# Patient Record
Sex: FEMALE | Race: WHITE
[De-identification: names, ages, dates, MRNs, and addresses within clinical notes are randomized per-mention and may not be internally consistent; named-entity substitution may affect disease eponyms.]

---

## 2017-01-19 ENCOUNTER — RX RENEWAL (OUTPATIENT)
Age: 82
End: 2017-01-19

## 2017-03-16 ENCOUNTER — APPOINTMENT (OUTPATIENT)
Dept: FAMILY MEDICINE | Facility: CLINIC | Age: 82
End: 2017-03-16

## 2017-03-16 VITALS
HEIGHT: 60 IN | SYSTOLIC BLOOD PRESSURE: 140 MMHG | WEIGHT: 129.38 LBS | DIASTOLIC BLOOD PRESSURE: 78 MMHG | BODY MASS INDEX: 25.4 KG/M2

## 2017-03-27 ENCOUNTER — RX RENEWAL (OUTPATIENT)
Age: 82
End: 2017-03-27

## 2017-06-14 ENCOUNTER — FORM ENCOUNTER (OUTPATIENT)
Age: 82
End: 2017-06-14

## 2017-06-14 ENCOUNTER — LABORATORY RESULT (OUTPATIENT)
Age: 82
End: 2017-06-14

## 2017-06-14 ENCOUNTER — APPOINTMENT (OUTPATIENT)
Dept: FAMILY MEDICINE | Facility: CLINIC | Age: 82
End: 2017-06-14

## 2017-06-14 VITALS
OXYGEN SATURATION: 95 % | DIASTOLIC BLOOD PRESSURE: 80 MMHG | SYSTOLIC BLOOD PRESSURE: 148 MMHG | RESPIRATION RATE: 16 BRPM | HEIGHT: 60 IN | WEIGHT: 129.13 LBS | BODY MASS INDEX: 25.35 KG/M2 | HEART RATE: 87 BPM

## 2017-06-15 ENCOUNTER — APPOINTMENT (OUTPATIENT)
Dept: RADIOLOGY | Facility: CLINIC | Age: 82
End: 2017-06-15

## 2017-06-15 ENCOUNTER — OUTPATIENT (OUTPATIENT)
Dept: OUTPATIENT SERVICES | Facility: HOSPITAL | Age: 82
LOS: 1 days | End: 2017-06-15
Payer: MEDICARE

## 2017-06-15 DIAGNOSIS — Z00.8 ENCOUNTER FOR OTHER GENERAL EXAMINATION: ICD-10-CM

## 2017-06-15 LAB
25(OH)D3 SERPL-MCNC: 60.4 NG/ML
ALBUMIN SERPL ELPH-MCNC: 4.2 G/DL
ALP BLD-CCNC: 106 U/L
ALT SERPL-CCNC: 18 U/L
ANION GAP SERPL CALC-SCNC: 14 MMOL/L
AST SERPL-CCNC: 35 U/L
BASOPHILS # BLD AUTO: 0.1 K/UL
BASOPHILS NFR BLD AUTO: 0.6 %
BILIRUB SERPL-MCNC: 0.8 MG/DL
BUN SERPL-MCNC: 23 MG/DL
CALCIUM SERPL-MCNC: 10.7 MG/DL
CHLORIDE SERPL-SCNC: 96 MMOL/L
CHOLEST SERPL-MCNC: 187 MG/DL
CHOLEST/HDLC SERPL: 3.4 RATIO
CO2 SERPL-SCNC: 27 MMOL/L
CREAT SERPL-MCNC: 0.84 MG/DL
EOSINOPHIL # BLD AUTO: 0.63 K/UL
EOSINOPHIL NFR BLD AUTO: 3.9 %
FOLATE SERPL-MCNC: >20 NG/ML
GLUCOSE SERPL-MCNC: 90 MG/DL
HCT VFR BLD CALC: 46.4 %
HDLC SERPL-MCNC: 55 MG/DL
HGB BLD-MCNC: 14.9 G/DL
IMM GRANULOCYTES NFR BLD AUTO: 0.3 %
LDLC SERPL CALC-MCNC: 107 MG/DL
LYMPHOCYTES # BLD AUTO: 1.39 K/UL
LYMPHOCYTES NFR BLD AUTO: 8.6 %
MAN DIFF?: NORMAL
MCHC RBC-ENTMCNC: 22.9 PG
MCHC RBC-ENTMCNC: 32.1 GM/DL
MCV RBC AUTO: 71.2 FL
MONOCYTES # BLD AUTO: 0.73 K/UL
MONOCYTES NFR BLD AUTO: 4.5 %
NEUTROPHILS # BLD AUTO: 13.2 K/UL
NEUTROPHILS NFR BLD AUTO: 82.1 %
PLATELET # BLD AUTO: 641 K/UL
POTASSIUM SERPL-SCNC: 4.5 MMOL/L
PROT SERPL-MCNC: 8.6 G/DL
RBC # BLD: 6.52 M/UL
RBC # FLD: 19.3 %
SODIUM SERPL-SCNC: 137 MMOL/L
TRIGL SERPL-MCNC: 125 MG/DL
TSH SERPL-ACNC: 2.93 UIU/ML
VIT B12 SERPL-MCNC: 1322 PG/ML
WBC # FLD AUTO: 16.1 K/UL

## 2017-06-15 PROCEDURE — 77080 DXA BONE DENSITY AXIAL: CPT

## 2017-06-28 ENCOUNTER — APPOINTMENT (OUTPATIENT)
Dept: FAMILY MEDICINE | Facility: CLINIC | Age: 82
End: 2017-06-28

## 2017-06-28 DIAGNOSIS — Z87.39 PERSONAL HISTORY OF OTHER DISEASES OF THE MUSCULOSKELETAL SYSTEM AND CONNECTIVE TISSUE: ICD-10-CM

## 2017-07-17 ENCOUNTER — APPOINTMENT (OUTPATIENT)
Dept: FAMILY MEDICINE | Facility: CLINIC | Age: 82
End: 2017-07-17

## 2017-07-17 VITALS
SYSTOLIC BLOOD PRESSURE: 144 MMHG | HEIGHT: 60 IN | BODY MASS INDEX: 25.52 KG/M2 | WEIGHT: 130 LBS | HEART RATE: 91 BPM | DIASTOLIC BLOOD PRESSURE: 70 MMHG

## 2017-07-19 ENCOUNTER — LABORATORY RESULT (OUTPATIENT)
Age: 82
End: 2017-07-19

## 2017-07-19 ENCOUNTER — APPOINTMENT (OUTPATIENT)
Dept: FAMILY MEDICINE | Facility: CLINIC | Age: 82
End: 2017-07-19

## 2017-07-19 VITALS
TEMPERATURE: 98.4 F | HEIGHT: 60 IN | DIASTOLIC BLOOD PRESSURE: 62 MMHG | BODY MASS INDEX: 25.52 KG/M2 | WEIGHT: 130 LBS | SYSTOLIC BLOOD PRESSURE: 136 MMHG

## 2017-07-20 LAB
BASOPHILS # BLD AUTO: 0.13 K/UL
BASOPHILS NFR BLD AUTO: 0.8 %
EOSINOPHIL # BLD AUTO: 0.79 K/UL
EOSINOPHIL NFR BLD AUTO: 5 %
HCT VFR BLD CALC: 41.6 %
HGB BLD-MCNC: 13.4 G/DL
IMM GRANULOCYTES NFR BLD AUTO: 0.4 %
LYMPHOCYTES # BLD AUTO: 1.26 K/UL
LYMPHOCYTES NFR BLD AUTO: 8 %
MAN DIFF?: NORMAL
MCHC RBC-ENTMCNC: 22.7 PG
MCHC RBC-ENTMCNC: 32.2 GM/DL
MCV RBC AUTO: 70.4 FL
MONOCYTES # BLD AUTO: 0.75 K/UL
MONOCYTES NFR BLD AUTO: 4.7 %
NEUTROPHILS # BLD AUTO: 12.82 K/UL
NEUTROPHILS NFR BLD AUTO: 81.1 %
PLATELET # BLD AUTO: 688 K/UL
RBC # BLD: 5.91 M/UL
RBC # FLD: 18 %
WBC # FLD AUTO: 15.81 K/UL

## 2017-08-04 ENCOUNTER — APPOINTMENT (OUTPATIENT)
Dept: FAMILY MEDICINE | Facility: CLINIC | Age: 82
End: 2017-08-04
Payer: MEDICARE

## 2017-08-04 ENCOUNTER — RX RENEWAL (OUTPATIENT)
Age: 82
End: 2017-08-04

## 2017-08-04 VITALS
DIASTOLIC BLOOD PRESSURE: 80 MMHG | SYSTOLIC BLOOD PRESSURE: 130 MMHG | BODY MASS INDEX: 26.21 KG/M2 | TEMPERATURE: 98.4 F | WEIGHT: 133.5 LBS | HEIGHT: 60 IN

## 2017-08-04 DIAGNOSIS — N95.2 POSTMENOPAUSAL ATROPHIC VAGINITIS: ICD-10-CM

## 2017-08-04 LAB
BILIRUB UR QL STRIP: NEGATIVE
CLARITY UR: CLEAR
COLLECTION METHOD: NORMAL
GLUCOSE UR-MCNC: NEGATIVE
HCG UR QL: 0.2 EU/DL
HGB UR QL STRIP.AUTO: ABNORMAL
KETONES UR-MCNC: NEGATIVE
LEUKOCYTE ESTERASE UR QL STRIP: ABNORMAL
NITRITE UR QL STRIP: NEGATIVE
PH UR STRIP: 6.5
PROT UR STRIP-MCNC: ABNORMAL
SP GR UR STRIP: 1.02

## 2017-08-04 PROCEDURE — 81003 URINALYSIS AUTO W/O SCOPE: CPT | Mod: QW

## 2017-08-04 PROCEDURE — 99214 OFFICE O/P EST MOD 30 MIN: CPT

## 2017-08-04 RX ORDER — CEPHALEXIN 500 MG/1
500 CAPSULE ORAL 4 TIMES DAILY
Qty: 40 | Refills: 1 | Status: DISCONTINUED | COMMUNITY
Start: 2017-07-17 | End: 2017-08-04

## 2017-08-07 LAB — BACTERIA UR CULT: ABNORMAL

## 2017-08-23 ENCOUNTER — RX RENEWAL (OUTPATIENT)
Age: 82
End: 2017-08-23

## 2017-09-11 ENCOUNTER — MED ADMIN CHARGE (OUTPATIENT)
Age: 82
End: 2017-09-11

## 2017-09-11 ENCOUNTER — APPOINTMENT (OUTPATIENT)
Dept: FAMILY MEDICINE | Facility: CLINIC | Age: 82
End: 2017-09-11
Payer: MEDICARE

## 2017-09-11 VITALS — TEMPERATURE: 98.1 F

## 2017-09-11 PROCEDURE — 90662 IIV NO PRSV INCREASED AG IM: CPT

## 2017-09-11 PROCEDURE — G0008: CPT

## 2017-10-05 ENCOUNTER — LABORATORY RESULT (OUTPATIENT)
Age: 82
End: 2017-10-05

## 2017-10-05 ENCOUNTER — FORM ENCOUNTER (OUTPATIENT)
Age: 82
End: 2017-10-05

## 2017-10-06 ENCOUNTER — APPOINTMENT (OUTPATIENT)
Dept: RADIOLOGY | Facility: CLINIC | Age: 82
End: 2017-10-06

## 2017-10-06 ENCOUNTER — APPOINTMENT (OUTPATIENT)
Dept: RADIOLOGY | Facility: CLINIC | Age: 82
End: 2017-10-06
Payer: MEDICARE

## 2017-10-06 ENCOUNTER — OUTPATIENT (OUTPATIENT)
Dept: OUTPATIENT SERVICES | Facility: HOSPITAL | Age: 82
LOS: 1 days | End: 2017-10-06
Payer: MEDICARE

## 2017-10-06 ENCOUNTER — APPOINTMENT (OUTPATIENT)
Dept: FAMILY MEDICINE | Facility: CLINIC | Age: 82
End: 2017-10-06
Payer: MEDICARE

## 2017-10-06 VITALS
BODY MASS INDEX: 24.76 KG/M2 | HEIGHT: 60 IN | HEART RATE: 91 BPM | OXYGEN SATURATION: 95 % | DIASTOLIC BLOOD PRESSURE: 78 MMHG | SYSTOLIC BLOOD PRESSURE: 140 MMHG | WEIGHT: 126.13 LBS

## 2017-10-06 DIAGNOSIS — Z00.8 ENCOUNTER FOR OTHER GENERAL EXAMINATION: ICD-10-CM

## 2017-10-06 PROCEDURE — 99214 OFFICE O/P EST MOD 30 MIN: CPT | Mod: 25

## 2017-10-06 PROCEDURE — 73060 X-RAY EXAM OF HUMERUS: CPT | Mod: 26,RT

## 2017-10-06 PROCEDURE — 36415 COLL VENOUS BLD VENIPUNCTURE: CPT

## 2017-10-06 PROCEDURE — 73030 X-RAY EXAM OF SHOULDER: CPT

## 2017-10-06 PROCEDURE — 73060 X-RAY EXAM OF HUMERUS: CPT

## 2017-10-06 PROCEDURE — 73030 X-RAY EXAM OF SHOULDER: CPT | Mod: 26,RT

## 2017-10-06 RX ORDER — GLYCOPYRROLATE AND FORMOTEROL FUMARATE 9; 4.8 UG/1; UG/1
9-4.8 AEROSOL, METERED RESPIRATORY (INHALATION)
Refills: 0 | Status: DISCONTINUED | COMMUNITY
End: 2017-10-06

## 2017-10-06 RX ORDER — DENOSUMAB 60 MG/ML
60 INJECTION SUBCUTANEOUS
Qty: 1 | Refills: 0 | Status: DISCONTINUED | COMMUNITY
Start: 2017-03-16 | End: 2017-10-06

## 2017-10-06 RX ORDER — AMOXICILLIN 875 MG/1
875 TABLET, FILM COATED ORAL
Qty: 10 | Refills: 1 | Status: DISCONTINUED | COMMUNITY
Start: 2017-08-07 | End: 2017-10-06

## 2017-10-06 RX ORDER — SULFAMETHOXAZOLE AND TRIMETHOPRIM 800; 160 MG/1; MG/1
800-160 TABLET ORAL TWICE DAILY
Qty: 10 | Refills: 3 | Status: DISCONTINUED | COMMUNITY
Start: 2017-08-04 | End: 2017-10-06

## 2017-10-08 LAB
ALBUMIN SERPL ELPH-MCNC: 3.7 G/DL
ALP BLD-CCNC: 127 U/L
ALT SERPL-CCNC: 8 U/L
ANION GAP SERPL CALC-SCNC: 17 MMOL/L
AST SERPL-CCNC: 30 U/L
BASOPHILS # BLD AUTO: 0.16 K/UL
BASOPHILS NFR BLD AUTO: 0.8 %
BILIRUB SERPL-MCNC: 0.6 MG/DL
BUN SERPL-MCNC: 31 MG/DL
CALCIUM SERPL-MCNC: 10.8 MG/DL
CHLORIDE SERPL-SCNC: 97 MMOL/L
CO2 SERPL-SCNC: 23 MMOL/L
CREAT SERPL-MCNC: 0.93 MG/DL
EOSINOPHIL # BLD AUTO: 2.25 K/UL
EOSINOPHIL NFR BLD AUTO: 10.9 %
GLUCOSE SERPL-MCNC: 82 MG/DL
HBA1C MFR BLD HPLC: 5.6 %
HCT VFR BLD CALC: 43.9 %
HGB BLD-MCNC: 13.5 G/DL
IMM GRANULOCYTES NFR BLD AUTO: 0.2 %
LYMPHOCYTES # BLD AUTO: 1.14 K/UL
LYMPHOCYTES NFR BLD AUTO: 5.5 %
MAN DIFF?: NORMAL
MCHC RBC-ENTMCNC: 21.6 PG
MCHC RBC-ENTMCNC: 30.8 GM/DL
MCV RBC AUTO: 70.2 FL
MONOCYTES # BLD AUTO: 0.96 K/UL
MONOCYTES NFR BLD AUTO: 4.7 %
NEUTROPHILS # BLD AUTO: 16.06 K/UL
NEUTROPHILS NFR BLD AUTO: 77.9 %
PLATELET # BLD AUTO: 802 K/UL
POTASSIUM SERPL-SCNC: 5.7 MMOL/L
PROT SERPL-MCNC: 8.4 G/DL
RBC # BLD: 6.25 M/UL
RBC # FLD: 17.8 %
SODIUM SERPL-SCNC: 137 MMOL/L
WBC # FLD AUTO: 20.61 K/UL

## 2017-10-16 ENCOUNTER — RX RENEWAL (OUTPATIENT)
Age: 82
End: 2017-10-16

## 2017-12-21 ENCOUNTER — APPOINTMENT (OUTPATIENT)
Dept: FAMILY MEDICINE | Facility: CLINIC | Age: 82
End: 2017-12-21
Payer: MEDICARE

## 2017-12-21 VITALS
HEIGHT: 60 IN | DIASTOLIC BLOOD PRESSURE: 62 MMHG | SYSTOLIC BLOOD PRESSURE: 142 MMHG | BODY MASS INDEX: 26.5 KG/M2 | WEIGHT: 135 LBS

## 2017-12-21 DIAGNOSIS — I34.0 NONRHEUMATIC MITRAL (VALVE) INSUFFICIENCY: ICD-10-CM

## 2017-12-21 PROCEDURE — 99214 OFFICE O/P EST MOD 30 MIN: CPT

## 2017-12-22 ENCOUNTER — RX RENEWAL (OUTPATIENT)
Age: 82
End: 2017-12-22

## 2017-12-24 ENCOUNTER — INPATIENT (INPATIENT)
Facility: HOSPITAL | Age: 82
LOS: 9 days | Discharge: HOME HEALTH | End: 2018-01-03
Attending: FAMILY MEDICINE | Admitting: FAMILY MEDICINE
Payer: MEDICARE

## 2017-12-24 VITALS
DIASTOLIC BLOOD PRESSURE: 66 MMHG | TEMPERATURE: 99 F | HEART RATE: 101 BPM | RESPIRATION RATE: 17 BRPM | SYSTOLIC BLOOD PRESSURE: 136 MMHG | OXYGEN SATURATION: 79 %

## 2017-12-24 LAB
ADD ON TEST-SPECIMEN IN LAB: SIGNIFICANT CHANGE UP
ALBUMIN SERPL ELPH-MCNC: 2 G/DL — LOW (ref 3.3–5)
ALP SERPL-CCNC: 75 U/L — SIGNIFICANT CHANGE UP (ref 40–120)
ALT FLD-CCNC: 15 U/L — SIGNIFICANT CHANGE UP (ref 12–78)
ANION GAP SERPL CALC-SCNC: 6 MMOL/L — SIGNIFICANT CHANGE UP (ref 5–17)
AST SERPL-CCNC: 28 U/L — SIGNIFICANT CHANGE UP (ref 15–37)
BASE EXCESS BLDA CALC-SCNC: 4.8 MMOL/L — HIGH (ref -2–2)
BILIRUB SERPL-MCNC: 0.3 MG/DL — SIGNIFICANT CHANGE UP (ref 0.2–1.2)
BLOOD GAS COMMENTS ARTERIAL: SIGNIFICANT CHANGE UP
BUN SERPL-MCNC: 29 MG/DL — HIGH (ref 7–23)
CALCIUM SERPL-MCNC: 9 MG/DL — SIGNIFICANT CHANGE UP (ref 8.5–10.1)
CHLORIDE SERPL-SCNC: 101 MMOL/L — SIGNIFICANT CHANGE UP (ref 96–108)
CK SERPL-CCNC: 35 U/L — SIGNIFICANT CHANGE UP (ref 26–192)
CO2 SERPL-SCNC: 30 MMOL/L — SIGNIFICANT CHANGE UP (ref 22–31)
CREAT SERPL-MCNC: 0.7 MG/DL — SIGNIFICANT CHANGE UP (ref 0.5–1.3)
FLUAV H1 2009 PAND RNA SPEC QL NAA+PROBE: DETECTED
GAS PNL BLDA: SIGNIFICANT CHANGE UP
GLUCOSE SERPL-MCNC: 99 MG/DL — SIGNIFICANT CHANGE UP (ref 70–99)
HCO3 BLDA-SCNC: 31 MMOL/L — HIGH (ref 21–29)
HCT VFR BLD CALC: 34.4 % — LOW (ref 34.5–45)
HGB BLD-MCNC: 10.5 G/DL — LOW (ref 11.5–15.5)
LACTATE SERPL-SCNC: 1.9 MMOL/L — SIGNIFICANT CHANGE UP (ref 0.7–2)
MCHC RBC-ENTMCNC: 20 PG — LOW (ref 27–34)
MCHC RBC-ENTMCNC: 30.5 GM/DL — LOW (ref 32–36)
MCV RBC AUTO: 65.6 FL — LOW (ref 80–100)
NT-PROBNP SERPL-SCNC: 679 PG/ML — HIGH (ref 0–450)
NT-PROBNP SERPL-SCNC: 831 PG/ML — HIGH (ref 0–450)
PCO2 BLDA: 57 MMHG — HIGH (ref 32–46)
PH BLDA: 7.35 — SIGNIFICANT CHANGE UP (ref 7.35–7.45)
PLATELET # BLD AUTO: 516 K/UL — HIGH (ref 150–400)
PO2 BLDA: 115 MMHG — HIGH (ref 74–108)
POTASSIUM SERPL-MCNC: 3.8 MMOL/L — SIGNIFICANT CHANGE UP (ref 3.5–5.3)
POTASSIUM SERPL-SCNC: 3.8 MMOL/L — SIGNIFICANT CHANGE UP (ref 3.5–5.3)
PROT SERPL-MCNC: 6.8 GM/DL — SIGNIFICANT CHANGE UP (ref 6–8.3)
RAPID RVP RESULT: DETECTED
RBC # BLD: 5.25 M/UL — HIGH (ref 3.8–5.2)
RBC # FLD: 18.7 % — HIGH (ref 10.3–14.5)
SAO2 % BLDA: 98 % — HIGH (ref 92–96)
SODIUM SERPL-SCNC: 137 MMOL/L — SIGNIFICANT CHANGE UP (ref 135–145)
TROPONIN I SERPL-MCNC: 0.02 NG/ML — SIGNIFICANT CHANGE UP (ref 0.01–0.04)
WBC # BLD: 15.1 K/UL — HIGH (ref 3.8–10.5)
WBC # FLD AUTO: 15.1 K/UL — HIGH (ref 3.8–10.5)

## 2017-12-24 PROCEDURE — 93010 ELECTROCARDIOGRAM REPORT: CPT

## 2017-12-24 PROCEDURE — 71250 CT THORAX DX C-: CPT | Mod: 26

## 2017-12-24 PROCEDURE — 71010: CPT | Mod: 26

## 2017-12-24 PROCEDURE — 99285 EMERGENCY DEPT VISIT HI MDM: CPT

## 2017-12-24 RX ORDER — CEFEPIME 1 G/1
1000 INJECTION, POWDER, FOR SOLUTION INTRAMUSCULAR; INTRAVENOUS EVERY 8 HOURS
Qty: 0 | Refills: 0 | Status: DISCONTINUED | OUTPATIENT
Start: 2017-12-24 | End: 2017-12-24

## 2017-12-24 RX ORDER — CEFEPIME 1 G/1
2000 INJECTION, POWDER, FOR SOLUTION INTRAMUSCULAR; INTRAVENOUS EVERY 12 HOURS
Qty: 0 | Refills: 0 | Status: COMPLETED | OUTPATIENT
Start: 2017-12-24 | End: 2017-12-29

## 2017-12-24 RX ORDER — ACETAMINOPHEN 500 MG
650 TABLET ORAL EVERY 6 HOURS
Qty: 0 | Refills: 0 | Status: DISCONTINUED | OUTPATIENT
Start: 2017-12-24 | End: 2018-01-03

## 2017-12-24 RX ORDER — VANCOMYCIN HCL 1 G
VIAL (EA) INTRAVENOUS
Qty: 0 | Refills: 0 | Status: DISCONTINUED | OUTPATIENT
Start: 2017-12-24 | End: 2017-12-24

## 2017-12-24 RX ORDER — AMLODIPINE BESYLATE 2.5 MG/1
10 TABLET ORAL DAILY
Qty: 0 | Refills: 0 | Status: DISCONTINUED | OUTPATIENT
Start: 2017-12-24 | End: 2018-01-03

## 2017-12-24 RX ORDER — VANCOMYCIN HCL 1 G
1000 VIAL (EA) INTRAVENOUS ONCE
Qty: 0 | Refills: 0 | Status: COMPLETED | OUTPATIENT
Start: 2017-12-24 | End: 2017-12-24

## 2017-12-24 RX ORDER — IPRATROPIUM/ALBUTEROL SULFATE 18-103MCG
3 AEROSOL WITH ADAPTER (GRAM) INHALATION EVERY 6 HOURS
Qty: 0 | Refills: 0 | Status: DISCONTINUED | OUTPATIENT
Start: 2017-12-24 | End: 2018-01-03

## 2017-12-24 RX ORDER — ASPIRIN/CALCIUM CARB/MAGNESIUM 324 MG
81 TABLET ORAL DAILY
Qty: 0 | Refills: 0 | Status: DISCONTINUED | OUTPATIENT
Start: 2017-12-24 | End: 2018-01-03

## 2017-12-24 RX ORDER — CEFEPIME 1 G/1
1000 INJECTION, POWDER, FOR SOLUTION INTRAMUSCULAR; INTRAVENOUS ONCE
Qty: 0 | Refills: 0 | Status: COMPLETED | OUTPATIENT
Start: 2017-12-24 | End: 2017-12-24

## 2017-12-24 RX ORDER — BUDESONIDE, MICRONIZED 100 %
1 POWDER (GRAM) MISCELLANEOUS
Qty: 0 | Refills: 0 | Status: DISCONTINUED | OUTPATIENT
Start: 2017-12-24 | End: 2017-12-25

## 2017-12-24 RX ORDER — ALBUTEROL 90 UG/1
2.5 AEROSOL, METERED ORAL ONCE
Qty: 0 | Refills: 0 | Status: COMPLETED | OUTPATIENT
Start: 2017-12-24 | End: 2017-12-24

## 2017-12-24 RX ORDER — ENOXAPARIN SODIUM 100 MG/ML
40 INJECTION SUBCUTANEOUS EVERY 24 HOURS
Qty: 0 | Refills: 0 | Status: DISCONTINUED | OUTPATIENT
Start: 2017-12-24 | End: 2018-01-03

## 2017-12-24 RX ORDER — ONDANSETRON 8 MG/1
4 TABLET, FILM COATED ORAL EVERY 6 HOURS
Qty: 0 | Refills: 0 | Status: DISCONTINUED | OUTPATIENT
Start: 2017-12-24 | End: 2018-01-03

## 2017-12-24 RX ORDER — LEVOTHYROXINE SODIUM 125 MCG
100 TABLET ORAL DAILY
Qty: 0 | Refills: 0 | Status: DISCONTINUED | OUTPATIENT
Start: 2017-12-25 | End: 2018-01-03

## 2017-12-24 RX ORDER — VANCOMYCIN HCL 1 G
750 VIAL (EA) INTRAVENOUS EVERY 12 HOURS
Qty: 0 | Refills: 0 | Status: DISCONTINUED | OUTPATIENT
Start: 2017-12-24 | End: 2017-12-29

## 2017-12-24 RX ORDER — MONTELUKAST 4 MG/1
10 TABLET, CHEWABLE ORAL DAILY
Qty: 0 | Refills: 0 | Status: DISCONTINUED | OUTPATIENT
Start: 2017-12-24 | End: 2018-01-03

## 2017-12-24 RX ADMIN — ENOXAPARIN SODIUM 40 MILLIGRAM(S): 100 INJECTION SUBCUTANEOUS at 17:57

## 2017-12-24 RX ADMIN — Medication 150 MILLIGRAM(S): at 18:39

## 2017-12-24 RX ADMIN — Medication 1 PUFF(S): at 20:37

## 2017-12-24 RX ADMIN — ALBUTEROL 2.5 MILLIGRAM(S): 90 AEROSOL, METERED ORAL at 01:25

## 2017-12-24 RX ADMIN — CEFEPIME 100 MILLIGRAM(S): 1 INJECTION, POWDER, FOR SOLUTION INTRAMUSCULAR; INTRAVENOUS at 03:16

## 2017-12-24 RX ADMIN — Medication 75 MILLIGRAM(S): at 20:19

## 2017-12-24 RX ADMIN — Medication 250 MILLIGRAM(S): at 04:15

## 2017-12-24 RX ADMIN — AMLODIPINE BESYLATE 10 MILLIGRAM(S): 2.5 TABLET ORAL at 12:54

## 2017-12-24 RX ADMIN — Medication 81 MILLIGRAM(S): at 12:56

## 2017-12-24 RX ADMIN — Medication 650 MILLIGRAM(S): at 23:30

## 2017-12-24 RX ADMIN — Medication 3 MILLILITER(S): at 20:37

## 2017-12-24 RX ADMIN — MONTELUKAST 10 MILLIGRAM(S): 4 TABLET, CHEWABLE ORAL at 12:55

## 2017-12-24 RX ADMIN — Medication 3 MILLILITER(S): at 14:14

## 2017-12-24 RX ADMIN — CEFEPIME 100 MILLIGRAM(S): 1 INJECTION, POWDER, FOR SOLUTION INTRAMUSCULAR; INTRAVENOUS at 18:00

## 2017-12-24 NOTE — ED PROVIDER NOTE - CONSTITUTIONAL, MLM
normal... well nourished, awake, alert, oriented to person, place, time/situation able to talk in full sentences.

## 2017-12-24 NOTE — H&P ADULT - HISTORY OF PRESENT ILLNESS
84 year old woman with PMHx of asthma, hypothyroidism, HTN who presents with worsening SOB.  Pt was recently discharged from [facility] for multiple rib fractures and PNA s/p 10 day course of levaquin, however still requiring oxygen.  Daughter noted pt to be hypoxic at home with sat  82%.    In ER: Pt Hypoxic, and placed on venti-mask. She was given Abx, and nebulizer therapy for presumed HCAP.      At bedside pt sleeping but arousable, and answering questions appropriately. Daughter at bedside. She is not confused but is comfortable yet weak. 84 year old woman with PMHx of MDS, asthma, hypothyroidism, HTN who presents with worsening SOB.  Pt was recently discharged from [facility] for multiple rib fractures and PNA s/p 10 day course of levaquin, however still requiring oxygen.  Daughter noted pt to be hypoxic at home with sat  82%.    In ER: Pt Hypoxic, and placed on venti-mask. She was given Abx, and nebulizer therapy for presumed HCAP.      At bedside pt sleeping but arousable, and answering questions appropriately. Daughter at bedside. She is not confused but is comfortable yet weak.

## 2017-12-24 NOTE — ED ADULT NURSE NOTE - OBJECTIVE STATEMENT
84 year old female presenting to the ED brought in by daughter with hypoxia. Patient's daughter acted as informant, reports that the patient was recently discharged from [facility] with multiple rib fractures to her left side, a left sided pleural effusion, and a left lobar pneumonia. Patient just recently completed a course of Levaquin. Post discharge, patient has been on 2 lpm NC home O2, but despite the O2, was hypoxic in the home with a SPO2 of 79%. Daughter also notes increased lethargy, weakness, and productive coughing fits. Patient states that she feels more tired, but otherwise denies any complaints. Patient afebrile. Lung sounds: wheeze bilaterally with rhonchi. Chest wall retractions noted. SPO2 81% on room air, up to 100% on 15 lpm NRB. 84 year old female presenting to the ED brought in by daughter with hypoxia. Patient's daughter acted as informant, reports that the patient was recently discharged from [facility] with multiple rib fractures to her left side, a left sided pleural effusion, and a left lobar pneumonia. Patient just recently completed a course of Levaquin. Post discharge, patient has been on 2 lpm NC home O2, but despite the O2, was hypoxic in the home with a SPO2 of 79%. Daughter also notes increased lethargy, weakness, and productive coughing fits. Patient states that she feels more tired, but otherwise denies any complaints. Patient afebrile. Lung sounds: wheeze bilaterally with rhonchi. Chest wall retractions noted. SPO2 81% on room air, up to 100% on 15 lpm NRB. MD Gustafson at bedside during initial assessment.

## 2017-12-24 NOTE — ED ADULT NURSE REASSESSMENT NOTE - NS ED NURSE REASSESS COMMENT FT1
Pt provided with hot breakfast tray. Pt placed on 4L NC while she eats. Pt O2 sat 92% on 4L. Pt is admitted with orders awaiting a bed on SD unit. Pt updated on plan of care.

## 2017-12-24 NOTE — H&P ADULT - PMH
Asthma, unspecified asthma severity, unspecified whether complicated, unspecified whether persistent    Essential hypertension    Hypothyroidism, unspecified type    Rib fractures Asthma, unspecified asthma severity, unspecified whether complicated, unspecified whether persistent    Essential hypertension    Hypothyroidism, unspecified type    MDS (myelodysplastic syndrome)    Rib fractures

## 2017-12-24 NOTE — ED PROVIDER NOTE - RESPIRATORY, MLM
Decreased BS Lt side.  (+) diffuse wheezing throughout all lung fields.  (+) min accessory muscle use.

## 2017-12-24 NOTE — H&P ADULT - NSHPLABSRESULTS_GEN_ALL_CORE
CARDIAC MARKERS ( 24 Dec 2017 07:38 )  0.022 ng/mL / x     / x     / x     / x      CARDIAC MARKERS ( 24 Dec 2017 04:45 )  0.018 ng/mL / x     / x     / x     / x      CARDIAC MARKERS ( 24 Dec 2017 01:30 )  0.018 ng/mL / x     / 35 U/L / x     / x                                10.5   15.1  )-----------( 516      ( 24 Dec 2017 01:30 )             34.4     12-24    137  |  101  |  29<H>  ----------------------------<  99  3.8   |  30  |  0.70    Ca    9.0      24 Dec 2017 01:30    TPro  6.8  /  Alb  2.0<L>  /  TBili  0.3  /  DBili  x   /  AST  28  /  ALT  15  /  AlkPhos  75  12-24    CAPILLARY BLOOD GLUCOSE        LIVER FUNCTIONS - ( 24 Dec 2017 01:30 )  Alb: 2.0 g/dL / Pro: 6.8 gm/dL / ALK PHOS: 75 U/L / ALT: 15 U/L / AST: 28 U/L / GGT: x               ABG - ( 24 Dec 2017 03:11 )  pH: 7.35  /  pCO2: 57    /  pO2: 115   / HCO3: 31    / Base Excess: 4.8   /  SaO2: 98

## 2017-12-24 NOTE — CONSULT NOTE ADULT - ASSESSMENT
84 year old woman with PMHx of MDS, asthma, hypothyroidism, HTN who presents with worsening SOB.  Pt was recently discharged from [facility] for multiple rib fractures and PNA s/p 10 day course of levaquin, however still requiring oxygen.  Daughter noted pt to be hypoxic at home with sat  82%. In ER: Pt Hypoxic, and placed on venti-mask, wbc elevated to 15.1, RVP positive for influenza, CT chest w/o contrast showed bronchiectasis/multifocal pna/b/l small pleural effusions, was given IV vancomycin/cefepime d/t concern for infection and started on tamiflu/droplet precautions. Her sister is currently sick at home who she has been residing with    1. sepsis/hypoxic resp failure/influenza/multifocal pna w/ bronchiectasis/MDS/asthma  - agree with IV vancomcyin [id number], check trough prior to 4th dose #2  - continue with IV cefepime [id number] for gnr resistant coverage #2  - f/u cultures  - monitor temps  - check legionella ag/mycoplasma serology  - completed 10 day course of levaquin for atypical coverage already  - on tamiflu 75 mg BID for influenza - for 5 day course  - on droplet precautions  -tolerating abx well so far; no side effects noted  -reason for abx use and side effects reviewed with patient  - supportive care  - f/u cbc    2. other issues -care per medicine

## 2017-12-24 NOTE — ED ADULT NURSE REASSESSMENT NOTE - NS ED NURSE REASSESS COMMENT FT1
Received care of pt A&Ox3 from Castro Tapia RN with daughter at the bedside. Pt is admitted awaiting orders from hospitalist. Pt and daughter updated on plan of care.

## 2017-12-24 NOTE — H&P ADULT - NSHPPHYSICALEXAM_GEN_ALL_CORE
Vital Signs Last 24 Hrs  T(C): 37.1 (24 Dec 2017 00:30), Max: 37.1 (24 Dec 2017 00:30)  T(F): 98.7 (24 Dec 2017 00:30), Max: 98.7 (24 Dec 2017 00:30)  HR: 86 (24 Dec 2017 07:00) (86 - 110)  BP: 121/54 (24 Dec 2017 07:00) (110/55 - 136/66)  BP(mean): --  RR: 19 (24 Dec 2017 07:00) (17 - 21)  SpO2: 98% (24 Dec 2017 07:00) (79% - 100%)    PHYSICAL EXAM:    Constitutional: NAD, afrail, asleep  HEENT: PERR, EOMI, Normal Hearing, MMM  Neck: Soft and supple  Respiratory: right sided rhonchi with faint exp wheezing.  Cardiovascular: S1 and S2, regular rate and rhythm, no Murmurs, gallops or rubs  Gastrointestinal: Bowel Sounds present, soft, nontender, nondistended, no guarding, no rebound  Extremities: No peripheral edema  Neurological: A/O x 3, no focal deficits  Skin: No rashes

## 2017-12-24 NOTE — ED PROVIDER NOTE - MEDICAL DECISION MAKING DETAILS
Pt with hypoxia, pn after recent admission.  Case DW Dr. Lakhani and pt will be admitted to the hospitalist service.

## 2017-12-24 NOTE — ED PROVIDER NOTE - OBJECTIVE STATEMENT
84 F with co SOb that has gotten worse throughout the day.  Pt was recently seen at [facility] for multiple rib fractures and PN.  She completed 10 days of Levaquin yesterday.  As per daughter, Pt started to get more and more SOB.  NO co HA , dizziness, cp , abd pain, fever, nvd.   Daughter noted pt to be hypoxic at home with sat  82%.

## 2017-12-25 DIAGNOSIS — J11.1 INFLUENZA DUE TO UNIDENTIFIED INFLUENZA VIRUS WITH OTHER RESPIRATORY MANIFESTATIONS: ICD-10-CM

## 2017-12-25 DIAGNOSIS — S22.39XA FRACTURE OF ONE RIB, UNSPECIFIED SIDE, INITIAL ENCOUNTER FOR CLOSED FRACTURE: ICD-10-CM

## 2017-12-25 DIAGNOSIS — J45.909 UNSPECIFIED ASTHMA, UNCOMPLICATED: ICD-10-CM

## 2017-12-25 DIAGNOSIS — I10 ESSENTIAL (PRIMARY) HYPERTENSION: ICD-10-CM

## 2017-12-25 DIAGNOSIS — E03.9 HYPOTHYROIDISM, UNSPECIFIED: ICD-10-CM

## 2017-12-25 DIAGNOSIS — J18.9 PNEUMONIA, UNSPECIFIED ORGANISM: ICD-10-CM

## 2017-12-25 DIAGNOSIS — J96.91 RESPIRATORY FAILURE, UNSPECIFIED WITH HYPOXIA: ICD-10-CM

## 2017-12-25 DIAGNOSIS — D56.3 THALASSEMIA MINOR: ICD-10-CM

## 2017-12-25 DIAGNOSIS — D46.9 MYELODYSPLASTIC SYNDROME, UNSPECIFIED: ICD-10-CM

## 2017-12-25 LAB
ANION GAP SERPL CALC-SCNC: 4 MMOL/L — LOW (ref 5–17)
BASE EXCESS BLDA CALC-SCNC: 5.6 MMOL/L — HIGH (ref -2–2)
BLOOD GAS COMMENTS ARTERIAL: SIGNIFICANT CHANGE UP
BUN SERPL-MCNC: 24 MG/DL — HIGH (ref 7–23)
CALCIUM SERPL-MCNC: 8.4 MG/DL — LOW (ref 8.5–10.1)
CHLORIDE SERPL-SCNC: 102 MMOL/L — SIGNIFICANT CHANGE UP (ref 96–108)
CO2 SERPL-SCNC: 33 MMOL/L — HIGH (ref 22–31)
CREAT SERPL-MCNC: 0.63 MG/DL — SIGNIFICANT CHANGE UP (ref 0.5–1.3)
FERRITIN SERPL-MCNC: 178 NG/ML — HIGH (ref 15–150)
FOLATE SERPL-MCNC: >20 NG/ML — SIGNIFICANT CHANGE UP (ref 4.8–24.2)
GAS PNL BLDA: SIGNIFICANT CHANGE UP
GLUCOSE SERPL-MCNC: 80 MG/DL — SIGNIFICANT CHANGE UP (ref 70–99)
HCO3 BLDA-SCNC: 31 MMOL/L — HIGH (ref 21–29)
HCT VFR BLD CALC: 29.2 % — LOW (ref 34.5–45)
HGB BLD-MCNC: 8.6 G/DL — LOW (ref 11.5–15.5)
IRON SATN MFR SERPL: 13 UG/DL — LOW (ref 30–160)
IRON SATN MFR SERPL: 8 % — LOW (ref 14–50)
LEGIONELLA AG UR QL: NEGATIVE — SIGNIFICANT CHANGE UP
MAGNESIUM SERPL-MCNC: 1.9 MG/DL — SIGNIFICANT CHANGE UP (ref 1.6–2.6)
MCHC RBC-ENTMCNC: 19.7 PG — LOW (ref 27–34)
MCHC RBC-ENTMCNC: 29.5 GM/DL — LOW (ref 32–36)
MCV RBC AUTO: 66.8 FL — LOW (ref 80–100)
PCO2 BLDA: 52 MMHG — HIGH (ref 32–46)
PH BLDA: 7.39 — SIGNIFICANT CHANGE UP (ref 7.35–7.45)
PHOSPHATE SERPL-MCNC: 2.9 MG/DL — SIGNIFICANT CHANGE UP (ref 2.5–4.5)
PLATELET # BLD AUTO: 403 K/UL — HIGH (ref 150–400)
PO2 BLDA: 58 MMHG — LOW (ref 74–108)
POTASSIUM SERPL-MCNC: 4.1 MMOL/L — SIGNIFICANT CHANGE UP (ref 3.5–5.3)
POTASSIUM SERPL-SCNC: 4.1 MMOL/L — SIGNIFICANT CHANGE UP (ref 3.5–5.3)
RBC # BLD: 4.38 M/UL — SIGNIFICANT CHANGE UP (ref 3.8–5.2)
RBC # FLD: 18.1 % — HIGH (ref 10.3–14.5)
SAO2 % BLDA: 90 % — LOW (ref 92–96)
SODIUM SERPL-SCNC: 139 MMOL/L — SIGNIFICANT CHANGE UP (ref 135–145)
TIBC SERPL-MCNC: 168 UG/DL — LOW (ref 220–430)
UIBC SERPL-MCNC: 155 UG/DL — SIGNIFICANT CHANGE UP (ref 110–370)
VANCOMYCIN TROUGH SERPL-MCNC: 12.2 UG/ML — SIGNIFICANT CHANGE UP (ref 10–20)
VIT B12 SERPL-MCNC: 1393 PG/ML — HIGH (ref 232–1245)
WBC # BLD: 11.3 K/UL — HIGH (ref 3.8–10.5)
WBC # FLD AUTO: 11.3 K/UL — HIGH (ref 3.8–10.5)

## 2017-12-25 PROCEDURE — 99223 1ST HOSP IP/OBS HIGH 75: CPT

## 2017-12-25 RX ORDER — BUDESONIDE AND FORMOTEROL FUMARATE DIHYDRATE 160; 4.5 UG/1; UG/1
2 AEROSOL RESPIRATORY (INHALATION)
Qty: 0 | Refills: 0 | Status: DISCONTINUED | OUTPATIENT
Start: 2017-12-25 | End: 2018-01-03

## 2017-12-25 RX ORDER — FERROUS SULFATE 325(65) MG
325 TABLET ORAL DAILY
Qty: 0 | Refills: 0 | Status: DISCONTINUED | OUTPATIENT
Start: 2017-12-25 | End: 2018-01-03

## 2017-12-25 RX ADMIN — Medication 75 MILLIGRAM(S): at 05:04

## 2017-12-25 RX ADMIN — Medication 75 MILLIGRAM(S): at 18:05

## 2017-12-25 RX ADMIN — Medication 150 MILLIGRAM(S): at 05:04

## 2017-12-25 RX ADMIN — Medication 81 MILLIGRAM(S): at 13:41

## 2017-12-25 RX ADMIN — AMLODIPINE BESYLATE 10 MILLIGRAM(S): 2.5 TABLET ORAL at 05:04

## 2017-12-25 RX ADMIN — CEFEPIME 100 MILLIGRAM(S): 1 INJECTION, POWDER, FOR SOLUTION INTRAMUSCULAR; INTRAVENOUS at 05:03

## 2017-12-25 RX ADMIN — Medication 3 MILLILITER(S): at 08:15

## 2017-12-25 RX ADMIN — Medication 3 MILLILITER(S): at 14:09

## 2017-12-25 RX ADMIN — Medication 100 MICROGRAM(S): at 05:04

## 2017-12-25 RX ADMIN — Medication 60 MILLIGRAM(S): at 17:52

## 2017-12-25 RX ADMIN — BUDESONIDE AND FORMOTEROL FUMARATE DIHYDRATE 2 PUFF(S): 160; 4.5 AEROSOL RESPIRATORY (INHALATION) at 21:33

## 2017-12-25 RX ADMIN — CEFEPIME 100 MILLIGRAM(S): 1 INJECTION, POWDER, FOR SOLUTION INTRAMUSCULAR; INTRAVENOUS at 18:04

## 2017-12-25 RX ADMIN — MONTELUKAST 10 MILLIGRAM(S): 4 TABLET, CHEWABLE ORAL at 13:42

## 2017-12-25 RX ADMIN — Medication 325 MILLIGRAM(S): at 18:05

## 2017-12-25 RX ADMIN — Medication 150 MILLIGRAM(S): at 19:18

## 2017-12-25 RX ADMIN — Medication 3 MILLILITER(S): at 02:48

## 2017-12-25 RX ADMIN — ENOXAPARIN SODIUM 40 MILLIGRAM(S): 100 INJECTION SUBCUTANEOUS at 18:04

## 2017-12-25 RX ADMIN — Medication 3 MILLILITER(S): at 20:20

## 2017-12-25 NOTE — CONSULT NOTE ADULT - ASSESSMENT
Multifocal HCAP. Positive Influenza A.    Antibiotics per ID, tamiflu.  Asthma with bronchospasm.    O2, nebs, symbicort, singulair.  IV solumedrol 60 mg now then 40 mg IV q AM.   Acute Hypoxic respiratory failure, CO2 retention with metabolic compensation.    recheck ABG's.  O2, BIPAP prn.   h.o. recent rib fractures L, denies pain.   incentive spirometry. Multifocal HCAP. Positive Influenza A.    Antibiotics per ID, tamiflu.    Asthma with bronchospasm.    O2, nebs, symbicort, singulair.  IV solumedrol 60 mg now then 40 mg IV q AM.     Acute Hypoxic respiratory failure, CO2 retention with metabolic compensation.    recheck ABG's.  O2, BIPAP prn.     h.o. recent rib fractures L, denies pain.   incentive spirometry.

## 2017-12-25 NOTE — PROGRESS NOTE ADULT - SUBJECTIVE AND OBJECTIVE BOX
Patient is a 84y old  Female who presents with a chief complaint of SOB (24 Dec 2017 08:49)      HPI:  84 year old woman with PMHx of MDS, asthma, hypothyroidism, HTN who presents with worsening SOB.  Pt was recently discharged from [facility] for multiple rib fractures and PNA s/p 10 day course of levaquin, however still requiring oxygen.  Daughter noted pt to be hypoxic at home with sat  82%.    In ER: Pt Hypoxic, and placed on venti-mask. She was given Abx, and nebulizer therapy for presumed HCAP.      At bedside pt sleeping but arousable, and answering questions appropriately. Daughter at bedside. She is not confused but is comfortable yet weak. (24 Dec 2017 08:49)    12/25: Pt still requiring a lot of oxygen, on non-rebreather.  She appears comfortable, and is more alert than yesterday.  She is answering all questions appropriately.    Review of system- Rest of the review of system are normal except mentioned in HPI    Vital Signs Last 24 Hrs  T(C): 38.7 (25 Dec 2017 00:00), Max: 38.7 (25 Dec 2017 00:00)  T(F): 101.7 (25 Dec 2017 00:00), Max: 101.7 (25 Dec 2017 00:00)  HR: 91 (25 Dec 2017 14:10) (86 - 111)  BP: 122/46 (25 Dec 2017 06:00) (116/59 - 149/50)  BP(mean): 65 (25 Dec 2017 06:00) (59 - 113)  RR: 26 (25 Dec 2017 06:00) (19 - 30)  SpO2: 99% (25 Dec 2017 06:00) (84% - 100%)    PHYSICAL EXAM:    Constitutional: NAD, afrail, asleep  HEENT: PERR, EOMI, Normal Hearing, MMM  Neck: Soft and supple  Respiratory: rhonchi with faint exp wheezing again noted.  Cardiovascular: S1 and S2, regular rate and rhythm, no Murmurs, gallops or rubs  Gastrointestinal: Bowel Sounds present, soft, nontender, nondistended, no guarding, no rebound  Extremities: No peripheral edema  Neurological: A/O x 3, no focal deficits  Skin: No rashes    MEDICATIONS  (STANDING):  ALBUTerol/ipratropium for Nebulization 3 milliLiter(s) Nebulizer every 6 hours  amLODIPine   Tablet 10 milliGRAM(s) Oral daily  aspirin  chewable 81 milliGRAM(s) Oral daily  buDESOnide 160 MICROgram(s)/formoterol 4.5 MICROgram(s) Inhaler 2 Puff(s) Inhalation two times a day  cefepime  IVPB 2000 milliGRAM(s) IV Intermittent every 12 hours  enoxaparin Injectable 40 milliGRAM(s) SubCutaneous every 24 hours  levothyroxine 100 MICROGram(s) Oral daily  methylPREDNISolone sodium succinate Injectable 60 milliGRAM(s) IV Push once  montelukast 10 milliGRAM(s) Oral daily  oseltamivir 75 milliGRAM(s) Oral every 12 hours  vancomycin  IVPB 750 milliGRAM(s) IV Intermittent every 12 hours    MEDICATIONS  (PRN):  acetaminophen   Tablet 650 milliGRAM(s) Oral every 6 hours PRN For Temp greater than 38.5 C (101.3 F)  acetaminophen   Tablet. 650 milliGRAM(s) Oral every 6 hours PRN Moderate Pain (4 - 6)  guaiFENesin    Syrup 200 milliGRAM(s) Oral every 6 hours PRN Cough  ondansetron Injectable 4 milliGRAM(s) IV Push every 6 hours PRN Nausea and/or Vomiting    CARDIAC MARKERS ( 24 Dec 2017 07:38 )  0.022 ng/mL / x     / x     / x     / x      CARDIAC MARKERS ( 24 Dec 2017 04:45 )  0.018 ng/mL / x     / x     / x     / x      CARDIAC MARKERS ( 24 Dec 2017 01:30 )  0.018 ng/mL / x     / 35 U/L / x     / x                                8.6    11.3  )-----------( 403      ( 25 Dec 2017 04:36 )             29.2     12-25    139  |  102  |  24<H>  ----------------------------<  80  4.1   |  33<H>  |  0.63    Ca    8.4<L>      25 Dec 2017 04:36  Phos  2.9     12-25  Mg     1.9     12-25    TPro  6.8  /  Alb  2.0<L>  /  TBili  0.3  /  DBili  x   /  AST  28  /  ALT  15  /  AlkPhos  75  12-24    CAPILLARY BLOOD GLUCOSE        LIVER FUNCTIONS - ( 24 Dec 2017 01:30 )  Alb: 2.0 g/dL / Pro: 6.8 gm/dL / ALK PHOS: 75 U/L / ALT: 15 U/L / AST: 28 U/L / GGT: x               ABG - ( 25 Dec 2017 14:41 )  pH: 7.39  /  pCO2: 52    /  pO2: 58    / HCO3: 31    / Base Excess: 5.6   /  SaO2: 90          Assessment and Plan:  84 year old woman with asthma, HTN, hypothyroidism who presents with SOB.      SOB with sepsis (fever, leukocytosis) and acute respiratory failure secondary to acute viral influenza A, hmpv, and probable superimposed bacterial pneumonia.  -Monitor in step down  -broad spectrum abx for probable HCAP/multifocal pnuemonia/gram negative bacteria.  -tamiflu for acute influenza A  -supportive care for RSV infection.  -ID consult appreciated.  -legionella negative  -f/u cultures     Asthma: with exacerbation  -duonebs OTC  -inhaled corticosteroids  -O2 supplementation  -Pulm consult appreciated - added systemic steroids and BIPAP PRN    Rib fractures:  -incentive spirometry  -pain management  -PT consult    Anemia: Probable mixed picture, chronic disease, iron def  -will start supplementation and monitor  -stool occult if possible    HTN:  -c/w amlodipine    Hypothyroidism:  -c/w synthroid    DVT ppx:  -lovenox    Attending Statement:  40 minutes spent on total encounter; more than 50% of the visit was spent counseling and/or coordinating care by the attending physician. Patient is a 84y old  Female who presents with a chief complaint of SOB (24 Dec 2017 08:49)      HPI:  84 year old woman with PMHx of MDS, asthma, hypothyroidism, HTN who presents with worsening SOB.  Pt was recently discharged from [facility] for multiple rib fractures and PNA s/p 10 day course of levaquin, however still requiring oxygen.  Daughter noted pt to be hypoxic at home with sat  82%.    In ER: Pt Hypoxic, and placed on venti-mask. She was given Abx, and nebulizer therapy for presumed HCAP.      At bedside pt sleeping but arousable, and answering questions appropriately. Daughter at bedside. She is not confused but is comfortable yet weak. (24 Dec 2017 08:49)    12/25: Pt still requiring a lot of oxygen, on non-rebreather.  She appears comfortable, and is more alert than yesterday.  She is answering all questions appropriately.    Review of system- Rest of the review of system are normal except mentioned in HPI    Vital Signs Last 24 Hrs  T(C): 38.7 (25 Dec 2017 00:00), Max: 38.7 (25 Dec 2017 00:00)  T(F): 101.7 (25 Dec 2017 00:00), Max: 101.7 (25 Dec 2017 00:00)  HR: 91 (25 Dec 2017 14:10) (86 - 111)  BP: 122/46 (25 Dec 2017 06:00) (116/59 - 149/50)  BP(mean): 65 (25 Dec 2017 06:00) (59 - 113)  RR: 26 (25 Dec 2017 06:00) (19 - 30)  SpO2: 99% (25 Dec 2017 06:00) (84% - 100%)    PHYSICAL EXAM:    Constitutional: NAD, afrail, asleep  HEENT: PERR, EOMI, Normal Hearing, MMM  Neck: Soft and supple  Respiratory: rhonchi with faint exp wheezing again noted.  Cardiovascular: S1 and S2, regular rate and rhythm, no Murmurs, gallops or rubs  Gastrointestinal: Bowel Sounds present, soft, nontender, nondistended, no guarding, no rebound  Extremities: No peripheral edema  Neurological: A/O x 3, no focal deficits  Skin: No rashes    MEDICATIONS  (STANDING):  ALBUTerol/ipratropium for Nebulization 3 milliLiter(s) Nebulizer every 6 hours  amLODIPine   Tablet 10 milliGRAM(s) Oral daily  aspirin  chewable 81 milliGRAM(s) Oral daily  buDESOnide 160 MICROgram(s)/formoterol 4.5 MICROgram(s) Inhaler 2 Puff(s) Inhalation two times a day  cefepime  IVPB 2000 milliGRAM(s) IV Intermittent every 12 hours  enoxaparin Injectable 40 milliGRAM(s) SubCutaneous every 24 hours  levothyroxine 100 MICROGram(s) Oral daily  methylPREDNISolone sodium succinate Injectable 60 milliGRAM(s) IV Push once  montelukast 10 milliGRAM(s) Oral daily  oseltamivir 75 milliGRAM(s) Oral every 12 hours  vancomycin  IVPB 750 milliGRAM(s) IV Intermittent every 12 hours    MEDICATIONS  (PRN):  acetaminophen   Tablet 650 milliGRAM(s) Oral every 6 hours PRN For Temp greater than 38.5 C (101.3 F)  acetaminophen   Tablet. 650 milliGRAM(s) Oral every 6 hours PRN Moderate Pain (4 - 6)  guaiFENesin    Syrup 200 milliGRAM(s) Oral every 6 hours PRN Cough  ondansetron Injectable 4 milliGRAM(s) IV Push every 6 hours PRN Nausea and/or Vomiting    CARDIAC MARKERS ( 24 Dec 2017 07:38 )  0.022 ng/mL / x     / x     / x     / x      CARDIAC MARKERS ( 24 Dec 2017 04:45 )  0.018 ng/mL / x     / x     / x     / x      CARDIAC MARKERS ( 24 Dec 2017 01:30 )  0.018 ng/mL / x     / 35 U/L / x     / x                                8.6    11.3  )-----------( 403      ( 25 Dec 2017 04:36 )             29.2     12-25    139  |  102  |  24<H>  ----------------------------<  80  4.1   |  33<H>  |  0.63    Ca    8.4<L>      25 Dec 2017 04:36  Phos  2.9     12-25  Mg     1.9     12-25    TPro  6.8  /  Alb  2.0<L>  /  TBili  0.3  /  DBili  x   /  AST  28  /  ALT  15  /  AlkPhos  75  12-24    CAPILLARY BLOOD GLUCOSE        LIVER FUNCTIONS - ( 24 Dec 2017 01:30 )  Alb: 2.0 g/dL / Pro: 6.8 gm/dL / ALK PHOS: 75 U/L / ALT: 15 U/L / AST: 28 U/L / GGT: x               ABG - ( 25 Dec 2017 14:41 )  pH: 7.39  /  pCO2: 52    /  pO2: 58    / HCO3: 31    / Base Excess: 5.6   /  SaO2: 90          Assessment and Plan:  84 year old woman with asthma, HTN, hypothyroidism who presents with SOB.      SOB with sepsis (fever, leukocytosis) and acute hypoxemic respiratory failure secondary to acute viral influenza A, hmpv, and probable superimposed bacterial pneumonia.  -Monitor in step down  -broad spectrum abx for probable HCAP/multifocal pnuemonia/gram negative bacteria.  -tamiflu for acute influenza A  -supportive care for RSV infection.  -ID consult appreciated.  -legionella negative  -f/u cultures     Asthma: with exacerbation  -duonebs OTC  -inhaled corticosteroids  -O2 supplementation  -Pulm consult appreciated - added systemic steroids and BIPAP PRN    Rib fractures:  -incentive spirometry  -pain management  -PT consult    Anemia: Probable mixed picture, chronic disease, iron def  -will start supplementation and monitor  -stool occult if possible    HTN:  -c/w amlodipine    Hypothyroidism:  -c/w synthroid    DVT ppx:  -lovenox    Attending Statement:  40 minutes spent on total encounter; more than 50% of the visit was spent counseling and/or coordinating care by the attending physician.

## 2017-12-26 LAB
ANION GAP SERPL CALC-SCNC: 4 MMOL/L — LOW (ref 5–17)
BUN SERPL-MCNC: 23 MG/DL — SIGNIFICANT CHANGE UP (ref 7–23)
CALCIUM SERPL-MCNC: 8.1 MG/DL — LOW (ref 8.5–10.1)
CHLORIDE SERPL-SCNC: 97 MMOL/L — SIGNIFICANT CHANGE UP (ref 96–108)
CO2 SERPL-SCNC: 35 MMOL/L — HIGH (ref 22–31)
CREAT SERPL-MCNC: 0.44 MG/DL — LOW (ref 0.5–1.3)
GLUCOSE SERPL-MCNC: 125 MG/DL — HIGH (ref 70–99)
HCT VFR BLD CALC: 33.4 % — LOW (ref 34.5–45)
HGB BLD-MCNC: 9.8 G/DL — LOW (ref 11.5–15.5)
MCHC RBC-ENTMCNC: 19.6 PG — LOW (ref 27–34)
MCHC RBC-ENTMCNC: 29.4 GM/DL — LOW (ref 32–36)
MCV RBC AUTO: 66.7 FL — LOW (ref 80–100)
NRBC # BLD: SIGNIFICANT CHANGE UP /100 WBCS (ref 0–0)
OB PNL STL: NEGATIVE — SIGNIFICANT CHANGE UP
PLATELET # BLD AUTO: 408 K/UL — HIGH (ref 150–400)
POTASSIUM SERPL-MCNC: 4.1 MMOL/L — SIGNIFICANT CHANGE UP (ref 3.5–5.3)
POTASSIUM SERPL-SCNC: 4.1 MMOL/L — SIGNIFICANT CHANGE UP (ref 3.5–5.3)
RBC # BLD: 5.02 M/UL — SIGNIFICANT CHANGE UP (ref 3.8–5.2)
RBC # FLD: 18.4 % — HIGH (ref 10.3–14.5)
SODIUM SERPL-SCNC: 136 MMOL/L — SIGNIFICANT CHANGE UP (ref 135–145)
WBC # BLD: 8.4 K/UL — SIGNIFICANT CHANGE UP (ref 3.8–10.5)
WBC # FLD AUTO: 8.4 K/UL — SIGNIFICANT CHANGE UP (ref 3.8–10.5)

## 2017-12-26 PROCEDURE — 99233 SBSQ HOSP IP/OBS HIGH 50: CPT

## 2017-12-26 RX ADMIN — CEFEPIME 100 MILLIGRAM(S): 1 INJECTION, POWDER, FOR SOLUTION INTRAMUSCULAR; INTRAVENOUS at 17:21

## 2017-12-26 RX ADMIN — BUDESONIDE AND FORMOTEROL FUMARATE DIHYDRATE 2 PUFF(S): 160; 4.5 AEROSOL RESPIRATORY (INHALATION) at 10:04

## 2017-12-26 RX ADMIN — Medication 100 MICROGRAM(S): at 06:02

## 2017-12-26 RX ADMIN — ENOXAPARIN SODIUM 40 MILLIGRAM(S): 100 INJECTION SUBCUTANEOUS at 17:21

## 2017-12-26 RX ADMIN — Medication 3 MILLILITER(S): at 10:08

## 2017-12-26 RX ADMIN — Medication 3 MILLILITER(S): at 21:03

## 2017-12-26 RX ADMIN — BUDESONIDE AND FORMOTEROL FUMARATE DIHYDRATE 2 PUFF(S): 160; 4.5 AEROSOL RESPIRATORY (INHALATION) at 21:05

## 2017-12-26 RX ADMIN — CEFEPIME 100 MILLIGRAM(S): 1 INJECTION, POWDER, FOR SOLUTION INTRAMUSCULAR; INTRAVENOUS at 06:01

## 2017-12-26 RX ADMIN — Medication 3 MILLILITER(S): at 14:02

## 2017-12-26 RX ADMIN — Medication 40 MILLIGRAM(S): at 06:02

## 2017-12-26 RX ADMIN — AMLODIPINE BESYLATE 10 MILLIGRAM(S): 2.5 TABLET ORAL at 06:02

## 2017-12-26 RX ADMIN — Medication 81 MILLIGRAM(S): at 11:52

## 2017-12-26 RX ADMIN — Medication 150 MILLIGRAM(S): at 18:08

## 2017-12-26 RX ADMIN — Medication 75 MILLIGRAM(S): at 06:01

## 2017-12-26 RX ADMIN — Medication 3 MILLILITER(S): at 02:01

## 2017-12-26 RX ADMIN — Medication 150 MILLIGRAM(S): at 06:01

## 2017-12-26 RX ADMIN — Medication 75 MILLIGRAM(S): at 18:08

## 2017-12-26 RX ADMIN — Medication 325 MILLIGRAM(S): at 11:52

## 2017-12-26 RX ADMIN — MONTELUKAST 10 MILLIGRAM(S): 4 TABLET, CHEWABLE ORAL at 11:52

## 2017-12-26 NOTE — PROGRESS NOTE ADULT - SUBJECTIVE AND OBJECTIVE BOX
Subjective:  awake and alert  no distress    MEDICATIONS  (STANDING):  ALBUTerol/ipratropium for Nebulization 3 milliLiter(s) Nebulizer every 6 hours  amLODIPine   Tablet 10 milliGRAM(s) Oral daily  aspirin  chewable 81 milliGRAM(s) Oral daily  buDESOnide 160 MICROgram(s)/formoterol 4.5 MICROgram(s) Inhaler 2 Puff(s) Inhalation two times a day  cefepime  IVPB 2000 milliGRAM(s) IV Intermittent every 12 hours  enoxaparin Injectable 40 milliGRAM(s) SubCutaneous every 24 hours  ferrous    sulfate 325 milliGRAM(s) Oral daily  levothyroxine 100 MICROGram(s) Oral daily  methylPREDNISolone sodium succinate Injectable 40 milliGRAM(s) IV Push daily  montelukast 10 milliGRAM(s) Oral daily  oseltamivir 75 milliGRAM(s) Oral every 12 hours  vancomycin  IVPB 750 milliGRAM(s) IV Intermittent every 12 hours    MEDICATIONS  (PRN):  acetaminophen   Tablet 650 milliGRAM(s) Oral every 6 hours PRN For Temp greater than 38.5 C (101.3 F)  acetaminophen   Tablet. 650 milliGRAM(s) Oral every 6 hours PRN Moderate Pain (4 - 6)  guaiFENesin    Syrup 200 milliGRAM(s) Oral every 6 hours PRN Cough  ondansetron Injectable 4 milliGRAM(s) IV Push every 6 hours PRN Nausea and/or Vomiting      Allergies    No Known Allergies    Intolerances        REVIEW OF SYSTEMS:    CONSTITUTIONAL:  As per HPI.  HEENT:  Eyes:  No diplopia or blurred vision. ENT:  No earache, sore throat or runny nose.  CARDIOVASCULAR:  No pressure, squeezing, tightness, heaviness or aching about the chest, neck, axilla or epigastrium.  RESPIRATORY:  No cough, shortness of breath, PND or orthopnea.  GASTROINTESTINAL:  No nausea, vomiting or diarrhea.  GENITOURINARY:  No dysuria, frequency or urgency.  MUSCULOSKELETAL:  no joint pain, deformity, tenderness  EXTREMITIES: no clubbing cyanosis,edema  SKIN:  No change in skin, hair or nails.  NEUROLOGIC:  No paresthesias, fasciculations, seizures or weakness.  PSYCHIATRIC:  No disorder of thought or mood.  ENDOCRINE:  No heat or cold intolerance, polyuria or polydipsia.  HEMATOLOGICAL:  No easy bruising or bleedings:    Vital Signs Last 24 Hrs  T(C): 37 (26 Dec 2017 00:00), Max: 37 (26 Dec 2017 00:00)  T(F): 98.6 (26 Dec 2017 00:00), Max: 98.6 (26 Dec 2017 00:00)  HR: 94 (26 Dec 2017 07:00) (78 - 111)  BP: 126/82 (26 Dec 2017 07:00) (116/48 - 165/82)  BP(mean): 92 (26 Dec 2017 07:00) (62 - 127)  RR: 21 (26 Dec 2017 07:00) (18 - 36)  SpO2: 95% (26 Dec 2017 07:00) (82% - 100%)    PHYSICAL EXAMINATION:  SKIN: no rashes  HEAD: NC/AT  EYES: PERRLA, EOMI  EARS: TM's intact  NOSE: no abnormalities  NECK:  Supple. No lymphadenopathy. Jugular venous pressure not elevated. Carotids equal.   HEART:   The cardiac impulse has a normal quality. Reg., Nl S1 and S2.  There are no murmurs, rubs or gallops noted  CHEST: coarse ronchi R with crackles  ABDOMEN:  Soft and nontender.   EXTREMITIES:  no C/C/E  NEURO: AAO x 3, no focal deficts       LABS:                        9.8    8.4   )-----------( 408      ( 26 Dec 2017 04:51 )             33.4     12-26    136  |  97  |  23  ----------------------------<  125<H>  4.1   |  35<H>  |  0.44<L>    Ca    8.1<L>      26 Dec 2017 04:51  Phos  2.9     12-25  Mg     1.9     12-25            RADIOLOGY & ADDITIONAL TESTS:

## 2017-12-26 NOTE — PROGRESS NOTE ADULT - SUBJECTIVE AND OBJECTIVE BOX
Patient is a 84y old  Female who presents with a chief complaint of SOB (24 Dec 2017 08:49)      HPI:  84 year old woman with PMHx of MDS, asthma, hypothyroidism, HTN who presents with worsening SOB.  Pt was recently discharged from [facility] for multiple rib fractures and PNA s/p 10 day course of levaquin, however still requiring oxygen.  Daughter noted pt to be hypoxic at home with sat  82%.    In ER: Pt Hypoxic, and placed on venti-mask. She was given Abx, and nebulizer therapy for presumed HCAP.      At bedside pt sleeping but arousable, and answering questions appropriately. Daughter at bedside. She is not confused but is comfortable yet weak. (24 Dec 2017 08:49)    12/25: Pt still requiring a lot of oxygen, on non-rebreather.  She appears comfortable, and is more alert than yesterday.  She is answering all questions appropriately.  12/26: Pt downgraded to NC and satting well.  She ambulated with PT as well.  She is alert and comfortable. She tolerated BIPAP last night.    Review of system- Rest of the review of system are normal except mentioned in HPI    Vital Signs Last 24 Hrs  T(C): 37 (26 Dec 2017 00:00), Max: 37 (26 Dec 2017 00:00)  T(F): 98.6 (26 Dec 2017 00:00), Max: 98.6 (26 Dec 2017 00:00)  HR: 93 (26 Dec 2017 15:00) (75 - 107)  BP: 138/66 (26 Dec 2017 15:00) (122/47 - 165/82)  BP(mean): 83 (26 Dec 2017 15:00) (65 - 127)  RR: 23 (26 Dec 2017 15:00) (16 - 36)  SpO2: 93% (26 Dec 2017 15:00) (82% - 100%)    PHYSICAL EXAM:    Constitutional: NAD, on NC, awake and alert.  HEENT: PERR, EOMI, Normal Hearing, MMM  Neck: Soft and supple  Respiratory: rhonchi with faint exp wheezing again noted.  Cardiovascular: S1 and S2, regular rate and rhythm, no Murmurs, gallops or rubs  Gastrointestinal: Bowel Sounds present, soft, nontender, nondistended, no guarding, no rebound  Extremities: No peripheral edema  Neurological: A/O x 3, no focal deficits  Skin: No rashes    MEDICATIONS  (STANDING):  ALBUTerol/ipratropium for Nebulization 3 milliLiter(s) Nebulizer every 6 hours  amLODIPine   Tablet 10 milliGRAM(s) Oral daily  aspirin  chewable 81 milliGRAM(s) Oral daily  buDESOnide 160 MICROgram(s)/formoterol 4.5 MICROgram(s) Inhaler 2 Puff(s) Inhalation two times a day  cefepime  IVPB 2000 milliGRAM(s) IV Intermittent every 12 hours  enoxaparin Injectable 40 milliGRAM(s) SubCutaneous every 24 hours  ferrous    sulfate 325 milliGRAM(s) Oral daily  levothyroxine 100 MICROGram(s) Oral daily  methylPREDNISolone sodium succinate Injectable 40 milliGRAM(s) IV Push daily  montelukast 10 milliGRAM(s) Oral daily  oseltamivir 75 milliGRAM(s) Oral every 12 hours  vancomycin  IVPB 750 milliGRAM(s) IV Intermittent every 12 hours    MEDICATIONS  (PRN):  acetaminophen   Tablet 650 milliGRAM(s) Oral every 6 hours PRN For Temp greater than 38.5 C (101.3 F)  acetaminophen   Tablet. 650 milliGRAM(s) Oral every 6 hours PRN Moderate Pain (4 - 6)  guaiFENesin    Syrup 200 milliGRAM(s) Oral every 6 hours PRN Cough  ondansetron Injectable 4 milliGRAM(s) IV Push every 6 hours PRN Nausea and/or Vomiting    	                          9.8    8.4   )-----------( 408      ( 26 Dec 2017 04:51 )             33.4     12-26    136  |  97  |  23  ----------------------------<  125<H>  4.1   |  35<H>  |  0.44<L>    Ca    8.1<L>      26 Dec 2017 04:51  Phos  2.9     12-25  Mg     1.9     12-25      CAPILLARY BLOOD GLUCOSE          ABG - ( 25 Dec 2017 14:41 )  pH: 7.39  /  pCO2: 52    /  pO2: 58    / HCO3: 31    / Base Excess: 5.6   /  SaO2: 90          Assessment and Plan:  84 year old woman with asthma, HTN, hypothyroidism who presents with SOB.    SOB with sepsis (fever, leukocytosis) and acute hypoxemic respiratory failure secondary to acute viral influenza A, hmpv, and probable superimposed bacterial pneumonia.  -Monitor in step down/CCU  -broad spectrum abx for probable HCAP/multifocal pnuemonia/gram negative bacteria.  -tamiflu for acute influenza A  -supportive care for RSV infection.  -ID consult appreciated.  -legionella negative  -f/u cultures negative.  -leukocytosis resolved.  -nocturnal bipap PRN    Asthma: with exacerbation  -duonebs OTC  -inhaled corticosteroids  -O2 supplementation  -Pulm consult appreciated - added systemic steroids and BIPAP PRN    Rib fractures:  -incentive spirometry  -pain management  -PT consult    Anemia and thrombocytosis: Presumed secondary to MDS  -Stable  -monitor  -stool occult negative    HTN:  -c/w amlodipine    Hypothyroidism:  -c/w synthroid    DVT ppx:  -lovenox    Code status: DNR/DNI    Attending Statement:  40 minutes spent on total encounter; more than 50% of the visit was spent counseling and/or coordinating care by the attending physician.

## 2017-12-26 NOTE — PROGRESS NOTE ADULT - SUBJECTIVE AND OBJECTIVE BOX
84 year old woman with PMHx of MDS, asthma, hypothyroidism, HTN who presents with worsening SOB.  Pt was recently discharged from [facility] for multiple rib fractures and PNA s/p 10 day course of levaquin, however still requiring oxygen.  Daughter noted pt to be hypoxic at home with sat  82%. In ER: Pt Hypoxic, and placed on venti-mask, wbc elevated to 15.1, RVP positive for influenza, CT chest w/o contrast showed bronchiectasis/multifocal pna/b/l small pleural effusions, was given IV vancomycin/cefepime d/t concern for infection and started on tamiflu/droplet precautions. Her sister is currently sick at home who she has been residing with.     dyspnea with exertion  still w/ cough  feels slightly better  no fevers    MEDICATIONS  (STANDING):  ALBUTerol/ipratropium for Nebulization 3 milliLiter(s) Nebulizer every 6 hours  amLODIPine   Tablet 10 milliGRAM(s) Oral daily  aspirin  chewable 81 milliGRAM(s) Oral daily  buDESOnide 160 MICROgram(s)/formoterol 4.5 MICROgram(s) Inhaler 2 Puff(s) Inhalation two times a day  cefepime  IVPB 2000 milliGRAM(s) IV Intermittent every 12 hours  enoxaparin Injectable 40 milliGRAM(s) SubCutaneous every 24 hours  ferrous    sulfate 325 milliGRAM(s) Oral daily  levothyroxine 100 MICROGram(s) Oral daily  methylPREDNISolone sodium succinate Injectable 40 milliGRAM(s) IV Push daily  montelukast 10 milliGRAM(s) Oral daily  oseltamivir 75 milliGRAM(s) Oral every 12 hours  vancomycin  IVPB 750 milliGRAM(s) IV Intermittent every 12 hours      Vital Signs Last 24 Hrs  T(C): 37 (26 Dec 2017 00:00), Max: 37 (26 Dec 2017 00:00)  T(F): 98.6 (26 Dec 2017 00:00), Max: 98.6 (26 Dec 2017 00:00)  HR: 91 (26 Dec 2017 11:30) (75 - 106)  BP: 145/80 (26 Dec 2017 11:00) (122/47 - 165/82)  BP(mean): 92 (26 Dec 2017 11:00) (65 - 127)  RR: 16 (26 Dec 2017 11:30) (16 - 36)  SpO2: 92% (26 Dec 2017 11:30) (82% - 99%)          PE:  Constitutional: frail looking, on NC  HEENT: NC/AT, EOMI, PERRLA  Neck: supple  Back: no tenderness  Respiratory: decreased breath sounds, scattered rhonchi  Cardiovascular: S1S2 regular, no murmurs  Abdomen: soft, not tender, not distended, positive BS  Genitourinary: deferred  Rectal: deferred  Musculoskeletal: no muscle tenderness, no joint swelling or tenderness  Extremities: no pedal edema  Neurological: no focal deficits  Skin: no rashes    Labs:                                   9.8    8.4   )-----------( 408      ( 26 Dec 2017 04:51 )             33.4     12-26    136  |  97  |  23  ----------------------------<  125<H>  4.1   |  35<H>  |  0.44<L>    Ca    8.1<L>      26 Dec 2017 04:51  Phos  2.9     12-25  Mg     1.9     12-25         Vancomycin Level, Trough: 12.2 ug/mL (12-25 @ 17:54)          Culture - Blood (12.24.17 @ 03:11)    Specimen Source: .Blood None    Culture Results:   No growth to date.    Culture - Blood (12.24.17 @ 03:11)    Specimen Source: .Blood None    Culture Results:   No growth to date.            Radiology:  < from: CT Chest No Cont (12.24.17 @ 10:21) >    EXAM:  CT CHEST                            PROCEDURE DATE:  12/24/2017          INTERPRETATION:  Exam Date: 12/24/2017 10:21 AM  Clinical Information: Shortness of breath, hypoxia  Technique:       CT of the chest was performed with axial images obtained   from the thoracic to the inlet to the bilateral adrenal glands       without IV contrast. Maximum intensity projection images were obtained.  Comparison:  None    FINDINGS:    Lungs, Airways and Pleura: Central tracheobronchial tree demonstrates   trace mucosal debris. Mild bronchiectasis in the upper and lower lobes.   Mild left pleural effusion. Trace to small right pleural effusion.   Multifocal segmental consolidations in the right middle lobe and   bilateral lower lobes and subsegmental consolidation in the lingula and   medial left upper lobe with air bronchograms and associated   bronchiectasis may represent multifocal pneumonia versus atelectasis with   or without chronic scarring. Additional scattered groundglass opacities   and patchy nodularity may represent a multifocal infectious or   inflammatory etiology. Biapical pleural parenchymal scarring. No   pneumothorax.    Heart and Great Vessels: Atherosclerotic changes of the aorta and   coronary vasculature. Heart is normal in size. Trace pericardial effusion.    Mediastinum and Jodi: Hiatal hernia. Mildly prominent mediastinal hilar   lymph nodes.    Neck and Chest Wall: within normal limits    Bones: Degenerative changes and osteopenia. Thoracicolumbar scoliosis   convex to the right. Mild kyphosis. Mild superior endplate deformities of   T8-T10.    Upper Abdomen:  Exophytic left upper pole renal cyst. Splenomegaly   measuring up to 14 cm.    IMPRESSION:          Mild left pleural effusion. Trace to small rightpleural effusion.   Multifocal segmental consolidations in the right middle lobe and   bilateral lower lobes and subsegmental consolidation in the lingula and   medial left upper lobe with air bronchograms and associated   bronchiectasis may represent multifocal pneumonia versus atelectasis with   or without chronic scarring. Additional scattered groundglass opacities   and patchy nodularity may represent a multifocal infectious or   inflammatory etiology      < end of copied text >    Advanced directives addressed: full resuscitation

## 2017-12-26 NOTE — PROGRESS NOTE ADULT - ASSESSMENT
84 year old woman with PMHx of MDS, asthma, hypothyroidism, HTN who presents with worsening SOB.  Pt was recently discharged from [facility] for multiple rib fractures and PNA s/p 10 day course of levaquin, however still requiring oxygen.  Daughter noted pt to be hypoxic at home with sat  82%. In ER: Pt Hypoxic, and placed on venti-mask, wbc elevated to 15.1, RVP positive for influenza, CT chest w/o contrast showed bronchiectasis/multifocal pna/b/l small pleural effusions, was given IV vancomycin/cefepime d/t concern for infection and started on tamiflu/droplet precautions. Her sister is currently sick at home who she has been residing with    1. sepsis/hypoxic resp failure/influenza/multifocal pna w/ bronchiectasis/MDS/asthma  - slowly improving  - on IV vancomcyin [id number], trough therapeutic #4  - on IV cefepime [id number] for gnr resistant coverage #4  - blood cx no growth  - monitor temps  - completed 10 day course of levaquin for atypical coverage already  - on tamiflu 75 mg BID for influenza - for 5 day course  - on droplet precautions  -tolerating abx well so far; no side effects noted  -reason for abx use and side effects reviewed with patient  - supportive care  - f/u cbc    2. other issues -care per medicine

## 2017-12-26 NOTE — PHYSICAL THERAPY INITIAL EVALUATION ADULT - ADDITIONAL COMMENTS
Upon discharge, pt. plans to go to pt.'s daughter's house who will be with pt. 24/7. House is level w/ 2 REBECCA w/ b/l HRs. At baseline, pt. ambulates w/ a RW and does not wear O2.

## 2017-12-27 ENCOUNTER — APPOINTMENT (OUTPATIENT)
Dept: FAMILY MEDICINE | Facility: CLINIC | Age: 82
End: 2017-12-27

## 2017-12-27 LAB
ANION GAP SERPL CALC-SCNC: 5 MMOL/L — SIGNIFICANT CHANGE UP (ref 5–17)
BUN SERPL-MCNC: 26 MG/DL — HIGH (ref 7–23)
CALCIUM SERPL-MCNC: 8.2 MG/DL — LOW (ref 8.5–10.1)
CHLORIDE SERPL-SCNC: 100 MMOL/L — SIGNIFICANT CHANGE UP (ref 96–108)
CO2 SERPL-SCNC: 33 MMOL/L — HIGH (ref 22–31)
CREAT SERPL-MCNC: 0.51 MG/DL — SIGNIFICANT CHANGE UP (ref 0.5–1.3)
GLUCOSE SERPL-MCNC: 71 MG/DL — SIGNIFICANT CHANGE UP (ref 70–99)
HCT VFR BLD CALC: 33.1 % — LOW (ref 34.5–45)
HGB BLD-MCNC: 9.9 G/DL — LOW (ref 11.5–15.5)
MCHC RBC-ENTMCNC: 19.7 PG — LOW (ref 27–34)
MCHC RBC-ENTMCNC: 29.7 GM/DL — LOW (ref 32–36)
MCV RBC AUTO: 66.4 FL — LOW (ref 80–100)
NRBC # BLD: SIGNIFICANT CHANGE UP /100 WBCS (ref 0–0)
PLATELET # BLD AUTO: 419 K/UL — HIGH (ref 150–400)
POTASSIUM SERPL-MCNC: 3.8 MMOL/L — SIGNIFICANT CHANGE UP (ref 3.5–5.3)
POTASSIUM SERPL-SCNC: 3.8 MMOL/L — SIGNIFICANT CHANGE UP (ref 3.5–5.3)
RBC # BLD: 4.99 M/UL — SIGNIFICANT CHANGE UP (ref 3.8–5.2)
RBC # FLD: 18.4 % — HIGH (ref 10.3–14.5)
SODIUM SERPL-SCNC: 138 MMOL/L — SIGNIFICANT CHANGE UP (ref 135–145)
WBC # BLD: 11.7 K/UL — HIGH (ref 3.8–10.5)
WBC # FLD AUTO: 11.7 K/UL — HIGH (ref 3.8–10.5)

## 2017-12-27 PROCEDURE — 99233 SBSQ HOSP IP/OBS HIGH 50: CPT

## 2017-12-27 RX ADMIN — Medication 150 MILLIGRAM(S): at 19:28

## 2017-12-27 RX ADMIN — CEFEPIME 100 MILLIGRAM(S): 1 INJECTION, POWDER, FOR SOLUTION INTRAMUSCULAR; INTRAVENOUS at 05:55

## 2017-12-27 RX ADMIN — Medication 100 MICROGRAM(S): at 05:55

## 2017-12-27 RX ADMIN — Medication 150 MILLIGRAM(S): at 05:55

## 2017-12-27 RX ADMIN — Medication 40 MILLIGRAM(S): at 05:55

## 2017-12-27 RX ADMIN — Medication 81 MILLIGRAM(S): at 12:09

## 2017-12-27 RX ADMIN — BUDESONIDE AND FORMOTEROL FUMARATE DIHYDRATE 2 PUFF(S): 160; 4.5 AEROSOL RESPIRATORY (INHALATION) at 08:47

## 2017-12-27 RX ADMIN — Medication 325 MILLIGRAM(S): at 12:09

## 2017-12-27 RX ADMIN — Medication 3 MILLILITER(S): at 20:30

## 2017-12-27 RX ADMIN — Medication 75 MILLIGRAM(S): at 07:39

## 2017-12-27 RX ADMIN — MONTELUKAST 10 MILLIGRAM(S): 4 TABLET, CHEWABLE ORAL at 22:01

## 2017-12-27 RX ADMIN — CEFEPIME 100 MILLIGRAM(S): 1 INJECTION, POWDER, FOR SOLUTION INTRAMUSCULAR; INTRAVENOUS at 19:27

## 2017-12-27 RX ADMIN — Medication 3 MILLILITER(S): at 02:01

## 2017-12-27 RX ADMIN — ENOXAPARIN SODIUM 40 MILLIGRAM(S): 100 INJECTION SUBCUTANEOUS at 19:27

## 2017-12-27 RX ADMIN — Medication 3 MILLILITER(S): at 13:52

## 2017-12-27 RX ADMIN — Medication 3 MILLILITER(S): at 08:45

## 2017-12-27 RX ADMIN — AMLODIPINE BESYLATE 10 MILLIGRAM(S): 2.5 TABLET ORAL at 05:55

## 2017-12-27 RX ADMIN — BUDESONIDE AND FORMOTEROL FUMARATE DIHYDRATE 2 PUFF(S): 160; 4.5 AEROSOL RESPIRATORY (INHALATION) at 20:30

## 2017-12-27 RX ADMIN — Medication 75 MILLIGRAM(S): at 19:28

## 2017-12-27 NOTE — PROGRESS NOTE ADULT - SUBJECTIVE AND OBJECTIVE BOX
Subjective:  awake and alert  no distress  12/27: O2 decreased to NC, 4 L/min.  has cough, clear/white sputum now.     MEDICATIONS  (STANDING):  ALBUTerol/ipratropium for Nebulization 3 milliLiter(s) Nebulizer every 6 hours  amLODIPine   Tablet 10 milliGRAM(s) Oral daily  aspirin  chewable 81 milliGRAM(s) Oral daily  buDESOnide 160 MICROgram(s)/formoterol 4.5 MICROgram(s) Inhaler 2 Puff(s) Inhalation two times a day  cefepime  IVPB 2000 milliGRAM(s) IV Intermittent every 12 hours  enoxaparin Injectable 40 milliGRAM(s) SubCutaneous every 24 hours  ferrous    sulfate 325 milliGRAM(s) Oral daily  levothyroxine 100 MICROGram(s) Oral daily  methylPREDNISolone sodium succinate Injectable 40 milliGRAM(s) IV Push daily  montelukast 10 milliGRAM(s) Oral daily  oseltamivir 75 milliGRAM(s) Oral every 12 hours  vancomycin  IVPB 750 milliGRAM(s) IV Intermittent every 12 hours    MEDICATIONS  (PRN):  acetaminophen   Tablet 650 milliGRAM(s) Oral every 6 hours PRN For Temp greater than 38.5 C (101.3 F)  acetaminophen   Tablet. 650 milliGRAM(s) Oral every 6 hours PRN Moderate Pain (4 - 6)  guaiFENesin    Syrup 200 milliGRAM(s) Oral every 6 hours PRN Cough  ondansetron Injectable 4 milliGRAM(s) IV Push every 6 hours PRN Nausea and/or Vomiting      Allergies    No Known Allergies    Intolerances        REVIEW OF SYSTEMS:    CONSTITUTIONAL:  As per HPI.  HEENT:  Eyes:  No diplopia or blurred vision. ENT:  No earache, sore throat or runny nose.  CARDIOVASCULAR:  No pressure, squeezing, tightness, heaviness or aching about the chest, neck, axilla or epigastrium.  RESPIRATORY:  see above.  GASTROINTESTINAL:  No nausea, vomiting or diarrhea.  GENITOURINARY:  No dysuria, frequency or urgency.  MUSCULOSKELETAL:  no joint pain, deformity, tenderness  EXTREMITIES: no clubbing cyanosis,edema  SKIN:  No change in skin, hair or nails.  NEUROLOGIC:  No paresthesias, fasciculations, seizures or weakness.  PSYCHIATRIC:  No disorder of thought or mood.  ENDOCRINE:  No heat or cold intolerance, polyuria or polydipsia.  HEMATOLOGICAL:  No easy bruising or bleedings:    Vital Signs Last 24 Hrs  T(C): 37 (26 Dec 2017 00:00), Max: 37 (26 Dec 2017 00:00)  T(F): 98.6 (26 Dec 2017 00:00), Max: 98.6 (26 Dec 2017 00:00)  HR: 94 (26 Dec 2017 07:00) (78 - 111)  BP: 126/82 (26 Dec 2017 07:00) (116/48 - 165/82)  BP(mean): 92 (26 Dec 2017 07:00) (62 - 127)  RR: 21 (26 Dec 2017 07:00) (18 - 36)  SpO2: 95% (26 Dec 2017 07:00) (82% - 100%)    PHYSICAL EXAMINATION:  SKIN: no rashes  HEAD: NC/AT  EYES: PERRLA, EOMI  EARS: TM's intact  NOSE: no abnormalities  NECK:  Supple. No lymphadenopathy. Jugular venous pressure not elevated. Carotids equal.   HEART:   The cardiac impulse has a normal quality. Reg., Nl S1 and S2.  There are no murmurs, rubs or gallops noted  CHEST: rhonchi and few crackles bibasilar.  increased FET.  ABDOMEN:  Soft and nontender.   EXTREMITIES:  no C/C/E  NEURO: AAO x 3, no focal deficts       LABS:                        9.8    8.4   )-----------( 408      ( 26 Dec 2017 04:51 )             33.4     12-26    136  |  97  |  23  ----------------------------<  125<H>  4.1   |  35<H>  |  0.44<L>    Ca    8.1<L>      26 Dec 2017 04:51  Phos  2.9     12-25  Mg     1.9     12-25            RADIOLOGY & ADDITIONAL TESTS:

## 2017-12-27 NOTE — PROGRESS NOTE ADULT - ASSESSMENT
Clinically improving.   cont O2/bronchodilators, IV solumedrol 40 mg/d, singulair.  on cefepime/vanco/tamiflu    DVT proph

## 2017-12-27 NOTE — PROGRESS NOTE ADULT - SUBJECTIVE AND OBJECTIVE BOX
Patient is a 84y old  Female who presents with a chief complaint of SOB (24 Dec 2017 08:49)      HPI:  84 year old woman with PMHx of MDS, asthma, hypothyroidism, HTN who presents with worsening SOB.  Pt was recently discharged from [facility] for multiple rib fractures and PNA s/p 10 day course of levaquin, however still requiring oxygen.  Daughter noted pt to be hypoxic at home with sat  82%.    In ER: Pt Hypoxic, and placed on venti-mask. She was given Abx, and nebulizer therapy for presumed HCAP.      At bedside pt sleeping but arousable, and answering questions appropriately. Daughter at bedside. She is not confused but is comfortable yet weak. (24 Dec 2017 08:49)    12/25: Pt still requiring a lot of oxygen, on non-rebreather.  She appears comfortable, and is more alert than yesterday.  She is answering all questions appropriately.  12/26: Pt downgraded to NC and satting well.  She ambulated with PT as well.  She is alert and comfortable. She tolerated BIPAP last night.  12/27: Eager to go home, pt is improving but still requiring supplemental O2.  Still with productive cough. No fever, chills.    Review of system- Rest of the review of system are normal except mentioned in HPI    Vital Signs Last 24 Hrs  T(C): 36.4 (27 Dec 2017 08:13), Max: 36.6 (27 Dec 2017 00:00)  T(F): 97.6 (27 Dec 2017 08:13), Max: 97.9 (27 Dec 2017 00:00)  HR: 91 (27 Dec 2017 09:00) (78 - 115)  BP: 139/83 (27 Dec 2017 04:00) (128/52 - 179/72)  BP(mean): 90 (27 Dec 2017 04:00) (71 - 98)  RR: 21 (27 Dec 2017 09:00) (17 - 35)  SpO2: 84% (27 Dec 2017 08:00) (84% - 100%)    PHYSICAL EXAM:    Constitutional: NAD, on NC, awake and alert.  HEENT: PERR, EOMI, Normal Hearing, MMM  Neck: Soft and supple  Respiratory: rhonchi with faint exp wheezing again noted.  Cardiovascular: S1 and S2, regular rate and rhythm, no Murmurs, gallops or rubs  Gastrointestinal: Bowel Sounds present, soft, nontender, nondistended, no guarding, no rebound  Extremities: No peripheral edema  Neurological: A/O x 3, no focal deficits  Skin: No rashes    MEDICATIONS  (STANDING):  ALBUTerol/ipratropium for Nebulization 3 milliLiter(s) Nebulizer every 6 hours  amLODIPine   Tablet 10 milliGRAM(s) Oral daily  aspirin  chewable 81 milliGRAM(s) Oral daily  buDESOnide 160 MICROgram(s)/formoterol 4.5 MICROgram(s) Inhaler 2 Puff(s) Inhalation two times a day  cefepime  IVPB 2000 milliGRAM(s) IV Intermittent every 12 hours  enoxaparin Injectable 40 milliGRAM(s) SubCutaneous every 24 hours  ferrous    sulfate 325 milliGRAM(s) Oral daily  levothyroxine 100 MICROGram(s) Oral daily  methylPREDNISolone sodium succinate Injectable 40 milliGRAM(s) IV Push daily  montelukast 10 milliGRAM(s) Oral daily  oseltamivir 75 milliGRAM(s) Oral every 12 hours  vancomycin  IVPB 750 milliGRAM(s) IV Intermittent every 12 hours    MEDICATIONS  (PRN):  acetaminophen   Tablet 650 milliGRAM(s) Oral every 6 hours PRN For Temp greater than 38.5 C (101.3 F)  acetaminophen   Tablet. 650 milliGRAM(s) Oral every 6 hours PRN Moderate Pain (4 - 6)  guaiFENesin    Syrup 200 milliGRAM(s) Oral every 6 hours PRN Cough  ondansetron Injectable 4 milliGRAM(s) IV Push every 6 hours PRN Nausea and/or Vomiting                              9.9    11.7  )-----------( 419      ( 27 Dec 2017 04:55 )             33.1     12-27    138  |  100  |  26<H>  ----------------------------<  71  3.8   |  33<H>  |  0.51    Ca    8.2<L>      27 Dec 2017 04:55      CAPILLARY BLOOD GLUCOSE      ABG - ( 25 Dec 2017 14:41 )  pH: 7.39  /  pCO2: 52    /  pO2: 58    / HCO3: 31    / Base Excess: 5.6   /  SaO2: 90              Assessment and Plan:  84 year old woman with asthma, HTN, hypothyroidism who presents with SOB.    SOB with sepsis (fever, leukocytosis) and acute hypoxemic respiratory failure secondary to acute viral influenza A, hmpv, and probable superimposed bacterial pneumonia.  -Sepsis resolved  -Monitor in step down/CCU  -broad spectrum abx for probable HCAP/multifocal pnuemonia/gram negative bacteria.  -tamiflu for acute influenza A  -supportive care for RSV infection.  -ID consult appreciated.  -legionella negative  -f/u cultures negative.  -leukocytosis IMPROVED.  -nocturnal bipap PRN    Asthma: with exacerbation  -duonebs OTC  -inhaled corticosteroids  -O2 supplementation  -Pulm consult appreciated - added systemic steroids. BIPAP PRN.    Rib fractures:  -incentive spirometry  -pain management  -PT consult    Anemia and thrombocytosis: Presumed secondary to MDS  -Stable  -monitor  -stool occult negative  -heme consult for nucleated RBCs    HTN:  -c/w amlodipine    Hypothyroidism:  -c/w synthroid    DVT ppx:  -lovenox    Code status: DNR/DNI    Attending Statement:  40 minutes spent on total encounter; more than 50% of the visit was spent counseling and/or coordinating care by the attending physician.

## 2017-12-28 PROCEDURE — 99233 SBSQ HOSP IP/OBS HIGH 50: CPT

## 2017-12-28 PROCEDURE — 99223 1ST HOSP IP/OBS HIGH 75: CPT

## 2017-12-28 RX ORDER — SODIUM FERRIC GLUCONAT/SUCROSE 62.5MG/5ML
125 AMPUL (ML) INTRAVENOUS ONCE
Qty: 0 | Refills: 0 | Status: COMPLETED | OUTPATIENT
Start: 2017-12-28 | End: 2017-12-28

## 2017-12-28 RX ORDER — FERUMOXYTOL 510 MG/17ML
510 INJECTION INTRAVENOUS ONCE
Qty: 0 | Refills: 0 | Status: DISCONTINUED | OUTPATIENT
Start: 2017-12-28 | End: 2017-12-28

## 2017-12-28 RX ADMIN — Medication 3 MILLILITER(S): at 12:41

## 2017-12-28 RX ADMIN — CEFEPIME 100 MILLIGRAM(S): 1 INJECTION, POWDER, FOR SOLUTION INTRAMUSCULAR; INTRAVENOUS at 06:27

## 2017-12-28 RX ADMIN — Medication 3 MILLILITER(S): at 20:55

## 2017-12-28 RX ADMIN — Medication 75 MILLIGRAM(S): at 06:30

## 2017-12-28 RX ADMIN — MONTELUKAST 10 MILLIGRAM(S): 4 TABLET, CHEWABLE ORAL at 11:08

## 2017-12-28 RX ADMIN — Medication 325 MILLIGRAM(S): at 11:08

## 2017-12-28 RX ADMIN — Medication 40 MILLIGRAM(S): at 06:26

## 2017-12-28 RX ADMIN — ENOXAPARIN SODIUM 40 MILLIGRAM(S): 100 INJECTION SUBCUTANEOUS at 17:39

## 2017-12-28 RX ADMIN — AMLODIPINE BESYLATE 10 MILLIGRAM(S): 2.5 TABLET ORAL at 06:26

## 2017-12-28 RX ADMIN — Medication 110 MILLIGRAM(S): at 16:42

## 2017-12-28 RX ADMIN — Medication 150 MILLIGRAM(S): at 06:26

## 2017-12-28 RX ADMIN — Medication 150 MILLIGRAM(S): at 18:51

## 2017-12-28 RX ADMIN — Medication 81 MILLIGRAM(S): at 11:08

## 2017-12-28 RX ADMIN — CEFEPIME 100 MILLIGRAM(S): 1 INJECTION, POWDER, FOR SOLUTION INTRAMUSCULAR; INTRAVENOUS at 17:38

## 2017-12-28 RX ADMIN — BUDESONIDE AND FORMOTEROL FUMARATE DIHYDRATE 2 PUFF(S): 160; 4.5 AEROSOL RESPIRATORY (INHALATION) at 20:55

## 2017-12-28 RX ADMIN — Medication 100 MICROGRAM(S): at 06:26

## 2017-12-28 RX ADMIN — Medication 40 MILLIGRAM(S): at 17:39

## 2017-12-28 RX ADMIN — Medication 3 MILLILITER(S): at 01:18

## 2017-12-28 RX ADMIN — Medication 75 MILLIGRAM(S): at 17:39

## 2017-12-28 RX ADMIN — Medication 3 MILLILITER(S): at 14:34

## 2017-12-28 NOTE — PROGRESS NOTE ADULT - SUBJECTIVE AND OBJECTIVE BOX
Patient is a 84y old  Female who presents with a chief complaint of SOB (24 Dec 2017 08:49)      HPI:  84 year old woman with PMHx of MDS, asthma, hypothyroidism, HTN who presents with worsening SOB.  Pt was recently discharged from [facility] for multiple rib fractures and PNA s/p 10 day course of levaquin, however still requiring oxygen.  Daughter noted pt to be hypoxic at home with sat  82%.    In ER: Pt Hypoxic, and placed on venti-mask. She was given Abx, and nebulizer therapy for presumed HCAP.      At bedside pt sleeping but arousable, and answering questions appropriately. Daughter at bedside. She is not confused but is comfortable yet weak. (24 Dec 2017 08:49)    12/25: Pt still requiring a lot of oxygen, on non-rebreather.  She appears comfortable, and is more alert than yesterday.  She is answering all questions appropriately.  12/26: Pt downgraded to NC and satting well.  She ambulated with PT as well.  She is alert and comfortable. She tolerated BIPAP last night.  12/27: Eager to go home, pt is improving but still requiring supplemental O2.  Still with productive cough. No fever, chills.  12/28: Continues to do better daily. Working with PT, pt still with dyspnea but improving.    Review of system- Rest of the review of system are normal except mentioned in HPI    Vital Signs Last 24 Hrs  T(C): 36.7 (28 Dec 2017 11:59), Max: 37.1 (28 Dec 2017 05:00)  T(F): 98 (28 Dec 2017 11:59), Max: 98.7 (28 Dec 2017 05:00)  HR: 76 (28 Dec 2017 10:00) (69 - 102)  BP: 121/65 (28 Dec 2017 10:00) (109/95 - 153/68)  BP(mean): 74 (28 Dec 2017 10:00) (73 - 98)  RR: 26 (28 Dec 2017 10:00) (15 - 30)  SpO2: 97% (28 Dec 2017 10:00) (92% - 100%)    PHYSICAL EXAM:    Constitutional: NAD, on NC, awake and alert.  HEENT: PERR, EOMI, Normal Hearing, MMM  Neck: Soft and supple  Respiratory: rhonchi with faint exp wheezing again noted.  Cardiovascular: S1 and S2, regular rate and rhythm, no Murmurs, gallops or rubs  Gastrointestinal: Bowel Sounds present, soft, nontender, nondistended, no guarding, no rebound  Extremities: No peripheral edema  Neurological: A/O x 3, no focal deficits  Skin: No rashes    MEDICATIONS  (STANDING):  ALBUTerol/ipratropium for Nebulization 3 milliLiter(s) Nebulizer every 6 hours  amLODIPine   Tablet 10 milliGRAM(s) Oral daily  aspirin  chewable 81 milliGRAM(s) Oral daily  buDESOnide 160 MICROgram(s)/formoterol 4.5 MICROgram(s) Inhaler 2 Puff(s) Inhalation two times a day  cefepime  IVPB 2000 milliGRAM(s) IV Intermittent every 12 hours  enoxaparin Injectable 40 milliGRAM(s) SubCutaneous every 24 hours  ferrous    sulfate 325 milliGRAM(s) Oral daily  ferumoxytol IVPB 510 milliGRAM(s) IV Intermittent once  levothyroxine 100 MICROGram(s) Oral daily  methylPREDNISolone sodium succinate Injectable 40 milliGRAM(s) IV Push every 12 hours  montelukast 10 milliGRAM(s) Oral daily  oseltamivir 75 milliGRAM(s) Oral every 12 hours  vancomycin  IVPB 750 milliGRAM(s) IV Intermittent every 12 hours    MEDICATIONS  (PRN):  acetaminophen   Tablet 650 milliGRAM(s) Oral every 6 hours PRN For Temp greater than 38.5 C (101.3 F)  acetaminophen   Tablet. 650 milliGRAM(s) Oral every 6 hours PRN Moderate Pain (4 - 6)  guaiFENesin    Syrup 200 milliGRAM(s) Oral every 6 hours PRN Cough  ondansetron Injectable 4 milliGRAM(s) IV Push every 6 hours PRN Nausea and/or Vomiting                              9.9    11.7  )-----------( 419      ( 27 Dec 2017 04:55 )             33.1     12-27    138  |  100  |  26<H>  ----------------------------<  71  3.8   |  33<H>  |  0.51    Ca    8.2<L>      27 Dec 2017 04:55      CAPILLARY BLOOD GLUCOSE      Assessment and Plan:  84 year old woman with asthma, HTN, hypothyroidism who presents with SOB.    SOB with sepsis (fever, leukocytosis) and acute hypoxemic respiratory failure secondary to acute viral influenza A, hmpv, and probable superimposed bacterial pneumonia.  -Sepsis resolved  -Transfer to med-surg  -broad spectrum abx for probable HCAP/multifocal pnuemonia/gram negative bacteria.  -tamiflu for acute influenza A day # 5.  -supportive care for RSV infection.  -ID consult appreciated.  -legionella negative  -f/u cultures negative.  -leukocytosis IMPROVED.  -nocturnal bipap PRN    Asthma: with exacerbation  -duonebs OTC  -inhaled corticosteroids  -O2 supplementation  -Pulm consult appreciated - added systemic steroids. BIPAP PRN (has not used bipap for last 2 nights).    Rib fractures:  -incentive spirometry  -pain management  -PT consult    Anemia and thrombocytosis: Presumed secondary to MDS with a component of iron deficiency anemia  -iron studies noted  -stool occult neg  -H+H Stable  -monitor  -heme consult appreciated - start feraheme IV x 1.    HTN:  -c/w amlodipine    Hypothyroidism:  -c/w synthroid    DVT ppx:  -lovenox    Code status: DNR/DNI    Attending Statement:  40 minutes spent on total encounter; more than 50% of the visit was spent counseling and/or coordinating care by the attending physician.

## 2017-12-28 NOTE — CONSULT NOTE ADULT - ASSESSMENT
84 year old woman with PMHx of  asthma, hypothyroidism, HTN who presents with worsening SOB. Patient is being treated for HAP. Hematology consult requested for anemia.    Reviewed of laboratory studies is c/w iron deficiency anemia. Recommend Feraheme x 1 today and follow up with hematology as outpatient  for a second dose of Feraheme and further studies to r/o MDS.  Patients with history of chronic   iron deficiency anemia  with MCVs less than 70 can present similar to MDS but once iron is restore the counts normalized.     Follow up in the outpatient Dr Elkins [phone].

## 2017-12-28 NOTE — PROGRESS NOTE ADULT - SUBJECTIVE AND OBJECTIVE BOX
84 year old woman with PMHx of MDS, asthma, hypothyroidism, HTN who presents with worsening SOB.  Pt was recently discharged from [facility] for multiple rib fractures and PNA s/p 10 day course of levaquin, however still requiring oxygen.  Daughter noted pt to be hypoxic at home with sat  82%. In ER: Pt Hypoxic, and placed on venti-mask, wbc elevated to 15.1, RVP positive for influenza, CT chest w/o contrast showed bronchiectasis/multifocal pna/b/l small pleural effusions, was given IV vancomycin/cefepime d/t concern for infection and started on tamiflu/droplet precautions. Her sister is currently sick at home who she has been residing with.       feels better  still some yanez but improving  less cough    MEDICATIONS  (STANDING):  ALBUTerol/ipratropium for Nebulization 3 milliLiter(s) Nebulizer every 6 hours  amLODIPine   Tablet 10 milliGRAM(s) Oral daily  aspirin  chewable 81 milliGRAM(s) Oral daily  buDESOnide 160 MICROgram(s)/formoterol 4.5 MICROgram(s) Inhaler 2 Puff(s) Inhalation two times a day  cefepime  IVPB 2000 milliGRAM(s) IV Intermittent every 12 hours  enoxaparin Injectable 40 milliGRAM(s) SubCutaneous every 24 hours  ferrous    sulfate 325 milliGRAM(s) Oral daily  levothyroxine 100 MICROGram(s) Oral daily  methylPREDNISolone sodium succinate Injectable 40 milliGRAM(s) IV Push every 12 hours  montelukast 10 milliGRAM(s) Oral daily  oseltamivir 75 milliGRAM(s) Oral every 12 hours  vancomycin  IVPB 750 milliGRAM(s) IV Intermittent every 12 hours      Vital Signs Last 24 Hrs  T(C): 36.7 (28 Dec 2017 07:43), Max: 37.1 (28 Dec 2017 05:00)  T(F): 98 (28 Dec 2017 07:43), Max: 98.7 (28 Dec 2017 05:00)  HR: 100 (28 Dec 2017 08:00) (69 - 100)  BP: 109/95 (28 Dec 2017 08:00) (109/95 - 153/68)  BP(mean): 98 (28 Dec 2017 08:00) (66 - 106)  RR: 30 (28 Dec 2017 08:00) (15 - 30)  SpO2: 93% (28 Dec 2017 08:00) (92% - 100%)        PE:  Constitutional: frail looking, on NC  HEENT: NC/AT, EOMI, PERRLA  Neck: supple  Back: no tenderness  Respiratory: decreased breath sounds, scattered rhonchi  Cardiovascular: S1S2 regular, no murmurs  Abdomen: soft, not tender, not distended, positive BS  Genitourinary: deferred  Rectal: deferred  Musculoskeletal: no muscle tenderness, no joint swelling or tenderness  Extremities: no pedal edema  Neurological: no focal deficits  Skin: no rashes    Labs:                                              9.9    11.7  )-----------( 419      ( 27 Dec 2017 04:55 )             33.1     12-27    138  |  100  |  26<H>  ----------------------------<  71  3.8   |  33<H>  |  0.51    Ca    8.2<L>      27 Dec 2017 04:55               Vancomycin Level, Trough: 12.2 ug/mL (12-25 @ 17:54)          Culture - Blood (12.24.17 @ 03:11)    Specimen Source: .Blood None    Culture Results:   No growth to date.    Culture - Blood (12.24.17 @ 03:11)    Specimen Source: .Blood None    Culture Results:   No growth to date.            Radiology:  < from: CT Chest No Cont (12.24.17 @ 10:21) >    EXAM:  CT CHEST                            PROCEDURE DATE:  12/24/2017          INTERPRETATION:  Exam Date: 12/24/2017 10:21 AM  Clinical Information: Shortness of breath, hypoxia  Technique:       CT of the chest was performed with axial images obtained   from the thoracic to the inlet to the bilateral adrenal glands       without IV contrast. Maximum intensity projection images were obtained.  Comparison:  None    FINDINGS:    Lungs, Airways and Pleura: Central tracheobronchial tree demonstrates   trace mucosal debris. Mild bronchiectasis in the upper and lower lobes.   Mild left pleural effusion. Trace to small right pleural effusion.   Multifocal segmental consolidations in the right middle lobe and   bilateral lower lobes and subsegmental consolidation in the lingula and   medial left upper lobe with air bronchograms and associated   bronchiectasis may represent multifocal pneumonia versus atelectasis with   or without chronic scarring. Additional scattered groundglass opacities   and patchy nodularity may represent a multifocal infectious or   inflammatory etiology. Biapical pleural parenchymal scarring. No   pneumothorax.    Heart and Great Vessels: Atherosclerotic changes of the aorta and   coronary vasculature. Heart is normal in size. Trace pericardial effusion.    Mediastinum and Jodi: Hiatal hernia. Mildly prominent mediastinal hilar   lymph nodes.    Neck and Chest Wall: within normal limits    Bones: Degenerative changes and osteopenia. Thoracicolumbar scoliosis   convex to the right. Mild kyphosis. Mild superior endplate deformities of   T8-T10.    Upper Abdomen:  Exophytic left upper pole renal cyst. Splenomegaly   measuring up to 14 cm.    IMPRESSION:          Mild left pleural effusion. Trace to small rightpleural effusion.   Multifocal segmental consolidations in the right middle lobe and   bilateral lower lobes and subsegmental consolidation in the lingula and   medial left upper lobe with air bronchograms and associated   bronchiectasis may represent multifocal pneumonia versus atelectasis with   or without chronic scarring. Additional scattered groundglass opacities   and patchy nodularity may represent a multifocal infectious or   inflammatory etiology      < end of copied text >    Advanced directives addressed: full resuscitation

## 2017-12-28 NOTE — PROGRESS NOTE ADULT - ASSESSMENT
Clinically improving.   cont O2/bronchodilators, singulair. increase solumedrol to 40 mg IV q 12 hours today, reevaluate dose in AM and taper as tolerated.  on cefepime/vanco/tamiflu    DVT proph

## 2017-12-28 NOTE — CONSULT NOTE ADULT - SUBJECTIVE AND OBJECTIVE BOX
HPI:  84 year old woman with PMHx of MDS, asthma, hypothyroidism, HTN who presents with worsening SOB.  Pt was recently discharged from [facility] for multiple rib fractures and PNA s/p 10 day course of levaquin, however still requiring oxygen.  Daughter noted pt to be hypoxic at home with sat  82%.    In ER: Pt Hypoxic, and placed on venti-mask. She was given Abx, and nebulizer therapy for presumed HCAP.      At bedside pt sleeping but arousable, and answering questions appropriately. Daughter at bedside. She is not confused but is comfortable yet weak. (24 Dec 2017 08:49)    pt on 100% NRM, mild SOB, has cough, some sputum.  no hemoptysis.  influenza A positive, CT bilat dense consolidations lower lobes and RML w/ air bronchograms, mild bronchiectais, splenomegaly, small L greater than R effusions.      PAST MEDICAL & SURGICAL HISTORY:  MDS (myelodysplastic syndrome)  Asthma, unspecified asthma severity, unspecified whether complicated, unspecified whether persistent  Hypothyroidism, unspecified type  Essential hypertension  Rib fractures  No significant past surgical history      MEDICATIONS  (STANDING):  ALBUTerol/ipratropium for Nebulization 3 milliLiter(s) Nebulizer every 6 hours  amLODIPine   Tablet 10 milliGRAM(s) Oral daily  aspirin  chewable 81 milliGRAM(s) Oral daily  buDESOnide 160 MICROgram(s)/formoterol 4.5 MICROgram(s) Inhaler 2 Puff(s) Inhalation two times a day  cefepime  IVPB 2000 milliGRAM(s) IV Intermittent every 12 hours  enoxaparin Injectable 40 milliGRAM(s) SubCutaneous every 24 hours  levothyroxine 100 MICROGram(s) Oral daily  montelukast 10 milliGRAM(s) Oral daily  oseltamivir 75 milliGRAM(s) Oral every 12 hours  vancomycin  IVPB 750 milliGRAM(s) IV Intermittent every 12 hours    MEDICATIONS  (PRN):  acetaminophen   Tablet 650 milliGRAM(s) Oral every 6 hours PRN For Temp greater than 38.5 C (101.3 F)  acetaminophen   Tablet. 650 milliGRAM(s) Oral every 6 hours PRN Moderate Pain (4 - 6)  guaiFENesin    Syrup 200 milliGRAM(s) Oral every 6 hours PRN Cough  ondansetron Injectable 4 milliGRAM(s) IV Push every 6 hours PRN Nausea and/or Vomiting      Allergies    No Known Allergies    Intolerances        SOCIAL HISTORY: Denies tobacco, etoh abuse or illicit drug use    FAMILY HISTORY:  No pertinent family history in first degree relatives      Vital Signs Last 24 Hrs  T(C): 38.7 (25 Dec 2017 00:00), Max: 38.7 (25 Dec 2017 00:00)  T(F): 101.7 (25 Dec 2017 00:00), Max: 101.7 (25 Dec 2017 00:00)  HR: 90 (25 Dec 2017 08:20) (83 - 111)  BP: 122/46 (25 Dec 2017 06:00) (116/59 - 149/50)  BP(mean): 65 (25 Dec 2017 06:00) (59 - 113)  RR: 26 (25 Dec 2017 06:00) (19 - 30)  SpO2: 99% (25 Dec 2017 06:00) (84% - 100%)    REVIEW OF SYSTEMS:    CONSTITUTIONAL:  As per HPI.  HEENT:  Eyes:  No diplopia or blurred vision. ENT:  No earache, sore throat or runny nose.  CARDIOVASCULAR:  No pressure, squeezing, tightness, heaviness or aching about the chest, neck, axilla or epigastrium.  RESPIRATORY:  see above.  GASTROINTESTINAL:  No nausea, vomiting or diarrhea.  GENITOURINARY:  No dysuria, frequency or urgency.  MUSCULOSKELETAL:  As per HPI.  SKIN:  No change in skin, hair or nails.  NEUROLOGIC:  No paresthesias, fasciculations, seizures or weakness.  PSYCHIATRIC:  No disorder of thought or mood.  ENDOCRINE:  No heat or cold intolerance, polyuria or polydipsia.  HEMATOLOGICAL:  No easy bruising or bleedings:  .     PHYSICAL EXAMINATION:    GENERAL APPEARANCE:  Pt. mildly SOB.  HEENT:  Pupils are normal and react normally. No icterus. Mucous membranes well colored.  NECK:  Supple. No lymphadenopathy. Jugular venous pressure not elevated. Carotids equal.   HEART:   The cardiac impulse has a normal quality. Regular. Normal S1 and S2. There are no murmurs, rubs or gallops noted  CHEST:  bilat rhonchi, few low pitched expir wheezes.  ABDOMEN:  Soft and nontender.   EXTREMITIES:  There is no cyanosis, clubbing or edema.   SKIN:  No rash or significant lesions are noted.  Neuro: Alert, awake, and O x 3.      LABS:                        8.6    11.3  )-----------( 403      ( 25 Dec 2017 04:36 )             29.2     12-25    139  |  102  |  24<H>  ----------------------------<  80  4.1   |  33<H>  |  0.63    Ca    8.4<L>      25 Dec 2017 04:36  Phos  2.9     12-25  Mg     1.9     12-25    TPro  6.8  /  Alb  2.0<L>  /  TBili  0.3  /  DBili  x   /  AST  28  /  ALT  15  /  AlkPhos  75  12-24    LIVER FUNCTIONS - ( 24 Dec 2017 01:30 )  Alb: 2.0 g/dL / Pro: 6.8 gm/dL / ALK PHOS: 75 U/L / ALT: 15 U/L / AST: 28 U/L / GGT: x             CARDIAC MARKERS ( 24 Dec 2017 07:38 )  0.022 ng/mL / x     / x     / x     / x      CARDIAC MARKERS ( 24 Dec 2017 04:45 )  0.018 ng/mL / x     / x     / x     / x      CARDIAC MARKERS ( 24 Dec 2017 01:30 )  0.018 ng/mL / x     / 35 U/L / x     / x            ABG - ( 24 Dec 2017 03:11 )  pH: 7.35  /  pCO2: 57    /  pO2: 115   / HCO3: 31    / Base Excess: 4.8   /  SaO2: 98                  RADIOLOGY & ADDITIONAL STUDIES:       EXAM:  CT CHEST                            PROCEDURE DATE:  12/24/2017          INTERPRETATION:  Exam Date: 12/24/2017 10:21 AM  Clinical Information: Shortness of breath, hypoxia  Technique:       CT of the chest was performed with axial images obtained   from the thoracic to the inlet to the bilateral adrenal glands       without IV contrast. Maximum intensity projection images were obtained.  Comparison:  None    FINDINGS:    Lungs, Airways and Pleura: Central tracheobronchial tree demonstrates   trace mucosal debris. Mild bronchiectasis in the upper and lower lobes.   Mild left pleural effusion. Trace to small right pleural effusion.   Multifocal segmental consolidations in the right middle lobe and   bilateral lower lobes and subsegmental consolidation in the lingula and   medial left upper lobe with air bronchograms and associated   bronchiectasis may represent multifocal pneumonia versus atelectasis with   or without chronic scarring. Additional scattered groundglass opacities   and patchy nodularity may represent a multifocal infectious or   inflammatory etiology. Biapical pleural parenchymal scarring. No   pneumothorax.    Heart and Great Vessels: Atherosclerotic changes of the aorta and   coronary vasculature. Heart is normal in size. Trace pericardial effusion.    Mediastinum and Jodi: Hiatal hernia. Mildly prominent mediastinal hilar   lymph nodes.    Neck and Chest Wall: within normal limits    Bones: Degenerative changes and osteopenia. Thoracicolumbar scoliosis   convex to the right. Mild kyphosis. Mild superior endplate deformities of   T8-T10.    Upper Abdomen:  Exophytic left upper pole renal cyst. Splenomegaly   measuring up to 14 cm.    IMPRESSION:          Mild left pleural effusion. Trace to small rightpleural effusion.   Multifocal segmental consolidations in the right middle lobe and   bilateral lower lobes and subsegmental consolidation in the lingula and   medial left upper lobe with air bronchograms and associated   bronchiectasis may represent multifocal pneumonia versus atelectasis with   or without chronic scarring. Additional scattered groundglass opacities   and patchy nodularity may represent a multifocal infectious or   inflammatory etiology
84 year old woman with PMHx of  asthma, hypothyroidism, HTN who presents with worsening SOB.  Pt was recently discharged from [facility] for multiple rib fractures and PNA s/p 10 day course of levaquin, however still requiring oxygen.  Daughter noted pt to be hypoxic at home with sat  82%. Patient is being treated for HAP.     Patient has history of anemia Hb  8.6 g/dl 12-25-17 to 9.9 g/dl 12-27-17. MCV 66.4, RDW  19.9, , iron saturation is 8%.    Patient is alert and oriented x 3, unable to provide history about MDS.     ICU Vital Signs Last 24 Hrs  T(C): 36.7 (28 Dec 2017 07:43), Max: 37.1 (28 Dec 2017 05:00)  T(F): 98 (28 Dec 2017 07:43), Max: 98.7 (28 Dec 2017 05:00)  HR: 76 (28 Dec 2017 10:00) (69 - 102)  BP: 121/65 (28 Dec 2017 10:00) (109/95 - 153/68)  BP(mean): 74 (28 Dec 2017 10:00) (73 - 98)  ABP: --  ABP(mean): --  RR: 26 (28 Dec 2017 10:00) (15 - 30)  SpO2: 97% (28 Dec 2017 10:00) (92% - 100%)      General woman in NAD  HEENT,PERRLA  Lungs bilateral decrease breath sounds, no wheezing or rales.  CVS S1 s2 regular, no M/R/G  Abdomen soft Nt ND positive for BS, no organomegaly.  Extremities: No C/C/E        MEDICATIONS  (STANDING):  ALBUTerol/ipratropium for Nebulization 3 milliLiter(s) Nebulizer every 6 hours  amLODIPine   Tablet 10 milliGRAM(s) Oral daily  aspirin  chewable 81 milliGRAM(s) Oral daily  buDESOnide 160 MICROgram(s)/formoterol 4.5 MICROgram(s) Inhaler 2 Puff(s) Inhalation two times a day  cefepime  IVPB 2000 milliGRAM(s) IV Intermittent every 12 hours  enoxaparin Injectable 40 milliGRAM(s) SubCutaneous every 24 hours  ferrous    sulfate 325 milliGRAM(s) Oral daily  levothyroxine 100 MICROGram(s) Oral daily  methylPREDNISolone sodium succinate Injectable 40 milliGRAM(s) IV Push every 12 hours  montelukast 10 milliGRAM(s) Oral daily  oseltamivir 75 milliGRAM(s) Oral every 12 hours  vancomycin  IVPB 750 milliGRAM(s) IV Intermittent every 12 hours    Basic Metabolic Panel in AM (12.27.17 @ 04:55)    Sodium, Serum: 138 mmol/L    Potassium, Serum: 3.8 mmol/L    Chloride, Serum: 100 mmol/L    Carbon Dioxide, Serum: 33 mmol/L    Anion Gap, Serum: 5 mmol/L    Blood Urea Nitrogen, Serum: 26 mg/dL    Creatinine, Serum: 0.51 mg/dL    Glucose, Serum: 71 mg/dL    Calcium, Total Serum: 8.2 mg/dL    eGFR if Non : 88: Interpretative comment  The units for eGFR are ml/min/1.73m2 (normalized body surface area). The  eGFR is calculated from a serum creatinine using the CKD-EPI equation.  Other variables required for calculation are race, age and sex. Among  patients with chronic kidney disease (CKD), the eGFR is useful in  determining the stage of disease according to KDOQI CKD classification.  All eGFR results are reported numerically with the following  interpretation.          GFR                    With                 Without     (ml/min/1.73 m2)    Kidney Damage       Kidney Damage        >= 90                    Stage 1                     Normal        60-89                    Stage 2                     Decreased GFR        30-59     Stage 3                     Stage 3        15-29                    Stage 4                     Stage 4        < 15                      Stage 5                     Stage 5  Each stage of CKD assumes that the associated GFR level has been in  effect for at least 3 months. Determination of stages one and two (with  eGFR > 59 ml/min/m2) requires estimation of kidney damage for at least 3  months as defined by structural or functional abnormalities.  Limitations: All estimates of GFR will be less accurate for patients at  extremes of muscle mass (including but not limited to frail elderly,  critically ill, or cancer patients), those with unusual diets, and those  with conditions associated with reduced secretion or extrarenal  elimination of creatinine. The eGFR equation is not recommended for use  in patients with unstable creatinine levels. mL/min/1.73M2    eGFR if African American: 102 mL/min/1.73M2        Complete Blood Count in AM (12.27.17 @ 04:55)    Nucleated RBC: FLAG /100 WBCs    WBC Count: 11.7 K/uL    RBC Count: 4.99 M/uL    Hemoglobin: 9.9 g/dL    Hematocrit: 33.1 %    Mean Cell Volume: 66.4 fl    Mean Cell Hemoglobin: 19.7 pg    Mean Cell Hemoglobin Conc: 29.7 gm/dL    Red Cell Distrib Width: 18.4 %    Platelet Count - Automated: 419 K/uL    Iron with Total Binding Capacity in AM (12.25.17 @ 04:36)    Iron - Total Binding Capacity.: 168 ug/dL    % Saturation, Iron: 8 %    Iron Total, Serum: 13 ug/dL    Unsaturated Iron Binding Capacity: 155 ug/dL    Ferritin, Serum in AM (12.25.17 @ 04:36)    Ferritin, Serum: 178.0 ng/mL    Vitamin B12, Serum in AM (12.25.17 @ 04:36)    Vitamin B12, Serum: 1393: Note: Reference Range Change on 12/18/2017. pg/mL    Folate, Serum in AM (12.25.17 @ 04:36)    Folate, Serum: >20.0 ng/mL
Patient is a 84y old  Female who presents with a chief complaint of SOB (24 Dec 2017 08:49)      HPI:  84 year old woman with PMHx of MDS, asthma, hypothyroidism, HTN who presents with worsening SOB.  Pt was recently discharged from [facility] for multiple rib fractures and PNA s/p 10 day course of levaquin, however still requiring oxygen.  Daughter noted pt to be hypoxic at home with sat  82%. In ER: Pt Hypoxic, and placed on venti-mask, wbc elevated to 15.1, RVP positive for influenza, CT chest w/o contrast showed bronchiectasis/multifocal pna/b/l small pleural effusions, was given IV vancomycin/cefepime d/t concern for infection and started on tamiflu/droplet precautions. Her sister is currently sick at home who she has been residing with.         PMH: as above    PSH: as above    Meds: per reconciliation sheet, noted below    MEDICATIONS  (STANDING):  ALBUTerol/ipratropium for Nebulization 3 milliLiter(s) Nebulizer every 6 hours  amLODIPine   Tablet 10 milliGRAM(s) Oral daily  aspirin  chewable 81 milliGRAM(s) Oral daily  buDESOnide  180 MICROgram(s) Inhaler 1 Puff(s) Inhalation two times a day  cefepime  IVPB 2000 milliGRAM(s) IV Intermittent every 12 hours  enoxaparin Injectable 40 milliGRAM(s) SubCutaneous every 24 hours  montelukast 10 milliGRAM(s) Oral daily  oseltamivir 75 milliGRAM(s) Oral every 12 hours  vancomycin  IVPB          Allergies    No Known Allergies    Intolerances        Social: no smoking, no alcohol, no illegal drugs; no recent travel, no exposure to TB    Family history:  No pertinent family history in first degree relatives      ROS: no HA, no dizziness, no sore throat, no blurry vision, no CP, no palpitations,  no abdominal pain, no diarrhea, no N/V, no dysuria, no leg pain, no claudication, no rash, no joint aches, no rectal pain or bleeding, no night sweats    Vital Signs Last 24 Hrs  T(C): 37.1 (24 Dec 2017 00:30), Max: 37.1 (24 Dec 2017 00:30)  T(F): 98.7 (24 Dec 2017 00:30), Max: 98.7 (24 Dec 2017 00:30)  HR: 86 (24 Dec 2017 07:00) (86 - 110)  BP: 121/54 (24 Dec 2017 07:00) (110/55 - 136/66)  BP(mean): --  RR: 19 (24 Dec 2017 07:00) (17 - 21)  SpO2: 98% (24 Dec 2017 07:00) (79% - 100%)      PE:  Constitutional: frail looking, on NC  HEENT: NC/AT, EOMI, PERRLA  Neck: supple  Back: no tenderness  Respiratory: decreased breath sounds, scattered rhonchi  Cardiovascular: S1S2 regular, no murmurs  Abdomen: soft, not tender, not distended, positive BS  Genitourinary: deferred  Rectal: deferred  Musculoskeletal: no muscle tenderness, no joint swelling or tenderness  Extremities: no pedal edema  Neurological: no focal deficits  Skin: no rashes    Labs:                        10.5   15.1  )-----------( 516      ( 24 Dec 2017 01:30 )             34.4     12-24    137  |  101  |  29<H>  ----------------------------<  99  3.8   |  30  |  0.70    Ca    9.0      24 Dec 2017 01:30    TPro  6.8  /  Alb  2.0<L>  /  TBili  0.3  /  DBili  x   /  AST  28  /  ALT  15  /  AlkPhos  75  12-24     LIVER FUNCTIONS - ( 24 Dec 2017 01:30 )  Alb: 2.0 g/dL / Pro: 6.8 gm/dL / ALK PHOS: 75 U/L / ALT: 15 U/L / AST: 28 U/L / GGT: x                   Radiology:  < from: CT Chest No Cont (12.24.17 @ 10:21) >    EXAM:  CT CHEST                            PROCEDURE DATE:  12/24/2017          INTERPRETATION:  Exam Date: 12/24/2017 10:21 AM  Clinical Information: Shortness of breath, hypoxia  Technique:       CT of the chest was performed with axial images obtained   from the thoracic to the inlet to the bilateral adrenal glands       without IV contrast. Maximum intensity projection images were obtained.  Comparison:  None    FINDINGS:    Lungs, Airways and Pleura: Central tracheobronchial tree demonstrates   trace mucosal debris. Mild bronchiectasis in the upper and lower lobes.   Mild left pleural effusion. Trace to small right pleural effusion.   Multifocal segmental consolidations in the right middle lobe and   bilateral lower lobes and subsegmental consolidation in the lingula and   medial left upper lobe with air bronchograms and associated   bronchiectasis may represent multifocal pneumonia versus atelectasis with   or without chronic scarring. Additional scattered groundglass opacities   and patchy nodularity may represent a multifocal infectious or   inflammatory etiology. Biapical pleural parenchymal scarring. No   pneumothorax.    Heart and Great Vessels: Atherosclerotic changes of the aorta and   coronary vasculature. Heart is normal in size. Trace pericardial effusion.    Mediastinum and Jodi: Hiatal hernia. Mildly prominent mediastinal hilar   lymph nodes.    Neck and Chest Wall: within normal limits    Bones: Degenerative changes and osteopenia. Thoracicolumbar scoliosis   convex to the right. Mild kyphosis. Mild superior endplate deformities of   T8-T10.    Upper Abdomen:  Exophytic left upper pole renal cyst. Splenomegaly   measuring up to 14 cm.    IMPRESSION:          Mild left pleural effusion. Trace to small rightpleural effusion.   Multifocal segmental consolidations in the right middle lobe and   bilateral lower lobes and subsegmental consolidation in the lingula and   medial left upper lobe with air bronchograms and associated   bronchiectasis may represent multifocal pneumonia versus atelectasis with   or without chronic scarring. Additional scattered groundglass opacities   and patchy nodularity may represent a multifocal infectious or   inflammatory etiology      < end of copied text >    Advanced directives addressed: full resuscitation

## 2017-12-28 NOTE — PROGRESS NOTE ADULT - SUBJECTIVE AND OBJECTIVE BOX
Subjective:  awake and alert  no distress  12/27: O2 decreased to NC, 4 L/min.  has cough, clear/white sputum now.   12/27: no distress, on NC O2. sputum is clear.    MEDICATIONS  (STANDING):  ALBUTerol/ipratropium for Nebulization 3 milliLiter(s) Nebulizer every 6 hours  amLODIPine   Tablet 10 milliGRAM(s) Oral daily  aspirin  chewable 81 milliGRAM(s) Oral daily  buDESOnide 160 MICROgram(s)/formoterol 4.5 MICROgram(s) Inhaler 2 Puff(s) Inhalation two times a day  cefepime  IVPB 2000 milliGRAM(s) IV Intermittent every 12 hours  enoxaparin Injectable 40 milliGRAM(s) SubCutaneous every 24 hours  ferrous    sulfate 325 milliGRAM(s) Oral daily  levothyroxine 100 MICROGram(s) Oral daily  methylPREDNISolone sodium succinate Injectable 40 milliGRAM(s) IV Push daily  montelukast 10 milliGRAM(s) Oral daily  oseltamivir 75 milliGRAM(s) Oral every 12 hours  vancomycin  IVPB 750 milliGRAM(s) IV Intermittent every 12 hours    MEDICATIONS  (PRN):  acetaminophen   Tablet 650 milliGRAM(s) Oral every 6 hours PRN For Temp greater than 38.5 C (101.3 F)  acetaminophen   Tablet. 650 milliGRAM(s) Oral every 6 hours PRN Moderate Pain (4 - 6)  guaiFENesin    Syrup 200 milliGRAM(s) Oral every 6 hours PRN Cough  ondansetron Injectable 4 milliGRAM(s) IV Push every 6 hours PRN Nausea and/or Vomiting      Allergies    No Known Allergies    Intolerances        REVIEW OF SYSTEMS:    CONSTITUTIONAL:  As per HPI.  HEENT:  Eyes:  No diplopia or blurred vision. ENT:  No earache, sore throat or runny nose.  CARDIOVASCULAR:  No pressure, squeezing, tightness, heaviness or aching about the chest, neck, axilla or epigastrium.  RESPIRATORY:  see above.  GASTROINTESTINAL:  No nausea, vomiting or diarrhea.  GENITOURINARY:  No dysuria, frequency or urgency.  MUSCULOSKELETAL:  no joint pain, deformity, tenderness  EXTREMITIES: no clubbing cyanosis,edema  SKIN:  No change in skin, hair or nails.  NEUROLOGIC:  No paresthesias, fasciculations, seizures or weakness.  PSYCHIATRIC:  No disorder of thought or mood.  ENDOCRINE:  No heat or cold intolerance, polyuria or polydipsia.  HEMATOLOGICAL:  No easy bruising or bleedings:    Vital Signs Last 24 Hrs  T(C): 37 (26 Dec 2017 00:00), Max: 37 (26 Dec 2017 00:00)  T(F): 98.6 (26 Dec 2017 00:00), Max: 98.6 (26 Dec 2017 00:00)  HR: 94 (26 Dec 2017 07:00) (78 - 111)  BP: 126/82 (26 Dec 2017 07:00) (116/48 - 165/82)  BP(mean): 92 (26 Dec 2017 07:00) (62 - 127)  RR: 21 (26 Dec 2017 07:00) (18 - 36)  SpO2: 95% (26 Dec 2017 07:00) (82% - 100%)    PHYSICAL EXAMINATION:  SKIN: no rashes  HEAD: NC/AT  EYES: PERRLA, EOMI  EARS: TM's intact  NOSE: no abnormalities  NECK:  Supple. No lymphadenopathy. Jugular venous pressure not elevated. Carotids equal.   HEART:   The cardiac impulse has a normal quality. Reg., Nl S1 and S2.  There are no murmurs, rubs or gallops noted  CHEST: few rhonchi anterior exam. low pitched wheeze with FEM.  ABDOMEN:  Soft and nontender.   EXTREMITIES:  no C/C/E  NEURO: AAO x 3, no focal deficts       LABS:                        9.8    8.4   )-----------( 408      ( 26 Dec 2017 04:51 )             33.4     12-26    136  |  97  |  23  ----------------------------<  125<H>  4.1   |  35<H>  |  0.44<L>    Ca    8.1<L>      26 Dec 2017 04:51  Phos  2.9     12-25  Mg     1.9     12-25            RADIOLOGY & ADDITIONAL TESTS:

## 2017-12-28 NOTE — PROGRESS NOTE ADULT - ASSESSMENT
84 year old woman with PMHx of MDS, asthma, hypothyroidism, HTN who presents with worsening SOB.  Pt was recently discharged from [facility] for multiple rib fractures and PNA s/p 10 day course of levaquin, however still requiring oxygen.  Daughter noted pt to be hypoxic at home with sat  82%. In ER: Pt Hypoxic, and placed on venti-mask, wbc elevated to 15.1, RVP positive for influenza, CT chest w/o contrast showed bronchiectasis/multifocal pna/b/l small pleural effusions, was given IV vancomycin/cefepime d/t concern for infection and started on tamiflu/droplet precautions. Her sister is currently sick at home who she has been residing with    1. sepsis/hypoxic resp failure/influenza/multifocal pna w/ bronchiectasis/MDS/asthma  - slowly improving  - on IV vancomcyin [id number], trough therapeutic #6  - on IV cefepime [id number] for gnr resistant coverage #6  - blood cx no growth  - monitor temps  - completed 10 day course of levaquin for atypical coverage already  - on tamiflu 75 mg BID for influenza - day 5/5  - on droplet precautions  -tolerating abx well so far; no side effects noted  -reason for abx use and side effects reviewed with patient  - supportive care  - f/u cbc    2. other issues -care per medicine

## 2017-12-29 ENCOUNTER — TRANSCRIPTION ENCOUNTER (OUTPATIENT)
Age: 82
End: 2017-12-29

## 2017-12-29 LAB
CULTURE RESULTS: SIGNIFICANT CHANGE UP
CULTURE RESULTS: SIGNIFICANT CHANGE UP
SPECIMEN SOURCE: SIGNIFICANT CHANGE UP
SPECIMEN SOURCE: SIGNIFICANT CHANGE UP

## 2017-12-29 RX ORDER — CEFUROXIME AXETIL 250 MG
500 TABLET ORAL EVERY 12 HOURS
Qty: 0 | Refills: 0 | Status: COMPLETED | OUTPATIENT
Start: 2017-12-30 | End: 2018-01-01

## 2017-12-29 RX ORDER — SODIUM FERRIC GLUCONAT/SUCROSE 62.5MG/5ML
125 AMPUL (ML) INTRAVENOUS ONCE
Qty: 0 | Refills: 0 | Status: COMPLETED | OUTPATIENT
Start: 2017-12-29 | End: 2017-12-29

## 2017-12-29 RX ADMIN — BUDESONIDE AND FORMOTEROL FUMARATE DIHYDRATE 2 PUFF(S): 160; 4.5 AEROSOL RESPIRATORY (INHALATION) at 21:15

## 2017-12-29 RX ADMIN — ENOXAPARIN SODIUM 40 MILLIGRAM(S): 100 INJECTION SUBCUTANEOUS at 17:27

## 2017-12-29 RX ADMIN — Medication 81 MILLIGRAM(S): at 11:24

## 2017-12-29 RX ADMIN — Medication 100 MICROGRAM(S): at 05:18

## 2017-12-29 RX ADMIN — Medication 75 MILLIGRAM(S): at 05:20

## 2017-12-29 RX ADMIN — AMLODIPINE BESYLATE 10 MILLIGRAM(S): 2.5 TABLET ORAL at 05:18

## 2017-12-29 RX ADMIN — MONTELUKAST 10 MILLIGRAM(S): 4 TABLET, CHEWABLE ORAL at 11:24

## 2017-12-29 RX ADMIN — Medication 3 MILLILITER(S): at 21:00

## 2017-12-29 RX ADMIN — Medication 3 MILLILITER(S): at 14:23

## 2017-12-29 RX ADMIN — CEFEPIME 100 MILLIGRAM(S): 1 INJECTION, POWDER, FOR SOLUTION INTRAMUSCULAR; INTRAVENOUS at 17:28

## 2017-12-29 RX ADMIN — Medication 40 MILLIGRAM(S): at 05:18

## 2017-12-29 RX ADMIN — BUDESONIDE AND FORMOTEROL FUMARATE DIHYDRATE 2 PUFF(S): 160; 4.5 AEROSOL RESPIRATORY (INHALATION) at 08:29

## 2017-12-29 RX ADMIN — Medication 110 MILLIGRAM(S): at 17:27

## 2017-12-29 RX ADMIN — Medication 150 MILLIGRAM(S): at 05:18

## 2017-12-29 RX ADMIN — CEFEPIME 100 MILLIGRAM(S): 1 INJECTION, POWDER, FOR SOLUTION INTRAMUSCULAR; INTRAVENOUS at 05:18

## 2017-12-29 RX ADMIN — Medication 3 MILLILITER(S): at 08:29

## 2017-12-29 RX ADMIN — Medication 325 MILLIGRAM(S): at 11:24

## 2017-12-29 RX ADMIN — Medication 3 MILLILITER(S): at 01:53

## 2017-12-29 NOTE — DISCHARGE NOTE ADULT - CARE PROVIDERS DIRECT ADDRESSES
[email or web address]

## 2017-12-29 NOTE — DISCHARGE NOTE ADULT - HOSPITAL COURSE
HPI:  84 year old woman with PMHx of MDS, asthma, hypothyroidism, HTN who presents with worsening SOB.  Pt was recently discharged from [facility] for multiple rib fractures and PNA s/p 10 day course of levaquin, however still requiring oxygen.  Daughter noted pt to be hypoxic at home with sat  82%.    In ER: Pt Hypoxic, and placed on venti-mask. She was given Abx, and nebulizer therapy for presumed HCAP.      At bedside pt sleeping but arousable, and answering questions appropriately. Daughter at bedside. She is not confused but is comfortable yet weak. (24 Dec 2017 08:49)    12/25: Pt still requiring a lot of oxygen, on non-rebreather.  She appears comfortable, and is more alert than yesterday.  She is answering all questions appropriately.  12/26: Pt downgraded to NC and satting well.  She ambulated with PT as well.  She is alert and comfortable. She tolerated BIPAP last night.  12/27: Eager to go home, pt is improving but still requiring supplemental O2.  Still with productive cough. No fever, chills.  12/28: Continues to do better daily. Working with PT, pt still with dyspnea but improving.  12/29: Improving daily.  Still requiring supplemental O2.  Tolerating abx.  No fever, chills, N, V.    Assessment and Plan:  84 year old woman with asthma, HTN, hypothyroidism who presents with SOB.    SOB with sepsis (fever, leukocytosis) and acute hypoxemic respiratory failure secondary to acute viral influenza A, hmpv, and probable superimposed bacterial pneumonia.  -Sepsis resolved, Transfer to med-surg.  -broad spectrum abx for probable HCAP/multifocal pnuemonia/gram negative bacteria. Per ID, change to oral ceftin x 3 days starting tomorrow for 10 day course of antibiotics.  -tamiflu for acute influenza A day # 5 --> Completed course.  -supportive care for RSV infection.  -legionella negative  -f/u cultures negative.  -leukocytosis IMPROVED.  -nocturnal bipap PRN    Asthma: with exacerbation  -duonebs OTC  -inhaled corticosteroids, taper to IV daily and d/c on tapering course.  -O2 supplementation  -Pulm consult appreciated     Rib fractures:  -incentive spirometry  -pain management  -PT consult --> Home PT.     Anemia and thrombocytosis: Presumed secondary to MDS with a component of iron deficiency anemia  -iron studies noted  -stool occult neg  -H+H Stable  -monitor  -heme consult appreciated   -start feraheme IV yesterday and today. d/c on daily dosing of oral iron.    HTN:  -c/w amlodipine    Hypothyroidism:  -c/w synthroid    DVT ppx:  -lovenox    Code status: DNR/DNI HPI:  84 year old woman with PMHx of MDS, asthma, hypothyroidism, HTN who presents with worsening SOB.  Pt was recently discharged from [facility] for multiple rib fractures and PNA s/p 10 day course of levaquin, however still requiring oxygen.  Daughter noted pt to be hypoxic at home with sat  82%.    In ER: Pt Hypoxic, and placed on venti-mask. She was given Abx, and nebulizer therapy for presumed HCAP.    Patient has since completed IV antibiotic regimen and transtiioned to po ceftin. Patient also on Prednisone taper for 1 week. She will need to f/u with Hematologist as an outpatient. Leukocytosis has trended down. Patient is medically cleared for discharge home today.

## 2017-12-29 NOTE — DISCHARGE NOTE ADULT - SECONDARY DIAGNOSIS.
Influenza MDS (myelodysplastic syndrome) Hypothyroidism, unspecified type HCAP (healthcare-associated pneumonia) Asthma, unspecified asthma severity, unspecified whether complicated, unspecified whether persistent

## 2017-12-29 NOTE — DISCHARGE NOTE ADULT - CARE PLAN
Principal Discharge DX:	Acute respiratory failure with hypoxia and hypercapnia  Goal:	RESOLVED.  Instructions for follow-up, activity and diet:	Follow up with your pcp.  Secondary Diagnosis:	Influenza  Goal:	RESOLVED.  Secondary Diagnosis:	MDS (myelodysplastic syndrome)  Goal:	Follow up with your hematologist.  Instructions for follow-up, activity and diet:	You also have a component of iron deficiency anemia. I recommend daily iron tablet.  Secondary Diagnosis:	Hypothyroidism, unspecified type  Goal:	resume home meds.  Secondary Diagnosis:	HCAP (healthcare-associated pneumonia)  Goal:	Take antibiotics to complete 10 day course.  Instructions for follow-up, activity and diet:	follow up with your pcp.  Secondary Diagnosis:	Asthma, unspecified asthma severity, unspecified whether complicated, unspecified whether persistent  Goal:	take tapering prednisone.  Instructions for follow-up, activity and diet:	follow up with your pcp. Principal Discharge DX:	Acute respiratory failure with hypoxia and hypercapnia  Goal:	RESOLVED.  Instructions for follow-up, activity and diet:	Follow up with your pcp.  Secondary Diagnosis:	Influenza  Goal:	RESOLVED.  Secondary Diagnosis:	MDS (myelodysplastic syndrome)  Goal:	Follow up with your hematologist.  Instructions for follow-up, activity and diet:	You also have a component of iron deficiency anemia. I recommend daily iron tablet[facility]  Secondary Diagnosis:	Hypothyroidism, unspecified type  Goal:	resume home meds.  Secondary Diagnosis:	HCAP (healthcare-associated pneumonia)  Goal:	completed 10 day course of ABx no further ABX  Instructions for follow-up, activity and diet:	follow up with your pcp.  Secondary Diagnosis:	Asthma, unspecified asthma severity, unspecified whether complicated, unspecified whether persistent  Goal:	take tapering prednisone.  Instructions for follow-up, activity and diet:	follow up with your pcp.

## 2017-12-29 NOTE — DISCHARGE NOTE ADULT - MEDICATION SUMMARY - MEDICATIONS TO TAKE
I will START or STAY ON the medications listed below when I get home from the hospital:    predniSONE 10 mg oral tablet  -- 20 mg daily for 3 days  10mg for 4 days then DC  -- It is very important that you take or use this exactly as directed.  Do not skip doses or discontinue unless directed by your doctor.  Obtain medical advice before taking any non-prescription drugs as some may affect the action of this medication.  Take with food or milk.    -- Indication: For PNuemonia    aspirin 81 mg oral tablet, chewable  -- 1 tab(s) by mouth once a day  -- Indication: For CAD    gabapentin enacarbil 300 mg oral tablet, extended release  -- 1 tab(s) by mouth once a day  -- Indication: For PAIn meds    albuterol  -- Indication: For Bronchodiltor    budesonide-formoterol 160 mcg-4.5 mcg/inh inhalation aerosol  -- 2 puff(s) inhaled 2 times a day   -- Indication: For Bronchodilator    amLODIPine 10 mg oral tablet  -- 1 tab(s) by mouth once a day  -- Indication: For HTN    guaiFENesin 100 mg/5 mL oral liquid  -- 10 milliliter(s) by mouth every 6 hours, As needed, Cough  -- Indication: For Cough    FeroSul 325 mg (65 mg elemental iron) oral tablet  -- 1 tab(s) by mouth once a day   -- Check with your doctor before becoming pregnant.  Do not chew, break, or crush.  May discolor urine or feces.    -- Indication: For Supplement    montelukast 10 mg oral tablet  -- 1 tab(s) by mouth once a day  -- Indication: For Home meds    levothyroxine 100 mcg (0.1 mg) oral tablet  -- 1 tab(s) by mouth once a day  -- Indication: For Hypothyroidism, unspecified type    Calcium 600+D  -- Indication: For Supplement I will START or STAY ON the medications listed below when I get home from the hospital:    predniSONE 10 mg oral tablet  -- 20 mg daily for 3 days  10mg for 4 days then DC  -- It is very important that you take or use this exactly as directed.  Do not skip doses or discontinue unless directed by your doctor.  Obtain medical advice before taking any non-prescription drugs as some may affect the action of this medication.  Take with food or milk.    -- Indication: For PNuemonia    aspirin 81 mg oral tablet, chewable  -- 1 tab(s) by mouth once a day  -- Indication: For CAD    gabapentin enacarbil 300 mg oral tablet, extended release  -- 1 tab(s) by mouth once a day  -- Indication: For PAIn meds    albuterol 90 mcg/inh inhalation aerosol  -- 2 puff(s) inhaled 4 times a day   -- For inhalation only.  It is very important that you take or use this exactly as directed.  Do not skip doses or discontinue unless directed by your doctor.  Obtain medical advice before taking any non-prescription drugs as some may affect the action of this medication.  Shake well before use.    -- Indication: For PNEUMONIA    budesonide-formoterol 160 mcg-4.5 mcg/inh inhalation aerosol  -- 2 puff(s) inhaled 2 times a day   -- Indication: For Bronchodilator    amLODIPine 10 mg oral tablet  -- 1 tab(s) by mouth once a day  -- Indication: For HTN    guaiFENesin 100 mg/5 mL oral liquid  -- 10 milliliter(s) by mouth every 6 hours, As needed, Cough  -- Indication: For Cough    FeroSul 325 mg (65 mg elemental iron) oral tablet  -- 1 tab(s) by mouth once a day   -- Check with your doctor before becoming pregnant.  Do not chew, break, or crush.  May discolor urine or feces.    -- Indication: For Supplement    montelukast 10 mg oral tablet  -- 1 tab(s) by mouth once a day  -- Indication: For Home meds    levothyroxine 100 mcg (0.1 mg) oral tablet  -- 1 tab(s) by mouth once a day  -- Indication: For Hypothyroidism, unspecified type    Calcium 600+D  -- Indication: For Supplement

## 2017-12-29 NOTE — PROGRESS NOTE ADULT - SUBJECTIVE AND OBJECTIVE BOX
Patient is a 84y old  Female who presents with a chief complaint of SOB (24 Dec 2017 08:49)      HPI:  84 year old woman with PMHx of MDS, asthma, hypothyroidism, HTN who presents with worsening SOB.  Pt was recently discharged from [facility] for multiple rib fractures and PNA s/p 10 day course of levaquin, however still requiring oxygen.  Daughter noted pt to be hypoxic at home with sat  82%.    In ER: Pt Hypoxic, and placed on venti-mask. She was given Abx, and nebulizer therapy for presumed HCAP.      At bedside pt sleeping but arousable, and answering questions appropriately. Daughter at bedside. She is not confused but is comfortable yet weak. (24 Dec 2017 08:49)    12/25: Pt still requiring a lot of oxygen, on non-rebreather.  She appears comfortable, and is more alert than yesterday.  She is answering all questions appropriately.  12/26: Pt downgraded to NC and satting well.  She ambulated with PT as well.  She is alert and comfortable. She tolerated BIPAP last night.  12/27: Eager to go home, pt is improving but still requiring supplemental O2.  Still with productive cough. No fever, chills.  12/28: Continues to do better daily. Working with PT, pt still with dyspnea but improving.  12/29: Improving daily.  Still requiring supplemental O2.  Tolerating abx.  No fever, chills, N, V.    Review of system- Rest of the review of system are normal except mentioned in HPI    Vital Signs Last 24 Hrs  T(C): 36.5 (29 Dec 2017 07:40), Max: 36.9 (29 Dec 2017 00:00)  T(F): 97.7 (29 Dec 2017 07:40), Max: 98.5 (29 Dec 2017 00:00)  HR: 102 (29 Dec 2017 14:23) (69 - 115)  BP: 137/66 (29 Dec 2017 13:00) (110/92 - 164/82)  BP(mean): 85 (29 Dec 2017 13:00) (76 - 112)  RR: 19 (29 Dec 2017 09:00) (13 - 24)  SpO2: 95% (29 Dec 2017 14:23) (79% - 100%)    PHYSICAL EXAM:    Constitutional: NAD, on NC, awake and alert.  HEENT: PERR, EOMI, Normal Hearing, MMM  Neck: Soft and supple  Respiratory: rhonchi with faint exp wheezing again noted.  Cardiovascular: S1 and S2, regular rate and rhythm, no Murmurs, gallops or rubs  Gastrointestinal: Bowel Sounds present, soft, nontender, nondistended, no guarding, no rebound  Extremities: No peripheral edema  Neurological: A/O x 3, no focal deficits  Skin: No rashes    MEDICATIONS  (STANDING):  ALBUTerol/ipratropium for Nebulization 3 milliLiter(s) Nebulizer every 6 hours  amLODIPine   Tablet 10 milliGRAM(s) Oral daily  aspirin  chewable 81 milliGRAM(s) Oral daily  buDESOnide 160 MICROgram(s)/formoterol 4.5 MICROgram(s) Inhaler 2 Puff(s) Inhalation two times a day  cefepime  IVPB 2000 milliGRAM(s) IV Intermittent every 12 hours  enoxaparin Injectable 40 milliGRAM(s) SubCutaneous every 24 hours  ferrous    sulfate 325 milliGRAM(s) Oral daily  levothyroxine 100 MICROGram(s) Oral daily  montelukast 10 milliGRAM(s) Oral daily  sodium ferric gluconate complex IVPB 125 milliGRAM(s) IV Intermittent once    MEDICATIONS  (PRN):  acetaminophen   Tablet 650 milliGRAM(s) Oral every 6 hours PRN For Temp greater than 38.5 C (101.3 F)  acetaminophen   Tablet. 650 milliGRAM(s) Oral every 6 hours PRN Moderate Pain (4 - 6)  guaiFENesin    Syrup 200 milliGRAM(s) Oral every 6 hours PRN Cough  ondansetron Injectable 4 milliGRAM(s) IV Push every 6 hours PRN Nausea and/or Vomiting        CAPILLARY BLOOD GLUCOSE        Assessment and Plan:  84 year old woman with asthma, HTN, hypothyroidism who presents with SOB.    SOB with sepsis (fever, leukocytosis) and acute hypoxemic respiratory failure secondary to acute viral influenza A, hmpv, and probable superimposed bacterial pneumonia.  -Sepsis resolved, Transfer to med-surg.  -broad spectrum abx for probable HCAP/multifocal pnuemonia/gram negative bacteria. Per ID, change to oral ceftin x 3 days starting tomorrow for 10 day course of antibiotics.  -tamiflu for acute influenza A day # 5 --> Completed course.  -supportive care for RSV infection.  -legionella negative  -f/u cultures negative.  -leukocytosis IMPROVED.  -nocturnal bipap PRN    Asthma: with exacerbation  -duonebs OTC  -inhaled corticosteroids, taper to IV daily and d/c on tapering course.  -O2 supplementation  -Pulm consult appreciated     Rib fractures:  -incentive spirometry  -pain management  -PT consult --> Home PT.     Anemia and thrombocytosis: Presumed secondary to MDS with a component of iron deficiency anemia  -iron studies noted  -stool occult neg  -H+H Stable  -monitor  -heme consult appreciated   -start feraheme IV yesterday and today. d/c on daily dosing of oral iron.    HTN:  -c/w amlodipine    Hypothyroidism:  -c/w synthroid    DVT ppx:  -lovenox    Code status: DNR/DNI    Dispo:  -Pt to be switched to oral abx tomorrow.  If pt can ambulate without desaturating can discharge home on ceftin and tapering steroids.  Will need respiratory assessment for this.      Attending Statement:  40 minutes spent on total encounter; more than 50% of the visit was spent counseling and/or coordinating care by the attending physician.

## 2017-12-29 NOTE — PROGRESS NOTE ADULT - SUBJECTIVE AND OBJECTIVE BOX
Subjective:  less dyspneic  still has cough    MEDICATIONS  (STANDING):  ALBUTerol/ipratropium for Nebulization 3 milliLiter(s) Nebulizer every 6 hours  amLODIPine   Tablet 10 milliGRAM(s) Oral daily  aspirin  chewable 81 milliGRAM(s) Oral daily  buDESOnide 160 MICROgram(s)/formoterol 4.5 MICROgram(s) Inhaler 2 Puff(s) Inhalation two times a day  cefepime  IVPB 2000 milliGRAM(s) IV Intermittent every 12 hours  enoxaparin Injectable 40 milliGRAM(s) SubCutaneous every 24 hours  ferrous    sulfate 325 milliGRAM(s) Oral daily  levothyroxine 100 MICROGram(s) Oral daily  methylPREDNISolone sodium succinate Injectable 40 milliGRAM(s) IV Push every 12 hours  montelukast 10 milliGRAM(s) Oral daily  vancomycin  IVPB 750 milliGRAM(s) IV Intermittent every 12 hours    MEDICATIONS  (PRN):  acetaminophen   Tablet 650 milliGRAM(s) Oral every 6 hours PRN For Temp greater than 38.5 C (101.3 F)  acetaminophen   Tablet. 650 milliGRAM(s) Oral every 6 hours PRN Moderate Pain (4 - 6)  guaiFENesin    Syrup 200 milliGRAM(s) Oral every 6 hours PRN Cough  ondansetron Injectable 4 milliGRAM(s) IV Push every 6 hours PRN Nausea and/or Vomiting      Allergies    No Known Allergies    Intolerances        REVIEW OF SYSTEMS:    CONSTITUTIONAL:  As per HPI.  HEENT:  Eyes:  No diplopia or blurred vision. ENT:  No earache, sore throat or runny nose.  CARDIOVASCULAR:  No pressure, squeezing, tightness, heaviness or aching about the chest, neck, axilla or epigastrium.  RESPIRATORY:  No cough, shortness of breath, PND or orthopnea.  GASTROINTESTINAL:  No nausea, vomiting or diarrhea.  GENITOURINARY:  No dysuria, frequency or urgency.  MUSCULOSKELETAL:  no joint pain, deformity, tenderness  EXTREMITIES: no clubbing cyanosis,edema  SKIN:  No change in skin, hair or nails.  NEUROLOGIC:  No paresthesias, fasciculations, seizures or weakness.  PSYCHIATRIC:  No disorder of thought or mood.  ENDOCRINE:  No heat or cold intolerance, polyuria or polydipsia.  HEMATOLOGICAL:  No easy bruising or bleedings:    Vital Signs Last 24 Hrs  T(C): 36.5 (29 Dec 2017 07:40), Max: 36.9 (29 Dec 2017 00:00)  T(F): 97.7 (29 Dec 2017 07:40), Max: 98.5 (29 Dec 2017 00:00)  HR: 100 (29 Dec 2017 08:00) (69 - 109)  BP: 145/65 (29 Dec 2017 08:00) (110/92 - 164/82)  BP(mean): 85 (29 Dec 2017 08:00) (74 - 112)  RR: 23 (29 Dec 2017 08:00) (13 - 26)  SpO2: 95% (29 Dec 2017 08:00) (79% - 100%)    PHYSICAL EXAMINATION:  SKIN: no rashes  HEAD: NC/AT  EYES: PERRLA, EOMI  EARS: TM's intact  NOSE: no abnormalities  NECK:  Supple. No lymphadenopathy. Jugular venous pressure not elevated. Carotids equal.   HEART:   S1S2 irreg  CHEST: bibasilar crackles  ABDOMEN:  Soft and nontender.   EXTREMITIES:  no C/C/E  NEURO: AAO x 3, no focal deficts       LABS:                RADIOLOGY & ADDITIONAL TESTS:

## 2017-12-29 NOTE — PROGRESS NOTE ADULT - SUBJECTIVE AND OBJECTIVE BOX
84 year old woman with PMHx of MDS, asthma, hypothyroidism, HTN who presents with worsening SOB.  Pt was recently discharged from [facility] for multiple rib fractures and PNA s/p 10 day course of levaquin, however still requiring oxygen.  Daughter noted pt to be hypoxic at home with sat  82%. In ER: Pt Hypoxic, and placed on venti-mask, wbc elevated to 15.1, RVP positive for influenza, CT chest w/o contrast showed bronchiectasis/multifocal pna/b/l small pleural effusions, was given IV vancomycin/cefepime d/t concern for infection and started on tamiflu/droplet precautions. Her sister is currently sick at home who she has been residing with.       feels better  still some yanez but improving  less cough        MEDICATIONS  (STANDING):  ALBUTerol/ipratropium for Nebulization 3 milliLiter(s) Nebulizer every 6 hours  amLODIPine   Tablet 10 milliGRAM(s) Oral daily  aspirin  chewable 81 milliGRAM(s) Oral daily  buDESOnide 160 MICROgram(s)/formoterol 4.5 MICROgram(s) Inhaler 2 Puff(s) Inhalation two times a day  cefepime  IVPB 2000 milliGRAM(s) IV Intermittent every 12 hours  enoxaparin Injectable 40 milliGRAM(s) SubCutaneous every 24 hours  ferrous    sulfate 325 milliGRAM(s) Oral daily  levothyroxine 100 MICROGram(s) Oral daily  montelukast 10 milliGRAM(s) Oral daily      Vital Signs Last 24 Hrs  T(C): 36.5 (29 Dec 2017 07:40), Max: 36.9 (29 Dec 2017 00:00)  T(F): 97.7 (29 Dec 2017 07:40), Max: 98.5 (29 Dec 2017 00:00)  HR: 107 (29 Dec 2017 11:00) (69 - 115)  BP: 131/60 (29 Dec 2017 11:00) (110/92 - 164/82)  BP(mean): 77 (29 Dec 2017 11:00) (75 - 112)  RR: 19 (29 Dec 2017 09:00) (13 - 25)  SpO2: 94% (29 Dec 2017 11:00) (79% - 100%)            PE:  Constitutional: frail looking, on NC  HEENT: NC/AT, EOMI, PERRLA  Neck: supple  Back: no tenderness  Respiratory: decreased breath sounds  Cardiovascular: S1S2 regular, no murmurs  Abdomen: soft, not tender, not distended, positive BS  Genitourinary: deferred  Rectal: deferred  Musculoskeletal: no muscle tenderness, no joint swelling or tenderness  Extremities: no pedal edema  Neurological: no focal deficits  Skin: no rashes    Labs:                            reviewed      Vancomycin Level, Trough: 12.2 ug/mL (12-25 @ 17:54)        Culture - Blood (12.24.17 @ 03:11)    Specimen Source: .Blood None    Culture Results:   No growth to date.    Culture - Blood (12.24.17 @ 03:11)    Specimen Source: .Blood None    Culture Results:   No growth to date.        Radiology:  < from: CT Chest No Cont (12.24.17 @ 10:21) >    EXAM:  CT CHEST                            PROCEDURE DATE:  12/24/2017        INTERPRETATION:  Exam Date: 12/24/2017 10:21 AM  Clinical Information: Shortness of breath, hypoxia  Technique:  CT of the chest was performed with axial images obtained   from the thoracic to the inlet to the bilateral adrenal glands       without IV contrast. Maximum intensity projection images were obtained.  Comparison:  None    FINDINGS:    Lungs, Airways and Pleura: Central tracheobronchial tree demonstrates   trace mucosal debris. Mild bronchiectasis in the upper and lower lobes.   Mild left pleural effusion. Trace to small right pleural effusion.   Multifocal segmental consolidations in the right middle lobe and   bilateral lower lobes and subsegmental consolidation in the lingula and   medial left upper lobe with air bronchograms and associated   bronchiectasis may represent multifocal pneumonia versus atelectasis with   or without chronic scarring. Additional scattered groundglass opacities   and patchy nodularity may represent a multifocal infectious or   inflammatory etiology. Biapical pleural parenchymal scarring. No   pneumothorax.    Heart and Great Vessels: Atherosclerotic changes of the aorta and   coronary vasculature. Heart is normal in size. Trace pericardial effusion.    Mediastinum and Jodi: Hiatal hernia. Mildly prominent mediastinal hilar   lymph nodes.    Neck and Chest Wall: within normal limits    Bones: Degenerative changes and osteopenia. Thoracicolumbar scoliosis   convex to the right. Mild kyphosis. Mild superior endplate deformities of   T8-T10.    Upper Abdomen:  Exophytic left upper pole renal cyst. Splenomegaly   measuring up to 14 cm.    IMPRESSION:          Mild left pleural effusion. Trace to small rightpleural effusion.   Multifocal segmental consolidations in the right middle lobe and   bilateral lower lobes and subsegmental consolidation in the lingula and   medial left upper lobe with air bronchograms and associated   bronchiectasis may represent multifocal pneumonia versus atelectasis with   or without chronic scarring. Additional scattered groundglass opacities   and patchy nodularity may represent a multifocal infectious or   inflammatory etiology      < end of copied text >    Advanced directives addressed: full resuscitation

## 2017-12-29 NOTE — DISCHARGE NOTE ADULT - PLAN OF CARE
RESOLVED. Follow up with your pcp. Follow up with your hematologist. You also have a component of iron deficiency anemia. I recommend daily iron tablet. resume home meds. Take antibiotics to complete 10 day course. follow up with your pcp. take tapering prednisone. You also have a component of iron deficiency anemia. I recommend daily iron tablet. FU with [facility] Dr Rodas completed 10 day course of ABx no further ABX

## 2017-12-29 NOTE — DISCHARGE NOTE ADULT - PATIENT PORTAL LINK FT
“You can access the [facility] Patient Portal, offered by [facility], by registering with the following website: [email or web address]”

## 2017-12-29 NOTE — DISCHARGE NOTE ADULT - CARE PROVIDER_API CALL
Cordell Yousif), Family Medicine  [address] [place] [address]  Phone: [phone]  Fax: [phone] Cordell Yousif), Family Medicine  [address] [place] [address]  Phone: [phone]  Fax: [phone]    Isai Rodas (), Hematology; Medical Oncology  [address] [facility]  [address] [place] [address]  Phone: [phone]  Fax: [phone]

## 2017-12-30 DIAGNOSIS — E87.6 HYPOKALEMIA: ICD-10-CM

## 2017-12-30 LAB
ANION GAP SERPL CALC-SCNC: 9 MMOL/L — SIGNIFICANT CHANGE UP (ref 5–17)
BUN SERPL-MCNC: 36 MG/DL — HIGH (ref 7–23)
CALCIUM SERPL-MCNC: 8.4 MG/DL — LOW (ref 8.5–10.1)
CHLORIDE SERPL-SCNC: 103 MMOL/L — SIGNIFICANT CHANGE UP (ref 96–108)
CO2 SERPL-SCNC: 30 MMOL/L — SIGNIFICANT CHANGE UP (ref 22–31)
CREAT SERPL-MCNC: 0.69 MG/DL — SIGNIFICANT CHANGE UP (ref 0.5–1.3)
GLUCOSE SERPL-MCNC: 79 MG/DL — SIGNIFICANT CHANGE UP (ref 70–99)
HCT VFR BLD CALC: 35.7 % — SIGNIFICANT CHANGE UP (ref 34.5–45)
HGB BLD-MCNC: 10.6 G/DL — LOW (ref 11.5–15.5)
MCHC RBC-ENTMCNC: 19.7 PG — LOW (ref 27–34)
MCHC RBC-ENTMCNC: 29.6 GM/DL — LOW (ref 32–36)
MCV RBC AUTO: 66.6 FL — LOW (ref 80–100)
NRBC # BLD: SIGNIFICANT CHANGE UP /100 WBCS (ref 0–0)
PLATELET # BLD AUTO: 580 K/UL — HIGH (ref 150–400)
POTASSIUM SERPL-MCNC: 3.4 MMOL/L — LOW (ref 3.5–5.3)
POTASSIUM SERPL-SCNC: 3.4 MMOL/L — LOW (ref 3.5–5.3)
RBC # BLD: 5.36 M/UL — HIGH (ref 3.8–5.2)
RBC # FLD: 19.3 % — HIGH (ref 10.3–14.5)
SODIUM SERPL-SCNC: 142 MMOL/L — SIGNIFICANT CHANGE UP (ref 135–145)
WBC # BLD: 20.3 K/UL — HIGH (ref 3.8–10.5)
WBC # FLD AUTO: 20.3 K/UL — HIGH (ref 3.8–10.5)

## 2017-12-30 RX ORDER — POTASSIUM CHLORIDE 20 MEQ
40 PACKET (EA) ORAL ONCE
Qty: 0 | Refills: 0 | Status: COMPLETED | OUTPATIENT
Start: 2017-12-30 | End: 2017-12-30

## 2017-12-30 RX ADMIN — Medication 3 MILLILITER(S): at 08:30

## 2017-12-30 RX ADMIN — BUDESONIDE AND FORMOTEROL FUMARATE DIHYDRATE 2 PUFF(S): 160; 4.5 AEROSOL RESPIRATORY (INHALATION) at 20:16

## 2017-12-30 RX ADMIN — BUDESONIDE AND FORMOTEROL FUMARATE DIHYDRATE 2 PUFF(S): 160; 4.5 AEROSOL RESPIRATORY (INHALATION) at 08:30

## 2017-12-30 RX ADMIN — Medication 3 MILLILITER(S): at 20:12

## 2017-12-30 RX ADMIN — Medication 500 MILLIGRAM(S): at 05:33

## 2017-12-30 RX ADMIN — ENOXAPARIN SODIUM 40 MILLIGRAM(S): 100 INJECTION SUBCUTANEOUS at 17:04

## 2017-12-30 RX ADMIN — Medication 500 MILLIGRAM(S): at 18:47

## 2017-12-30 RX ADMIN — Medication 3 MILLILITER(S): at 14:31

## 2017-12-30 RX ADMIN — AMLODIPINE BESYLATE 10 MILLIGRAM(S): 2.5 TABLET ORAL at 05:34

## 2017-12-30 RX ADMIN — Medication 40 MILLIGRAM(S): at 05:33

## 2017-12-30 RX ADMIN — Medication 81 MILLIGRAM(S): at 12:26

## 2017-12-30 RX ADMIN — Medication 325 MILLIGRAM(S): at 12:26

## 2017-12-30 RX ADMIN — MONTELUKAST 10 MILLIGRAM(S): 4 TABLET, CHEWABLE ORAL at 12:26

## 2017-12-30 RX ADMIN — Medication 40 MILLIEQUIVALENT(S): at 12:26

## 2017-12-30 NOTE — PROGRESS NOTE ADULT - ASSESSMENT
cont O2/bronchodilators  now on ceftin 500 mg bid  wbc increased to 20k - no diarrhea or evidence active infection  will repeat labs in am  repeat CT scan chest  dvt proph

## 2017-12-30 NOTE — PROGRESS NOTE ADULT - SUBJECTIVE AND OBJECTIVE BOX
Subjective:  awake, sitting in chair  still some SAENZ  daughter (pediatrician) at bedside    MEDICATIONS  (STANDING):  ALBUTerol/ipratropium for Nebulization 3 milliLiter(s) Nebulizer every 6 hours  amLODIPine   Tablet 10 milliGRAM(s) Oral daily  aspirin  chewable 81 milliGRAM(s) Oral daily  buDESOnide 160 MICROgram(s)/formoterol 4.5 MICROgram(s) Inhaler 2 Puff(s) Inhalation two times a day  cefuroxime   Tablet 500 milliGRAM(s) Oral every 12 hours  enoxaparin Injectable 40 milliGRAM(s) SubCutaneous every 24 hours  ferrous    sulfate 325 milliGRAM(s) Oral daily  levothyroxine 100 MICROGram(s) Oral daily  methylPREDNISolone sodium succinate Injectable 40 milliGRAM(s) IV Push daily  montelukast 10 milliGRAM(s) Oral daily    MEDICATIONS  (PRN):  acetaminophen   Tablet 650 milliGRAM(s) Oral every 6 hours PRN For Temp greater than 38.5 C (101.3 F)  acetaminophen   Tablet. 650 milliGRAM(s) Oral every 6 hours PRN Moderate Pain (4 - 6)  guaiFENesin    Syrup 200 milliGRAM(s) Oral every 6 hours PRN Cough  ondansetron Injectable 4 milliGRAM(s) IV Push every 6 hours PRN Nausea and/or Vomiting      Allergies    No Known Allergies    Intolerances        REVIEW OF SYSTEMS:    CONSTITUTIONAL:  As per HPI.  HEENT:  Eyes:  No diplopia or blurred vision. ENT:  No earache, sore throat or runny nose.  CARDIOVASCULAR:  No pressure, squeezing, tightness, heaviness or aching about the chest, neck, axilla or epigastrium.  RESPIRATORY:  No cough, shortness of breath, PND or orthopnea.  GASTROINTESTINAL:  No nausea, vomiting or diarrhea.  GENITOURINARY:  No dysuria, frequency or urgency.  MUSCULOSKELETAL:  no joint pain, deformity, tenderness  EXTREMITIES: no clubbing cyanosis,edema  SKIN:  No change in skin, hair or nails.  NEUROLOGIC:  No paresthesias, fasciculations, seizures or weakness.  PSYCHIATRIC:  No disorder of thought or mood.  ENDOCRINE:  No heat or cold intolerance, polyuria or polydipsia.  HEMATOLOGICAL:  No easy bruising or bleedings:    Vital Signs Last 24 Hrs  T(C): 36.5 (30 Dec 2017 08:20), Max: 36.5 (30 Dec 2017 08:20)  T(F): 97.7 (30 Dec 2017 08:20), Max: 97.7 (30 Dec 2017 08:20)  HR: 97 (30 Dec 2017 11:56) (97 - 107)  BP: 142/55 (30 Dec 2017 11:56) (137/56 - 156/68)  BP(mean): 77 (30 Dec 2017 11:56) (74 - 91)  RR: 29 (30 Dec 2017 11:56) (19 - 29)  SpO2: 96% (30 Dec 2017 11:56) (93% - 96%)    PHYSICAL EXAMINATION:  SKIN: no rashes  HEAD: NC/AT  EYES: PERRLA, EOMI  EARS: TM's intact  NOSE: no abnormalities  NECK:  Supple. No lymphadenopathy. Jugular venous pressure not elevated. Carotids equal.   HEART:   S1S2 reg  CHEST:  scattered ronchi, decreased BS bases  ABDOMEN:  Soft and nontender.   EXTREMITIES:  no C/C/E  NEURO: AAO x 3, no focal deficts       LABS:                        10.6   20.3  )-----------( 580      ( 30 Dec 2017 05:55 )             35.7     12-30    142  |  103  |  36<H>  ----------------------------<  79  3.4<L>   |  30  |  0.69    Ca    8.4<L>      30 Dec 2017 05:55            RADIOLOGY & ADDITIONAL TESTS:

## 2017-12-31 DIAGNOSIS — J10.1 INFLUENZA DUE TO OTHER IDENTIFIED INFLUENZA VIRUS WITH OTHER RESPIRATORY MANIFESTATIONS: ICD-10-CM

## 2017-12-31 DIAGNOSIS — D72.829 ELEVATED WHITE BLOOD CELL COUNT, UNSPECIFIED: ICD-10-CM

## 2017-12-31 LAB
ANION GAP SERPL CALC-SCNC: 5 MMOL/L — SIGNIFICANT CHANGE UP (ref 5–17)
ANISOCYTOSIS BLD QL: SIGNIFICANT CHANGE UP
BASO STIPL BLD QL SMEAR: SLIGHT — SIGNIFICANT CHANGE UP
BASOPHILS NFR BLD AUTO: 0.2 % — SIGNIFICANT CHANGE UP (ref 0–2)
BUN SERPL-MCNC: 37 MG/DL — HIGH (ref 7–23)
CALCIUM SERPL-MCNC: 8.3 MG/DL — LOW (ref 8.5–10.1)
CHLORIDE SERPL-SCNC: 105 MMOL/L — SIGNIFICANT CHANGE UP (ref 96–108)
CO2 SERPL-SCNC: 31 MMOL/L — SIGNIFICANT CHANGE UP (ref 22–31)
CREAT SERPL-MCNC: 0.53 MG/DL — SIGNIFICANT CHANGE UP (ref 0.5–1.3)
DACRYOCYTES BLD QL SMEAR: SLIGHT — SIGNIFICANT CHANGE UP
ELLIPTOCYTES BLD QL SMEAR: SLIGHT — SIGNIFICANT CHANGE UP
EOSINOPHIL NFR BLD AUTO: 2.4 % — SIGNIFICANT CHANGE UP (ref 0–6)
GLUCOSE SERPL-MCNC: 82 MG/DL — SIGNIFICANT CHANGE UP (ref 70–99)
HCT VFR BLD CALC: 34.6 % — SIGNIFICANT CHANGE UP (ref 34.5–45)
HGB BLD-MCNC: 10 G/DL — LOW (ref 11.5–15.5)
HYPOCHROMIA BLD QL: SLIGHT — SIGNIFICANT CHANGE UP
LG PLATELETS BLD QL AUTO: SLIGHT — SIGNIFICANT CHANGE UP
LYMPHOCYTES # BLD AUTO: 4.1 % — LOW (ref 13–44)
MACROCYTES BLD QL: SLIGHT — SIGNIFICANT CHANGE UP
MANUAL DIF COMMENT BLD-IMP: SIGNIFICANT CHANGE UP
MCHC RBC-ENTMCNC: 19.1 PG — LOW (ref 27–34)
MCHC RBC-ENTMCNC: 28.9 GM/DL — LOW (ref 32–36)
MCV RBC AUTO: 66.2 FL — LOW (ref 80–100)
MICROCYTES BLD QL: SIGNIFICANT CHANGE UP
MONOCYTES NFR BLD AUTO: 4 % — SIGNIFICANT CHANGE UP (ref 2–14)
NEUTROPHILS NFR BLD AUTO: 89.3 % — HIGH (ref 43–77)
OVALOCYTES BLD QL SMEAR: SLIGHT — SIGNIFICANT CHANGE UP
PLAT MORPH BLD: NORMAL — SIGNIFICANT CHANGE UP
PLATELET # BLD AUTO: 575 K/UL — HIGH (ref 150–400)
POIKILOCYTOSIS BLD QL AUTO: SIGNIFICANT CHANGE UP
POLYCHROMASIA BLD QL SMEAR: SLIGHT — SIGNIFICANT CHANGE UP
POTASSIUM SERPL-MCNC: 3.9 MMOL/L — SIGNIFICANT CHANGE UP (ref 3.5–5.3)
POTASSIUM SERPL-SCNC: 3.9 MMOL/L — SIGNIFICANT CHANGE UP (ref 3.5–5.3)
RBC # BLD: 5.22 M/UL — HIGH (ref 3.8–5.2)
RBC # FLD: 19.4 % — HIGH (ref 10.3–14.5)
RBC BLD AUTO: (no result)
SCHISTOCYTES BLD QL AUTO: SLIGHT — SIGNIFICANT CHANGE UP
SODIUM SERPL-SCNC: 141 MMOL/L — SIGNIFICANT CHANGE UP (ref 135–145)
TARGETS BLD QL SMEAR: SLIGHT — SIGNIFICANT CHANGE UP
WBC # BLD: 22.1 K/UL — HIGH (ref 3.8–10.5)
WBC # FLD AUTO: 22.1 K/UL — HIGH (ref 3.8–10.5)

## 2017-12-31 PROCEDURE — 99233 SBSQ HOSP IP/OBS HIGH 50: CPT

## 2017-12-31 PROCEDURE — 71250 CT THORAX DX C-: CPT | Mod: 26

## 2017-12-31 RX ADMIN — ENOXAPARIN SODIUM 40 MILLIGRAM(S): 100 INJECTION SUBCUTANEOUS at 18:06

## 2017-12-31 RX ADMIN — MONTELUKAST 10 MILLIGRAM(S): 4 TABLET, CHEWABLE ORAL at 11:51

## 2017-12-31 RX ADMIN — Medication 3 MILLILITER(S): at 08:10

## 2017-12-31 RX ADMIN — Medication 500 MILLIGRAM(S): at 05:47

## 2017-12-31 RX ADMIN — Medication 40 MILLIGRAM(S): at 05:47

## 2017-12-31 RX ADMIN — Medication 3 MILLILITER(S): at 21:17

## 2017-12-31 RX ADMIN — Medication 500 MILLIGRAM(S): at 18:06

## 2017-12-31 RX ADMIN — BUDESONIDE AND FORMOTEROL FUMARATE DIHYDRATE 2 PUFF(S): 160; 4.5 AEROSOL RESPIRATORY (INHALATION) at 21:52

## 2017-12-31 RX ADMIN — Medication 325 MILLIGRAM(S): at 11:51

## 2017-12-31 RX ADMIN — Medication 81 MILLIGRAM(S): at 11:51

## 2017-12-31 RX ADMIN — AMLODIPINE BESYLATE 10 MILLIGRAM(S): 2.5 TABLET ORAL at 05:47

## 2017-12-31 RX ADMIN — Medication 3 MILLILITER(S): at 14:00

## 2017-12-31 RX ADMIN — BUDESONIDE AND FORMOTEROL FUMARATE DIHYDRATE 2 PUFF(S): 160; 4.5 AEROSOL RESPIRATORY (INHALATION) at 08:10

## 2017-12-31 RX ADMIN — Medication 3 MILLILITER(S): at 02:10

## 2017-12-31 NOTE — PROGRESS NOTE ADULT - SUBJECTIVE AND OBJECTIVE BOX
Subjective:  feeling much better ambulating    MEDICATIONS  (STANDING):  ALBUTerol/ipratropium for Nebulization 3 milliLiter(s) Nebulizer every 6 hours  amLODIPine   Tablet 10 milliGRAM(s) Oral daily  aspirin  chewable 81 milliGRAM(s) Oral daily  buDESOnide 160 MICROgram(s)/formoterol 4.5 MICROgram(s) Inhaler 2 Puff(s) Inhalation two times a day  cefuroxime   Tablet 500 milliGRAM(s) Oral every 12 hours  enoxaparin Injectable 40 milliGRAM(s) SubCutaneous every 24 hours  ferrous    sulfate 325 milliGRAM(s) Oral daily  levothyroxine 100 MICROGram(s) Oral daily  methylPREDNISolone sodium succinate Injectable 40 milliGRAM(s) IV Push daily  montelukast 10 milliGRAM(s) Oral daily    MEDICATIONS  (PRN):  acetaminophen   Tablet 650 milliGRAM(s) Oral every 6 hours PRN For Temp greater than 38.5 C (101.3 F)  acetaminophen   Tablet. 650 milliGRAM(s) Oral every 6 hours PRN Moderate Pain (4 - 6)  guaiFENesin    Syrup 200 milliGRAM(s) Oral every 6 hours PRN Cough  ondansetron Injectable 4 milliGRAM(s) IV Push every 6 hours PRN Nausea and/or Vomiting      Allergies    No Known Allergies    Intolerances        REVIEW OF SYSTEMS:    CONSTITUTIONAL:  As per HPI.  HEENT:  Eyes:  No diplopia or blurred vision. ENT:  No earache, sore throat or runny nose.  CARDIOVASCULAR:  No pressure, squeezing, tightness, heaviness or aching about the chest, neck, axilla or epigastrium.  RESPIRATORY:  No cough, shortness of breath, PND or orthopnea.  GASTROINTESTINAL:  No nausea, vomiting or diarrhea.  GENITOURINARY:  No dysuria, frequency or urgency.  MUSCULOSKELETAL:  no joint pain, deformity, tenderness  EXTREMITIES: no clubbing cyanosis,edema  SKIN:  No change in skin, hair or nails.  NEUROLOGIC:  No paresthesias, fasciculations, seizures or weakness.  PSYCHIATRIC:  No disorder of thought or mood.  ENDOCRINE:  No heat or cold intolerance, polyuria or polydipsia.  HEMATOLOGICAL:  No easy bruising or bleedings:    Vital Signs Last 24 Hrs  T(C): 36.4 (31 Dec 2017 12:58), Max: 36.4 (31 Dec 2017 06:12)  T(F): 97.6 (31 Dec 2017 12:58), Max: 97.6 (31 Dec 2017 06:12)  HR: 80 (31 Dec 2017 14:00) (80 - 107)  BP: 135/60 (31 Dec 2017 12:58) (135/60 - 145/64)  BP(mean): --  RR: 19 (31 Dec 2017 12:58) (19 - 22)  SpO2: 95% (31 Dec 2017 12:58) (94% - 98%)    PHYSICAL EXAMINATION:  SKIN: no rashes  HEAD: NC/AT  EYES: PERRLA, EOMI  EARS: TM's intact  NOSE: no abnormalities  NECK:  Supple. No lymphadenopathy. Jugular venous pressure not elevated. Carotids equal.   HEART:   The cardiac impulse has a normal quality. Reg., Nl S1 and S2.  There are no murmurs, rubs or gallops noted  CHEST:  bilateral ronchi, decreased BS left base  ABDOMEN:  Soft and nontender.   EXTREMITIES:  no C/C/E  NEURO: AAO x 3, no focal deficts       LABS:                        10.0   22.1  )-----------( 575      ( 31 Dec 2017 07:12 )             34.6     12-31    141  |  105  |  37<H>  ----------------------------<  82  3.9   |  31  |  0.53    Ca    8.3<L>      31 Dec 2017 07:12            RADIOLOGY & ADDITIONAL TESTS:

## 2017-12-31 NOTE — PROGRESS NOTE ADULT - PROBLEM SELECTOR PROBLEM 1
Respiratory failure with hypoxia and hypercapnia

## 2017-12-31 NOTE — PROGRESS NOTE ADULT - PROBLEM SELECTOR PROBLEM 4
MDS (myelodysplastic syndrome)

## 2017-12-31 NOTE — PROGRESS NOTE ADULT - PROBLEM SELECTOR PROBLEM 6
Influenza
Asthma, unspecified asthma severity, unspecified whether complicated, unspecified whether persistent
Influenza
Influenza A

## 2017-12-31 NOTE — PROGRESS NOTE ADULT - ASSESSMENT
repeat CT scan shows significant improvement in multilobar pneumonia  LLL and RML infiltrates much smaller; L effussion resolved  WBC increased again - ? MDS vs steroids  will change to prednisone 20 mg daily  repeat CBC in am  now on ceftin  ABG on room air  dvt proph

## 2017-12-31 NOTE — PROGRESS NOTE ADULT - PROBLEM SELECTOR PROBLEM 2
Multifocal pneumonia

## 2017-12-31 NOTE — PROGRESS NOTE ADULT - PROBLEM SELECTOR PROBLEM 3
HCAP (healthcare-associated pneumonia)

## 2018-01-01 LAB
ACANTHOCYTES BLD QL SMEAR: SLIGHT — SIGNIFICANT CHANGE UP
ANISOCYTOSIS BLD QL: SIGNIFICANT CHANGE UP
BASE EXCESS BLDA CALC-SCNC: 4.2 MMOL/L — HIGH (ref -2–2)
BASO STIPL BLD QL SMEAR: SLIGHT — SIGNIFICANT CHANGE UP
BLOOD GAS COMMENTS ARTERIAL: SIGNIFICANT CHANGE UP
DACRYOCYTES BLD QL SMEAR: SLIGHT — SIGNIFICANT CHANGE UP
ELLIPTOCYTES BLD QL SMEAR: SLIGHT — SIGNIFICANT CHANGE UP
EOSINOPHIL NFR BLD AUTO: 3 % — SIGNIFICANT CHANGE UP (ref 0–6)
GAS PNL BLDA: SIGNIFICANT CHANGE UP
GIANT PLATELETS BLD QL SMEAR: PRESENT — SIGNIFICANT CHANGE UP
HCO3 BLDA-SCNC: 28 MMOL/L — SIGNIFICANT CHANGE UP (ref 21–29)
HCT VFR BLD CALC: 38.3 % — SIGNIFICANT CHANGE UP (ref 34.5–45)
HGB BLD-MCNC: 11 G/DL — LOW (ref 11.5–15.5)
HYPOCHROMIA BLD QL: SLIGHT — SIGNIFICANT CHANGE UP
LYMPHOCYTES # BLD AUTO: 6 % — LOW (ref 13–44)
MACROCYTES BLD QL: SLIGHT — SIGNIFICANT CHANGE UP
MANUAL DIF COMMENT BLD-IMP: SIGNIFICANT CHANGE UP
MCHC RBC-ENTMCNC: 19.1 PG — LOW (ref 27–34)
MCHC RBC-ENTMCNC: 28.8 GM/DL — LOW (ref 32–36)
MCV RBC AUTO: 66.4 FL — LOW (ref 80–100)
MICROCYTES BLD QL: SLIGHT — SIGNIFICANT CHANGE UP
MONOCYTES NFR BLD AUTO: 1 % — LOW (ref 2–14)
NEUTROPHILS NFR BLD AUTO: 90 % — HIGH (ref 43–77)
OVALOCYTES BLD QL SMEAR: SLIGHT — SIGNIFICANT CHANGE UP
PCO2 BLDA: 39 MMHG — SIGNIFICANT CHANGE UP (ref 32–46)
PH BLDA: 7.46 — HIGH (ref 7.35–7.45)
PLAT MORPH BLD: NORMAL — SIGNIFICANT CHANGE UP
PLATELET # BLD AUTO: 755 K/UL — HIGH (ref 150–400)
PO2 BLDA: 58 MMHG — LOW (ref 74–108)
POIKILOCYTOSIS BLD QL AUTO: SIGNIFICANT CHANGE UP
POLYCHROMASIA BLD QL SMEAR: SLIGHT — SIGNIFICANT CHANGE UP
RBC # BLD: 5.77 M/UL — HIGH (ref 3.8–5.2)
RBC # FLD: 19.7 % — HIGH (ref 10.3–14.5)
RBC BLD AUTO: (no result)
SAO2 % BLDA: 90 % — LOW (ref 92–96)
SCHISTOCYTES BLD QL AUTO: SLIGHT — SIGNIFICANT CHANGE UP
TARGETS BLD QL SMEAR: SLIGHT — SIGNIFICANT CHANGE UP
WBC # BLD: 25.8 K/UL — HIGH (ref 3.8–10.5)
WBC # FLD AUTO: 25.8 K/UL — HIGH (ref 3.8–10.5)

## 2018-01-01 PROCEDURE — 99233 SBSQ HOSP IP/OBS HIGH 50: CPT

## 2018-01-01 RX ADMIN — Medication 3 MILLILITER(S): at 14:18

## 2018-01-01 RX ADMIN — BUDESONIDE AND FORMOTEROL FUMARATE DIHYDRATE 2 PUFF(S): 160; 4.5 AEROSOL RESPIRATORY (INHALATION) at 20:25

## 2018-01-01 RX ADMIN — Medication 500 MILLIGRAM(S): at 07:04

## 2018-01-01 RX ADMIN — Medication 3 MILLILITER(S): at 20:23

## 2018-01-01 RX ADMIN — Medication 100 MICROGRAM(S): at 07:05

## 2018-01-01 RX ADMIN — ENOXAPARIN SODIUM 40 MILLIGRAM(S): 100 INJECTION SUBCUTANEOUS at 18:08

## 2018-01-01 RX ADMIN — MONTELUKAST 10 MILLIGRAM(S): 4 TABLET, CHEWABLE ORAL at 11:56

## 2018-01-01 RX ADMIN — BUDESONIDE AND FORMOTEROL FUMARATE DIHYDRATE 2 PUFF(S): 160; 4.5 AEROSOL RESPIRATORY (INHALATION) at 08:09

## 2018-01-01 RX ADMIN — Medication 20 MILLIGRAM(S): at 07:04

## 2018-01-01 RX ADMIN — Medication 3 MILLILITER(S): at 08:06

## 2018-01-01 RX ADMIN — AMLODIPINE BESYLATE 10 MILLIGRAM(S): 2.5 TABLET ORAL at 07:05

## 2018-01-01 RX ADMIN — Medication 81 MILLIGRAM(S): at 11:56

## 2018-01-01 RX ADMIN — Medication 3 MILLILITER(S): at 02:16

## 2018-01-01 RX ADMIN — Medication 500 MILLIGRAM(S): at 18:09

## 2018-01-01 RX ADMIN — Medication 325 MILLIGRAM(S): at 11:56

## 2018-01-01 NOTE — DIETITIAN INITIAL EVALUATION ADULT. - OTHER INFO
Nutrition assessment for length of stay. Nutrition assessment for length of stay. DX: PNA RSV infection. Pt reports po intake good ~% of meals consumed. No difficulty chewing or swallowing at this time. No n/v/d/c.  Pt stated no significant weight changes current weight UBW. Skin Stage 1 coccyx with no edema documented. Diet instruction provided on DASH/TLC  with educational materials.

## 2018-01-01 NOTE — PROVIDER CONTACT NOTE (OTHER) - SITUATION
called md service was told by evan that they don't cover HH but she will put the call out anyway called md service spoke with charles

## 2018-01-01 NOTE — PROGRESS NOTE ADULT - ASSESSMENT
84 year old woman with asthma, HTN, hypothyroidism who presents with SOB.    *SOB with sepsis (fever, leukocytosis) and acute hypoxemic respiratory failure secondary to acute viral influenza A, hmpv, and superimposed bacterial pneumonia.  -Sepsis resolved and Transferred to med-surg.  -broad spectrum abx for probable HCAP/multifocal pnuemonia/gram negative bacteria.   -S/P Vanco and Cefepime; Per ID, change to oral ceftin x 3 days starting tomorrow for 10 day course of antibiotics.  -S/P tamiflu for acute influenza A day # 5 --> Completed course.  -supportive care for RSV infection.  -legionella negative  -f/u cultures negative.  -leukocytosis IMPROVED.  -nocturnal bipap PRN  -repeat CT scan shows significant improvement in multilobar pneumonia    *Asthma: with exacerbation  -duonebs OTC  -inhaled corticosteroids,   -S/P Solumedrol IV daily and switched to PO  -O2 supplementation  -Pulm consult appreciated     *Leukocytosis 2ndry to ? MDS vs steroids  -repeat CT scan shows significant improvement in multilobar pneumonia  -will change to prednisone 20 mg daily  -repeat CBC in am  -now on ceftin  -FU ABG on room air    *Rib fractures:  -incentive spirometry  -pain management  -PT consult --> Home PT.     *Anemia and thrombocytosis: Presumed secondary to MDS with a component of iron deficiency anemia  -iron studies noted  -stool occult neg  -H+H Stable  -monitor  -heme consult appreciated   -start feraheme IV yesterday and today. d/c on daily dosing of oral iron.    *HTN:  -c/w amlodipine    *Hypothyroidism:  -c/w synthroid    *DVT ppx:  -lovenox    *Code status: DNR/DNI 84 year old woman with asthma, HTN, hypothyroidism who presents with SOB.    *SOB with sepsis (fever, leukocytosis) and acute hypoxemic respiratory failure secondary to acute viral influenza A, hmpv, and superimposed bacterial pneumonia.  -Sepsis resolved and Transferred to med-surg.  -broad spectrum abx for probable HCAP/multifocal pnuemonia/gram negative bacteria.   -S/P Vanco and Cefepime; Per ID, change to oral ceftin x 3 days starting tomorrow for 10 day course of antibiotics.  -S/P tamiflu for acute influenza A day # 5 --> Completed course.  -supportive care for RSV infection.  -legionella negative  -f/u cultures negative.  -leukocytosis worsening  -nocturnal bipap PRN  -repeat CT scan shows significant improvement in multilobar pneumonia  -ABG on room air noted--PaO2 of 58 on 21%. Will need home O2 at time of discharge if Sats remain low  -prednisone 20 mg daily  -will need to ambulate with Oxygen    *Asthma: with exacerbation  -duonebs OTC  -inhaled corticosteroids,   -S/P Solumedrol IV daily and switched to PO  -O2 supplementation  -Pulm consult appreciated     *Leukocytosis and thrombocytosis 2ndry to ? MDS vs steroids  -repeat CT scan shows significant improvement in multilobar pneumonia  -change to prednisone 20 mg daily  -repeat CBC in am  -now on ceftin  -reconsult hemeonc R/O Myelofibrosis     *Rib fractures:  -incentive spirometry  -pain management  -PT consult --> Home PT.     *Anemia and thrombocytosis: Presumed secondary to MDS with a component of iron deficiency anemia  -iron studies noted  -stool occult neg  -H+H Stable  -monitor  -heme consult appreciated   -sS/P  feraheme IV X  2; Continue Ferrrous sulfate    *HTN:  -c/w amlodipine    *Hypothyroidism:  -c/w synthroid    *DVT ppx:  -lovenox    *Code status: DNR/DNI

## 2018-01-01 NOTE — PROGRESS NOTE ADULT - SUBJECTIVE AND OBJECTIVE BOX
Subjective:  wake, alert. Looks good. Loose, non-productive cough.    MEDICATIONS  (STANDING):  ALBUTerol/ipratropium for Nebulization 3 milliLiter(s) Nebulizer every 6 hours  amLODIPine   Tablet 10 milliGRAM(s) Oral daily  aspirin  chewable 81 milliGRAM(s) Oral daily  buDESOnide 160 MICROgram(s)/formoterol 4.5 MICROgram(s) Inhaler 2 Puff(s) Inhalation two times a day  cefuroxime   Tablet 500 milliGRAM(s) Oral every 12 hours  enoxaparin Injectable 40 milliGRAM(s) SubCutaneous every 24 hours  ferrous    sulfate 325 milliGRAM(s) Oral daily  levothyroxine 100 MICROGram(s) Oral daily  montelukast 10 milliGRAM(s) Oral daily  predniSONE   Tablet 20 milliGRAM(s) Oral daily    MEDICATIONS  (PRN):  acetaminophen   Tablet 650 milliGRAM(s) Oral every 6 hours PRN For Temp greater than 38.5 C (101.3 F)  acetaminophen   Tablet. 650 milliGRAM(s) Oral every 6 hours PRN Moderate Pain (4 - 6)  guaiFENesin    Syrup 200 milliGRAM(s) Oral every 6 hours PRN Cough  ondansetron Injectable 4 milliGRAM(s) IV Push every 6 hours PRN Nausea and/or Vomiting      Allergies    No Known Allergies    Intolerances        Vital Signs Last 24 Hrs  T(C): 36.6 (01 Jan 2018 05:22), Max: 36.6 (31 Dec 2017 21:02)  T(F): 97.9 (01 Jan 2018 05:22), Max: 97.9 (31 Dec 2017 21:02)  HR: 86 (01 Jan 2018 08:16) (80 - 101)  BP: 146/65 (01 Jan 2018 05:22) (135/60 - 146/65)  BP(mean): --  RR: 19 (01 Jan 2018 05:22) (19 - 22)  SpO2: 92% (01 Jan 2018 08:16) (92% - 97%)    PHYSICAL EXAMINATION:    NECK:  Supple. No lymphadenopathy. Jugular venous pressure not elevated. Carotids equal.   HEART:   The cardiac impulse has a normal quality. Reg., Nl S1 and S2.    CHEST:  Chest with few scattered crackles with L>R. No wheezing Normal respiratory effort.  ABDOMEN:  Soft and nontender.   EXTREMITIES:  There is no edema.       LABS:                        11.0   25.8  )-----------( 755      ( 01 Jan 2018 07:49 )             38.3     12-31    141  |  105  |  37<H>  ----------------------------<  82  3.9   |  31  |  0.53    Ca    8.3<L>      31 Dec 2017 07:12            RADIOLOGY & ADDITIONAL TESTS:  Assessment and Plan:   · Assessment		  repeat CT scan shows significant improvement in multilobar pneumonia  LLL and RML infiltrates much smaller; L effussion resolved  WBC increased again - ? MDS vs steroids  will change to prednisone 20 mg daily  now on ceftin  ABG on room air noted--PaO2 of 58 on 21%. Will need home O2 at time of discharge if Sats remain low  dvt proph  Ambulate with O2    Problem/Plan - 1:  ·  Problem: Respiratory failure with hypoxia and hypercapnia.     Problem/Plan - 2:  ·  Problem: Multifocal pneumonia.     Problem/Plan - 3:  ·  Problem: HCAP (healthcare-associated pneumonia).     Problem/Plan - 4:  ·  Problem: MDS (myelodysplastic syndrome).     Problem/Plan - 5:  ·  Problem: Leukocytosis.     Problem/Plan - 6:  Problem: Influenza A.

## 2018-01-01 NOTE — DIETITIAN INITIAL EVALUATION ADULT. - ENERGY NEEDS
Ht.     66 "        Wt.  132.2  #              BMI 23.4                    #               Pt is at   102 %  IBW

## 2018-01-01 NOTE — PROGRESS NOTE ADULT - SUBJECTIVE AND OBJECTIVE BOX
Patient is a 84y old  Female who presents with a chief complaint of SOB (24 Dec 2017 08:49)      HPI:  84 year old woman with PMHx of MDS, asthma, hypothyroidism, HTN who presents with worsening SOB.  Pt was recently discharged from [facility] for multiple rib fractures and PNA s/p 10 day course of levaquin, however still requiring oxygen.  Daughter noted pt to be hypoxic at home with sat  82%.  In ER: Pt Hypoxic, and placed on venti-mask. She was given Abx, and nebulizer therapy for presumed HCAP.    At bedside pt sleeping but arousable, and answering questions appropriately. Daughter at bedside. She is not confused but is comfortable yet weak. (24 Dec 2017 08:49)  12/25: Pt still requiring a lot of oxygen, on non-rebreather.  She appears comfortable, and is more alert than yesterday.  She is answering all questions appropriately.  12/26: Pt downgraded to NC and satting well.  She ambulated with PT as well.  She is alert and comfortable. She tolerated BIPAP last night.  12/27: Eager to go home, pt is improving but still requiring supplemental O2.  Still with productive cough. No fever, chills.  12/28: Continues to do better daily. Working with PT, pt still with dyspnea but improving.  12/29: Improving daily.  Still requiring supplemental O2.  Tolerating abx.  No fever, chills, N, V.    1/1/18: Above reviewed.     Review of system- Rest of the review of system are normal except mentioned in HPI    Vital Signs Last 24 Hrs  T(C): 36.6 (01 Jan 2018 05:22), Max: 36.6 (31 Dec 2017 21:02)  T(F): 97.9 (01 Jan 2018 05:22), Max: 97.9 (31 Dec 2017 21:02)  HR: 88 (01 Jan 2018 05:22) (80 - 101)  BP: 146/65 (01 Jan 2018 05:22) (135/60 - 146/65)  BP(mean): --  RR: 19 (01 Jan 2018 05:22) (19 - 22)  SpO2: 95% (01 Jan 2018 05:22) (94% - 97%)    PHYSICAL EXAM:    Constitutional: NAD, on NC, awake and alert.  HEENT: PERR, EOMI, Normal Hearing, MMM  Neck: Soft and supple  Respiratory: rhonchi with faint exp wheezing again noted.  Cardiovascular: S1 and S2, regular rate and rhythm, no Murmurs, gallops or rubs  Gastrointestinal: Bowel Sounds present, soft, nontender, nondistended, no guarding, no rebound  Extremities: No peripheral edema  Neurological: A/O x 3, no focal deficits  Skin: No rashes    MEDICATIONS  (STANDING):  ALBUTerol/ipratropium for Nebulization 3 milliLiter(s) Nebulizer every 6 hours  amLODIPine   Tablet 10 milliGRAM(s) Oral daily  aspirin  chewable 81 milliGRAM(s) Oral daily  buDESOnide 160 MICROgram(s)/formoterol 4.5 MICROgram(s) Inhaler 2 Puff(s) Inhalation two times a day  cefepime  IVPB 2000 milliGRAM(s) IV Intermittent every 12 hours  enoxaparin Injectable 40 milliGRAM(s) SubCutaneous every 24 hours  ferrous    sulfate 325 milliGRAM(s) Oral daily  ferumoxytol IVPB 510 milliGRAM(s) IV Intermittent once  levothyroxine 100 MICROGram(s) Oral daily  methylPREDNISolone sodium succinate Injectable 40 milliGRAM(s) IV Push every 12 hours  montelukast 10 milliGRAM(s) Oral daily  oseltamivir 75 milliGRAM(s) Oral every 12 hours  vancomycin  IVPB 750 milliGRAM(s) IV Intermittent every 12 hours    MEDICATIONS  (PRN):  acetaminophen   Tablet 650 milliGRAM(s) Oral every 6 hours PRN For Temp greater than 38.5 C (101.3 F)  acetaminophen   Tablet. 650 milliGRAM(s) Oral every 6 hours PRN Moderate Pain (4 - 6)  guaiFENesin    Syrup 200 milliGRAM(s) Oral every 6 hours PRN Cough  ondansetron Injectable 4 milliGRAM(s) IV Push every 6 hours PRN Nausea and/or Vomiting    Lab Results:  CBC  CBC Full  -  ( 31 Dec 2017 07:12 )  WBC Count : 22.1 K/uL  Hemoglobin : 10.0 g/dL  Hematocrit : 34.6 %  Platelet Count - Automated : 575 K/uL  Mean Cell Volume : 66.2 fl  Mean Cell Hemoglobin : 19.1 pg  Mean Cell Hemoglobin Concentration : 28.9 gm/dL  Auto Neutrophil # : x  Auto Lymphocyte # : x  Auto Monocyte # : x  Auto Eosinophil # : x  Auto Basophil # : x  Auto Neutrophil % : 89.3 %  Auto Lymphocyte % : 4.1 %  Auto Monocyte % : 4.0 %  Auto Eosinophil % : 2.4 %  Auto Basophil % : 0.2 %    .		Differential:	[] Automated		[] Manual  Chemistry                        10.0   22.1  )-----------( 575      ( 31 Dec 2017 07:12 )             34.6     12-31    141  |  105  |  37<H>  ----------------------------<  82  3.9   |  31  |  0.53    Ca    8.3<L>      31 Dec 2017 07:12        RADIOLOGY RESULTS:    < from: Xray Chest 1 View AP/PA. (12.24.17 @ 02:42) >      Impression:Bibasilar infiltrates with small pleural effusions.    < end of copied text >    < from: CT Chest No Cont (12.24.17 @ 10:21) >    IMPRESSION:          Mild left pleural effusion. Trace to small rightpleural effusion.   Multifocal segmental consolidations in the right middle lobe and   bilateral lower lobes and subsegmental consolidation in the lingula and   medial left upper lobe with air bronchograms and associated   bronchiectasis may represent multifocal pneumonia versus atelectasis with   or without chronic scarring. Additional scattered groundglass opacities   and patchy nodularity may represent a multifocal infectious or   inflammatory etiology        < end of copied text >    < from: CT Chest No Cont (12.31.17 @ 09:02) >    IMPRESSION: improved bibasilar infiltrates and atelectasis with residual   in the RIGHT middle lobe, lingula and lung bases. Small BILATERAL pleural   effusions with underlying atelectasis are slightly improved.    < end of copied text > Patient is a 84y old  Female who presents with a chief complaint of SOB (24 Dec 2017 08:49)      HPI:  84 year old woman with PMHx of MDS, asthma, hypothyroidism, HTN who presents with worsening SOB.  Pt was recently discharged from [facility] for multiple rib fractures and PNA s/p 10 day course of levaquin, however still requiring oxygen.  Daughter noted pt to be hypoxic at home with sat  82%.  In ER: Pt Hypoxic, and placed on venti-mask. She was given Abx, and nebulizer therapy for presumed HCAP.    At bedside pt sleeping but arousable, and answering questions appropriately. Daughter at bedside. She is not confused but is comfortable yet weak. (24 Dec 2017 08:49)  12/25: Pt still requiring a lot of oxygen, on non-rebreather.  She appears comfortable, and is more alert than yesterday.  She is answering all questions appropriately.  12/26: Pt downgraded to NC and satting well.  She ambulated with PT as well.  She is alert and comfortable. She tolerated BIPAP last night.  12/27: Eager to go home, pt is improving but still requiring supplemental O2.  Still with productive cough. No fever, chills.  12/28: Continues to do better daily. Working with PT, pt still with dyspnea but improving.  12/29: Improving daily.  Still requiring supplemental O2.  Tolerating abx.  No fever, chills, N, V.    1/1/18: Above reviewed. Patient still feels short of breath; Prednisone was decreased due to increased WBC. Discussed plan with daughter and patient and they do now want to go home with oxygen since this is what happened at [facility]. They feel that she will be readmitted again    Review of system- Rest of the review of system are normal except mentioned in HPI    Vital Signs Last 24 Hrs  T(C): 36.9 (01 Jan 2018 13:43), Max: 36.9 (01 Jan 2018 13:43)  T(F): 98.4 (01 Jan 2018 13:43), Max: 98.4 (01 Jan 2018 13:43)  HR: 88 (01 Jan 2018 14:45) (80 - 104)  BP: 124/62 (01 Jan 2018 13:43) (124/62 - 146/65)  BP(mean): --  RR: 19 (01 Jan 2018 13:43) (19 - 22)  SpO2: 97% (01 Jan 2018 13:43) (92% - 97%)    PHYSICAL EXAM:    Constitutional: NAD, on NC, awake and alert.  HEENT: PERR, EOMI, Normal Hearing, MMM  Neck: Soft and supple  Respiratory: rhonchi with faint exp wheezing again noted.  Cardiovascular: S1 and S2, regular rate and rhythm, no Murmurs, gallops or rubs  Gastrointestinal: Bowel Sounds present, soft, nontender, nondistended, no guarding, no rebound  Extremities: No peripheral edema  Neurological: A/O x 3, no focal deficits  Skin: No rashes    MEDICATIONS  (STANDING):  ALBUTerol/ipratropium for Nebulization 3 milliLiter(s) Nebulizer every 6 hours  amLODIPine   Tablet 10 milliGRAM(s) Oral daily  aspirin  chewable 81 milliGRAM(s) Oral daily  buDESOnide 160 MICROgram(s)/formoterol 4.5 MICROgram(s) Inhaler 2 Puff(s) Inhalation two times a day  cefepime  IVPB 2000 milliGRAM(s) IV Intermittent every 12 hours  enoxaparin Injectable 40 milliGRAM(s) SubCutaneous every 24 hours  ferrous    sulfate 325 milliGRAM(s) Oral daily  ferumoxytol IVPB 510 milliGRAM(s) IV Intermittent once  levothyroxine 100 MICROGram(s) Oral daily  methylPREDNISolone sodium succinate Injectable 40 milliGRAM(s) IV Push every 12 hours  montelukast 10 milliGRAM(s) Oral daily  oseltamivir 75 milliGRAM(s) Oral every 12 hours  vancomycin  IVPB 750 milliGRAM(s) IV Intermittent every 12 hours    MEDICATIONS  (PRN):  acetaminophen   Tablet 650 milliGRAM(s) Oral every 6 hours PRN For Temp greater than 38.5 C (101.3 F)  acetaminophen   Tablet. 650 milliGRAM(s) Oral every 6 hours PRN Moderate Pain (4 - 6)  guaiFENesin    Syrup 200 milliGRAM(s) Oral every 6 hours PRN Cough  ondansetron Injectable 4 milliGRAM(s) IV Push every 6 hours PRN Nausea and/or Vomiting    Lab Results:  CBC  CBC Full  -  ( 01 Jan 2018 07:49 )  WBC Count : 25.8 K/uL  Hemoglobin : 11.0 g/dL  Hematocrit : 38.3 %  Platelet Count - Automated : 755 K/uL  Mean Cell Volume : 66.4 fl  Mean Cell Hemoglobin : 19.1 pg  Mean Cell Hemoglobin Concentration : 28.8 gm/dL  Auto Neutrophil # : x  Auto Lymphocyte # : x  Auto Monocyte # : x  Auto Eosinophil # : x  Auto Basophil # : x  Auto Neutrophil % : 90.0 %  Auto Lymphocyte % : 6.0 %  Auto Monocyte % : 1.0 %  Auto Eosinophil % : 3.0 %  Auto Basophil % : x    .		Differential:	[] Automated		[] Manual  Chemistry                        11.0   25.8  )-----------( 755      ( 01 Jan 2018 07:49 )             38.3     12-31    141  |  105  |  37<H>  ----------------------------<  82  3.9   |  31  |  0.53    Ca    8.3<L>      31 Dec 2017 07:12      ABG - ( 01 Jan 2018 09:36 )  pH: 7.46  /  pCO2: 39    /  pO2: 58    / HCO3: 28    / Base Excess: 4.2   /  SaO2: 90        RADIOLOGY RESULTS:    < from: Xray Chest 1 View AP/PA. (12.24.17 @ 02:42) >      Impression:Bibasilar infiltrates with small pleural effusions.    < end of copied text >    < from: CT Chest No Cont (12.24.17 @ 10:21) >    IMPRESSION:          Mild left pleural effusion. Trace to small rightpleural effusion.   Multifocal segmental consolidations in the right middle lobe and   bilateral lower lobes and subsegmental consolidation in the lingula and   medial left upper lobe with air bronchograms and associated   bronchiectasis may represent multifocal pneumonia versus atelectasis with   or without chronic scarring. Additional scattered groundglass opacities   and patchy nodularity may represent a multifocal infectious or   inflammatory etiology        < end of copied text >    < from: CT Chest No Cont (12.31.17 @ 09:02) >    IMPRESSION: improved bibasilar infiltrates and atelectasis with residual   in the RIGHT middle lobe, lingula and lung bases. Small BILATERAL pleural   effusions with underlying atelectasis are slightly improved.    < end of copied text >

## 2018-01-02 LAB
ANISOCYTOSIS BLD QL: SIGNIFICANT CHANGE UP
BASOPHILS # BLD AUTO: 0.1 K/UL — SIGNIFICANT CHANGE UP (ref 0–0.2)
BASOPHILS NFR BLD AUTO: 0.7 % — SIGNIFICANT CHANGE UP (ref 0–2)
DACRYOCYTES BLD QL SMEAR: SLIGHT — SIGNIFICANT CHANGE UP
ELLIPTOCYTES BLD QL SMEAR: SLIGHT — SIGNIFICANT CHANGE UP
EOSINOPHIL # BLD AUTO: 1.3 K/UL — HIGH (ref 0–0.5)
EOSINOPHIL NFR BLD AUTO: 7.1 % — HIGH (ref 0–6)
HCT VFR BLD CALC: 35 % — SIGNIFICANT CHANGE UP (ref 34.5–45)
HGB BLD-MCNC: 10.2 G/DL — LOW (ref 11.5–15.5)
HYPOCHROMIA BLD QL: SIGNIFICANT CHANGE UP
LYMPHOCYTES # BLD AUTO: 0.9 K/UL — LOW (ref 1–3.3)
LYMPHOCYTES # BLD AUTO: 4.8 % — LOW (ref 13–44)
MACROCYTES BLD QL: SLIGHT — SIGNIFICANT CHANGE UP
MANUAL DIF COMMENT BLD-IMP: SIGNIFICANT CHANGE UP
MCHC RBC-ENTMCNC: 19.3 PG — LOW (ref 27–34)
MCHC RBC-ENTMCNC: 29 GM/DL — LOW (ref 32–36)
MCV RBC AUTO: 66.4 FL — LOW (ref 80–100)
MICROCYTES BLD QL: SIGNIFICANT CHANGE UP
MONOCYTES # BLD AUTO: 0.9 K/UL — SIGNIFICANT CHANGE UP (ref 0–0.9)
MONOCYTES NFR BLD AUTO: 4.9 % — SIGNIFICANT CHANGE UP (ref 2–14)
NEUTROPHILS # BLD AUTO: 15.4 K/UL — HIGH (ref 1.8–7.4)
NEUTROPHILS NFR BLD AUTO: 82.5 % — HIGH (ref 43–77)
OVALOCYTES BLD QL SMEAR: SLIGHT — SIGNIFICANT CHANGE UP
PLAT MORPH BLD: NORMAL — SIGNIFICANT CHANGE UP
PLATELET # BLD AUTO: 537 K/UL — HIGH (ref 150–400)
POIKILOCYTOSIS BLD QL AUTO: SLIGHT — SIGNIFICANT CHANGE UP
POLYCHROMASIA BLD QL SMEAR: SLIGHT — SIGNIFICANT CHANGE UP
RBC # BLD: 5.27 M/UL — HIGH (ref 3.8–5.2)
RBC # FLD: 20.1 % — HIGH (ref 10.3–14.5)
RBC BLD AUTO: (no result)
SCHISTOCYTES BLD QL AUTO: SLIGHT — SIGNIFICANT CHANGE UP
TARGETS BLD QL SMEAR: SLIGHT — SIGNIFICANT CHANGE UP
WBC # BLD: 18.7 K/UL — HIGH (ref 3.8–10.5)
WBC # FLD AUTO: 18.7 K/UL — HIGH (ref 3.8–10.5)

## 2018-01-02 PROCEDURE — 99232 SBSQ HOSP IP/OBS MODERATE 35: CPT

## 2018-01-02 RX ADMIN — MONTELUKAST 10 MILLIGRAM(S): 4 TABLET, CHEWABLE ORAL at 11:31

## 2018-01-02 RX ADMIN — BUDESONIDE AND FORMOTEROL FUMARATE DIHYDRATE 2 PUFF(S): 160; 4.5 AEROSOL RESPIRATORY (INHALATION) at 10:12

## 2018-01-02 RX ADMIN — Medication 3 MILLILITER(S): at 13:45

## 2018-01-02 RX ADMIN — Medication 325 MILLIGRAM(S): at 11:31

## 2018-01-02 RX ADMIN — Medication 3 MILLILITER(S): at 19:57

## 2018-01-02 RX ADMIN — ENOXAPARIN SODIUM 40 MILLIGRAM(S): 100 INJECTION SUBCUTANEOUS at 18:17

## 2018-01-02 RX ADMIN — BUDESONIDE AND FORMOTEROL FUMARATE DIHYDRATE 2 PUFF(S): 160; 4.5 AEROSOL RESPIRATORY (INHALATION) at 19:56

## 2018-01-02 RX ADMIN — Medication 3 MILLILITER(S): at 10:12

## 2018-01-02 RX ADMIN — Medication 81 MILLIGRAM(S): at 11:31

## 2018-01-02 RX ADMIN — Medication 100 MICROGRAM(S): at 05:45

## 2018-01-02 RX ADMIN — Medication 20 MILLIGRAM(S): at 05:45

## 2018-01-02 RX ADMIN — AMLODIPINE BESYLATE 10 MILLIGRAM(S): 2.5 TABLET ORAL at 05:45

## 2018-01-02 NOTE — PROGRESS NOTE ADULT - ASSESSMENT
84 year old woman with asthma, HTN, hypothyroidism who presents with SOB.    *SOB with sepsis (fever, leukocytosis) and acute hypoxemic respiratory failure secondary to acute viral influenza A, hmpv, and superimposed bacterial pneumonia.  -Sepsis resolved and Transferred to med-surg.  -broad spectrum abx for probable HCAP/multifocal pnuemonia/gram negative bacteria.   -S/P Vanco and Cefepime; S/P ceftin x 3 days  -S/P tamiflu for acute influenza A day # 5 --> Completed course.  -supportive care for RSV infection.  -legionella negative  -f/u cultures negative.  -leukocytosis worsening  -nocturnal bipap PRN  -repeat CT scan shows significant improvement in multilobar pneumonia  -ABG on room air noted--PaO2 of 58 on 21%. No need for home O2 - Pulse ox at rest 92% and on ambulation is 90%;  -prednisone 20 mg daily  -will need to ambulate with Oxygen    *Asthma: with exacerbation  -duonebs ATC  -inhaled corticosteroids,   -S/P Solumedrol IV daily and switched to PO  -O2 supplementation  -Pulm consult appreciated     *Leukocytosis and thrombocytosis 2ndry to ? MDS vs steroids  -repeat CT scan shows significant improvement in multilobar pneumonia  -change to prednisone 20 mg daily  -repeat CBC in am  -S/P ceftin  -Hemeonc consult appreciated -  FU with Dr Rodas as outpatient    *Rib fractures:  -incentive spirometry  -pain management  -PT consult --> Home PT.     *Anemia and thrombocytosis: Presumed secondary to MDS with a component of iron deficiency anemia  -iron studies noted  -stool occult neg  -H+H Stable  -monitor  -heme consult appreciated   -sS/P  feraheme IV X  2; Continue Ferrrous sulfate    *HTN:  -c/w amlodipine    *Hypothyroidism:  -c/w synthroid    *DVT ppx:  -lovenox    *Code status: DNR/DNI

## 2018-01-02 NOTE — PROGRESS NOTE ADULT - SUBJECTIVE AND OBJECTIVE BOX
Patient is feeling well, sitting OOB/chair.     ICU Vital Signs Last 24 Hrs  T(C): 36.3 (02 Jan 2018 04:44), Max: 36.9 (01 Jan 2018 13:43)  T(F): 97.4 (02 Jan 2018 04:44), Max: 98.4 (01 Jan 2018 13:43)  HR: 94 (02 Jan 2018 04:44) (88 - 104)  BP: 157/78 (02 Jan 2018 04:44) (124/62 - 157/78)  BP(mean): --  ABP: --  ABP(mean): --  RR: 20 (02 Jan 2018 04:44) (19 - 22)  SpO2: 95% (02 Jan 2018 04:44) (95% - 97%)      general woman in NAD  HEENT  Lungs bilateral decreased breath sounds  CVS S1 S2 regular, no M/R/G  Abdomen soft Nt ND positive for BS, no organomegaly.  Extremities: No C/C/E    Manual Differential (01.02.18 @ 06:50)    Target Cells: Slight    Tear Drops: Slight    Poikilocytosis: Slight    Polychromasia: Slight    Schistocytes: Slight    Ovalocytes: Slight    Microcytosis: Moderate    Hypochromia: Moderate    Macrocytosis: Slight    Anisocytosis: Moderate    Elliptocytes: Slight    Red Cell Morphology: Abnormal    Platelet Morphology: Normal    Comment - Hematology: Results verified by smear review.      Complete Blood Count + Automated Diff (01.02.18 @ 06:50)    WBC Count: 18.7 K/uL    RBC Count: 5.27 M/uL    Hemoglobin: 10.2 g/dL    Hematocrit: 35.0 %    Mean Cell Volume: 66.4 fl    Mean Cell Hemoglobin: 19.3 pg    Mean Cell Hemoglobin Conc: 29.0 gm/dL    Red Cell Distrib Width: 20.1 %    Platelet Count - Automated: 537 K/uL    Auto Neutrophil #: 15.4 K/uL    Auto Lymphocyte #: 0.9 K/uL    Auto Monocyte #: 0.9 K/uL    Auto Eosinophil #: 1.3 K/uL    Auto Basophil #: 0.1 K/uL    Auto Neutrophil %: 82.5: Differential percentages must be correlated with absolute numbers for  clinical significance. %    Auto Lymphocyte %: 4.8 %    Auto Monocyte %: 4.9 %    Auto Eosinophil %: 7.1 %    Auto Basophil %: 0.7 %                Basic Metabolic Panel in AM (12.31.17 @ 07:12)    Sodium, Serum: 141 mmol/L    Potassium, Serum: 3.9 mmol/L    Chloride, Serum: 105 mmol/L    Carbon Dioxide, Serum: 31 mmol/L    Anion Gap, Serum: 5 mmol/L    Blood Urea Nitrogen, Serum: 37 mg/dL    Creatinine, Serum: 0.53 mg/dL    Glucose, Serum: 82 mg/dL    Calcium, Total Serum: 8.3 mg/dL    eGFR if Non : 87: Interpretative comment  The units for eGFR are ml/min/1.73m2 (normalized body surface area). The  eGFR is calculated from a serum creatinine using the CKD-EPI equation.  Other variables required for calculation are race, age and sex. Among  patients with chronic kidney disease (CKD), the eGFR is useful in  determining the stage of disease according to KDOQI CKD classification.  All eGFR results are reported numerically with the following  interpretation.          GFR                    With                 Without     (ml/min/1.73 m2)    Kidney Damage       Kidney Damage        >= 90                    Stage 1                     Normal        60-89                    Stage 2                     Decreased GFR        30-59     Stage 3                     Stage 3        15-29                    Stage 4                     Stage 4        < 15                      Stage 5                     Stage 5  Each stage of CKD assumes that the associated GFR level has been in  effect for at least 3 months. Determination of stages one and two (with  eGFR > 59 ml/min/m2) requires estimation of kidney damage for at least 3  months as defined by structural or functional abnormalities.  Limitations: All estimates of GFR will be less accurate for patients at  extremes of muscle mass (including but not limited to frail elderly,  critically ill, or cancer patients), those with unusual diets, and those  with conditions associated with reduced secretion or extrarenal  elimination of creatinine. The eGFR equation is not recommended for use  in patients with unstable creatinine levels. mL/min/1.73M2    eGFR if African American: 100 mL/min/1.73M2

## 2018-01-02 NOTE — PROGRESS NOTE ADULT - SUBJECTIVE AND OBJECTIVE BOX
Subjective:  wake, alert. Looks good. Loose, non-productive cough.  1/2/18:  alert, no distress. slight clear sputum.    MEDICATIONS  (STANDING):  ALBUTerol/ipratropium for Nebulization 3 milliLiter(s) Nebulizer every 6 hours  amLODIPine   Tablet 10 milliGRAM(s) Oral daily  aspirin  chewable 81 milliGRAM(s) Oral daily  buDESOnide 160 MICROgram(s)/formoterol 4.5 MICROgram(s) Inhaler 2 Puff(s) Inhalation two times a day  cefuroxime   Tablet 500 milliGRAM(s) Oral every 12 hours  enoxaparin Injectable 40 milliGRAM(s) SubCutaneous every 24 hours  ferrous    sulfate 325 milliGRAM(s) Oral daily  levothyroxine 100 MICROGram(s) Oral daily  montelukast 10 milliGRAM(s) Oral daily  predniSONE   Tablet 20 milliGRAM(s) Oral daily    MEDICATIONS  (PRN):  acetaminophen   Tablet 650 milliGRAM(s) Oral every 6 hours PRN For Temp greater than 38.5 C (101.3 F)  acetaminophen   Tablet. 650 milliGRAM(s) Oral every 6 hours PRN Moderate Pain (4 - 6)  guaiFENesin    Syrup 200 milliGRAM(s) Oral every 6 hours PRN Cough  ondansetron Injectable 4 milliGRAM(s) IV Push every 6 hours PRN Nausea and/or Vomiting      Allergies    No Known Allergies    Intolerances        Vital Signs Last 24 Hrs  T(C): 36.6 (01 Jan 2018 05:22), Max: 36.6 (31 Dec 2017 21:02)  T(F): 97.9 (01 Jan 2018 05:22), Max: 97.9 (31 Dec 2017 21:02)  HR: 86 (01 Jan 2018 08:16) (80 - 101)  BP: 146/65 (01 Jan 2018 05:22) (135/60 - 146/65)  BP(mean): --  RR: 19 (01 Jan 2018 05:22) (19 - 22)  SpO2: 92% (01 Jan 2018 08:16) (92% - 97%)    PHYSICAL EXAMINATION:    NECK:  Supple. No lymphadenopathy. Jugular venous pressure not elevated. Carotids equal.   HEART:   The cardiac impulse has a normal quality. Reg., Nl S1 and S2.    CHEST:  Chest with few scattered crackles with L>R. No wheezing Normal respiratory effort.  ABDOMEN:  Soft and nontender.   EXTREMITIES:  There is no edema.       LABS:                        11.0   25.8  )-----------( 755      ( 01 Jan 2018 07:49 )             38.3     12-31    141  |  105  |  37<H>  ----------------------------<  82  3.9   |  31  |  0.53    Ca    8.3<L>      31 Dec 2017 07:12            RADIOLOGY & ADDITIONAL TESTS:  Assessment and Plan:   · Assessment		  repeat CT scan shows significant improvement in multilobar pneumonia  LLL and RML infiltrates much smaller; L effussion resolved  WBC increased again - ? MDS vs steroids  on prednisone 20 mg daily    ABG on room air noted--PaO2 of 58 on 21%. Will need home O2 at time of discharge if Sats remain low.  For O2 sat on RA at rest and w/ walk (as dru).  dvt proph  Ambulate with O2    Problem/Plan - 1:  ·  Problem: Respiratory failure with hypoxia and hypercapnia.     Problem/Plan - 2:  ·  Problem: Multifocal pneumonia.     Problem/Plan - 3:  ·  Problem: HCAP (healthcare-associated pneumonia).     Problem/Plan - 4:  ·  Problem: MDS (myelodysplastic syndrome).     Problem/Plan - 5:  ·  Problem: Leukocytosis.     Problem/Plan - 6:  Problem: Influenza A.

## 2018-01-02 NOTE — PROGRESS NOTE ADULT - ASSESSMENT
84 year old woman with PMHx of MDS, asthma, hypothyroidism, HTN, currently been treated for pneumonia. Counts are recovering. Patient's Hematologist is Dr Rodas, patient will follow with him as outpatient.     Continue current management and supportive care.

## 2018-01-02 NOTE — PROGRESS NOTE ADULT - SUBJECTIVE AND OBJECTIVE BOX
Patient is a 84y old  Female who presents with a chief complaint of SOB (24 Dec 2017 08:49)      HPI:  84 year old woman with PMHx of MDS, asthma, hypothyroidism, HTN who presents with worsening SOB.  Pt was recently discharged from [facility] for multiple rib fractures and PNA s/p 10 day course of levaquin, however still requiring oxygen.  Daughter noted pt to be hypoxic at home with sat  82%.  In ER: Pt Hypoxic, and placed on venti-mask. She was given Abx, and nebulizer therapy for presumed HCAP.    At bedside pt sleeping but arousable, and answering questions appropriately. Daughter at bedside. She is not confused but is comfortable yet weak. (24 Dec 2017 08:49)  12/25: Pt still requiring a lot of oxygen, on non-rebreather.  She appears comfortable, and is more alert than yesterday.  She is answering all questions appropriately.  12/26: Pt downgraded to NC and satting well.  She ambulated with PT as well.  She is alert and comfortable. She tolerated BIPAP last night.  12/27: Eager to go home, pt is improving but still requiring supplemental O2.  Still with productive cough. No fever, chills.  12/28: Continues to do better daily. Working with PT, pt still with dyspnea but improving.  12/29: Improving daily.  Still requiring supplemental O2.  Tolerating abx.  No fever, chills, N, V.    1/1/18: Above reviewed. Patient still feels short of breath; Prednisone was decreased due to increased WBC. Discussed plan with daughter and patient and they do now want to go home with oxygen since this is what happened at [facility]. They feel that she will be readmitted again  1/2/18: Breathing better; Pulse ox at rest 92% and on ambulation is 90%;     Review of system- Rest of the review of system are normal except mentioned in HPI    Vital Signs Last 24 Hrs  T(C): 36.6 (02 Jan 2018 11:35), Max: 36.6 (02 Jan 2018 11:35)  T(F): 97.8 (02 Jan 2018 11:35), Max: 97.8 (02 Jan 2018 11:35)  HR: 99 (02 Jan 2018 11:35) (88 - 99)  BP: 116/51 (02 Jan 2018 11:35) (116/51 - 157/78)  BP(mean): --  RR: 18 (02 Jan 2018 11:35) (18 - 22)  SpO2: 94% (02 Jan 2018 11:35) (92% - 95%)    PHYSICAL EXAM:    Constitutional: NAD, on NC, awake and alert.  HEENT: PERR, EOMI, Normal Hearing, MMM  Neck: Soft and supple  Respiratory: rhonchi with faint exp wheezing again noted.  Cardiovascular: S1 and S2, regular rate and rhythm, no Murmurs, gallops or rubs  Gastrointestinal: Bowel Sounds present, soft, nontender, nondistended, no guarding, no rebound  Extremities: No peripheral edema  Neurological: A/O x 3, no focal deficits  Skin: No rashes    MEDICATIONS  (STANDING):  ALBUTerol/ipratropium for Nebulization 3 milliLiter(s) Nebulizer every 6 hours  amLODIPine   Tablet 10 milliGRAM(s) Oral daily  aspirin  chewable 81 milliGRAM(s) Oral daily  buDESOnide 160 MICROgram(s)/formoterol 4.5 MICROgram(s) Inhaler 2 Puff(s) Inhalation two times a day  cefepime  IVPB 2000 milliGRAM(s) IV Intermittent every 12 hours  enoxaparin Injectable 40 milliGRAM(s) SubCutaneous every 24 hours  ferrous    sulfate 325 milliGRAM(s) Oral daily  ferumoxytol IVPB 510 milliGRAM(s) IV Intermittent once  levothyroxine 100 MICROGram(s) Oral daily  methylPREDNISolone sodium succinate Injectable 40 milliGRAM(s) IV Push every 12 hours  montelukast 10 milliGRAM(s) Oral daily  oseltamivir 75 milliGRAM(s) Oral every 12 hours  vancomycin  IVPB 750 milliGRAM(s) IV Intermittent every 12 hours    MEDICATIONS  (PRN):  acetaminophen   Tablet 650 milliGRAM(s) Oral every 6 hours PRN For Temp greater than 38.5 C (101.3 F)  acetaminophen   Tablet. 650 milliGRAM(s) Oral every 6 hours PRN Moderate Pain (4 - 6)  guaiFENesin    Syrup 200 milliGRAM(s) Oral every 6 hours PRN Cough  ondansetron Injectable 4 milliGRAM(s) IV Push every 6 hours PRN Nausea and/or Vomiting    Lab Results:  CBC  CBC Full  -  ( 02 Jan 2018 06:50 )  WBC Count : 18.7 K/uL  Hemoglobin : 10.2 g/dL  Hematocrit : 35.0 %  Platelet Count - Automated : 537 K/uL  Mean Cell Volume : 66.4 fl  Mean Cell Hemoglobin : 19.3 pg  Mean Cell Hemoglobin Concentration : 29.0 gm/dL  Auto Neutrophil # : 15.4 K/uL  Auto Lymphocyte # : 0.9 K/uL  Auto Monocyte # : 0.9 K/uL  Auto Eosinophil # : 1.3 K/uL  Auto Basophil # : 0.1 K/uL  Auto Neutrophil % : 82.5 %  Auto Lymphocyte % : 4.8 %  Auto Monocyte % : 4.9 %  Auto Eosinophil % : 7.1 %  Auto Basophil % : 0.7 %    .		Differential:	[] Automated		[] Manual  Chemistry                        10.2   18.7  )-----------( 537      ( 02 Jan 2018 06:50 )             35.0         ABG - ( 01 Jan 2018 09:36 )  pH: 7.46  /  pCO2: 39    /  pO2: 58    / HCO3: 28    / Base Excess: 4.2   /  SaO2: 90          RADIOLOGY RESULTS:      ABG - ( 01 Jan 2018 09:36 )  pH: 7.46  /  pCO2: 39    /  pO2: 58    / HCO3: 28    / Base Excess: 4.2   /  SaO2: 90        RADIOLOGY RESULTS:    < from: Xray Chest 1 View AP/PA. (12.24.17 @ 02:42) >      Impression:Bibasilar infiltrates with small pleural effusions.    < end of copied text >    < from: CT Chest No Cont (12.24.17 @ 10:21) >    IMPRESSION:          Mild left pleural effusion. Trace to small rightpleural effusion.   Multifocal segmental consolidations in the right middle lobe and   bilateral lower lobes and subsegmental consolidation in the lingula and   medial left upper lobe with air bronchograms and associated   bronchiectasis may represent multifocal pneumonia versus atelectasis with   or without chronic scarring. Additional scattered groundglass opacities   and patchy nodularity may represent a multifocal infectious or   inflammatory etiology        < end of copied text >    < from: CT Chest No Cont (12.31.17 @ 09:02) >    IMPRESSION: improved bibasilar infiltrates and atelectasis with residual   in the RIGHT middle lobe, lingula and lung bases. Small BILATERAL pleural   effusions with underlying atelectasis are slightly improved.    < end of copied text >

## 2018-01-03 VITALS
HEART RATE: 92 BPM | SYSTOLIC BLOOD PRESSURE: 126 MMHG | RESPIRATION RATE: 17 BRPM | TEMPERATURE: 98 F | DIASTOLIC BLOOD PRESSURE: 58 MMHG | OXYGEN SATURATION: 97 %

## 2018-01-03 LAB
HCT VFR BLD CALC: 33 % — LOW (ref 34.5–45)
HGB BLD-MCNC: 9.9 G/DL — LOW (ref 11.5–15.5)
MAGNESIUM SERPL-MCNC: 2.1 MG/DL — SIGNIFICANT CHANGE UP (ref 1.6–2.6)
MCHC RBC-ENTMCNC: 20 PG — LOW (ref 27–34)
MCHC RBC-ENTMCNC: 29.8 GM/DL — LOW (ref 32–36)
MCV RBC AUTO: 67.2 FL — LOW (ref 80–100)
NRBC # BLD: SIGNIFICANT CHANGE UP /100 WBCS (ref 0–0)
PHOSPHATE SERPL-MCNC: 2.5 MG/DL — SIGNIFICANT CHANGE UP (ref 2.5–4.5)
PLATELET # BLD AUTO: 505 K/UL — HIGH (ref 150–400)
POTASSIUM SERPL-MCNC: 3.7 MMOL/L — SIGNIFICANT CHANGE UP (ref 3.5–5.3)
POTASSIUM SERPL-SCNC: 3.7 MMOL/L — SIGNIFICANT CHANGE UP (ref 3.5–5.3)
RBC # BLD: 4.92 M/UL — SIGNIFICANT CHANGE UP (ref 3.8–5.2)
RBC # FLD: 20.8 % — HIGH (ref 10.3–14.5)
WBC # BLD: 18.6 K/UL — HIGH (ref 3.8–10.5)
WBC # FLD AUTO: 18.6 K/UL — HIGH (ref 3.8–10.5)

## 2018-01-03 PROCEDURE — 99233 SBSQ HOSP IP/OBS HIGH 50: CPT

## 2018-01-03 RX ORDER — MONTELUKAST 4 MG/1
1 TABLET, CHEWABLE ORAL
Qty: 0 | Refills: 0 | DISCHARGE
Start: 2018-01-03

## 2018-01-03 RX ORDER — ALBUTEROL 90 UG/1
0 AEROSOL, METERED ORAL
Qty: 0 | Refills: 0 | COMMUNITY

## 2018-01-03 RX ORDER — MONTELUKAST 4 MG/1
1 TABLET, CHEWABLE ORAL
Qty: 0 | Refills: 0 | COMMUNITY

## 2018-01-03 RX ORDER — AMLODIPINE BESYLATE 2.5 MG/1
1 TABLET ORAL
Qty: 0 | Refills: 0 | COMMUNITY

## 2018-01-03 RX ORDER — LEVOTHYROXINE SODIUM 125 MCG
1 TABLET ORAL
Qty: 0 | Refills: 0 | COMMUNITY

## 2018-01-03 RX ORDER — FERROUS SULFATE 325(65) MG
1 TABLET ORAL
Qty: 30 | Refills: 0
Start: 2018-01-03 | End: 2018-02-01

## 2018-01-03 RX ORDER — LEVOTHYROXINE SODIUM 125 MCG
1 TABLET ORAL
Qty: 0 | Refills: 0 | DISCHARGE
Start: 2018-01-03

## 2018-01-03 RX ORDER — ASPIRIN/CALCIUM CARB/MAGNESIUM 324 MG
1 TABLET ORAL
Qty: 0 | Refills: 0 | COMMUNITY

## 2018-01-03 RX ORDER — ASPIRIN/CALCIUM CARB/MAGNESIUM 324 MG
1 TABLET ORAL
Qty: 0 | Refills: 0 | DISCHARGE
Start: 2018-01-03

## 2018-01-03 RX ORDER — AMLODIPINE BESYLATE 2.5 MG/1
1 TABLET ORAL
Qty: 0 | Refills: 0 | DISCHARGE
Start: 2018-01-03

## 2018-01-03 RX ORDER — BUDESONIDE AND FORMOTEROL FUMARATE DIHYDRATE 160; 4.5 UG/1; UG/1
2 AEROSOL RESPIRATORY (INHALATION)
Qty: 1 | Refills: 0
Start: 2018-01-03 | End: 2018-02-01

## 2018-01-03 RX ORDER — ALBUTEROL 90 UG/1
2 AEROSOL, METERED ORAL
Qty: 1 | Refills: 0
Start: 2018-01-03 | End: 2018-02-01

## 2018-01-03 RX ADMIN — AMLODIPINE BESYLATE 10 MILLIGRAM(S): 2.5 TABLET ORAL at 05:39

## 2018-01-03 RX ADMIN — Medication 20 MILLIGRAM(S): at 05:40

## 2018-01-03 RX ADMIN — Medication 81 MILLIGRAM(S): at 13:59

## 2018-01-03 RX ADMIN — MONTELUKAST 10 MILLIGRAM(S): 4 TABLET, CHEWABLE ORAL at 13:59

## 2018-01-03 RX ADMIN — Medication 325 MILLIGRAM(S): at 13:58

## 2018-01-03 RX ADMIN — Medication 3 MILLILITER(S): at 14:29

## 2018-01-03 RX ADMIN — Medication 100 MICROGRAM(S): at 05:40

## 2018-01-03 NOTE — PROGRESS NOTE ADULT - ASSESSMENT
84 year old woman with asthma, HTN, hypothyroidism who presents with SOB.    *SOB with sepsis (fever, leukocytosis) and acute hypoxemic respiratory failure secondary to acute viral influenza A, hmpv, and superimposed bacterial pneumonia.  -broad spectrum abx for probable HCAP/multifocal pnuemonia/gram negative bacteria.   -S/P Vanco and Cefepime; S/P ceftin x 3 days  -S/P tamiflu for acute influenza A day # 5 --> Completed course.  -supportive care for RSV infection.  -legionella negative  -f/u cultures negative.  -leukocytosis improved  -nocturnal bipap PRN  -repeat CT scan shows significant improvement in multilobar pneumonia  -ABG on room air noted--PaO2 of 58 on 21%. No need for home O2 - Pulse ox at rest 92% and on ambulation is 90%;  -prednisone 20 mg daily- one week taper     *Asthma: with exacerbation  -rad ATC  -inhaled corticosteroids,   -S/P Solumedrol IV daily and switched to PO taper  -O2 supplementation  -Pulm consult appreciated     *Leukocytosis and thrombocytosis 2ndry to ? MDS vs steroids  -repeat CT scan shows significant improvement in multilobar pneumonia  -change to prednisone 20 mg daily  -repeat CBC in am  -S/P ceftin  -Hemeonc consult appreciated -  FU with Dr Rodas as outpatient    *Rib fractures:  -incentive spirometry  -pain management  -PT consult --> Home PT.     *Anemia and thrombocytosis: Presumed secondary to MDS with a component of iron deficiency anemia  -iron studies noted  -stool occult neg  -H+H Stable  -monitor  -heme consult appreciated   -sS/P  feraheme IV X  2; Continue Ferrrous sulfate  - f/u with Dr vazquez as an outpatient    *HTN:  -c/w amlodipine    *Hypothyroidism:  -c/w synthroid    *DVT ppx:  -lovenox    *Code status: DNR/DNI    medically optimized for discharge home. 84 year old woman with asthma, HTN, hypothyroidism who presents with SOB.    *SOB with sepsis (fever, leukocytosis) and acute hypoxemic respiratory failure secondary to acute viral influenza A, hmpv, and superimposed bacterial pneumonia.  -broad spectrum abx for probable HCAP/multifocal pnuemonia/gram negative bacteria.   -S/P Vanco and Cefepime; S/P ceftin x 3 days  -S/P tamiflu for acute influenza A day # 5 --> Completed course.  -supportive care for RSV infection.  -legionella negative  -f/u cultures negative.  -leukocytosis improved  -nocturnal bipap PRN  -repeat CT scan shows significant improvement in multilobar pneumonia  -ABG on room air noted--PaO2 of 58 on 21%. No need for home O2 - Pulse ox at rest 92% and on ambulation is 90%;  -prednisone 20 mg daily- one week taper     *Asthma: with exacerbation  -rad ATC  -inhaled corticosteroids,   -S/P Solumedrol IV daily and switched to PO taper  -O2 supplementation  -Pulm consult appreciated     *Leukocytosis and thrombocytosis 2ndry to ? MDS vs steroids  -repeat CT scan shows significant improvement in multilobar pneumonia  -change to prednisone 20 mg daily  -repeat CBC in am  -S/P ceftin  -Hemeonc consult appreciated -  FU with Dr Rodas as outpatient    *Rib fractures:  -incentive spirometry  -pain management  -PT consult --> Home PT.     *Anemia and thrombocytosis: Presumed secondary to MDS with a component of iron deficiency anemia  -iron studies noted  -stool occult neg  -H+H Stable  -monitor  -heme consult appreciated   -sS/P  feraheme IV X  2; Continue Ferrrous sulfate  - f/u with Dr vazquez as an outpatient    *HTN:  -c/w amlodipine    *Hypothyroidism:  -c/w synthroid    *DVT ppx:  -lovenox    *Code status: DNR/DNI    medically optimized for discharge home.   Total time: 45 minutes

## 2018-01-03 NOTE — PROGRESS NOTE ADULT - SUBJECTIVE AND OBJECTIVE BOX
Subjective:  wake, alert. Looks good. Loose, non-productive cough.  1/2/18:  alert, no distress. slight clear sputum.  1/3/18: alert. no distress. O2 sat noted 90% on RA with vo walk.    MEDICATIONS  (STANDING):  ALBUTerol/ipratropium for Nebulization 3 milliLiter(s) Nebulizer every 6 hours  amLODIPine   Tablet 10 milliGRAM(s) Oral daily  aspirin  chewable 81 milliGRAM(s) Oral daily  buDESOnide 160 MICROgram(s)/formoterol 4.5 MICROgram(s) Inhaler 2 Puff(s) Inhalation two times a day  cefuroxime   Tablet 500 milliGRAM(s) Oral every 12 hours  enoxaparin Injectable 40 milliGRAM(s) SubCutaneous every 24 hours  ferrous    sulfate 325 milliGRAM(s) Oral daily  levothyroxine 100 MICROGram(s) Oral daily  montelukast 10 milliGRAM(s) Oral daily  predniSONE   Tablet 20 milliGRAM(s) Oral daily    MEDICATIONS  (PRN):  acetaminophen   Tablet 650 milliGRAM(s) Oral every 6 hours PRN For Temp greater than 38.5 C (101.3 F)  acetaminophen   Tablet. 650 milliGRAM(s) Oral every 6 hours PRN Moderate Pain (4 - 6)  guaiFENesin    Syrup 200 milliGRAM(s) Oral every 6 hours PRN Cough  ondansetron Injectable 4 milliGRAM(s) IV Push every 6 hours PRN Nausea and/or Vomiting      Allergies    No Known Allergies    Intolerances        Vital Signs Last 24 Hrs  T(C): 36.6 (01 Jan 2018 05:22), Max: 36.6 (31 Dec 2017 21:02)  T(F): 97.9 (01 Jan 2018 05:22), Max: 97.9 (31 Dec 2017 21:02)  HR: 86 (01 Jan 2018 08:16) (80 - 101)  BP: 146/65 (01 Jan 2018 05:22) (135/60 - 146/65)  BP(mean): --  RR: 19 (01 Jan 2018 05:22) (19 - 22)  SpO2: 92% (01 Jan 2018 08:16) (92% - 97%)    PHYSICAL EXAMINATION:    NECK:  Supple. No lymphadenopathy. Jugular venous pressure not elevated. Carotids equal.   HEART:   The cardiac impulse has a normal quality. Reg., Nl S1 and S2.    CHEST:  Decreased aud BS's. No wheezing Normal respiratory effort.  ABDOMEN:  Soft and nontender.   EXTREMITIES:  There is no edema.       LABS:                        11.0   25.8  )-----------( 755      ( 01 Jan 2018 07:49 )             38.3     12-31    141  |  105  |  37<H>  ----------------------------<  82  3.9   |  31  |  0.53    Ca    8.3<L>      31 Dec 2017 07:12            RADIOLOGY & ADDITIONAL TESTS:  Assessment and Plan:   · Assessment		  repeat CT scan shows significant improvement in multilobar pneumonia  LLL and RML infiltrates much smaller; L effussion resolved  Hematology noted.  to follow as outpatient w/ hematologist re MDS.  on prednisone 20 mg daily    dvt proph  Ambulate with O2    Problem/Plan - 1:  ·  Problem: Respiratory failure with hypoxia and hypercapnia.     Problem/Plan - 2:  ·  Problem: Multifocal pneumonia.     Problem/Plan - 3:  ·  Problem: HCAP (healthcare-associated pneumonia).     Problem/Plan - 4:  ·  Problem: MDS (myelodysplastic syndrome).     Problem/Plan - 5:  ·  Problem: Leukocytosis.     Problem/Plan - 6:  Problem: Influenza A.

## 2018-01-03 NOTE — PROGRESS NOTE ADULT - ATTENDING COMMENTS
Patient was seen and examined by me at bedside with the NP, Amy Cooper.  Plan of care was reviewed and Agree with the assessment and plan.

## 2018-01-03 NOTE — PROGRESS NOTE ADULT - SUBJECTIVE AND OBJECTIVE BOX
Patient is a 84y old  Female who presents with a chief complaint of SOB (24 Dec 2017 08:49)      HPI:  84 year old woman with PMHx of MDS, asthma, hypothyroidism, HTN who presents with worsening SOB.  Pt was recently discharged from [facility] for multiple rib fractures and PNA s/p 10 day course of levaquin, however still requiring oxygen.  Daughter noted pt to be hypoxic at home with sat  82%.  In ER: Pt Hypoxic, and placed on venti-mask. She was given Abx, and nebulizer therapy for presumed HCAP.    Patient completed IV antibiotic and changed to po antibotic and prednisone taper    Review of system- Rest of the review of system are normal except mentioned in HPI    Vital Signs Last 24 Hrs  T(C): 36.4 (03 Jan 2018 11:54), Max: 36.6 (03 Jan 2018 05:31)  T(F): 97.5 (03 Jan 2018 11:54), Max: 97.9 (03 Jan 2018 05:31)  HR: 92 (03 Jan 2018 11:54) (89 - 94)  BP: 126/58 (03 Jan 2018 11:54) (126/58 - 134/60)  BP(mean): --  RR: 17 (03 Jan 2018 11:54) (17 - 18)  SpO2: 97% (03 Jan 2018 11:54) (95% - 97%)    PHYSICAL EXAM:    Constitutional: NAD, on NC, awake and alert.  HEENT: PERR, EOMI, Normal Hearing, MMM  Neck: Soft and supple  Respiratory: rhonchi with faint exp wheezing again noted.  Cardiovascular: S1 and S2, regular rate and rhythm, no Murmurs, gallops or rubs  Gastrointestinal: Bowel Sounds present, soft, nontender, nondistended, no guarding, no rebound  Extremities: No peripheral edema  Neurological: A/O x 3, no focal deficits  Skin: No rashes    MEDICATIONS  (STANDING):  ALBUTerol/ipratropium for Nebulization 3 milliLiter(s) Nebulizer every 6 hours  amLODIPine   Tablet 10 milliGRAM(s) Oral daily  aspirin  chewable 81 milliGRAM(s) Oral daily  buDESOnide 160 MICROgram(s)/formoterol 4.5 MICROgram(s) Inhaler 2 Puff(s) Inhalation two times a day  cefepime  IVPB 2000 milliGRAM(s) IV Intermittent every 12 hours  enoxaparin Injectable 40 milliGRAM(s) SubCutaneous every 24 hours  ferrous    sulfate 325 milliGRAM(s) Oral daily  ferumoxytol IVPB 510 milliGRAM(s) IV Intermittent once  levothyroxine 100 MICROGram(s) Oral daily  methylPREDNISolone sodium succinate Injectable 40 milliGRAM(s) IV Push every 12 hours  montelukast 10 milliGRAM(s) Oral daily  oseltamivir 75 milliGRAM(s) Oral every 12 hours  vancomycin  IVPB 750 milliGRAM(s) IV Intermittent every 12 hours    MEDICATIONS  (PRN):  acetaminophen   Tablet 650 milliGRAM(s) Oral every 6 hours PRN For Temp greater than 38.5 C (101.3 F)  acetaminophen   Tablet. 650 milliGRAM(s) Oral every 6 hours PRN Moderate Pain (4 - 6)  guaiFENesin    Syrup 200 milliGRAM(s) Oral every 6 hours PRN Cough  ondansetron Injectable 4 milliGRAM(s) IV Push every 6 hours PRN Nausea and/or Vomiting    01-03    x   |  x   |  x   ----------------------------<  x   3.7   |  x   |  x     Phos  2.5     01-03  Mg     2.1     01-03    CBC Full  -  ( 03 Jan 2018 06:56 )  WBC Count : 18.6 K/uL  Hemoglobin : 9.9 g/dL  Hematocrit : 33.0 %  Platelet Count - Automated : 505 K/uL  Mean Cell Volume : 67.2 fl  Mean Cell Hemoglobin : 20.0 pg  Mean Cell Hemoglobin Concentration : 29.8 gm/dL  Auto Neutrophil # : x  Auto Lymphocyte # : x  Auto Monocyte # : x  Auto Eosinophil # : x  Auto Basophil # : x  Auto Neutrophil % : x  Auto Lymphocyte % : x  Auto Monocyte % : x  Auto Eosinophil % : x  Auto Basophil % : x          ABG - ( 01 Jan 2018 09:36 )  pH: 7.46  /  pCO2: 39    /  pO2: 58    / HCO3: 28    / Base Excess: 4.2   /  SaO2: 90          RADIOLOGY RESULTS:      ABG - ( 01 Jan 2018 09:36 )  pH: 7.46  /  pCO2: 39    /  pO2: 58    / HCO3: 28    / Base Excess: 4.2   /  SaO2: 90        RADIOLOGY RESULTS:    < from: Xray Chest 1 View AP/PA. (12.24.17 @ 02:42) >      Impression:Bibasilar infiltrates with small pleural effusions.    < end of copied text >    < from: CT Chest No Cont (12.24.17 @ 10:21) >    IMPRESSION:          Mild left pleural effusion. Trace to small rightpleural effusion.   Multifocal segmental consolidations in the right middle lobe and   bilateral lower lobes and subsegmental consolidation in the lingula and   medial left upper lobe with air bronchograms and associated   bronchiectasis may represent multifocal pneumonia versus atelectasis with   or without chronic scarring. Additional scattered groundglass opacities   and patchy nodularity may represent a multifocal infectious or   inflammatory etiology        < end of copied text >    < from: CT Chest No Cont (12.31.17 @ 09:02) >    IMPRESSION: improved bibasilar infiltrates and atelectasis with residual   in the RIGHT middle lobe, lingula and lung bases. Small BILATERAL pleural   effusions with underlying atelectasis are slightly improved.    < end of copied text > Patient is a 84y old  Female who presents with a chief complaint of SOB (24 Dec 2017 08:49)      HPI:  84 year old woman with PMHx of MDS, asthma, hypothyroidism, HTN who presents with worsening SOB.  Pt was recently discharged from [facility] for multiple rib fractures and PNA s/p 10 day course of levaquin, however still requiring oxygen.  Daughter noted pt to be hypoxic at home with sat  82%.  In ER: Pt Hypoxic, and placed on venti-mask. She was given Abx, and nebulizer therapy for presumed HCAP.    Patient completed IV antibiotic and changed to po antibotic and prednisone taper    1/3: No dyspnea or wheezing. Patient states that she can go home.     Review of system- Rest of the review of system are normal except mentioned in HPI    Vital Signs Last 24 Hrs  T(C): 36.4 (03 Jan 2018 11:54), Max: 36.6 (03 Jan 2018 05:31)  T(F): 97.5 (03 Jan 2018 11:54), Max: 97.9 (03 Jan 2018 05:31)  HR: 92 (03 Jan 2018 11:54) (89 - 94)  BP: 126/58 (03 Jan 2018 11:54) (126/58 - 134/60)  BP(mean): --  RR: 17 (03 Jan 2018 11:54) (17 - 18)  SpO2: 97% (03 Jan 2018 11:54) (95% - 97%)    PHYSICAL EXAM:    Constitutional: NAD, on NC, awake and alert.  HEENT: PERR, EOMI, Normal Hearing, MMM  Neck: Soft and supple  Respiratory: rhonchi with faint exp wheezing again noted.  Cardiovascular: S1 and S2, regular rate and rhythm, no Murmurs, gallops or rubs  Gastrointestinal: Bowel Sounds present, soft, nontender, nondistended, no guarding, no rebound  Extremities: No peripheral edema  Neurological: A/O x 3, no focal deficits  Skin: No rashes    MEDICATIONS  (STANDING):  ALBUTerol/ipratropium for Nebulization 3 milliLiter(s) Nebulizer every 6 hours  amLODIPine   Tablet 10 milliGRAM(s) Oral daily  aspirin  chewable 81 milliGRAM(s) Oral daily  buDESOnide 160 MICROgram(s)/formoterol 4.5 MICROgram(s) Inhaler 2 Puff(s) Inhalation two times a day  cefepime  IVPB 2000 milliGRAM(s) IV Intermittent every 12 hours  enoxaparin Injectable 40 milliGRAM(s) SubCutaneous every 24 hours  ferrous    sulfate 325 milliGRAM(s) Oral daily  ferumoxytol IVPB 510 milliGRAM(s) IV Intermittent once  levothyroxine 100 MICROGram(s) Oral daily  methylPREDNISolone sodium succinate Injectable 40 milliGRAM(s) IV Push every 12 hours  montelukast 10 milliGRAM(s) Oral daily  oseltamivir 75 milliGRAM(s) Oral every 12 hours  vancomycin  IVPB 750 milliGRAM(s) IV Intermittent every 12 hours    MEDICATIONS  (PRN):  acetaminophen   Tablet 650 milliGRAM(s) Oral every 6 hours PRN For Temp greater than 38.5 C (101.3 F)  acetaminophen   Tablet. 650 milliGRAM(s) Oral every 6 hours PRN Moderate Pain (4 - 6)  guaiFENesin    Syrup 200 milliGRAM(s) Oral every 6 hours PRN Cough  ondansetron Injectable 4 milliGRAM(s) IV Push every 6 hours PRN Nausea and/or Vomiting    Lab Results:  CBC  CBC Full  -  ( 03 Jan 2018 06:56 )  WBC Count : 18.6 K/uL  Hemoglobin : 9.9 g/dL  Hematocrit : 33.0 %  Platelet Count - Automated : 505 K/uL  Mean Cell Volume : 67.2 fl  Mean Cell Hemoglobin : 20.0 pg  Mean Cell Hemoglobin Concentration : 29.8 gm/dL  Auto Neutrophil # : x  Auto Lymphocyte # : x  Auto Monocyte # : x  Auto Eosinophil # : x  Auto Basophil # : x  Auto Neutrophil % : x  Auto Lymphocyte % : x  Auto Monocyte % : x  Auto Eosinophil % : x  Auto Basophil % : x    .		Differential:	[] Automated		[] Manual  Chemistry                        9.9    18.6  )-----------( 505      ( 03 Jan 2018 06:56 )             33.0     01-03    x   |  x   |  x   ----------------------------<  x   3.7   |  x   |  x     Phos  2.5     01-03  Mg     2.1     01-03      ABG - ( 01 Jan 2018 09:36 )  pH: 7.46  /  pCO2: 39    /  pO2: 58    / HCO3: 28    / Base Excess: 4.2   /  SaO2: 90        RADIOLOGY RESULTS:    < from: Xray Chest 1 View AP/PA. (12.24.17 @ 02:42) >      Impression:Bibasilar infiltrates with small pleural effusions.    < end of copied text >    < from: CT Chest No Cont (12.24.17 @ 10:21) >    IMPRESSION:          Mild left pleural effusion. Trace to small rightpleural effusion.   Multifocal segmental consolidations in the right middle lobe and   bilateral lower lobes and subsegmental consolidation in the lingula and   medial left upper lobe with air bronchograms and associated   bronchiectasis may represent multifocal pneumonia versus atelectasis with   or without chronic scarring. Additional scattered groundglass opacities   and patchy nodularity may represent a multifocal infectious or   inflammatory etiology        < end of copied text >    < from: CT Chest No Cont (12.31.17 @ 09:02) >    IMPRESSION: improved bibasilar infiltrates and atelectasis with residual   in the RIGHT middle lobe, lingula and lung bases. Small BILATERAL pleural   effusions with underlying atelectasis are slightly improved.    < end of copied text >

## 2018-01-03 NOTE — PROGRESS NOTE ADULT - PROVIDER SPECIALTY LIST ADULT
Heme/Onc
Hospitalist
Infectious Disease
Infectious Disease
Pulmonology
Hospitalist
Infectious Disease
Hospitalist
Pulmonology

## 2018-01-05 PROBLEM — S22.39XA FRACTURE OF ONE RIB, UNSPECIFIED SIDE, INITIAL ENCOUNTER FOR CLOSED FRACTURE: Chronic | Status: ACTIVE | Noted: 2017-12-24

## 2018-01-08 ENCOUNTER — APPOINTMENT (OUTPATIENT)
Dept: FAMILY MEDICINE | Facility: CLINIC | Age: 83
End: 2018-01-08
Payer: MEDICARE

## 2018-01-08 VITALS
OXYGEN SATURATION: 96 % | BODY MASS INDEX: 24.54 KG/M2 | SYSTOLIC BLOOD PRESSURE: 152 MMHG | HEART RATE: 88 BPM | HEIGHT: 60 IN | WEIGHT: 125 LBS | DIASTOLIC BLOOD PRESSURE: 84 MMHG | TEMPERATURE: 97.7 F

## 2018-01-08 PROCEDURE — 99214 OFFICE O/P EST MOD 30 MIN: CPT

## 2018-01-08 RX ORDER — CONJUGATED ESTROGENS 0.62 MG/G
0.62 CREAM VAGINAL
Qty: 3 | Refills: 1 | Status: DISCONTINUED | COMMUNITY
Start: 2017-08-04 | End: 2018-01-08

## 2018-01-08 RX ORDER — TRAMADOL HYDROCHLORIDE 50 MG/1
50 TABLET, COATED ORAL
Qty: 60 | Refills: 3 | Status: DISCONTINUED | COMMUNITY
Start: 2017-11-08 | End: 2018-01-08

## 2018-01-09 ENCOUNTER — RX RENEWAL (OUTPATIENT)
Age: 83
End: 2018-01-09

## 2018-01-09 DIAGNOSIS — J96.01 ACUTE RESPIRATORY FAILURE WITH HYPOXIA: ICD-10-CM

## 2018-01-09 DIAGNOSIS — D46.9 MYELODYSPLASTIC SYNDROME, UNSPECIFIED: ICD-10-CM

## 2018-01-09 DIAGNOSIS — J45.901 UNSPECIFIED ASTHMA WITH (ACUTE) EXACERBATION: ICD-10-CM

## 2018-01-09 DIAGNOSIS — A41.9 SEPSIS, UNSPECIFIED ORGANISM: ICD-10-CM

## 2018-01-09 DIAGNOSIS — I25.10 ATHEROSCLEROTIC HEART DISEASE OF NATIVE CORONARY ARTERY WITHOUT ANGINA PECTORIS: ICD-10-CM

## 2018-01-09 DIAGNOSIS — R65.20 SEVERE SEPSIS WITHOUT SEPTIC SHOCK: ICD-10-CM

## 2018-01-09 DIAGNOSIS — J15.6 PNEUMONIA DUE TO OTHER GRAM-NEGATIVE BACTERIA: ICD-10-CM

## 2018-01-09 DIAGNOSIS — J10.08 INFLUENZA DUE TO OTHER IDENTIFIED INFLUENZA VIRUS WITH OTHER SPECIFIED PNEUMONIA: ICD-10-CM

## 2018-01-09 DIAGNOSIS — E03.9 HYPOTHYROIDISM, UNSPECIFIED: ICD-10-CM

## 2018-01-09 DIAGNOSIS — Z66 DO NOT RESUSCITATE: ICD-10-CM

## 2018-01-09 DIAGNOSIS — I10 ESSENTIAL (PRIMARY) HYPERTENSION: ICD-10-CM

## 2018-01-09 DIAGNOSIS — J18.9 PNEUMONIA, UNSPECIFIED ORGANISM: ICD-10-CM

## 2018-01-14 ENCOUNTER — RX RENEWAL (OUTPATIENT)
Age: 83
End: 2018-01-14

## 2018-01-16 ENCOUNTER — RX RENEWAL (OUTPATIENT)
Age: 83
End: 2018-01-16

## 2018-01-17 ENCOUNTER — APPOINTMENT (OUTPATIENT)
Dept: INTERNAL MEDICINE | Facility: CLINIC | Age: 83
End: 2018-01-17
Payer: MEDICARE

## 2018-01-17 VITALS
WEIGHT: 123 LBS | HEIGHT: 60 IN | DIASTOLIC BLOOD PRESSURE: 60 MMHG | TEMPERATURE: 97.9 F | SYSTOLIC BLOOD PRESSURE: 124 MMHG | BODY MASS INDEX: 24.15 KG/M2 | HEART RATE: 100 BPM | RESPIRATION RATE: 24 BRPM | OXYGEN SATURATION: 95 %

## 2018-01-17 PROCEDURE — 94060 EVALUATION OF WHEEZING: CPT

## 2018-01-17 PROCEDURE — 94727 GAS DIL/WSHOT DETER LNG VOL: CPT

## 2018-01-17 PROCEDURE — 94729 DIFFUSING CAPACITY: CPT

## 2018-01-17 PROCEDURE — 99205 OFFICE O/P NEW HI 60 MIN: CPT | Mod: 25

## 2018-01-17 RX ORDER — FLUTICASONE FUROATE AND VILANTEROL TRIFENATATE 200; 25 UG/1; UG/1
200-25 POWDER RESPIRATORY (INHALATION) DAILY
Refills: 0 | Status: DISCONTINUED | COMMUNITY
End: 2018-01-17

## 2018-01-20 ENCOUNTER — EMERGENCY (EMERGENCY)
Facility: HOSPITAL | Age: 83
LOS: 0 days | Discharge: HOME | End: 2018-01-21
Attending: EMERGENCY MEDICINE | Admitting: EMERGENCY MEDICINE
Payer: MEDICARE

## 2018-01-20 VITALS
DIASTOLIC BLOOD PRESSURE: 70 MMHG | TEMPERATURE: 98 F | HEART RATE: 90 BPM | RESPIRATION RATE: 20 BRPM | OXYGEN SATURATION: 95 % | SYSTOLIC BLOOD PRESSURE: 145 MMHG

## 2018-01-20 VITALS — WEIGHT: 123.02 LBS

## 2018-01-20 DIAGNOSIS — J45.909 UNSPECIFIED ASTHMA, UNCOMPLICATED: ICD-10-CM

## 2018-01-20 DIAGNOSIS — R04.0 EPISTAXIS: ICD-10-CM

## 2018-01-20 DIAGNOSIS — D46.9 MYELODYSPLASTIC SYNDROME, UNSPECIFIED: ICD-10-CM

## 2018-01-20 DIAGNOSIS — I10 ESSENTIAL (PRIMARY) HYPERTENSION: ICD-10-CM

## 2018-01-20 PROCEDURE — 30903 CONTROL OF NOSEBLEED: CPT

## 2018-01-20 PROCEDURE — 99284 EMERGENCY DEPT VISIT MOD MDM: CPT | Mod: 25

## 2018-01-20 RX ORDER — PHENYLEPHRINE HCL 0.25 %
1 AEROSOL, SPRAY WITH PUMP (ML) NASAL ONCE
Qty: 0 | Refills: 0 | Status: COMPLETED | OUTPATIENT
Start: 2018-01-20 | End: 2018-01-20

## 2018-01-20 RX ADMIN — Medication 1 SPRAY(S): at 23:48

## 2018-01-20 NOTE — ED STATDOCS - ATTENDING CONTRIBUTION TO CARE
Attending Contribution to Care: I, Ofe Maravilla, performed the initial face to face bedside interview with this patient regarding history of present illness, review of symptoms and relevant past medical, social and family history.  I completed an independent physical examination.  I was the initial provider who evaluated this patient. I have signed out the follow up of any pending tests (i.e. labs, radiological studies) to the resident physician.  I have communicated the patient’s plan of care and disposition with the resident physician.

## 2018-01-20 NOTE — ED STATDOCS - OBJECTIVE STATEMENT
86 y/o female presents to the ED c/o epistaxis. Per daughter pt states epistaxis started on Tuesday night, Wednesday night epistaxis reoccurred and took 40 mins to stop, Friday went to ENT pulled blot clots, cauterized in blood nostrils. Pt was fine yesterday. Today pt was just getting up to go to dinner. Daughter put some Afrin saturated in cotton balls, pressure, ice pack. Left nostril more than the right.  Pt was on home oxygen nasal canula, ulcer. Denies dizziness. ENT  in [place]

## 2018-01-20 NOTE — ED STATDOCS - PMH
Asthma, unspecified asthma severity, unspecified whether complicated, unspecified whether persistent    Essential hypertension    Hypothyroidism, unspecified type    MDS (myelodysplastic syndrome)    Rib fractures

## 2018-01-20 NOTE — ED STATDOCS - MEDICAL DECISION MAKING DETAILS
Chikis, observation, ellen underwood for left nare. Neosynephrine, observation, rhino rocket for left nare if bleeding continues vs. ENT consult for persistent bleeding despite cautery yesterday.

## 2018-01-20 NOTE — ED STATDOCS - PLAN OF CARE
Follow up with your ENT doctor within 48-72 hours.   You must return for new, worsening or concerning symptoms; specifically including those listed on the attached sheet.   Keep packing in place and take 1 augmentin pill in the morning and night.   Return for severe bleeding  Follow the general instructions provided by your ENT doctor regarding care of your cauterized septum

## 2018-01-20 NOTE — ED STATDOCS - PROGRESS NOTE DETAILS
Lobo PGY3: s/p bilateral cautery for epistaxis 1 day ago. extensive clot in left naris, discussed case with on call ENT over the phone. He reccomended NOT cauterizing again today in ER. Rhino rocket placed. Large clot in back of oropharynx partially cleared with suction. Reassured that clots like this usually dissolve. Patient protecting airway well and aaox3. will Observe for 1 hour for rebleed. send home on augmentin with prompt follow up on monday AM

## 2018-01-20 NOTE — ED STATDOCS - ENMT, MLM
+slow trickling of right dark blood in the right anterior nares, no pulsatile blood, no tenderness of the nose. Nasal mucosa clear.  Mouth with normal mucosa  Throat has no vesicles, no oropharyngeal exudates and uvula is midline.

## 2018-01-20 NOTE — ED STATDOCS - CARE PLAN
Assessment and plan of treatment:	Follow up with your ENT doctor within 48-72 hours.   You must return for new, worsening or concerning symptoms; specifically including those listed on the attached sheet.   Keep packing in place and take 1 augmentin pill in the morning and night.   Return for severe bleeding  Follow the general instructions provided by your ENT doctor regarding care of your cauterized septum Principal Discharge DX:	Epistaxis, recurrent  Assessment and plan of treatment:	Follow up with your ENT doctor within 48-72 hours.   You must return for new, worsening or concerning symptoms; specifically including those listed on the attached sheet.   Keep packing in place and take 1 augmentin pill in the morning and night.   Return for severe bleeding  Follow the general instructions provided by your ENT doctor regarding care of your cauterized septum

## 2018-01-20 NOTE — ED ADULT NURSE NOTE - OBJECTIVE STATEMENT
Pt with bloody nose since 5:30 tonight - pt qs cauterized b/l nares yesterday - slow trickle of blood this evening, worse on left.  Daughter attempted cotton ball with afrin and ice packs, nose still bleeding.

## 2018-01-21 RX ADMIN — Medication 1 TABLET(S): at 00:41

## 2018-01-21 NOTE — ED PROCEDURE NOTE - PROCEDURE ADDITIONAL DETAILS
S/p bilateral nasal cautery yesterday. bleeding persistent in left naris. rhino rocket placed as cautery was contraindicated.

## 2018-01-21 NOTE — ED PROCEDURE NOTE - ATTENDING CONTRIBUTION TO CARE
Attending Contribution to Care: I, Ofe Maravilla, performed the initial face to face bedside interview with this patient regarding history of present illness, review of symptoms and relevant past medical, social and family history.  I completed an independent physical examination.  I was the initial provider who evaluated this patient. I have signed out the follow up of any pending tests (i.e. labs, radiological studies) to the resident.  I have communicated the patient’s plan of care and disposition with the resident.

## 2018-01-25 ENCOUNTER — APPOINTMENT (OUTPATIENT)
Dept: FAMILY MEDICINE | Facility: CLINIC | Age: 83
End: 2018-01-25

## 2018-01-31 ENCOUNTER — APPOINTMENT (OUTPATIENT)
Dept: INTERNAL MEDICINE | Facility: CLINIC | Age: 83
End: 2018-01-31
Payer: MEDICARE

## 2018-01-31 ENCOUNTER — NON-APPOINTMENT (OUTPATIENT)
Age: 83
End: 2018-01-31

## 2018-01-31 PROCEDURE — 94060 EVALUATION OF WHEEZING: CPT | Mod: 59

## 2018-01-31 PROCEDURE — 94621 CARDIOPULM EXERCISE TESTING: CPT

## 2018-02-14 ENCOUNTER — APPOINTMENT (OUTPATIENT)
Dept: FAMILY MEDICINE | Facility: CLINIC | Age: 83
End: 2018-02-14
Payer: MEDICARE

## 2018-02-14 VITALS
WEIGHT: 125.13 LBS | BODY MASS INDEX: 24.57 KG/M2 | DIASTOLIC BLOOD PRESSURE: 82 MMHG | SYSTOLIC BLOOD PRESSURE: 150 MMHG | OXYGEN SATURATION: 97 % | TEMPERATURE: 97.9 F | HEIGHT: 60 IN | HEART RATE: 103 BPM

## 2018-02-14 DIAGNOSIS — Z60.2 PROBLEMS RELATED TO LIVING ALONE: ICD-10-CM

## 2018-02-14 PROCEDURE — 96372 THER/PROPH/DIAG INJ SC/IM: CPT

## 2018-02-14 PROCEDURE — 99213 OFFICE O/P EST LOW 20 MIN: CPT | Mod: 25

## 2018-02-14 RX ORDER — DENOSUMAB 60 MG/ML
60 INJECTION SUBCUTANEOUS
Qty: 0 | Refills: 0 | Status: COMPLETED | OUTPATIENT
Start: 2018-02-14

## 2018-02-14 RX ADMIN — DENOSUMAB 0 MG/ML: 60 INJECTION SUBCUTANEOUS at 00:00

## 2018-02-14 SDOH — SOCIAL STABILITY - SOCIAL INSECURITY: PROBLEMS RELATED TO LIVING ALONE: Z60.2

## 2018-02-16 PROBLEM — Z60.2 LIVING ALONE: Status: ACTIVE | Noted: 2018-02-16

## 2018-02-25 ENCOUNTER — FORM ENCOUNTER (OUTPATIENT)
Age: 83
End: 2018-02-25

## 2018-02-26 ENCOUNTER — APPOINTMENT (OUTPATIENT)
Dept: CT IMAGING | Facility: CLINIC | Age: 83
End: 2018-02-26
Payer: MEDICARE

## 2018-02-26 ENCOUNTER — OUTPATIENT (OUTPATIENT)
Dept: OUTPATIENT SERVICES | Facility: HOSPITAL | Age: 83
LOS: 1 days | End: 2018-02-26
Payer: MEDICARE

## 2018-02-26 DIAGNOSIS — Z00.8 ENCOUNTER FOR OTHER GENERAL EXAMINATION: ICD-10-CM

## 2018-02-26 PROCEDURE — 71250 CT THORAX DX C-: CPT | Mod: 26

## 2018-02-26 PROCEDURE — 71250 CT THORAX DX C-: CPT

## 2018-03-05 ENCOUNTER — APPOINTMENT (OUTPATIENT)
Dept: INTERNAL MEDICINE | Facility: CLINIC | Age: 83
End: 2018-03-05
Payer: MEDICARE

## 2018-03-05 VITALS
TEMPERATURE: 97.8 F | SYSTOLIC BLOOD PRESSURE: 122 MMHG | HEIGHT: 60 IN | WEIGHT: 121 LBS | HEART RATE: 96 BPM | BODY MASS INDEX: 23.75 KG/M2 | RESPIRATION RATE: 20 BRPM | DIASTOLIC BLOOD PRESSURE: 72 MMHG | OXYGEN SATURATION: 96 %

## 2018-03-05 PROCEDURE — 99214 OFFICE O/P EST MOD 30 MIN: CPT | Mod: 25

## 2018-03-05 PROCEDURE — 94060 EVALUATION OF WHEEZING: CPT

## 2018-03-05 RX ORDER — LIDOCAINE 50 MG/G
5 PATCH CUTANEOUS
Qty: 30 | Refills: 5 | Status: DISCONTINUED | COMMUNITY
End: 2018-03-05

## 2018-03-05 RX ORDER — SOFT LENS DISINFECTANT
SOLUTION, NON-ORAL MISCELLANEOUS
Qty: 1 | Refills: 0 | Status: ACTIVE | OUTPATIENT
Start: 2018-03-05

## 2018-03-15 ENCOUNTER — LABORATORY RESULT (OUTPATIENT)
Age: 83
End: 2018-03-15

## 2018-03-15 ENCOUNTER — APPOINTMENT (OUTPATIENT)
Dept: FAMILY MEDICINE | Facility: CLINIC | Age: 83
End: 2018-03-15
Payer: MEDICARE

## 2018-03-15 VITALS
SYSTOLIC BLOOD PRESSURE: 130 MMHG | BODY MASS INDEX: 24.74 KG/M2 | TEMPERATURE: 98.4 F | DIASTOLIC BLOOD PRESSURE: 80 MMHG | HEIGHT: 60 IN | HEART RATE: 104 BPM | OXYGEN SATURATION: 96 % | WEIGHT: 126 LBS

## 2018-03-15 LAB
BILIRUB UR QL STRIP: NORMAL
CLARITY UR: CLEAR
COLLECTION METHOD: NORMAL
GLUCOSE UR-MCNC: NEGATIVE
HCG UR QL: 1 EU/DL
HGB UR QL STRIP.AUTO: NORMAL
KETONES UR-MCNC: NORMAL
LEUKOCYTE ESTERASE UR QL STRIP: NORMAL
NITRITE UR QL STRIP: POSITIVE
PH UR STRIP: 8.5
PROT UR STRIP-MCNC: NORMAL
SP GR UR STRIP: 1.02

## 2018-03-15 PROCEDURE — 99214 OFFICE O/P EST MOD 30 MIN: CPT | Mod: 25

## 2018-03-15 PROCEDURE — 81003 URINALYSIS AUTO W/O SCOPE: CPT | Mod: QW

## 2018-03-15 RX ORDER — FLUTICASONE FUROATE AND VILANTEROL TRIFENATATE 200; 25 UG/1; UG/1
200-25 POWDER RESPIRATORY (INHALATION) DAILY
Qty: 1 | Refills: 11 | Status: DISCONTINUED | COMMUNITY
Start: 2018-01-17 | End: 2018-03-15

## 2018-03-16 LAB
APPEARANCE: ABNORMAL
BILIRUBIN URINE: NEGATIVE
BLOOD URINE: NEGATIVE
COLOR: ABNORMAL
GLUCOSE QUALITATIVE U: NEGATIVE MG/DL
KETONES URINE: ABNORMAL
LEUKOCYTE ESTERASE URINE: ABNORMAL
NITRITE URINE: POSITIVE
PH URINE: >8.5
PROTEIN URINE: >300 MG/DL
SPECIFIC GRAVITY URINE: 1.02
UROBILINOGEN URINE: NEGATIVE MG/DL

## 2018-05-31 ENCOUNTER — RX RENEWAL (OUTPATIENT)
Age: 83
End: 2018-05-31

## 2018-06-05 ENCOUNTER — RX RENEWAL (OUTPATIENT)
Age: 83
End: 2018-06-05

## 2018-06-27 ENCOUNTER — RX RENEWAL (OUTPATIENT)
Age: 83
End: 2018-06-27

## 2018-07-02 ENCOUNTER — NON-APPOINTMENT (OUTPATIENT)
Age: 83
End: 2018-07-02

## 2018-07-02 ENCOUNTER — APPOINTMENT (OUTPATIENT)
Dept: INTERNAL MEDICINE | Facility: CLINIC | Age: 83
End: 2018-07-02
Payer: MEDICARE

## 2018-07-02 VITALS
DIASTOLIC BLOOD PRESSURE: 70 MMHG | HEIGHT: 60 IN | BODY MASS INDEX: 23.56 KG/M2 | OXYGEN SATURATION: 95 % | SYSTOLIC BLOOD PRESSURE: 138 MMHG | TEMPERATURE: 98.1 F | WEIGHT: 120 LBS | HEART RATE: 105 BPM | RESPIRATION RATE: 18 BRPM

## 2018-07-02 PROCEDURE — 94060 EVALUATION OF WHEEZING: CPT

## 2018-07-02 PROCEDURE — 99214 OFFICE O/P EST MOD 30 MIN: CPT | Mod: 25

## 2018-07-02 NOTE — PLAN
[FreeTextEntry1] : 1. Continue with medication as outlined above.\par \par 2. We'll now increase his Mucinex to 2 tablets b.i.d.\par \par 3. I have suggested a course of chest physical therapy, if there is someone in the home that can help her. The patient does live alone. This may help to mobilize some of her secretions given her history of bronchiectasis.\par \par 4. Routine medical followup with her primary care physician.\par \par 5. Follow up with myself in 6 months with full pulmonary function testing. If she has increasing symptoms, or if her flow rates diminish, will consider the addition of Spiriva to the regimen at that time. She is refusing at this time to it any more medications.\par \par 6. Proventil on a p.r.n. basis.

## 2018-07-02 NOTE — DATA REVIEWED
[FreeTextEntry1] : Spirometric analysis reveals a significant degree of obstruction. Minimal bronchodilator activity is demonstrated. When compared to the prior study, the flow rates are somewhat diminished.

## 2018-07-02 NOTE — PHYSICAL EXAM
[No Acute Distress] : no acute distress [Well Nourished] : well nourished [Well Developed] : well developed [Well-Appearing] : well-appearing [Normal Outer Ear/Nose] : the outer ears and nose were normal in appearance [Normal Oropharynx] : the oropharynx was normal [No JVD] : no jugular venous distention [Supple] : supple [No Lymphadenopathy] : no lymphadenopathy [No Respiratory Distress] : no respiratory distress  [Clear to Auscultation] : lungs were clear to auscultation bilaterally [No Accessory Muscle Use] : no accessory muscle use [Normal Rate] : normal rate  [Regular Rhythm] : with a regular rhythm [Normal S1, S2] : normal S1 and S2 [No Carotid Bruits] : no carotid bruits [No Edema] : there was no peripheral edema [No Extremity Clubbing/Cyanosis] : no extremity clubbing/cyanosis [Soft] : abdomen soft [No Masses] : no abdominal mass palpated [Normal Bowel Sounds] : normal bowel sounds [Normal Posterior Cervical Nodes] : no posterior cervical lymphadenopathy [Normal Anterior Cervical Nodes] : no anterior cervical lymphadenopathy [No CVA Tenderness] : no CVA  tenderness [No Joint Swelling] : no joint swelling [Grossly Normal Strength/Tone] : grossly normal strength/tone [No Rash] : no rash [Deep Tendon Reflexes (DTR)] : deep tendon reflexes were 2+ and symmetric [Normal Affect] : the affect was normal [Normal Insight/Judgement] : insight and judgment were intact [de-identified] : Breath sounds are mildly diminished [de-identified] : There is a 1/6 systolic ejection murmur at the left sternal border

## 2018-07-02 NOTE — HISTORY OF PRESENT ILLNESS
[de-identified] : She comes in today for a followup evaluation, and reassessment of her pulmonary status.\par \par At this time, she states that she is fairly stable. At the time of the last visit, we switched her over from the Breo metered dose inhaler, O2 and nebulizer. He felt that the efficacy with the use of the nebulizer would be better. She states that she has noted some improvement overall her pulmonary status. She does, however, occasionally wheeze. She feels stable at this time. She does have shortness of breath with exertional type activities. She is unable to produce any sputum. She has not had any significant cough as of late.\par \par With regards to underlying allergies, she is asymptomatic. She does have a history of bronchiectasis, for which she is using Mucinex. She is not able to produce any sputum. She does not perform any formal type of exercise, but she does remain active.\par \par The patient states that she was recently started on hydroxyurea do to thrombocytosis. She does have a history of myelodysplastic syndrome. She is being followed at [place] for this process. She now comes in for this assessment.

## 2018-07-24 ENCOUNTER — RX RENEWAL (OUTPATIENT)
Age: 83
End: 2018-07-24

## 2018-07-29 ENCOUNTER — RX RENEWAL (OUTPATIENT)
Age: 83
End: 2018-07-29

## 2018-08-08 ENCOUNTER — APPOINTMENT (OUTPATIENT)
Dept: FAMILY MEDICINE | Facility: CLINIC | Age: 83
End: 2018-08-08
Payer: MEDICARE

## 2018-08-08 VITALS
OXYGEN SATURATION: 92 % | HEART RATE: 91 BPM | BODY MASS INDEX: 23.63 KG/M2 | DIASTOLIC BLOOD PRESSURE: 76 MMHG | SYSTOLIC BLOOD PRESSURE: 140 MMHG | HEIGHT: 60 IN | WEIGHT: 120.38 LBS

## 2018-08-08 PROBLEM — E03.9 HYPOTHYROIDISM, UNSPECIFIED: Chronic | Status: ACTIVE | Noted: 2017-12-24

## 2018-08-08 PROBLEM — J45.909 UNSPECIFIED ASTHMA, UNCOMPLICATED: Chronic | Status: ACTIVE | Noted: 2017-12-24

## 2018-08-08 PROBLEM — I10 ESSENTIAL (PRIMARY) HYPERTENSION: Chronic | Status: ACTIVE | Noted: 2017-12-24

## 2018-08-08 PROCEDURE — 96372 THER/PROPH/DIAG INJ SC/IM: CPT

## 2018-08-08 PROCEDURE — 99212 OFFICE O/P EST SF 10 MIN: CPT | Mod: 25

## 2018-08-08 RX ADMIN — DENOSUMAB 0 MG/ML: 60 INJECTION SUBCUTANEOUS at 00:00

## 2018-08-08 NOTE — PROCEDURE
[FreeTextEntry1] : Poorly subcutaneous [FreeTextEntry2] : Osteoporosis [FreeTextEntry3] : Injection  right deltoid

## 2018-08-09 ENCOUNTER — MED ADMIN CHARGE (OUTPATIENT)
Age: 83
End: 2018-08-09

## 2018-08-09 RX ORDER — DENOSUMAB 60 MG/ML
60 INJECTION SUBCUTANEOUS
Qty: 0 | Refills: 0 | Status: COMPLETED | OUTPATIENT
Start: 2018-08-09

## 2018-08-09 RX ORDER — DENOSUMAB 60 MG/ML
60 INJECTION SUBCUTANEOUS
Qty: 0 | Refills: 0 | Status: COMPLETED | OUTPATIENT
Start: 2018-08-08

## 2018-08-09 RX ADMIN — DENOSUMAB 0 MG/ML: 60 INJECTION SUBCUTANEOUS at 00:00

## 2018-08-20 ENCOUNTER — MEDICATION RENEWAL (OUTPATIENT)
Age: 83
End: 2018-08-20

## 2018-08-21 ENCOUNTER — RX RENEWAL (OUTPATIENT)
Age: 83
End: 2018-08-21

## 2018-08-27 ENCOUNTER — RX RENEWAL (OUTPATIENT)
Age: 83
End: 2018-08-27

## 2018-08-30 ENCOUNTER — RX RENEWAL (OUTPATIENT)
Age: 83
End: 2018-08-30

## 2018-09-28 ENCOUNTER — RX RENEWAL (OUTPATIENT)
Age: 83
End: 2018-09-28

## 2018-10-04 ENCOUNTER — RX RENEWAL (OUTPATIENT)
Age: 83
End: 2018-10-04

## 2018-10-12 ENCOUNTER — LABORATORY RESULT (OUTPATIENT)
Age: 83
End: 2018-10-12

## 2018-10-12 ENCOUNTER — APPOINTMENT (OUTPATIENT)
Dept: FAMILY MEDICINE | Facility: CLINIC | Age: 83
End: 2018-10-12
Payer: MEDICARE

## 2018-10-12 VITALS
SYSTOLIC BLOOD PRESSURE: 130 MMHG | HEART RATE: 83 BPM | HEIGHT: 60 IN | WEIGHT: 124 LBS | OXYGEN SATURATION: 94 % | RESPIRATION RATE: 16 BRPM | TEMPERATURE: 98.3 F | DIASTOLIC BLOOD PRESSURE: 60 MMHG | BODY MASS INDEX: 24.35 KG/M2

## 2018-10-12 PROCEDURE — 99214 OFFICE O/P EST MOD 30 MIN: CPT | Mod: 25

## 2018-10-12 PROCEDURE — G0008: CPT

## 2018-10-12 PROCEDURE — 90662 IIV NO PRSV INCREASED AG IM: CPT

## 2018-10-12 PROCEDURE — 36415 COLL VENOUS BLD VENIPUNCTURE: CPT

## 2018-10-12 RX ORDER — SULFAMETHOXAZOLE AND TRIMETHOPRIM 800; 160 MG/1; MG/1
800-160 TABLET ORAL TWICE DAILY
Qty: 10 | Refills: 3 | Status: DISCONTINUED | COMMUNITY
Start: 2018-03-15 | End: 2018-10-12

## 2018-10-12 NOTE — REVIEW OF SYSTEMS
[Shortness Of Breath] : no shortness of breath [Cough] : cough [Dyspnea on Exertion] : dyspnea on exertion [Joint Pain] : joint pain [Negative] : Integumentary [FreeTextEntry9] : Bilateral shoulder pain, right worse then left

## 2018-10-12 NOTE — HISTORY OF PRESENT ILLNESS
[de-identified] : COPD with bronchiectasis. Breathing is stable. Being followed by pulmonary.\par \par Myelodysplasia with thrombocytosis. Blood has stabilized on hydroxyurea followed by hematology.\par \par Hypothyroid currently on thyroid replacement needs check.\par \par Bilateral shoulder arthritis. Home physical therapy. Discussed. Patient refuses cortisone injection\par \par Patient lives alone niece, comes to visit daily. She is not depressed

## 2018-10-12 NOTE — PHYSICAL EXAM
[No Acute Distress] : no acute distress [No Lymphadenopathy] : no lymphadenopathy [Thyroid Normal, No Nodules] : the thyroid was normal and there were no nodules present [Regular Rhythm] : with a regular rhythm [No Murmur] : no murmur heard [No Edema] : there was no peripheral edema [de-identified] : Scattered rhonchi

## 2018-10-13 LAB — TSH SERPL-ACNC: 4.85 UIU/ML

## 2018-11-13 ENCOUNTER — RX RENEWAL (OUTPATIENT)
Age: 83
End: 2018-11-13

## 2018-11-13 NOTE — ED ADULT TRIAGE NOTE - CHIEF COMPLAINT QUOTE
How Severe Is Your Skin Lesion?: moderate Patient's daughter states that the patient was hypoxic at home with increasing lethargy. Have Your Skin Lesions Been Treated?: not been treated Is This A New Presentation, Or A Follow-Up?: Skin Lesions Which Family Member (Optional)?: Daughter

## 2018-12-20 ENCOUNTER — RX RENEWAL (OUTPATIENT)
Age: 83
End: 2018-12-20

## 2019-01-07 ENCOUNTER — APPOINTMENT (OUTPATIENT)
Dept: FAMILY MEDICINE | Facility: CLINIC | Age: 84
End: 2019-01-07
Payer: MEDICARE

## 2019-01-07 VITALS
HEIGHT: 60 IN | DIASTOLIC BLOOD PRESSURE: 70 MMHG | SYSTOLIC BLOOD PRESSURE: 138 MMHG | BODY MASS INDEX: 24.44 KG/M2 | HEART RATE: 98 BPM | WEIGHT: 124.5 LBS | OXYGEN SATURATION: 97 % | RESPIRATION RATE: 16 BRPM

## 2019-01-07 DIAGNOSIS — F19.90 OTHER PSYCHOACTIVE SUBSTANCE USE, UNSPECIFIED, UNCOMPLICATED: ICD-10-CM

## 2019-01-07 DIAGNOSIS — S22.42XA MULTIPLE FRACTURES OF RIBS, LEFT SIDE, INITIAL ENCOUNTER FOR CLOSED FRACTURE: ICD-10-CM

## 2019-01-07 DIAGNOSIS — Z87.39 PERSONAL HISTORY OF OTHER DISEASES OF THE MUSCULOSKELETAL SYSTEM AND CONNECTIVE TISSUE: ICD-10-CM

## 2019-01-07 DIAGNOSIS — J18.9 PNEUMONIA, UNSPECIFIED ORGANISM: ICD-10-CM

## 2019-01-07 DIAGNOSIS — H61.23 IMPACTED CERUMEN, BILATERAL: ICD-10-CM

## 2019-01-07 DIAGNOSIS — Z87.01 PERSONAL HISTORY OF PNEUMONIA (RECURRENT): ICD-10-CM

## 2019-01-07 DIAGNOSIS — J90 PLEURAL EFFUSION, NOT ELSEWHERE CLASSIFIED: ICD-10-CM

## 2019-01-07 DIAGNOSIS — H26.9 UNSPECIFIED CATARACT: ICD-10-CM

## 2019-01-07 DIAGNOSIS — Z87.2 PERSONAL HISTORY OF DISEASES OF THE SKIN AND SUBCUTANEOUS TISSUE: ICD-10-CM

## 2019-01-07 DIAGNOSIS — Z87.09 PERSONAL HISTORY OF OTHER DISEASES OF THE RESPIRATORY SYSTEM: ICD-10-CM

## 2019-01-07 PROCEDURE — 99214 OFFICE O/P EST MOD 30 MIN: CPT

## 2019-01-07 NOTE — HISTORY OF PRESENT ILLNESS
[No Pertinent Cardiac History] : no history of aortic stenosis, atrial fibrillation, coronary artery disease, recent myocardial infarction, or implantable device/pacemaker [COPD] : COPD [No Adverse Anesthesia Reaction] : no adverse anesthesia reaction in self or family member [Chronic Anticoagulation] : no chronic anticoagulation [Chronic Kidney Disease] : no chronic kidney disease [Diabetes] : no diabetes [(Patient denies any chest pain, claudication, dyspnea on exertion, orthopnea, palpitations or syncope)] : Patient denies any chest pain, claudication, dyspnea on exertion, orthopnea, palpitations or syncope [FreeTextEntry1] : Bilateral intraocular lens replacement [FreeTextEntry2] : 1/10 [FreeTextEntry3] : Mima [FreeTextEntry4] : Poor vision secondary to cataract.\par \par Patient uses inhalers regularly, and dyspnea is well controlled patient has myelodysplasia, on hydroxyurea. Blood work performed today at hematologist shows white blood cell of 4000, hemoglobin 11.7, platelets 179,000

## 2019-01-07 NOTE — PHYSICAL EXAM
Patient has been seeing Dr. Zakiya Washington to get her toe nails cut but they are no longer accepting Medicare. Can we recommend someone else please. [No Acute Distress] : no acute distress [Regular Rhythm] : with a regular rhythm [de-identified] : rare crackles good breath sounds

## 2019-01-15 ENCOUNTER — APPOINTMENT (OUTPATIENT)
Dept: INTERNAL MEDICINE | Facility: CLINIC | Age: 84
End: 2019-01-15
Payer: MEDICARE

## 2019-01-15 VITALS
WEIGHT: 124 LBS | DIASTOLIC BLOOD PRESSURE: 72 MMHG | HEIGHT: 60 IN | TEMPERATURE: 97.9 F | BODY MASS INDEX: 24.35 KG/M2 | HEART RATE: 86 BPM | OXYGEN SATURATION: 96 % | RESPIRATION RATE: 16 BRPM | SYSTOLIC BLOOD PRESSURE: 142 MMHG

## 2019-01-15 PROCEDURE — 94060 EVALUATION OF WHEEZING: CPT

## 2019-01-15 PROCEDURE — ZZZZZ: CPT

## 2019-01-15 PROCEDURE — 94727 GAS DIL/WSHOT DETER LNG VOL: CPT

## 2019-01-15 PROCEDURE — 99214 OFFICE O/P EST MOD 30 MIN: CPT | Mod: 25

## 2019-01-15 PROCEDURE — 94729 DIFFUSING CAPACITY: CPT

## 2019-01-15 NOTE — PLAN
[FreeTextEntry1] : Continue current medications as prescribed.\par Patient now is willing to add Spiriva HandiHaler 18 MCG once daily.\par Encouraged to increase activity at home such as walking.\par Medical follow up with PCP.\par 6 month follow up with CAMERON OWEN\par Call office if questions or problems in interim.

## 2019-01-15 NOTE — HISTORY OF PRESENT ILLNESS
[Family Member] : family member [FreeTextEntry1] : Bronchiectasis\par Moderate persistent asthma [de-identified] : Patient presents for interval visit to reassess pulmonary function.  She is accompanied by her daughter. \par Patient reports she is stable from a pulmonary standpoint, gets out of breath with exertional activities and has no difficulties with ADL's.  Daughter disagrees, although patient is independent daughter feels shortness of breath has increased since last visit.\par Denies cough, wheeze or sputum production, compliant with all medications.\par Primarily sedentary.\par Has O2 saturation monitor at home but "never uses it."\par Awaiting Zostavax vaccine.

## 2019-01-15 NOTE — PHYSICAL EXAM
[No Acute Distress] : no acute distress [Well Nourished] : well nourished [Well Developed] : well developed [Normal Sclera/Conjunctiva] : normal sclera/conjunctiva [PERRL] : pupils equal round and reactive to light [Normal Oropharynx] : the oropharynx was normal [Normal TMs] : both tympanic membranes were normal [Supple] : supple [No Respiratory Distress] : no respiratory distress  [Clear to Auscultation] : lungs were clear to auscultation bilaterally [Normal Rate] : normal rate  [Regular Rhythm] : with a regular rhythm [Normal S1, S2] : normal S1 and S2 [No Edema] : there was no peripheral edema [Soft] : abdomen soft [Non Tender] : non-tender [Normal Supraclavicular Nodes] : no supraclavicular lymphadenopathy [Normal Posterior Cervical Nodes] : no posterior cervical lymphadenopathy [Normal Anterior Cervical Nodes] : no anterior cervical lymphadenopathy [No CVA Tenderness] : no CVA  tenderness [No Spinal Tenderness] : no spinal tenderness [Grossly Normal Strength/Tone] : grossly normal strength/tone [No Rash] : no rash [No Focal Deficits] : no focal deficits [Normal Affect] : the affect was normal [Normal Insight/Judgement] : insight and judgment were intact [de-identified] : Distant breath sounds throughout.  No wheeze or crackle. [de-identified] : systolic murmur.

## 2019-01-15 NOTE — DATA REVIEWED
[FreeTextEntry1] : PFT shows significant obstruction, there is no improvement post bronchodilator.\par Flow rates diminished compared to spirometrics dated 7/2/18.\par O2 saturation is 96% room air.

## 2019-01-15 NOTE — REVIEW OF SYSTEMS
[Shortness Of Breath] : no shortness of breath [Wheezing] : no wheezing [Cough] : no cough [Dyspnea on Exertion] : dyspnea on exertion [Negative] : Psychiatric

## 2019-02-14 NOTE — H&P ADULT - ASSESSMENT
n/a 84 year old woman with asthma, HTN, hypothyroidism who presents with SOB.      SOB with acute respiratory failure secondary to acute viral influenza A, hmpv, and probable superimposed bacterial pneumonia.  -Monitor in step down  -broad spectrum abx for probable HCAP pnuemonia/gram negative bacteria.  -ID consult  -tamiflu for acute influenza A  -supportive care for RSV infection.  -f/u cultures     Asthma: Mild exacerbation  -duonebs OTC  -inhaled corticosteroids  -O2 supplementation  -Pulm consult    Rib fractures:  -incentive spirometry  -pain management  -PT consult    HTN:  -c/w amlodipine    Hypothyroidism:  -c/w synthroid    DVT ppx:  -lovenox n/a

## 2019-02-21 ENCOUNTER — APPOINTMENT (OUTPATIENT)
Dept: FAMILY MEDICINE | Facility: CLINIC | Age: 84
End: 2019-02-21
Payer: MEDICARE

## 2019-02-21 VITALS
HEART RATE: 86 BPM | RESPIRATION RATE: 16 BRPM | HEIGHT: 60 IN | BODY MASS INDEX: 24.74 KG/M2 | OXYGEN SATURATION: 97 % | SYSTOLIC BLOOD PRESSURE: 140 MMHG | DIASTOLIC BLOOD PRESSURE: 70 MMHG | WEIGHT: 126 LBS

## 2019-02-21 PROCEDURE — 96372 THER/PROPH/DIAG INJ SC/IM: CPT

## 2019-02-21 RX ORDER — DENOSUMAB 60 MG/ML
60 INJECTION SUBCUTANEOUS
Qty: 1 | Refills: 0 | Status: ACTIVE | COMMUNITY
Start: 2017-12-21

## 2019-02-21 RX ORDER — DENOSUMAB 60 MG/ML
60 INJECTION SUBCUTANEOUS
Qty: 1 | Refills: 0 | Status: COMPLETED | OUTPATIENT
Start: 2019-02-21

## 2019-02-21 RX ADMIN — DENOSUMAB 0 MG/ML: 60 INJECTION SUBCUTANEOUS at 00:00

## 2019-03-25 ENCOUNTER — RX RENEWAL (OUTPATIENT)
Age: 84
End: 2019-03-25

## 2019-04-23 NOTE — DISCHARGE NOTE ADULT - NS MD DC FALL RISK RISK
For information on Fall & Injury Prevention, visit [email or web address]

## 2019-06-06 ENCOUNTER — APPOINTMENT (OUTPATIENT)
Dept: FAMILY MEDICINE | Facility: CLINIC | Age: 84
End: 2019-06-06
Payer: MEDICARE

## 2019-06-06 VITALS
WEIGHT: 126.25 LBS | HEIGHT: 60 IN | OXYGEN SATURATION: 94 % | HEART RATE: 103 BPM | SYSTOLIC BLOOD PRESSURE: 120 MMHG | BODY MASS INDEX: 24.79 KG/M2 | DIASTOLIC BLOOD PRESSURE: 70 MMHG

## 2019-06-06 DIAGNOSIS — Z88.9 ALLERGY STATUS TO UNSPECIFIED DRUGS, MEDICAMENTS AND BIOLOGICAL SUBSTANCES: ICD-10-CM

## 2019-06-06 DIAGNOSIS — Z86.79 PERSONAL HISTORY OF OTHER DISEASES OF THE CIRCULATORY SYSTEM: ICD-10-CM

## 2019-06-06 DIAGNOSIS — Z01.818 ENCOUNTER FOR OTHER PREPROCEDURAL EXAMINATION: ICD-10-CM

## 2019-06-06 DIAGNOSIS — T14.8XXA OTHER INJURY OF UNSPECIFIED BODY REGION, INITIAL ENCOUNTER: ICD-10-CM

## 2019-06-06 DIAGNOSIS — Z87.440 PERSONAL HISTORY OF URINARY (TRACT) INFECTIONS: ICD-10-CM

## 2019-06-06 DIAGNOSIS — F32.9 MAJOR DEPRESSIVE DISORDER, SINGLE EPISODE, UNSPECIFIED: ICD-10-CM

## 2019-06-06 DIAGNOSIS — R53.83 MAJOR DEPRESSIVE DISORDER, SINGLE EPISODE, UNSPECIFIED: ICD-10-CM

## 2019-06-06 PROCEDURE — 99214 OFFICE O/P EST MOD 30 MIN: CPT

## 2019-06-06 NOTE — HISTORY OF PRESENT ILLNESS
[de-identified] : Chronic back pain due to osteoarthritis and compression fracture ambulating becoming more difficult using a cane.  Rolling walker ordered may use 2 Tylenol and 1 Motrin as needed with food\par \par Patient has been on aspirin for polycythemia which has now resolved due to myelodysplasia instructed to stop aspirin\par \par COPD with bronchiectasis breathing well controlled on current regimen\par \par Hypothyroid on replacement thyroid function normal last October

## 2019-06-06 NOTE — PHYSICAL EXAM
[No Acute Distress] : no acute distress [No Murmur] : no murmur heard [Regular Rhythm] : with a regular rhythm [Soft] : abdomen soft [No Edema] : there was no peripheral edema [Normal Anterior Cervical Nodes] : no anterior cervical lymphadenopathy [de-identified] : Decreased breath sounds with scattered rhonchi

## 2019-06-07 ENCOUNTER — INPATIENT (INPATIENT)
Facility: HOSPITAL | Age: 84
LOS: 3 days | Discharge: NURSING, REHAB OR LONG-TERM CARE | End: 2019-06-11
Attending: INTERNAL MEDICINE | Admitting: INTERNAL MEDICINE
Payer: MEDICARE

## 2019-06-07 VITALS
DIASTOLIC BLOOD PRESSURE: 69 MMHG | RESPIRATION RATE: 16 BRPM | HEIGHT: 60 IN | SYSTOLIC BLOOD PRESSURE: 141 MMHG | TEMPERATURE: 98 F | WEIGHT: 111.99 LBS | OXYGEN SATURATION: 98 % | HEART RATE: 112 BPM

## 2019-06-07 LAB
ALBUMIN SERPL ELPH-MCNC: 3.4 G/DL — SIGNIFICANT CHANGE UP (ref 3.3–5)
ALP SERPL-CCNC: 80 U/L — SIGNIFICANT CHANGE UP (ref 40–120)
ALT FLD-CCNC: 15 U/L — SIGNIFICANT CHANGE UP (ref 12–78)
ANION GAP SERPL CALC-SCNC: 6 MMOL/L — SIGNIFICANT CHANGE UP (ref 5–17)
APPEARANCE UR: ABNORMAL
AST SERPL-CCNC: 26 U/L — SIGNIFICANT CHANGE UP (ref 15–37)
BACTERIA # UR AUTO: ABNORMAL
BILIRUB SERPL-MCNC: 0.7 MG/DL — SIGNIFICANT CHANGE UP (ref 0.2–1.2)
BILIRUB UR-MCNC: NEGATIVE — SIGNIFICANT CHANGE UP
BUN SERPL-MCNC: 37 MG/DL — HIGH (ref 7–23)
CALCIUM SERPL-MCNC: 9 MG/DL — SIGNIFICANT CHANGE UP (ref 8.5–10.1)
CHLORIDE SERPL-SCNC: 104 MMOL/L — SIGNIFICANT CHANGE UP (ref 96–108)
CO2 SERPL-SCNC: 26 MMOL/L — SIGNIFICANT CHANGE UP (ref 22–31)
COLOR SPEC: YELLOW — SIGNIFICANT CHANGE UP
CREAT SERPL-MCNC: 1.05 MG/DL — SIGNIFICANT CHANGE UP (ref 0.5–1.3)
DIFF PNL FLD: ABNORMAL
EPI CELLS # UR: SIGNIFICANT CHANGE UP
GLUCOSE SERPL-MCNC: 102 MG/DL — HIGH (ref 70–99)
GLUCOSE UR QL: NEGATIVE MG/DL — SIGNIFICANT CHANGE UP
HCT VFR BLD CALC: 34.2 % — LOW (ref 34.5–45)
HGB BLD-MCNC: 10.9 G/DL — LOW (ref 11.5–15.5)
KETONES UR-MCNC: NEGATIVE — SIGNIFICANT CHANGE UP
LEUKOCYTE ESTERASE UR-ACNC: ABNORMAL
MAGNESIUM SERPL-MCNC: 2.1 MG/DL — SIGNIFICANT CHANGE UP (ref 1.6–2.6)
MCHC RBC-ENTMCNC: 29.9 PG — SIGNIFICANT CHANGE UP (ref 27–34)
MCHC RBC-ENTMCNC: 31.9 GM/DL — LOW (ref 32–36)
MCV RBC AUTO: 93.7 FL — SIGNIFICANT CHANGE UP (ref 80–100)
NITRITE UR-MCNC: NEGATIVE — SIGNIFICANT CHANGE UP
PH UR: 8 — SIGNIFICANT CHANGE UP (ref 5–8)
PLATELET # BLD AUTO: 178 K/UL — SIGNIFICANT CHANGE UP (ref 150–400)
POTASSIUM SERPL-MCNC: 4.2 MMOL/L — SIGNIFICANT CHANGE UP (ref 3.5–5.3)
POTASSIUM SERPL-SCNC: 4.2 MMOL/L — SIGNIFICANT CHANGE UP (ref 3.5–5.3)
PROT SERPL-MCNC: 7.6 GM/DL — SIGNIFICANT CHANGE UP (ref 6–8.3)
PROT UR-MCNC: 100 MG/DL
RBC # BLD: 3.65 M/UL — LOW (ref 3.8–5.2)
RBC # FLD: 16.4 % — HIGH (ref 10.3–14.5)
RBC CASTS # UR COMP ASSIST: NEGATIVE /HPF — SIGNIFICANT CHANGE UP (ref 0–4)
SODIUM SERPL-SCNC: 136 MMOL/L — SIGNIFICANT CHANGE UP (ref 135–145)
SP GR SPEC: 1.01 — SIGNIFICANT CHANGE UP (ref 1.01–1.02)
TROPONIN I SERPL-MCNC: 0.03 NG/ML — SIGNIFICANT CHANGE UP (ref 0.01–0.04)
TROPONIN I SERPL-MCNC: <0.015 NG/ML — SIGNIFICANT CHANGE UP (ref 0.01–0.04)
UROBILINOGEN FLD QL: NEGATIVE MG/DL — SIGNIFICANT CHANGE UP
WBC # BLD: 4.38 K/UL — SIGNIFICANT CHANGE UP (ref 3.8–10.5)
WBC # FLD AUTO: 4.38 K/UL — SIGNIFICANT CHANGE UP (ref 3.8–10.5)
WBC UR QL: SIGNIFICANT CHANGE UP

## 2019-06-07 PROCEDURE — 70450 CT HEAD/BRAIN W/O DYE: CPT | Mod: 26

## 2019-06-07 PROCEDURE — 73562 X-RAY EXAM OF KNEE 3: CPT | Mod: 26,RT

## 2019-06-07 PROCEDURE — 93010 ELECTROCARDIOGRAM REPORT: CPT

## 2019-06-07 PROCEDURE — 99285 EMERGENCY DEPT VISIT HI MDM: CPT

## 2019-06-07 PROCEDURE — 71045 X-RAY EXAM CHEST 1 VIEW: CPT | Mod: 26

## 2019-06-07 RX ORDER — ASPIRIN/CALCIUM CARB/MAGNESIUM 324 MG
325 TABLET ORAL ONCE
Refills: 0 | Status: COMPLETED | OUTPATIENT
Start: 2019-06-07 | End: 2019-06-07

## 2019-06-07 RX ORDER — MONTELUKAST 4 MG/1
10 TABLET, CHEWABLE ORAL DAILY
Refills: 0 | Status: DISCONTINUED | OUTPATIENT
Start: 2019-06-07 | End: 2019-06-11

## 2019-06-07 RX ORDER — AMLODIPINE BESYLATE 2.5 MG/1
10 TABLET ORAL DAILY
Refills: 0 | Status: DISCONTINUED | OUTPATIENT
Start: 2019-06-07 | End: 2019-06-11

## 2019-06-07 RX ORDER — BUDESONIDE, MICRONIZED 100 %
0.5 POWDER (GRAM) MISCELLANEOUS
Refills: 0 | Status: DISCONTINUED | OUTPATIENT
Start: 2019-06-07 | End: 2019-06-11

## 2019-06-07 RX ORDER — LEVOTHYROXINE SODIUM 125 MCG
100 TABLET ORAL
Refills: 0 | Status: DISCONTINUED | OUTPATIENT
Start: 2019-06-07 | End: 2019-06-11

## 2019-06-07 RX ORDER — IPRATROPIUM/ALBUTEROL SULFATE 18-103MCG
3 AEROSOL WITH ADAPTER (GRAM) INHALATION EVERY 6 HOURS
Refills: 0 | Status: DISCONTINUED | OUTPATIENT
Start: 2019-06-07 | End: 2019-06-07

## 2019-06-07 RX ORDER — HEPARIN SODIUM 5000 [USP'U]/ML
5000 INJECTION INTRAVENOUS; SUBCUTANEOUS EVERY 12 HOURS
Refills: 0 | Status: DISCONTINUED | OUTPATIENT
Start: 2019-06-07 | End: 2019-06-11

## 2019-06-07 RX ORDER — HYDROXYUREA 500 MG/1
1000 CAPSULE ORAL EVERY OTHER DAY
Refills: 0 | Status: DISCONTINUED | OUTPATIENT
Start: 2019-06-09 | End: 2019-06-11

## 2019-06-07 RX ORDER — TIOTROPIUM BROMIDE 18 UG/1
1 CAPSULE ORAL; RESPIRATORY (INHALATION) DAILY
Refills: 0 | Status: DISCONTINUED | OUTPATIENT
Start: 2019-06-07 | End: 2019-06-11

## 2019-06-07 RX ORDER — GABAPENTIN 400 MG/1
1 CAPSULE ORAL
Qty: 0 | Refills: 0 | DISCHARGE

## 2019-06-07 RX ORDER — IPRATROPIUM BROMIDE 0.2 MG/ML
500 SOLUTION, NON-ORAL INHALATION EVERY 6 HOURS
Refills: 0 | Status: DISCONTINUED | OUTPATIENT
Start: 2019-06-07 | End: 2019-06-11

## 2019-06-07 RX ORDER — HYDROXYUREA 500 MG/1
500 CAPSULE ORAL EVERY OTHER DAY
Refills: 0 | Status: DISCONTINUED | OUTPATIENT
Start: 2019-06-08 | End: 2019-06-11

## 2019-06-07 RX ORDER — ASPIRIN/CALCIUM CARB/MAGNESIUM 324 MG
81 TABLET ORAL DAILY
Refills: 0 | Status: DISCONTINUED | OUTPATIENT
Start: 2019-06-08 | End: 2019-06-09

## 2019-06-07 RX ORDER — SODIUM CHLORIDE 9 MG/ML
1000 INJECTION INTRAMUSCULAR; INTRAVENOUS; SUBCUTANEOUS ONCE
Refills: 0 | Status: COMPLETED | OUTPATIENT
Start: 2019-06-07 | End: 2019-06-07

## 2019-06-07 RX ADMIN — Medication 3 MILLILITER(S): at 12:25

## 2019-06-07 RX ADMIN — SODIUM CHLORIDE 1000 MILLILITER(S): 9 INJECTION INTRAMUSCULAR; INTRAVENOUS; SUBCUTANEOUS at 12:25

## 2019-06-07 RX ADMIN — Medication 325 MILLIGRAM(S): at 16:51

## 2019-06-07 RX ADMIN — SODIUM CHLORIDE 1000 MILLILITER(S): 9 INJECTION INTRAMUSCULAR; INTRAVENOUS; SUBCUTANEOUS at 13:26

## 2019-06-07 RX ADMIN — MONTELUKAST 10 MILLIGRAM(S): 4 TABLET, CHEWABLE ORAL at 21:18

## 2019-06-07 RX ADMIN — Medication 3 MILLILITER(S): at 17:51

## 2019-06-07 RX ADMIN — Medication 1200 MILLIGRAM(S): at 21:18

## 2019-06-07 NOTE — H&P ADULT - NSHPPHYSICALEXAM_GEN_ALL_CORE
PHYSICAL EXAM:    GENERAL: NAD    HEENT:  pupils equal and reactive, EOMI, no oropharyngeal lesions, erythema, exudates, oral thrush    NECK:   supple, no carotid bruits, no palpable lymph nodes, no thyromegaly    CV:  +S1, +S2, regular,  no m/c/r    RESP: coarse rhonchi throughout, scattered expiratory wheezing,     BREAST:  not examined    GI:  abdomen soft, non-tender, non-distended, normal BS, no bruits, no abdominal masses, no palpable masses    RECTAL:  not examined    :  no suprapubic tenderness, wearing an incontinent device (diaper), intense urine odor noted    MSK:  +kyphosis, normal muscle tone, no atrophy, no rigidity, no contractions    EXT: below right knee with marked erythemous bruising, moderately swollen, AROM noted, not guarded, +distal pulses, otherwise chronic vascular changes to BLE, no c/c/e, no calf pain, swelling or erythema    VASCULAR:  pulses equal and symmetric in the upper and lower extremities    NEURO:  AAOX3, no pronation drift, lifts and resist BUE/BLE strongly 5/5, tongue midline, no focal neurological deficits, follows all commands, able to move extremities spontaneously    SKIN:  no ulcers, lesions or rashes    Vital Signs Last 24 Hrs  T(C): 36.6 (07 Jun 2019 16:07), Max: 36.9 (07 Jun 2019 11:49)  T(F): 97.9 (07 Jun 2019 16:07), Max: 98.5 (07 Jun 2019 11:49)  HR: 95 (07 Jun 2019 16:55) (95 - 112)  BP: 150/70 (07 Jun 2019 16:55) (141/69 - 160/76)  BP(mean): --  RR: 17 (07 Jun 2019 16:55) (16 - 22)  SpO2: 100% (07 Jun 2019 16:55) (96% - 100%)

## 2019-06-07 NOTE — H&P ADULT - NSHPSOCIALHISTORY_GEN_ALL_CORE
Never smoke  glass of wine occasionally with dinner  lives alone, independent of ADL's  ambulates with can    Plan to stay with one of her daughters after discharge

## 2019-06-07 NOTE — ED PROVIDER NOTE - PROGRESS NOTE DETAILS
d/w daughter and pt. pt w/hx of htn, hypothyroid, MDS and polycythemia had episode of right leg weakness when she got out of bed this AM that caused her to fall today. now resolved.  no other sxs. denies arm weakness or speech problem. no LOC. no hx of similar. off her ASA for few days because "my levels are good." Work up in ED no acute findings. Admit to tele for TIA eval. consult placed to neuro dr. cevallos. MD GAY

## 2019-06-07 NOTE — ED ADULT NURSE NOTE - NS ED NURSE PATIENT LEFT UNIT TIME
Subjective:      Melissa Wadsworth Jr. is a 13 m.o. male here with grandmother. Patient brought in for Well Child    DIET: eats  Anything.   Finger feeds, spoon.   Organic milk.   Off bottle.   pediasure      SLEEP: all night    DEVELOPMENTAL HISTORY:   Walks alone: y  Pincer grasp : y  2 words other than mama-kristofer :y  Imitates : y  Gestures:  y  Notices small objects:   y  Problems with last vaccines:n      History of Present Illness:  HPI    Review of Systems   Constitutional: Negative for activity change, appetite change, chills, crying, fever, irritability and unexpected weight change.   HENT: Negative for congestion, drooling, ear discharge, ear pain, mouth sores, nosebleeds, rhinorrhea, sneezing, sore throat and trouble swallowing.    Eyes: Negative for discharge and redness.   Respiratory: Negative for cough, choking and wheezing.    Cardiovascular: Negative for cyanosis.   Gastrointestinal: Negative for abdominal distention, abdominal pain, blood in stool, constipation, diarrhea and vomiting.   Genitourinary: Negative for decreased urine volume, dysuria and hematuria.   Musculoskeletal: Negative for gait problem and joint swelling.   Skin: Negative for color change and rash.   Neurological: Negative for seizures and weakness.   Hematological: Negative for adenopathy.   Psychiatric/Behavioral: Negative for behavioral problems.       Objective:     Physical Exam   Constitutional: He appears well-developed and well-nourished. He is active. No distress.   HENT:   Head: Atraumatic. No signs of injury.   Nose: Nose normal. No nasal discharge.   Mouth/Throat: Mucous membranes are moist. No dental caries. No tonsillar exudate. Oropharynx is clear. Pharynx is normal.   Both TMS purulent effusions   Eyes: Conjunctivae and EOM are normal. Pupils are equal, round, and reactive to light. Right eye exhibits no discharge. Left eye exhibits no discharge.   Neck: Normal range of motion. Neck supple. No neck adenopathy.    Cardiovascular: Normal rate and regular rhythm.    No murmur heard.  Pulmonary/Chest: Effort normal. No stridor. No respiratory distress. He has no wheezes.   Abdominal: Soft. Bowel sounds are normal. He exhibits no distension and no mass. There is no hepatosplenomegaly. There is no tenderness.   Genitourinary: Penis normal.   Musculoskeletal: Normal range of motion. He exhibits no edema or deformity.   Neurological: He is alert. He exhibits normal muscle tone. Coordination normal.   Skin: Skin is warm. Rash (papular diaper rash with satellitle lesions) noted. No cyanosis.   Nursing note and vitals reviewed.      Assessment:     Melissa was seen today for well child.    Diagnoses and all orders for this visit:    Encounter for routine child health examination without abnormal findings  -     MMR Vaccine (SQ)  -     Varicella Vaccine (SQ)  -     Hepatitis A Vaccine (Pediatric/Adolescent) (2 Dose) (IM)  -     DTaP / HiB / IPV Combined Vaccine (IM)  -     Cancel: Hepatitis B Vaccine (Pediatric/Adolescent) (3-Dose) (IM)  -     Pneumococcal Conjugate Vaccine (13 Valent) (IM)  -     Lead, blood  -     POCT Hemoglobin    Recurrent acute suppurative otitis media without spontaneous rupture of tympanic membrane of both sides  -     cefdinir (OMNICEF) 250 mg/5 mL suspension; Take 3 mLs (150 mg total) by mouth once daily. for 10 days    Candidal diaper dermatitis  -     nystatin (MYCOSTATIN) cream; Apply topically 3 (three) times daily. for 10 days          Plan:   ANTICIPATORY GUIDANCE:  Carseat remains rear facing.  Smoke detectors. Child proof home. Close supervision.  Supervise bath.  Sun exposure.  Poison control  Teething/clean teeth.  No bottle in bed  Weaning.  Introduce whole milk in cup, table and finger foods.  Limit juice.  Choking prevention  Talk/read to baby. Family support.  Bedtime routine.  Set limits/discipline.  Praise good behavior    Otitis media:  Caused by infection in middle ear.  Take antibiotics  "as prescribed.  Make sure to complete entire course.  Don't stop medicine or keep any left over.  Acetaminophen and/or ibuprofen (is >6 months old) can be taken for pain and/or fever  Recheck ear after 2 weeks.  Call if continues to have symptoms despite being on antibiotics for a few days.    Diaper rash is not uncommon.  If a prescription cream is given (nystatin for yeast; cortisone for atopic derm; etc) apply that first.  Otherwise, use diaper cream generously to area (Dr Ramirez, Janet, A&D, Kate's, Calmoseptin).  If open areas, apply a thick barrier ointment (vasoline, aquaphor) on top.  This helps prevent soiling on skin and help when wiping).   Change diaper often to keep dry.  Call if diaper rash persists or worsens.    Take antibiotic as prescribed.   Make sure to complete entire course of medicine.  This helps prevent antibiotic resistance.    You should never keep "left over" antibiotics and restart at a later date for other symptoms.    To help prevent antibiotic associated diarrhea or if stomach upset occurs, try starting lactinex or culturelle.  Both are probiotics and put "good bacteria" back into the gut.  Both come in granules that can be added to milk/juice for infants or sprinkled on foods.  Can also get in pill form.  Take entire time on antibiotic.  If symptoms continue despite treatment, call.  May need to be re-evaluated.    " 20:09

## 2019-06-07 NOTE — H&P ADULT - HISTORY OF PRESENT ILLNESS
86 year old Woman with pmhx of asthma, HTN, Polycythemia presented to ED c/o fall d/t RLE weakness. She reports that while trying to get out of bed this morning her R leg felt weak and when she attempted to stand her knee "gave way" then she fell to the ground on her knees, denies precipitating factors, no numbness/tingling. Ambulates with a cane at home. Denies CP/palpitation/SOB/HA/dizziness/abd pain/n/v/d/f/c.. Denies dysuria/urgency/frequency. Has had no hospitalization since her last hospital admit in T 1/2018.    In the ED BP initially 141/69 with subsequent assessments with 's - 160's, , T 36.9, RR 16, 98% rm air. CBC/BMP stable, TNI negative x 2; CT head no evidence of an acute territorial infarction. 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 Small vessel ischemic changes. CXR no acute findings, R knee xray degenerative changes, no acute pathology/fx. She received albuterol neb x2, asa 325mg x1, and NS 1L bolus IV.

## 2019-06-07 NOTE — H&P ADULT - ASSESSMENT
Fall due to RLE weakness likely due to degenerative changes vs cardiogenic-->very low suspicion  no neurological deficit noted  consult PT  cold compress to R knee  tylenol PRN for pain  admit to tele    Asthma  nebs - avoid albuterol d/t borderline tachycardia  con't home dose budenoside    HTN poorly controlled  report she had a PCP f/u yesterday and "everything was fine"  con't home med for now: norvasc 10  monitor for now and add another regimen if needed or change norvasc to cardizem for optimal HR --> reassess in am  ** chart review notable for HR 90's- 106  VS per routine    Polycythemia  well controlled for "nearly a year" per pt  con't home dose hydroxyurea - next dosing is 500mg QOD (she alternates with 1000mg)    Hypothyroidism:  con't home dose LT4    DVT PPX  SQ heparin BID (on hydroxyurea, can increase risk of bleeding w/ lovenox)    IMPROVE VTE Individual Risk Assessment    RISK                                                                Points  [  ] Previous VTE                                                  3  [x ] Thrombophilia                                               2  [  ] Lower limb paralysis                                      2        (unable to hold up >15 seconds)    [  ] Current Cancer                                              2         (within 6 months)  [  ] Immobilization > 24 hrs                                1  [  ] ICU/CCU stay > 24 hours                              1  [x] Age > 60                                                      1    IMPROVE VTE Score 3    Dispo  full code  plan of care d/w pt and dtr via telephone document in pt info area; pt agreed with discussed plan of care Fall due to RLE weakness, TIA likely due to degenerative changes vs TIA vs cardiogenic  right leg minimal weakness  neuro consult, mri  asa  lipid profile  consult PT  cold compress to R knee  tylenol PRN for pain  admit to tele    Asthma  nebs - avoid albuterol d/t borderline tachycardia  con't home dose budenoside    HTN poorly controlled  report she had a PCP f/u yesterday and "everything was fine"  con't home med for now: norvasc 10  monitor for now and add another regimen if needed or change norvasc to cardizem for optimal HR --> reassess in am  ** chart review notable for HR 90's- 106  VS per routine    Polycythemia  well controlled for "nearly a year" per pt  con't home dose hydroxyurea - next dosing is 500mg QOD (she alternates with 1000mg)    Hypothyroidism:  con't home dose LT4    DVT PPX  SQ heparin BID (on hydroxyurea, can increase risk of bleeding w/ lovenox)    IMPROVE VTE Individual Risk Assessment    RISK                                                                Points  [  ] Previous VTE                                                  3  [x ] Thrombophilia                                               2  [  ] Lower limb paralysis                                      2        (unable to hold up >15 seconds)    [  ] Current Cancer                                              2         (within 6 months)  [  ] Immobilization > 24 hrs                                1  [  ] ICU/CCU stay > 24 hours                              1  [x] Age > 60                                                      1    IMPROVE VTE Score 3    Dispo  full code  plan of care d/w pt and dtr via telephone document in pt info area; pt agreed with discussed plan of care

## 2019-06-07 NOTE — ED PROVIDER NOTE - OBJECTIVE STATEMENT
87 y/o female with a PMHx of asthma and Polycythemia presents c/o weakness. Pt fell onto right knee this morning at approximately 8am. Denies hitting head. Denies fever. Discontinued ASA yesterday secondary to blood levels being good. On 3 nebulizers (Brovana, budesonide, scoriae) that she takes every morning and night. PMD is Dr. Forman.

## 2019-06-07 NOTE — ED ADULT NURSE NOTE - OBJECTIVE STATEMENT
PT BIBA from home c/o weakness since am.  PT states ambulates with walker and states leg "felt weak and my knees fell to the ground".  Swollen around right knee and ankle.  PT denies fever, cough, SOB at home.  VSS.

## 2019-06-07 NOTE — H&P ADULT - NSICDXPASTMEDICALHX_GEN_ALL_CORE_FT
PAST MEDICAL HISTORY:  Asthma, unspecified asthma severity, unspecified whether complicated, unspecified whether persistent     Essential hypertension     Hypothyroidism, unspecified type     MDS (myelodysplastic syndrome)     Rib fractures

## 2019-06-07 NOTE — H&P ADULT - ATTENDING COMMENTS
Patient seen and examined with the NP. Agree with above plan of care which was discussed with the patient, team and NP.   86 year old Woman with pmhx of asthma, HTN, Polycythemia admitted with TIA r/o cva, right leg weakness. admit, neuro consult. MRI brain if deemed necessary by neuro. Asymptomatic bacteriuria, monitor.

## 2019-06-07 NOTE — ED ADULT NURSE NOTE - NSIMPLEMENTINTERV_GEN_ALL_ED
Implemented All Fall with Harm Risk Interventions:  [place] to call system. Call bell, personal items and telephone within reach. Instruct patient to call for assistance. Room bathroom lighting operational. Non-slip footwear when patient is off stretcher. Physically safe environment: no spills, clutter or unnecessary equipment. Stretcher in lowest position, wheels locked, appropriate side rails in place. Provide visual cue, wrist band, yellow gown, etc. Monitor gait and stability. Monitor for mental status changes and reorient to person, place, and time. Review medications for side effects contributing to fall risk. Reinforce activity limits and safety measures with patient and family. Provide visual clues: red socks.

## 2019-06-07 NOTE — ED ADULT TRIAGE NOTE - CHIEF COMPLAINT QUOTE
Pt BIBEMS for evaluation of weakness, pt fell onto right knee this morning at approx 8am has hx of Polycythemia, discontinued ASA recently, denies hitting head No AC

## 2019-06-08 DIAGNOSIS — R29.898 OTHER SYMPTOMS AND SIGNS INVOLVING THE MUSCULOSKELETAL SYSTEM: ICD-10-CM

## 2019-06-08 LAB
ANION GAP SERPL CALC-SCNC: 6 MMOL/L — SIGNIFICANT CHANGE UP (ref 5–17)
BASOPHILS # BLD AUTO: 0.02 K/UL — SIGNIFICANT CHANGE UP (ref 0–0.2)
BASOPHILS NFR BLD AUTO: 0.6 % — SIGNIFICANT CHANGE UP (ref 0–2)
BUN SERPL-MCNC: 27 MG/DL — HIGH (ref 7–23)
CALCIUM SERPL-MCNC: 9.1 MG/DL — SIGNIFICANT CHANGE UP (ref 8.5–10.1)
CHLORIDE SERPL-SCNC: 107 MMOL/L — SIGNIFICANT CHANGE UP (ref 96–108)
CO2 SERPL-SCNC: 26 MMOL/L — SIGNIFICANT CHANGE UP (ref 22–31)
CREAT SERPL-MCNC: 0.84 MG/DL — SIGNIFICANT CHANGE UP (ref 0.5–1.3)
EOSINOPHIL # BLD AUTO: 0.07 K/UL — SIGNIFICANT CHANGE UP (ref 0–0.5)
EOSINOPHIL NFR BLD AUTO: 1.9 % — SIGNIFICANT CHANGE UP (ref 0–6)
GLUCOSE SERPL-MCNC: 96 MG/DL — SIGNIFICANT CHANGE UP (ref 70–99)
HCT VFR BLD CALC: 32.5 % — LOW (ref 34.5–45)
HGB BLD-MCNC: 10.3 G/DL — LOW (ref 11.5–15.5)
IMM GRANULOCYTES NFR BLD AUTO: 0.6 % — SIGNIFICANT CHANGE UP (ref 0–1.5)
LYMPHOCYTES # BLD AUTO: 0.59 K/UL — LOW (ref 1–3.3)
LYMPHOCYTES # BLD AUTO: 16.4 % — SIGNIFICANT CHANGE UP (ref 13–44)
MCHC RBC-ENTMCNC: 29.9 PG — SIGNIFICANT CHANGE UP (ref 27–34)
MCHC RBC-ENTMCNC: 31.7 GM/DL — LOW (ref 32–36)
MCV RBC AUTO: 94.2 FL — SIGNIFICANT CHANGE UP (ref 80–100)
MONOCYTES # BLD AUTO: 0.26 K/UL — SIGNIFICANT CHANGE UP (ref 0–0.9)
MONOCYTES NFR BLD AUTO: 7.2 % — SIGNIFICANT CHANGE UP (ref 2–14)
NEUTROPHILS # BLD AUTO: 2.63 K/UL — SIGNIFICANT CHANGE UP (ref 1.8–7.4)
NEUTROPHILS NFR BLD AUTO: 73.3 % — SIGNIFICANT CHANGE UP (ref 43–77)
PLATELET # BLD AUTO: 161 K/UL — SIGNIFICANT CHANGE UP (ref 150–400)
POTASSIUM SERPL-MCNC: 3.7 MMOL/L — SIGNIFICANT CHANGE UP (ref 3.5–5.3)
POTASSIUM SERPL-SCNC: 3.7 MMOL/L — SIGNIFICANT CHANGE UP (ref 3.5–5.3)
RBC # BLD: 3.45 M/UL — LOW (ref 3.8–5.2)
RBC # FLD: 16.9 % — HIGH (ref 10.3–14.5)
SODIUM SERPL-SCNC: 139 MMOL/L — SIGNIFICANT CHANGE UP (ref 135–145)
WBC # BLD: 3.59 K/UL — LOW (ref 3.8–10.5)
WBC # FLD AUTO: 3.59 K/UL — LOW (ref 3.8–10.5)

## 2019-06-08 PROCEDURE — 71046 X-RAY EXAM CHEST 2 VIEWS: CPT | Mod: 26

## 2019-06-08 PROCEDURE — 99223 1ST HOSP IP/OBS HIGH 75: CPT

## 2019-06-08 RX ADMIN — Medication 81 MILLIGRAM(S): at 11:34

## 2019-06-08 RX ADMIN — HEPARIN SODIUM 5000 UNIT(S): 5000 INJECTION INTRAVENOUS; SUBCUTANEOUS at 05:49

## 2019-06-08 RX ADMIN — Medication 1 TABLET(S): at 11:34

## 2019-06-08 RX ADMIN — MONTELUKAST 10 MILLIGRAM(S): 4 TABLET, CHEWABLE ORAL at 21:30

## 2019-06-08 RX ADMIN — Medication 0.5 MILLIGRAM(S): at 17:52

## 2019-06-08 RX ADMIN — Medication 1200 MILLIGRAM(S): at 21:30

## 2019-06-08 RX ADMIN — Medication 0.5 MILLIGRAM(S): at 08:51

## 2019-06-08 RX ADMIN — HEPARIN SODIUM 5000 UNIT(S): 5000 INJECTION INTRAVENOUS; SUBCUTANEOUS at 17:31

## 2019-06-08 RX ADMIN — Medication 500 MICROGRAM(S): at 08:51

## 2019-06-08 RX ADMIN — AMLODIPINE BESYLATE 10 MILLIGRAM(S): 2.5 TABLET ORAL at 05:49

## 2019-06-08 RX ADMIN — HYDROXYUREA 500 MILLIGRAM(S): 500 CAPSULE ORAL at 11:34

## 2019-06-08 RX ADMIN — Medication 500 MICROGRAM(S): at 17:52

## 2019-06-08 NOTE — CONSULT NOTE ADULT - SUBJECTIVE AND OBJECTIVE BOX
Neurology Consult requested by:   Patient is a 86y old  Female who presents with a chief complaint of Fall / RLE weakness / uncontrolled HTN (07 Jun 2019 19:18)     HPI:  86 year old Woman with pmhx of asthma, HTN, Polycythemia presented to ED c/o fall d/t RLE weakness. She reports that while trying to get out of bed on morning of admission her R leg felt weak and when she attempted to stand her knee "gave way" then she fell to the ground on her knees, denies numbness/tingling. NO neck or back pain, no hx of sciatica. No headache, change in vision, daughters reports slurred speech which has improved. Denies CP/palpitation/SOB/HA/dizziness/abd pain/n/v/d/f/c.. Denies dysuria/urgency/frequency.     PAST MEDICAL & SURGICAL HISTORY:  MDS (myelodysplastic syndrome)  Asthma, unspecified asthma severity, unspecified whether complicated, unspecified whether persistent  Hypothyroidism, unspecified type  Essential hypertension  Rib fractures  No significant past surgical history    FAMILY HISTORY:  FH: diabetes mellitus (Sibling)    Social Hx:  Nonsmoker, no drug or alcohol use    Medications and Allergies ReviewedMEDICATIONS  (STANDING):  amLODIPine   Tablet 10 milliGRAM(s) Oral daily  aspirin  chewable 81 milliGRAM(s) Oral daily  buDESOnide   0.5 milliGRAM(s) Respule 0.5 milliGRAM(s) Nebulizer two times a day  calcium carbonate 1250 mG  + Vitamin D (OsCal 500 + D) 1 Tablet(s) Oral daily  heparin  Injectable 5000 Unit(s) SubCutaneous every 12 hours  hydroxyurea 500 milliGRAM(s) Oral every other day  ipratropium    for Nebulization 500 MICROGram(s) Nebulizer every 6 hours  levothyroxine 100 MICROGram(s) Oral <User Schedule>  montelukast 10 milliGRAM(s) Oral daily  tiotropium 18 MICROgram(s) Capsule 1 Capsule(s) Inhalation daily     ROS: Pertinent positives in HPI, all other ROS were reviewed and are negative.      Examination:   Vital Signs Last 24 Hrs  T(C): 36.9 (08 Jun 2019 04:51), Max: 36.9 (08 Jun 2019 04:51)  T(F): 98.4 (08 Jun 2019 04:51), Max: 98.4 (08 Jun 2019 04:51)  HR: 76 (08 Jun 2019 09:26) (76 - 103)  BP: 144/64 (08 Jun 2019 04:51) (140/65 - 160/76)  BP(mean): --  RR: 18 (08 Jun 2019 04:51) (17 - 22)  SpO2: 92% (08 Jun 2019 04:51) (92% - 100%)  General: Cooperative, NAD   NECK: supple, no masses  ENT: Normal hearing   Vascular : no carotid bruits,   Lungs: CTAB  Chest: RRR, no murmurs  Extremities: nontender, + ecchymosis right knee, noedema  Musculoskeletal: no adventitious movements, no joint stiffness  Skin: no rash    Neurological Examination:  NIHSS:0  MS: AOx3. Appropriately interactive, normal affect. Speech fluent w/o paraphasic error, repetition, naming is intact   CN: VFFTC, PERLL, EOMI, V1-3 sensation intact, face symmetric, hearing intact, palate elevates symmetrically, tongue midline, SCM equal bilaterally  Motor: normal bulk and tone, no tremor, rigidity or bradykinesia.  -5/5 all over   Sens: Intact to light touch.    Reflexes: 0-1/4 all over, downgoing toes b/l  Coord:  No dysmetria, RITCHIE intact   Gait: can stand, c/o weakness of right leg    Labs: Reviewed  Basic Metabolic Panel (06.08.19 @ 07:36)    Sodium, Serum: 139 mmol/L    Potassium, Serum: 3.7 mmol/L    Chloride, Serum: 107 mmol/L    Carbon Dioxide, Serum: 26 mmol/L    Anion Gap, Serum: 6 mmol/L    Blood Urea Nitrogen, Serum: 27 mg/dL    Creatinine, Serum: 0.84 mg/dL    Glucose, Serum: 96 mg/dL    Calcium, Total Serum: 9.1 mg/dL    eGFR if Non : 63      Imaging:   < from: CT Head No Cont (06.07.19 @ 12:33) >  MPRESSION:       No parenchymal contusion, hemorrhage or extra-axial collection.    No CT evidence of an acute territorial infarction. 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    Small vessel ischemic changes.    < end of copied text >

## 2019-06-08 NOTE — PROGRESS NOTE ADULT - SUBJECTIVE AND OBJECTIVE BOX
HPI: 86 year old Woman with pmhx of asthma, HTN, Polycythemia presented to ED c/o fall d/t RLE weakness. She reports that while trying to get out of bed this morning her R leg felt weak and when she attempted to stand her knee "gave way" then she fell to the ground on her knees, denies precipitating factors, no numbness/tingling. Ambulates with a cane at home. Denies CP/palpitation/SOB/HA/dizziness/abd pain/n/v/d/f/c.. Denies dysuria/urgency/frequency. Has had no hospitalization since her last hospital admit in [facility] 2018.    : right leg weakness much improved      PHYSICAL EXAM:    Daily     Daily     ICU Vital Signs Last 24 Hrs  T(C): 36.8 (2019 12:40), Max: 36.9 (2019 04:51)  T(F): 98.2 (2019 12:40), Max: 98.4 (2019 04:51)  HR: 80 (2019 12:40) (76 - 99)  BP: 138/73 (2019 12:40) (138/73 - 150/70)  BP(mean): --  ABP: --  ABP(mean): --  RR: 18 (2019 12:40) (17 - 19)  SpO2: 92% (2019 12:40) (92% - 100%)      Constitutional: Well appearing  HEENT: Atraumatic, MARISSA, Normal, No congestion  Respiratory: Breath Sounds normal, no rhonchi/wheeze  Cardiovascular: N S1S2; SYEDA present  Gastrointestinal: Abdomen soft, non tender, Bowel Sounds present  Extremities: No edema, peripheral pulses present  Neurological: AAO x 3, no gross focal motor deficits  Skin: Non cellulitic, no rash, ulcers  Lymph Nodes: No lymphadenopathy noted  Back: No CVA tenderness   Musculoskeletal: non tender  Breasts: Deferred  Genitourinary: deferred  Rectal: Deferred                          10.3   3.59  )-----------( 161      ( 2019 07:36 )             32.5       CBC Full  -  ( 2019 07:36 )  WBC Count : 3.59 K/uL  RBC Count : 3.45 M/uL  Hemoglobin : 10.3 g/dL  Hematocrit : 32.5 %  Platelet Count - Automated : 161 K/uL  Mean Cell Volume : 94.2 fl  Mean Cell Hemoglobin : 29.9 pg  Mean Cell Hemoglobin Concentration : 31.7 gm/dL  Auto Neutrophil # : 2.63 K/uL  Auto Lymphocyte # : 0.59 K/uL  Auto Monocyte # : 0.26 K/uL  Auto Eosinophil # : 0.07 K/uL  Auto Basophil # : 0.02 K/uL  Auto Neutrophil % : 73.3 %  Auto Lymphocyte % : 16.4 %  Auto Monocyte % : 7.2 %  Auto Eosinophil % : 1.9 %  Auto Basophil % : 0.6 %          139  |  107  |  27<H>  ----------------------------<  96  3.7   |  26  |  0.84    Ca    9.1      2019 07:36  Mg     2.1         TPro  7.6  /  Alb  3.4  /  TBili  0.7  /  DBili  x   /  AST  26  /  ALT  15  /  AlkPhos  80  0607      LIVER FUNCTIONS - ( 2019 12:07 )  Alb: 3.4 g/dL / Pro: 7.6 gm/dL / ALK PHOS: 80 U/L / ALT: 15 U/L / AST: 26 U/L / GGT: x                 CARDIAC MARKERS ( 2019 15:34 )  0.025 ng/mL / x     / x     / x     / x      CARDIAC MARKERS ( 2019 12:07 )  <0.015 ng/mL / x     / x     / x     / x            Urinalysis Basic - ( 2019 12:07 )    Color: Yellow / Appearance: very cloudy / S.010 / pH: x  Gluc: x / Ketone: Negative  / Bili: Negative / Urobili: Negative mg/dL   Blood: x / Protein: 100 mg/dL / Nitrite: Negative   Leuk Esterase: Small / RBC: Negative /HPF / WBC 0-2   Sq Epi: x / Non Sq Epi: Occasional / Bacteria: Many            MEDICATIONS  (STANDING):  amLODIPine   Tablet 10 milliGRAM(s) Oral daily  aspirin  chewable 81 milliGRAM(s) Oral daily  buDESOnide   0.5 milliGRAM(s) Respule 0.5 milliGRAM(s) Nebulizer two times a day  calcium carbonate 1250 mG  + Vitamin D (OsCal 500 + D) 1 Tablet(s) Oral daily  heparin  Injectable 5000 Unit(s) SubCutaneous every 12 hours  hydroxyurea 500 milliGRAM(s) Oral every other day  ipratropium    for Nebulization 500 MICROGram(s) Nebulizer every 6 hours  levothyroxine 100 MICROGram(s) Oral <User Schedule>  montelukast 10 milliGRAM(s) Oral daily  tiotropium 18 MICROgram(s) Capsule 1 Capsule(s) Inhalation daily

## 2019-06-08 NOTE — SWALLOW BEDSIDE ASSESSMENT ADULT - NS SPL SWALLOW CLINIC TRIAL FT
NO behavioral aspiration signs exhibited on exam. Odynophagia was denied. , NO behavioral aspiration signs exhibited on exam. Odynophagia was denied.

## 2019-06-08 NOTE — PROGRESS NOTE ADULT - ASSESSMENT
Fall due to RLE weakness, TIA likely due to degenerative changes vs TIA vs cardiogenic  right leg minimal weakness  neuro / cardio consults appreciated  MRI/MRA awaited,   asa  lipid profile  consult PT  cold compress to R knee  tylenol PRN for pain  admit to tele    Asthma  nebs - avoid albuterol d/t borderline tachycardia  con't home dose budenoside    HTN poorly controlled  report she had a PCP f/u yesterday and "everything was fine"  con't home med for now: norvasc 10  monitor for now and add another regimen if needed or change norvasc to cardizem for optimal HR --> reassess in am    Polycythemia  well controlled for "nearly a year" per pt  con't home dose hydroxyurea - next dosing is 500mg QOD (she alternates with 1000mg)    Hypothyroidism:  con't home dose LT4    DVT PPX  SQ heparin BID (on hydroxyurea, can increase risk of bleeding w/ lovenox)    poc discussed with pt, team

## 2019-06-08 NOTE — CONSULT NOTE ADULT - SUBJECTIVE AND OBJECTIVE BOX
Patient is a 86y old  Female who presents with a chief complaint of Fall / RLE weakness / uncontrolled HTN (2019 13:01)      HPI:  86 year old Woman with pmhx of asthma, HTN, Polycythemia presented to ED c/o fall d/t RLE weakness. She reports that while trying to get out of bed this morning her R leg felt weak and when she attempted to stand her knee "gave way" then she fell to the ground on her knees, denies precipitating factors, no numbness/tingling. Ambulates with a cane at home. Denies CP/palpitation/SOB/HA/dizziness/abd pain/n/v/d/f/c.. Denies dysuria/urgency/frequency. Has had no hospitalization since her last hospital admit in T 2018.    In the ED BP initially 141/69 with subsequent assessments with 's - 160's, , T 36.9, RR 16, 98% rm air. CBC/BMP stable, TNI negative x 2; CT head no evidence of an acute territorial infarction. 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 Small vessel ischemic changes. CXR no acute findings, R knee xray degenerative changes, no acute pathology/fx. She received albuterol neb x2, asa 325mg x1, and NS 1L bolus IV. (2019 19:18)      PAST MEDICAL & SURGICAL HISTORY:  MDS (myelodysplastic syndrome)  Asthma, unspecified asthma severity, unspecified whether complicated, unspecified whether persistent  Hypothyroidism, unspecified type  Essential hypertension  Rib fractures  No significant past surgical history      PREVIOUS CARDIAC WORKUP:      Echo:  Stress Test:  Cardiac Cath:    ALLERGIES:    No Known Allergies       MEDICATIONS  (STANDING):  amLODIPine   Tablet 10 milliGRAM(s) Oral daily  aspirin  chewable 81 milliGRAM(s) Oral daily  buDESOnide   0.5 milliGRAM(s) Respule 0.5 milliGRAM(s) Nebulizer two times a day  calcium carbonate 1250 mG  + Vitamin D (OsCal 500 + D) 1 Tablet(s) Oral daily  heparin  Injectable 5000 Unit(s) SubCutaneous every 12 hours  hydroxyurea 500 milliGRAM(s) Oral every other day  ipratropium    for Nebulization 500 MICROGram(s) Nebulizer every 6 hours  levothyroxine 100 MICROGram(s) Oral <User Schedule>  montelukast 10 milliGRAM(s) Oral daily  tiotropium 18 MICROgram(s) Capsule 1 Capsule(s) Inhalation daily    MEDICATIONS  (PRN):  guaiFENesin ER 1200 milliGRAM(s) Oral every 12 hours PRN Cough      FAMILY HISTORY:  FH: diabetes mellitus (Sibling)        SOCIAL HISTORY:  .scl     ROS:     .ros    Vital Signs Last 24 Hrs  T(C): 36.8 (2019 12:40), Max: 36.9 (2019 04:51)  T(F): 98.2 (2019 12:40), Max: 98.4 (2019 04:51)  HR: 80 (2019 12:40) (76 - 103)  BP: 138/73 (2019 12:40) (138/73 - 160/76)  BP(mean): --  RR: 18 (2019 12:40) (17 - 22)  SpO2: 92% (2019 12:40) (92% - 100%)    I&O's Summary      PHYSICAL EXAM:    .phy      TELEMETRY:    ECG:    LABS:                          10.3   3.59  )-----------( 161      ( 2019 07:36 )             32.5     06-08    139  |  107  |  27<H>  ----------------------------<  96  3.7   |  26  |  0.84    Ca    9.1      2019 07:36  Mg     2.1     06-07    TPro  7.6  /  Alb  3.4  /  TBili  0.7  /  DBili  x   /  AST  26  /  ALT  15  /  AlkPhos  80  06-07        06-07 @ 15:34  Trop-I  0.025    06- @ 12:07  Trop-I  <0.015      Urinalysis Basic - ( 2019 12:07 )    Color: Yellow / Appearance: very cloudy / S.010 / pH: x  Gluc: x / Ketone: Negative  / Bili: Negative / Urobili: Negative mg/dL   Blood: x / Protein: 100 mg/dL / Nitrite: Negative   Leuk Esterase: Small / RBC: Negative /HPF / WBC 0-2   Sq Epi: x / Non Sq Epi: Occasional / Bacteria: Many      RADIOLOGY & ADDITIONAL STUDIES:    Xray Chest 2 Views PA/Lat (19 @ 09:58) >  IMPRESSION: No acute disease    CT Head No Cont (19 @ 12:33) >  IMPRESSION:   No parenchymal contusion, hemorrhage or extra-axial collection.  No CT evidence of an acute territorial infarction. 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 Small vessel ischemic changes. Chief complaint of Fall / RLE weakness / uncontrolled HTN (2019 13:01)      HPI:  86 year old Woman with pmhx of asthma, HTN, Polycythemia presented to ED c/o fall d/t RLE weakness. She reports that while trying to get out of bed this morning her R leg felt weak and when she attempted to stand her knee "gave way" then she fell to the ground on her knees, denies precipitating factors, no numbness/tingling. Ambulates with a cane at home. Denies CP/palpitation/SOB/HA/dizziness/abd pain/n/v/d/f/c.. Denies dysuria/urgency/frequency. Has had no hospitalization since her last hospital admit in [place] 2018.    In the ED BP initially 141/69 with subsequent assessments with 's - 160's, , T 36.9, RR 16, 98% rm air. CBC/BMP stable, TNI negative x 2; CT head no evidence of an acute territorial infarction. 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 Small vessel ischemic changes. CXR no acute findings, R knee xray degenerative changes, no acute pathology/fx. She received albuterol neb x2, asa 325mg x1, and NS 1L bolus IV. (2019 19:18)      PAST MEDICAL & SURGICAL HISTORY:  MDS (myelodysplastic syndrome)  Asthma, unspecified asthma severity, unspecified whether complicated, unspecified whether persistent  Hypothyroidism, unspecified type  Essential hypertension  Rib fractures  No significant past surgical history      PREVIOUS CARDIAC WORKUP:      Echo:  Stress Test:  Cardiac Cath:    ALLERGIES:    No Known Allergies       MEDICATIONS  (STANDING):  amLODIPine   Tablet 10 milliGRAM(s) Oral daily  aspirin  chewable 81 milliGRAM(s) Oral daily  buDESOnide   0.5 milliGRAM(s) Respule 0.5 milliGRAM(s) Nebulizer two times a day  calcium carbonate 1250 mG  + Vitamin D (OsCal 500 + D) 1 Tablet(s) Oral daily  heparin  Injectable 5000 Unit(s) SubCutaneous every 12 hours  hydroxyurea 500 milliGRAM(s) Oral every other day  ipratropium    for Nebulization 500 MICROGram(s) Nebulizer every 6 hours  levothyroxine 100 MICROGram(s) Oral <User Schedule>  montelukast 10 milliGRAM(s) Oral daily  tiotropium 18 MICROgram(s) Capsule 1 Capsule(s) Inhalation daily    MEDICATIONS  (PRN):  guaiFENesin ER 1200 milliGRAM(s) Oral every 12 hours PRN Cough      FAMILY HISTORY:  FH: diabetes mellitus (Sibling)        SOCIAL HISTORY:  .scl     ROS:     .ros    Vital Signs Last 24 Hrs  T(C): 36.8 (2019 12:40), Max: 36.9 (2019 04:51)  T(F): 98.2 (2019 12:40), Max: 98.4 (2019 04:51)  HR: 80 (2019 12:40) (76 - 103)  BP: 138/73 (2019 12:40) (138/73 - 160/76)  BP(mean): --  RR: 18 (2019 12:40) (17 - 22)  SpO2: 92% (2019 12:40) (92% - 100%)    I&O's Summary      PHYSICAL EXAM:    .phy      TELEMETRY:    ECG:    LABS:                          10.3   3.59  )-----------( 161      ( 2019 07:36 )             32.5     06-08    139  |  107  |  27<H>  ----------------------------<  96  3.7   |  26  |  0.84    Ca    9.1      2019 07:36  Mg     2.1     06-07    TPro  7.6  /  Alb  3.4  /  TBili  0.7  /  DBili  x   /  AST  26  /  ALT  15  /  AlkPhos  80  06-07        06-07 @ 15:34  Trop-I  0.025    - @ 12:07  Trop-I  <0.015      Urinalysis Basic - ( 2019 12:07 )    Color: Yellow / Appearance: very cloudy / S.010 / pH: x  Gluc: x / Ketone: Negative  / Bili: Negative / Urobili: Negative mg/dL   Blood: x / Protein: 100 mg/dL / Nitrite: Negative   Leuk Esterase: Small / RBC: Negative /HPF / WBC 0-2   Sq Epi: x / Non Sq Epi: Occasional / Bacteria: Many      RADIOLOGY & ADDITIONAL STUDIES:    Xray Chest 2 Views PA/Lat (19 @ 09:58) >  IMPRESSION: No acute disease    CT Head No Cont (19 @ 12:33) >  IMPRESSION:   No parenchymal contusion, hemorrhage or extra-axial collection.  No CT evidence of an acute territorial infarction. 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 Small vessel ischemic changes. Chief complaint of Fall / RLE weakness / uncontrolled HTN (2019 13:01)      HPI:  86-year-old female presented with complaints of a fall because of right lower extremity weakness. Symptoms of right leg weakness resolved. She denies palpitations. No history of atrial fibrillation. Recently had been taken off aspirin by her primary care physician but she does not recall the reason it was stopped. History of myelodysplastic syndrome, polycythemia.    Admitting MD HPI:  86 year old Woman with pmhx of asthma, HTN, Polycythemia presented to ED c/o fall d/t RLE weakness. She reports that while trying to get out of bed this morning her R leg felt weak and when she attempted to stand her knee "gave way" then she fell to the ground on her knees, denies precipitating factors, no numbness/tingling. Ambulates with a cane at home. Denies CP/palpitation/SOB/HA/dizziness/abd pain/n/v/d/f/c.. Denies dysuria/urgency/frequency. Has had no hospitalization since her last hospital admit in T 2018. In the ED BP initially 141/69 with subsequent assessments with 's - 160's, , T 36.9, RR 16, 98% rm air. CBC/BMP stable, TNI negative x 2; CT head no evidence of an acute territorial infarction. 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 Small vessel ischemic changes. CXR no acute findings, R knee xray degenerative changes, no acute pathology/fx. She received albuterol neb x2, asa 325mg x1, and NS 1L bolus IV. (2019 19:18)      PAST MEDICAL & SURGICAL HISTORY:  MDS (myelodysplastic syndrome)  Asthma, unspecified asthma severity, unspecified whether complicated, unspecified whether persistent  Hypothyroidism, unspecified type  Essential hypertension  Rib fractures  No significant past surgical history      PREVIOUS CARDIAC WORKUP:  None      ALLERGIES:    No Known Allergies       MEDICATIONS  (STANDING):  amLODIPine   Tablet 10 milliGRAM(s) Oral daily  aspirin  chewable 81 milliGRAM(s) Oral daily  buDESOnide   0.5 milliGRAM(s) Respule 0.5 milliGRAM(s) Nebulizer two times a day  calcium carbonate 1250 mG  + Vitamin D (OsCal 500 + D) 1 Tablet(s) Oral daily  heparin  Injectable 5000 Unit(s) SubCutaneous every 12 hours  hydroxyurea 500 milliGRAM(s) Oral every other day  ipratropium    for Nebulization 500 MICROGram(s) Nebulizer every 6 hours  levothyroxine 100 MICROGram(s) Oral <User Schedule>  montelukast 10 milliGRAM(s) Oral daily  tiotropium 18 MICROgram(s) Capsule 1 Capsule(s) Inhalation daily    MEDICATIONS  (PRN):  guaiFENesin ER 1200 milliGRAM(s) Oral every 12 hours PRN Cough      FAMILY HISTORY:  diabetes mellitus (Sibling)        SOCIAL HISTORY:  Nonsmoker. No ETOH abuse. No illicit drugs.     ROS:     Detailed ten system ROS was performed and was negative except for history as eluded to above.    no fever  no chills  no nausea  no vomiting  no diarrhea  no constipation  no melena  no hematochezia  no chest pain  no palpitations  no sob at rest  no dyspnea on exertion  no cough  no wheezing  no anorexia  no headache  no dizziness  no syncope  no weakness  no myalgia  no dysuria  no polyuria  no hematuria         Vital Signs Last 24 Hrs  T(C): 36.8 (2019 12:40), Max: 36.9 (2019 04:51)  T(F): 98.2 (2019 12:40), Max: 98.4 (2019 04:51)  HR: 80 (2019 12:40) (76 - 103)  BP: 138/73 (2019 12:40) (138/73 - 160/76)  RR: 18 (2019 12:40) (17 - 22)  SpO2: 92% (2019 12:40) (92% - 100%)    I&O's Summary      PHYSICAL EXAM:    General:                Comfortable, AAO X 3, in no distress.  HEENT:                  Atraumatic, PERRLA, EOMI, conjunctiva clear.    Neck:                     Supple, no adenopathy, no thyromegaly, no JVD, no bruit.  Back:                     Symmetric, non tender.  Chest:                    Clear, B/L symmetric air entry, no tachypnea  Heart:                     S1, S2 normal, no gallop, no murmur.  Abdomen:              Soft, non-tender, bowel sounds active. No palpable masses.  Extremities:           no cyanosis, no edema. Peripheral pulses normal.  Skin:                      Skin color, texture normal. No rashes.  Neurologic:            Grossly nonfocal.       TELEMETRY:  Normal sinus rhythm with no tachy or oumar events     ECG:  NSR, first degree AV block, no ST T changes of ischemia or infarction.     LABS:                          10.3   3.59  )-----------( 161      ( 2019 07:36 )             32.5         139  |  107  |  27<H>  ----------------------------<  96  3.7   |  26  |  0.84    Ca    9.1      2019 07:36  Mg     2.1         TPro  7.6  /  Alb  3.4  /  TBili  0.7  /  DBili  x   /  AST  26  /  ALT  15  /  AlkPhos  80   @ 15:34  Trop-I  0.025     @ 12:07  Trop-I  <0.015      Urinalysis Basic - ( 2019 12:07 )    Color: Yellow / Appearance: very cloudy / S.010 / pH: x  Gluc: x / Ketone: Negative  / Bili: Negative / Urobili: Negative mg/dL   Blood: x / Protein: 100 mg/dL / Nitrite: Negative   Leuk Esterase: Small / RBC: Negative /HPF / WBC 0-2   Sq Epi: x / Non Sq Epi: Occasional / Bacteria: Many      RADIOLOGY & ADDITIONAL STUDIES:    Xray Chest 2 Views PA/Lat (19 @ 09:58) >  IMPRESSION: No acute disease    CT Head No Cont (19 @ 12:33) >  IMPRESSION:   No parenchymal contusion, hemorrhage or extra-axial collection.  No CT evidence of an acute territorial infarction. 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 Small vessel ischemic changes.

## 2019-06-08 NOTE — SWALLOW BEDSIDE ASSESSMENT ADULT - COMMENTS
The patient was admitted to  with RLE s/p fall. Unclear if this is reflective of a neuro process. Pt also with elevated blood pressure. The pt has an underlying history of Polycythemia Vera/MDS, asthma, HTN, hypothyroidism, and past rib fractures. The patient was admitted to  with RLE s/p fall. Unclear if this is reflective of a neuro issue. Pt also with elevated blood pressure. The pt has an underlying history of Polycythemia Vera/MDS, asthma, HTN, hypothyroidism, and past rib fractures.

## 2019-06-08 NOTE — SWALLOW BEDSIDE ASSESSMENT ADULT - SLP GENERAL OBSERVATIONS
On encounter, some loss of bulk was evident in strap muscle regions.  The pt is alert and interactive. She was able to verbalize during communicative probes and in conversation via intelligible/fluent/linguistically intact/contextually appropriate. No primary speech-language pathology was evident. At reported communicative baseline. On encounter, some loss of bulk was evident in strap muscle regions.  The pt is alert and interactive. She was able to verbalize during communicative probes and in conversation via intelligible/fluent/linguistically intact/contextually appropriate utterances. No primary speech-language pathology was evident. At reported communicative baseline.

## 2019-06-08 NOTE — CONSULT NOTE ADULT - ASSESSMENT
86 year old woman c/o new right LE weakness, improved slurred speech. Ct of head showed no acute changes. Possible left CVA. Doubt spinal cord or peripheral nerve problem.

## 2019-06-08 NOTE — SWALLOW BEDSIDE ASSESSMENT ADULT - SWALLOW EVAL: RECOMMENDED DIET
SUGGEST A LOW SODIUM(BLOOD PRESSURE IS ELEVATED) REGULAR TEXTURE DIET WITH THIN LIQUID CONSISTENCIES AS SHE TOLERATES THESE FOOD TEXTURES FROM AN OROPHARYNGEAL SWALLOWING PERSPECTIVE ON CLINICAL EXAM.

## 2019-06-08 NOTE — SWALLOW BEDSIDE ASSESSMENT ADULT - SWALLOW EVAL: DIAGNOSIS
1) The pt admitted with RLE weakness without UE extremity weakness. the pt is able to competently feed self and exhibits functional Oropharyngeal Swallowing for age without overt Dysphagia or aspiration signs.   2) The pt is alert and interactive. She was able to verbalize during communicative probes and in conversation via intelligible/fluent/linguistically intact/contextually appropriate. No primary speech-language pathology was evident. At reported communicative baseline. 1) The pt admitted with RLE weakness without UE extremity weakness. The patient is able to competently feed self and exhibits functional Oropharyngeal Swallowing for age without overt Dysphagia or aspiration signs.   2) The pt is alert and interactive. She was able to verbalize during communicative probes and in conversation via intelligible/fluent/linguistically intact/contextually appropriate utterances. No primary speech-language pathology was evident. At reported communicative baseline.

## 2019-06-08 NOTE — CONSULT NOTE ADULT - ASSESSMENT
TIA    Suggest:    ASA  Statins  MRI brain and carotids  EMILIANO and ILR as in or out pt. TIA  HTN  Hypothyroidism    Suggest:    ASA  Statins  MRI brain. MRA  brain and carotids (or Carotid Doppler)  EMILIANO and ILR as in or out pt.

## 2019-06-08 NOTE — SWALLOW BEDSIDE ASSESSMENT ADULT - SWALLOW EVAL: CRITERIA FOR SKILLED INTERVENTION MET
NO NEED FOR SPEECH OR SWALLOWING THERAPY. HER SPEECH-LANGUAGE AND OROPHARYNGEAL SWALLOWING ABILITIES ARE WITHIN FUNCTIONAL PARAMETERS. GIVEN ABOVE. WILL NOT ACTIVELY FOLLOW. RECONSULT PRN.

## 2019-06-09 DIAGNOSIS — I63.81 OTHER CEREBRAL INFARCTION DUE TO OCCLUSION OR STENOSIS OF SMALL ARTERY: ICD-10-CM

## 2019-06-09 PROCEDURE — 70547 MR ANGIOGRAPHY NECK W/O DYE: CPT | Mod: 26

## 2019-06-09 PROCEDURE — 99232 SBSQ HOSP IP/OBS MODERATE 35: CPT

## 2019-06-09 PROCEDURE — 70544 MR ANGIOGRAPHY HEAD W/O DYE: CPT | Mod: 26,59

## 2019-06-09 PROCEDURE — 99233 SBSQ HOSP IP/OBS HIGH 50: CPT

## 2019-06-09 PROCEDURE — 70551 MRI BRAIN STEM W/O DYE: CPT | Mod: 26

## 2019-06-09 RX ORDER — ASPIRIN/CALCIUM CARB/MAGNESIUM 324 MG
325 TABLET ORAL DAILY
Refills: 0 | Status: DISCONTINUED | OUTPATIENT
Start: 2019-06-09 | End: 2019-06-11

## 2019-06-09 RX ORDER — ATORVASTATIN CALCIUM 80 MG/1
40 TABLET, FILM COATED ORAL AT BEDTIME
Refills: 0 | Status: DISCONTINUED | OUTPATIENT
Start: 2019-06-09 | End: 2019-06-11

## 2019-06-09 RX ADMIN — Medication 325 MILLIGRAM(S): at 11:13

## 2019-06-09 RX ADMIN — HEPARIN SODIUM 5000 UNIT(S): 5000 INJECTION INTRAVENOUS; SUBCUTANEOUS at 05:28

## 2019-06-09 RX ADMIN — Medication 1200 MILLIGRAM(S): at 21:09

## 2019-06-09 RX ADMIN — Medication 1 TABLET(S): at 11:13

## 2019-06-09 RX ADMIN — Medication 500 MICROGRAM(S): at 18:52

## 2019-06-09 RX ADMIN — MONTELUKAST 10 MILLIGRAM(S): 4 TABLET, CHEWABLE ORAL at 21:09

## 2019-06-09 RX ADMIN — AMLODIPINE BESYLATE 10 MILLIGRAM(S): 2.5 TABLET ORAL at 05:29

## 2019-06-09 RX ADMIN — HYDROXYUREA 1000 MILLIGRAM(S): 500 CAPSULE ORAL at 11:14

## 2019-06-09 RX ADMIN — Medication 0.5 MILLIGRAM(S): at 18:52

## 2019-06-09 RX ADMIN — ATORVASTATIN CALCIUM 40 MILLIGRAM(S): 80 TABLET, FILM COATED ORAL at 21:09

## 2019-06-09 RX ADMIN — Medication 500 MICROGRAM(S): at 11:50

## 2019-06-09 RX ADMIN — Medication 0.5 MILLIGRAM(S): at 11:51

## 2019-06-09 RX ADMIN — HEPARIN SODIUM 5000 UNIT(S): 5000 INJECTION INTRAVENOUS; SUBCUTANEOUS at 17:27

## 2019-06-09 NOTE — PROGRESS NOTE ADULT - ASSESSMENT
86 year old Woman with pmhx of asthma, HTN, Polycythemia admitted with     Fall due to CVA  right leg minimal weakness  neuro / cardio consults appreciated  MRI/MRA noted, ac lacunar left thalamic infarct  asa increased to 325  lipid profile  consult PT  tylenol PRN for pain  admit to tele  BP control  ILR, EP consult    Asthma  nebs - avoid albuterol d/t borderline tachycardia  con't home dose budenoside    HTN poorly controlled  report she had a PCP f/u yesterday and "everything was fine"  con't home med for now: norvasc 10  monitor for now and add another regimen if needed or change norvasc to cardizem for optimal HR --> reassess in am    Polycythemia  well controlled for "nearly a year" per pt  con't home dose hydroxyurea - next dosing is 500mg QOD (she alternates with 1000mg)    Hypothyroidism:  con't home dose LT4    DVT PPX  SQ heparin BID     poc discussed with pt, team, Dr. Kelly, Dr. Jain    likely RITU on 6/10

## 2019-06-09 NOTE — CONSULT NOTE ADULT - ASSESSMENT
Constitutional:  a well-developed, well-nourished female in no acute distress  HEENT: Atraumatic, MARISSA, Normal,   Respiratory: CTA-Breath Sounds normal, no rhonchi/wheeze  Cardiovascular:  S1S2; SYEDA present, No M/R/G  Gastrointestinal: Abdomen soft, non tender, Bowel Sounds present  Extremities: No edema, peripheral pulses present  Neurological: AAO x 3, no gross focal motor deficits  Skin: Non cellulitic, no rash, ulcers

## 2019-06-09 NOTE — CONSULT NOTE ADULT - PROBLEM SELECTOR RECOMMENDATION 9
agree with asa, add statin  MR/MRA head, MRA neck  2D echo  PT evaluation  may need prolonged EKG monitor, further hear imaging depending on MR of head
Continue tele monitoring  Keep NPO after midnight  For possible ILR implant in am-will D/W EP attending  Spoke to pt and daughter who are agreeable to procedure

## 2019-06-09 NOTE — CONSULT NOTE ADULT - SUBJECTIVE AND OBJECTIVE BOX
Patient is a 86y old  Female who presents with a chief complaint of Fall / RLE weakness / uncontrolled HTN (09 Jun 2019 11:22)  Dr Rowan requesting EP consultation to evaluate pt for ILR implant   Pt has been SR on tele since its inception, with 7 beats of SVT at midnight last night-self terminating.  Pt has a positive MRI finding (06/09) of an acute  lacunar infarct-left thalamus-will be going to acute rehab shortly.      HPI:  86 year old Woman with pmhx of asthma, HTN, Polycythemia presented to ED c/o fall d/t RLE weakness. She reports that while trying to get out of bed this morning her R leg felt weak and when she attempted to stand her knee "gave way" then she fell to the ground on her knees, denies precipitating factors, no numbness/tingling. Ambulates with a cane at home. Denies CP/palpitation/SOB/HA/dizziness/abd pain/n/v/d/f/c.. Denies dysuria/urgency/frequency. Has had no hospitalization since her last hospital admit in [facility] 1/2018.    In the ED BP initially 141/69 with subsequent assessments with 's - 160's, , T 36.9, RR 16, 98% rm air. CBC/BMP stable, TNI negative x 2; CT head no evidence of an acute territorial infarction. 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 Small vessel ischemic changes. CXR no acute findings, R knee xray degenerative changes, no acute pathology/fx. She received albuterol neb x2, asa 325mg x1, and NS 1L bolus IV. (07 Jun 2019 19:18)      PAST MEDICAL & SURGICAL HISTORY:  MDS (myelodysplastic syndrome)  Asthma, unspecified asthma severity, unspecified whether complicated, unspecified whether persistent  Hypothyroidism, unspecified type  Essential hypertension  Rib fractures  No significant past surgical history        MEDICATIONS  (STANDING):  amLODIPine   Tablet 10 milliGRAM(s) Oral daily  aspirin 325 milliGRAM(s) Oral daily  atorvastatin 40 milliGRAM(s) Oral at bedtime  buDESOnide   0.5 milliGRAM(s) Respule 0.5 milliGRAM(s) Nebulizer two times a day  calcium carbonate 1250 mG  + Vitamin D (OsCal 500 + D) 1 Tablet(s) Oral daily  heparin  Injectable 5000 Unit(s) SubCutaneous every 12 hours  hydroxyurea 1000 milliGRAM(s) Oral every other day  hydroxyurea 500 milliGRAM(s) Oral every other day  ipratropium    for Nebulization 500 MICROGram(s) Nebulizer every 6 hours  levothyroxine 100 MICROGram(s) Oral <User Schedule>  montelukast 10 milliGRAM(s) Oral daily  tiotropium 18 MICROgram(s) Capsule 1 Capsule(s) Inhalation daily    MEDICATIONS  (PRN):  guaiFENesin ER 1200 milliGRAM(s) Oral every 12 hours PRN Cough      FAMILY HISTORY:  FH: diabetes mellitus (Sibling)      SOCIAL HISTORY:  Lives alone in a house-mobilizes with a walker-no longer drives Negative tobacco etoh or illicit drug use, daughter lives 15 min away    ROS:     A comprehensive review of systems was performed and pertinent items are noted in the history above. A detailed ROS is as follows:    Constitutional	     Negative for anorexia, appetite changes, chills, fatigue, fevers, malaise, sweats and weight gain / loss.  Eyes: 	                         Negative for icterus, irritation, redness and visual disturbance.  ENT, mouth and face:	     Negative for ear discharge, earaches, hearing loss, tinnitus,  epistaxis, nasal congestion, snoring, sleep apnea, oral sores, dental and gum infection, sore throat hoarseness or voice change.  Neck:	                         Negative for thyroid enlargement, neck pain, swollen glands and difficulty in swallowing  Respiratory:                       Negative for asthma, chronic bronchitis, cough, dyspnea on exertion, emphysema, hemoptysis, pleurisy/chest pain, pneumonia, sputum, stridor and wheezing  Cardiovascular:                  Negative for chest pain, dyspnea, fatigue, irregular heart beat, near-syncope, orthopnea, palpitations, paroxysmal nocturnal dyspnea and syncope  Gastrointestinal:	      Negative for abdominal pain, nausea, vomiting, change in bowel habits, constipation, diarrhea, dyspepsia, dysphagia, odynophagia, reflux symptoms, jaundice, hematemesis, melena and hematochezia.  Genitourinary:	      Negative for genital lesions, discharge, bleeding, sexual problems, dysuria, frequency, hematuria and urinary incontinence.  Skin / Breast: 	      Negative for breast lump, breast tenderness. Negative for skin rash, redness, pruritis, swelling dryness and fissures.  Hematologic/lymphatic:   Negative for bleeding disorder, clotting disorder, petechial rash, easy bruising and lymphadenopathy.  Musculoskeletal:	      Negative for arthralgias, back pain, bone pain, muscle weakness, myalgias, neck pain and stiff joints  Vascular:	                          No leg pain, cramps, discoloration or edema.   Neurological:	      Negative for coordination problems, dizziness, gait problems, headaches, memory problems, paresthesia, seizures, speech problems, tremors, vertigo and weakness  Behavioral/Psych: 	      Negative for mood change, depression, anxiety, suicidal attempts.  Endocrine:	                          Negative for blurry vision, increased fatigue, polydipsia, polyphagia, polyuria, poor wound healing, pruritus, skin dryness and weight loss, fertility problems and temperature intolerance.  Allergic/Immunologic:	      Negative for anaphylaxis, angioedema and urticaria.      Vital Signs Last 24 Hrs  T(C): 36.2 (09 Jun 2019 12:00), Max: 37.2 (08 Jun 2019 21:26)  T(F): 97.2 (09 Jun 2019 12:00), Max: 98.9 (08 Jun 2019 21:26)  HR: 91 (09 Jun 2019 12:00) (76 - 101)  BP: 133/65 (09 Jun 2019 12:00) (129/60 - 173/99)  BP(mean): --  RR: 17 (09 Jun 2019 12:00) (17 - 18)  SpO2: 100% (09 Jun 2019 12:00) (92% - 100%)    I&O's Summary        INTERPRETATION OF TELEMETRY: SR with one 7 beat sun of SVT at 150 last night-self-terminating, No AF or ectopy detected.      ECG: SR with first degree AVB HR 90        LABS:                          10.3   3.59  )-----------( 161      ( 08 Jun 2019 07:36 )             32.5     06-08    139  |  107  |  27<H>  ----------------------------<  96  3.7   |  26  |  0.84    Ca    9.1      08 Jun 2019 07:36      CARDIAC MARKERS ( 07 Jun 2019 15:34 )  0.025 ng/mL / x     / x     / x     / x

## 2019-06-09 NOTE — PROGRESS NOTE ADULT - SUBJECTIVE AND OBJECTIVE BOX
CURRENT CARDIAC WORKUP:       Echo:  Stress Test:  Cardiac Cath:    Allergies:   No Known Allergies      MEDICATIONS  (STANDING):  amLODIPine   Tablet 10 milliGRAM(s) Oral daily  aspirin  chewable 81 milliGRAM(s) Oral daily  buDESOnide   0.5 milliGRAM(s) Respule 0.5 milliGRAM(s) Nebulizer two times a day  calcium carbonate 1250 mG  + Vitamin D (OsCal 500 + D) 1 Tablet(s) Oral daily  heparin  Injectable 5000 Unit(s) SubCutaneous every 12 hours  hydroxyurea 1000 milliGRAM(s) Oral every other day  hydroxyurea 500 milliGRAM(s) Oral every other day  ipratropium    for Nebulization 500 MICROGram(s) Nebulizer every 6 hours  levothyroxine 100 MICROGram(s) Oral <User Schedule>  montelukast 10 milliGRAM(s) Oral daily  tiotropium 18 MICROgram(s) Capsule 1 Capsule(s) Inhalation daily    MEDICATIONS  (PRN):  guaiFENesin ER 1200 milliGRAM(s) Oral every 12 hours PRN Cough      ROS:     .ros      Vital Signs Last 24 Hrs  T(C): 36.7 (2019 04:24), Max: 37.2 (2019 21:26)  T(F): 98.1 (2019 04:24), Max: 98.9 (2019 21:26)  HR: 101 (2019 08:27) (76 - 101)  BP: 147/53 (2019 08:27) (129/60 - 173/99)  BP(mean): --  RR: 17 (2019 04:24) (17 - 18)  SpO2: 100% (2019 04:24) (92% - 100%)    I&O's Summary      PHYSICAL EXAM:    .phy      TELEMETRY:    ECG:    LABS:                        10.3   3.59  )-----------( 161      ( 2019 07:36 )             32.5     06-08    139  |  107  |  27<H>  ----------------------------<  96  3.7   |  26  |  0.84    Ca    9.1      2019 07:36  Mg     2.1     06-07    TPro  7.6  /  Alb  3.4  /  TBili  0.7  /  DBili  x   /  AST  26  /  ALT  15  /  AlkPhos  80  06-          06-07 @ 15:34  Trop-I 0.025  CK     --  CK MB  --    -07 @ 12:07  Trop-I <0.015  CK     --  CK MB  --        Urinalysis Basic - ( 2019 12:07 )    Color: Yellow / Appearance: very cloudy / S.010 / pH: x  Gluc: x / Ketone: Negative  / Bili: Negative / Urobili: Negative mg/dL   Blood: x / Protein: 100 mg/dL / Nitrite: Negative   Leuk Esterase: Small / RBC: Negative /HPF / WBC 0-2   Sq Epi: x / Non Sq Epi: Occasional / Bacteria: Many            RADIOLOGY & ADDITIONAL STUDIES: 86-year-old female presented with complaints of a fall because of right lower extremity weakness. Symptoms of right leg weakness resolved. She denies palpitations. No history of atrial fibrillation. Recently had been taken off aspirin by her primary care physician but she does not recall the reason it was stopped. History of myelodysplastic syndrome, polycythemia.    Today, no new events. Awaiting MRI and MRA of head and neck.    PAST MEDICAL & SURGICAL HISTORY:  MDS (myelodysplastic syndrome)  Asthma, unspecified asthma severity, unspecified whether complicated, unspecified whether persistent  Hypothyroidism, unspecified type  Essential hypertension  Rib fractures  No significant past surgical history      PREVIOUS CARDIAC WORKUP:  None      Allergies:   No Known Allergies      MEDICATIONS  (STANDING):  amLODIPine   Tablet 10 milliGRAM(s) Oral daily  aspirin  chewable 81 milliGRAM(s) Oral daily  buDESOnide   0.5 milliGRAM(s) Respule 0.5 milliGRAM(s) Nebulizer two times a day  calcium carbonate 1250 mG  + Vitamin D (OsCal 500 + D) 1 Tablet(s) Oral daily  heparin  Injectable 5000 Unit(s) SubCutaneous every 12 hours  hydroxyurea 1000 milliGRAM(s) Oral every other day  hydroxyurea 500 milliGRAM(s) Oral every other day  ipratropium    for Nebulization 500 MICROGram(s) Nebulizer every 6 hours  levothyroxine 100 MICROGram(s) Oral <User Schedule>  montelukast 10 milliGRAM(s) Oral daily  tiotropium 18 MICROgram(s) Capsule 1 Capsule(s) Inhalation daily    MEDICATIONS  (PRN):  guaiFENesin ER 1200 milliGRAM(s) Oral every 12 hours PRN Cough      ROS:     Detailed ten system ROS was performed and was negative except for history as eluded to above.    no fever  no chills  no nausea  no vomiting  no diarrhea  no constipation  no melena  no hematochezia  no chest pain  no palpitations  no sob at rest  no dyspnea on exertion  no cough  no wheezing  no anorexia  no headache  no dizziness  no syncope  no weakness  no myalgia  no dysuria  no polyuria  no hematuria       Vital Signs Last 24 Hrs  T(C): 36.7 (09 Jun 2019 04:24), Max: 37.2 (08 Jun 2019 21:26)  T(F): 98.1 (09 Jun 2019 04:24), Max: 98.9 (08 Jun 2019 21:26)  HR: 101 (09 Jun 2019 08:27) (76 - 101)  BP: 147/53 (09 Jun 2019 08:27) (129/60 - 173/99)  RR: 17 (09 Jun 2019 04:24) (17 - 18)  SpO2: 100% (09 Jun 2019 04:24) (92% - 100%)       PHYSICAL EXAM:      General:                Comfortable, AAO X 3, in no distress.  HEENT:                  Atraumatic, PERRLA, EOMI, conjunctiva clear.    Neck:                     Supple, no adenopathy, no thyromegaly, no JVD, no bruit.  Back:                     Symmetric, non tender.  Chest:                    Clear, B/L symmetric air entry, no tachypnea  Heart:                     S1, S2 normal, no gallop, no murmur.  Abdomen:              Soft, non-tender, bowel sounds active. No palpable masses.  Extremities:           no cyanosis, no edema. Peripheral pulses normal.  Skin:                      Skin color, texture normal. No rashes.  Neurologic:            Grossly nonfocal.       TELEMETRY:  Normal sinus rhythm with no tachy or oumar events     ECG:  NSR, first degree AV block, no ST T changes of ischemia or infarction.     LABS:                        10.3   3.59  )-----------( 161      ( 08 Jun 2019 07:36 )             32.5     06-08    139  |  107  |  27<H>  ----------------------------<  96  3.7   |  26  |  0.84    Ca    9.1      08 Jun 2019 07:36  Mg     2.1     06-07    TPro  7.6  /  Alb  3.4  /  TBili  0.7  /  DBili  x   /  AST  26  /  ALT  15  /  AlkPhos  80  06-07      RADIOLOGY & ADDITIONAL STUDIES:  MRI MRA Pending

## 2019-06-09 NOTE — PROGRESS NOTE ADULT - ASSESSMENT
86 year old woman c/o new right UE> LE weakness. Ct of head showed no acute changes.  MRI Brain reveals acute lacunar infarct left thalamus  MRA's no significant stenosis Possible left CVA    # Acute left thalamic infarct, lacunar, most likely secondary to HTN    - Agree with asa, add statin   -  cardio w/u as in progress,  - DVT prophylaxis  - Recommend acute rehab, d/w pt abd her daughter, she is agreeable    D/W Dr. Rowan

## 2019-06-09 NOTE — PROVIDER CONTACT NOTE (OTHER) - SITUATION
Consult left with Dr. Shah's answering service.
s/w Everardo from [facility] service
spoke with Viky in answering service. aware of consult

## 2019-06-09 NOTE — PROGRESS NOTE ADULT - SUBJECTIVE AND OBJECTIVE BOX
Pt continues to have weakness right arm > right leg, barely able to lift arm antigravity.  Gait is unstable, no speech issues. No HA    MRI brain reveals acute lacunar infarct left thalamus  MRA's no significant stenosis    ROS: As above, other ROS Negative    MEDICATIONS  (STANDING):  amLODIPine   Tablet 10 milliGRAM(s) Oral daily  aspirin 325 milliGRAM(s) Oral daily  atorvastatin 40 milliGRAM(s) Oral at bedtime  buDESOnide   0.5 milliGRAM(s) Respule 0.5 milliGRAM(s) Nebulizer two times a day  calcium carbonate 1250 mG  + Vitamin D (OsCal 500 + D) 1 Tablet(s) Oral daily  heparin  Injectable 5000 Unit(s) SubCutaneous every 12 hours  hydroxyurea 1000 milliGRAM(s) Oral every other day  hydroxyurea 500 milliGRAM(s) Oral every other day  ipratropium    for Nebulization 500 MICROGram(s) Nebulizer every 6 hours  levothyroxine 100 MICROGram(s) Oral <User Schedule>  montelukast 10 milliGRAM(s) Oral daily  tiotropium 18 MICROgram(s) Capsule 1 Capsule(s) Inhalation daily      Vital Signs Last 24 Hrs  T(C): 36.2 (09 Jun 2019 12:00), Max: 37.2 (08 Jun 2019 21:26)  T(F): 97.2 (09 Jun 2019 12:00), Max: 98.9 (08 Jun 2019 21:26)  HR: 91 (09 Jun 2019 12:00) (76 - 101)  BP: 133/65 (09 Jun 2019 12:00) (129/60 - 173/99)  BP(mean): --  RR: 17 (09 Jun 2019 12:00) (17 - 18)  SpO2: 100% (09 Jun 2019 12:00) (92% - 100%)    General: Cooperative, NAD   NECK: supple, no masses  ENT: Normal hearing   Vascular : no carotid bruits,   Lungs: CTAB  Chest: RRR, no murmurs  Extremities: nontender, + ecchymosis right knee, noedema  Musculoskeletal: no adventitious movements, no joint stiffness  Skin: no rash    Neurological Examination:  MS: AxOx3. Speech fluent w/o paraphasic error, repetition, naming is intact   CN: VFFTC, PERLL, EOMI, V1-3 sensation intact, face symmetric, hearing intact, palate elevates symmetrically, tongue midline, SCM equal bilaterally  Motor: Right UE prox 3/5, LE 4/5, Left UE/LE 5/5   Sens: Intact to light touch, dECREASES PP right arm.    Reflexes: 0-1/4 all over, downgoing toes b/l  Coord:  No dysmetria left   Gait: Unstable    NIHSS: 7    Labs:                       10.3   3.59  )-----------( 161      ( 08 Jun 2019 07:36 )             32.5     06-08    139  |  107  |  27<H>  ----------------------------<  96  3.7   |  26  |  0.84    Ca    9.1      08 Jun 2019 07:36      Radiology report:  - MRI brain: < from: MR Head No Cont (06.09.19 @ 09:28) >  Acute nonhemorrhagic ischemic lacunar infarct involving the left thalamus.    Atrophy and advanced chronic small vessel ischemic changes.    < from: MR Angio Neck No Cont (06.09.19 @ 09:28) >  No evidence of hemodynamically significant stenosis at the margin of the   internal carotid arteries bilaterally.    Smooth area of narrowing in midportion left cervical internal carotid   artery.    No major branch occlusion or hemodynamically significant stenosis of the   intracranial circulation.    No evidence of intracranial aneurysm orhemodynamically significant   stenosis of the [place]/intracranial circulation.

## 2019-06-09 NOTE — PROGRESS NOTE ADULT - ASSESSMENT
MRI MRA today    Out pt EMILIANO and ILR    Continue aspirin, statins.  ARBs for HTN if BP is high, unless high grade carotid or intracranial stenosis. TIA  HTN  Hypothyroidism    Suggest:    MRI MRA today    Out pt EMILIANO and ILR    Continue aspirin, statins.  ARBs for HTN if BP is high, unless high grade carotid or intracranial stenosis.

## 2019-06-10 LAB
CHOLEST SERPL-MCNC: 154 MG/DL — SIGNIFICANT CHANGE UP (ref 10–199)
HDLC SERPL-MCNC: 44 MG/DL — LOW
LIPID PNL WITH DIRECT LDL SERPL: 86 MG/DL — SIGNIFICANT CHANGE UP
TOTAL CHOLESTEROL/HDL RATIO MEASUREMENT: 3.5 RATIO — SIGNIFICANT CHANGE UP (ref 3.3–7.1)
TRIGL SERPL-MCNC: 119 MG/DL — SIGNIFICANT CHANGE UP (ref 10–149)

## 2019-06-10 RX ORDER — ARFORMOTEROL TARTRATE 15 UG/2ML
15 SOLUTION RESPIRATORY (INHALATION) EVERY 12 HOURS
Refills: 0 | Status: DISCONTINUED | OUTPATIENT
Start: 2019-06-10 | End: 2019-06-11

## 2019-06-10 RX ADMIN — Medication 1 TABLET(S): at 12:16

## 2019-06-10 RX ADMIN — Medication 1200 MILLIGRAM(S): at 21:05

## 2019-06-10 RX ADMIN — Medication 0.5 MILLIGRAM(S): at 21:28

## 2019-06-10 RX ADMIN — Medication 500 MICROGRAM(S): at 07:39

## 2019-06-10 RX ADMIN — MONTELUKAST 10 MILLIGRAM(S): 4 TABLET, CHEWABLE ORAL at 21:04

## 2019-06-10 RX ADMIN — Medication 500 MICROGRAM(S): at 21:29

## 2019-06-10 RX ADMIN — HYDROXYUREA 500 MILLIGRAM(S): 500 CAPSULE ORAL at 12:16

## 2019-06-10 RX ADMIN — HEPARIN SODIUM 5000 UNIT(S): 5000 INJECTION INTRAVENOUS; SUBCUTANEOUS at 19:48

## 2019-06-10 RX ADMIN — Medication 0.5 MILLIGRAM(S): at 07:39

## 2019-06-10 RX ADMIN — AMLODIPINE BESYLATE 10 MILLIGRAM(S): 2.5 TABLET ORAL at 05:30

## 2019-06-10 RX ADMIN — Medication 325 MILLIGRAM(S): at 12:16

## 2019-06-10 RX ADMIN — ARFORMOTEROL TARTRATE 15 MICROGRAM(S): 15 SOLUTION RESPIRATORY (INHALATION) at 21:28

## 2019-06-10 RX ADMIN — ATORVASTATIN CALCIUM 40 MILLIGRAM(S): 80 TABLET, FILM COATED ORAL at 21:04

## 2019-06-10 RX ADMIN — Medication 100 MICROGRAM(S): at 05:30

## 2019-06-10 RX ADMIN — Medication 500 MICROGRAM(S): at 13:16

## 2019-06-10 RX ADMIN — HEPARIN SODIUM 5000 UNIT(S): 5000 INJECTION INTRAVENOUS; SUBCUTANEOUS at 05:30

## 2019-06-10 NOTE — PHYSICAL THERAPY INITIAL EVALUATION ADULT - ADDITIONAL COMMENTS
pt has 3 steps to enter home with HR; stays on 1st floor of home although there are carpeted steps to basement; dtrs report pt will occasionally go down/up these  stairs sitting on buttocks; pt had attended  [facility] in past after a fractured pelvis per family

## 2019-06-10 NOTE — PROCEDURAL SAFETY CHECKLIST WITH OR WITHOUT SEDATION - NSPOSTCOMMENTFT_GEN_ALL_CORE
Probe in 0910, bubble study at 0920, patient tolerated procedure well. Probe out at 0923 by Dr. Kelly.

## 2019-06-10 NOTE — PHYSICAL THERAPY INITIAL EVALUATION ADULT - PLANNED THERAPY INTERVENTIONS, PT EVAL
neuromuscular re-education/transfer training/gait training/motor coordination training/ROM/strengthening/balance training/bed mobility training

## 2019-06-10 NOTE — PHYSICAL THERAPY INITIAL EVALUATION ADULT - MUSCLE TONE ASSESSMENT, REHAB EVAL
mildly decreased tone/decreased muscle mass/bulk diffusely ,with very prominent /?hypertrophied shoulder joints B

## 2019-06-10 NOTE — PROGRESS NOTE ADULT - SUBJECTIVE AND OBJECTIVE BOX
HPI: 86 year old Woman with pmhx of asthma, HTN, Polycythemia presented to ED c/o fall d/t RLE weakness. She reports that while trying to get out of bed this morning her R leg felt weak and when she attempted to stand her knee "gave way" then she fell to the ground on her knees, denies precipitating factors, no numbness/tingling. Ambulates with a cane at home. Denies CP/palpitation/SOB/HA/dizziness/abd pain/n/v/d/f/c.. Denies dysuria/urgency/frequency. Has had no hospitalization since her last hospital admit in [facility] 1/2018.    6/8: right leg weakness much improved    6/9: MRI showed acute lacunar infarct Left thalamus  BP was 173/99 in early am, later 147/53  HR went up to 120s upon minimal ambulation; ataxic gait right leg    6/10: s/p ILR        PHYSICAL EXAM:    Vital Signs Last 24 Hrs  T(C): 36.1 (10 Ady 2019 09:28), Max: 36.6 (09 Jun 2019 16:53)  T(F): 97 (10 Ady 2019 09:28), Max: 97.8 (09 Jun 2019 16:53)  HR: 77 (10 Ady 2019 10:07) (73 - 91)  BP: 147/61 (10 Ady 2019 10:07) (99/62 - 158/74)  BP(mean): --  RR: 18 (10 Ady 2019 10:07) (14 - 18)  SpO2: 100% (10 Ady 2019 10:07) (96% - 100%)      Constitutional: Well appearing  HEENT: Atraumatic, MARISSA, Normal, No congestion  Respiratory: Breath Sounds normal, no rhonchi/wheeze  Cardiovascular: N S1S2; SYEDA present  Gastrointestinal: Abdomen soft, non tender, Bowel Sounds present  Extremities: No edema, peripheral pulses present  Neurological: AAO x 3, no gross focal motor deficits  Skin: Non cellulitic, no rash, ulcers  Lymph Nodes: No lymphadenopathy noted  Back: No CVA tenderness   Musculoskeletal: non tender  Breasts: Deferred  Genitourinary: deferred  Rectal: Deferred               Lab Results:  CBC    .		Differential:	[] Automated		[] Manual  Chemistry                      MICROBIOLOGY/CULTURES:      RADIOLOGY RESULTS:          MEDICATIONS  (STANDING):  amLODIPine   Tablet 10 milliGRAM(s) Oral daily  aspirin 325 milliGRAM(s) Oral daily  atorvastatin 40 milliGRAM(s) Oral at bedtime  buDESOnide   0.5 milliGRAM(s) Respule 0.5 milliGRAM(s) Nebulizer two times a day  calcium carbonate 1250 mG  + Vitamin D (OsCal 500 + D) 1 Tablet(s) Oral daily  heparin  Injectable 5000 Unit(s) SubCutaneous every 12 hours  hydroxyurea 1000 milliGRAM(s) Oral every other day  hydroxyurea 500 milliGRAM(s) Oral every other day  ipratropium    for Nebulization 500 MICROGram(s) Nebulizer every 6 hours  levothyroxine 100 MICROGram(s) Oral <User Schedule>  montelukast 10 milliGRAM(s) Oral daily  tiotropium 18 MICROgram(s) Capsule 1 Capsule(s) Inhalation daily

## 2019-06-10 NOTE — PROGRESS NOTE ADULT - ASSESSMENT
86 year old Woman with pmhx of asthma, HTN, Polycythemia admitted with     Fall due to CVA  right leg minimal weakness  neuro / cardio consults appreciated  MRI/MRA noted, ac lacunar left thalamic infarct  asa increased to 325  lipid profile awaited  consult PT  tylenol PRN for pain  admit to tele  BP control  s/p ILR, EP consult    Asthma  nebs - avoid albuterol d/t borderline tachycardia  con't home dose budenoside    HTN poorly controlled  report she had a PCP f/u yesterday and "everything was fine"  con't home med for now: norvasc 10  monitor for now and add another regimen if needed or change norvasc to cardizem for optimal HR --> reassess in am    Polycythemia  well controlled for "nearly a year" per pt  con't home dose hydroxyurea - next dosing is 500mg QOD (she alternates with 1000mg)    Hypothyroidism:  con't home dose LT4    DVT PPX  SQ heparin BID     poc discussed with pt, team, family    likely RITU on 6/11

## 2019-06-10 NOTE — PHYSICAL THERAPY INITIAL EVALUATION ADULT - IMPAIRMENTS FOUND, PT EVAL
aerobic capacity/endurance/joint integrity and mobility/gait, locomotion, and balance/muscle strength

## 2019-06-10 NOTE — PHYSICAL THERAPY INITIAL EVALUATION ADULT - CRITERIA FOR SKILLED THERAPEUTIC INTERVENTIONS
risk reduction/prevention/rehab potential/predicted duration of therapy intervention/therapy frequency/anticipated equipment needs at discharge/impairments found/functional limitations in following categories/anticipated discharge recommendation

## 2019-06-10 NOTE — PHYSICAL THERAPY INITIAL EVALUATION ADULT - SKIN COLOR/CHARACTERISTICS
bruising noted anterior R knee and medial R lowre leg just superior to ankle; scab over lateral L elbow with local bruising ;peeling skin over plantar surface L foot and small dry callous noted plantar medial R midfoot (has dropped long arch )/bruised (ecchymotic)

## 2019-06-10 NOTE — CONSULT NOTE ADULT - REASON FOR ADMISSION
Fall / RLE weakness / uncontrolled HTN

## 2019-06-10 NOTE — PHYSICAL THERAPY INITIAL EVALUATION ADULT - PERSONAL SAFETY AND JUDGMENT, REHAB EVAL
at risk behaviors demonstrated/did not immediately notify daughters of collapse/fall at EOB,until contacted by phone to confirm time for grocery shopping that morning

## 2019-06-10 NOTE — PHYSICAL THERAPY INITIAL EVALUATION ADULT - IMPAIRMENTS CONTRIBUTING TO GAIT DEVIATIONS, PT EVAL
ataxic/decreased strength/impaired coordination/impaired motor control/RLE mildly ataxic with impaired foot placement

## 2019-06-10 NOTE — CONSULT NOTE ADULT - ASSESSMENT
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     Overall this is an 85 year old female now admitted with acute nonhemorrhagic CVA. The patient has been doing well on hydrea. At this time due to the present cytopenias would hold off on hydrea. CBC from the 8th was suggestive of moderate leukopenia with mild lymphocytopenia which has been persistent and concordant with outpatient laboratory studies. Would continue with aspirin. Also in light of the patients cytopenias and cytoreductive therapy would recommend CBCs while inpatient especially while receiving hydrea as there is considerable risk for medication induced myelosuppression.     Would recommend changing DVT prophylaxis to once daily Lovenox.     will continue to follow

## 2019-06-10 NOTE — PHYSICAL THERAPY INITIAL EVALUATION ADULT - GENERAL OBSERVATIONS, REHAB EVAL
resting supine in bed s/p implanted loop recorder placed at Cath Lab.(small incision L parasternal region with minimal  local bruising ) A& Ox3,speech clear/fluent

## 2019-06-10 NOTE — PROCEDURAL SAFETY CHECKLIST WITH OR WITHOUT SEDATION - NSPOSTCOMMENTFT_GEN_ALL_CORE
Patient tolerated procedure well. Wound class 1. Dressing clean dry and intact. Patient tolerated procedure well. Wound class 1. Dressing clean dry and intact. Pairing and education on home monitor device completed by Sha Bautista, jaja rep.

## 2019-06-10 NOTE — PHYSICAL THERAPY INITIAL EVALUATION ADULT - PATIENT/FAMILY/SIGNIFICANT OTHER GOALS STATEMENT, PT EVAL
2 daughters at bedside eager for patient to attend REHAB on DC ,feel acute rehab would better meet patients needs (independent,living alone prior to admission)

## 2019-06-10 NOTE — PACU DISCHARGE NOTE - COMMENTS
Report given to Jo Lisa RN 3E. Verified with [employer] that the patient is on cardiac monitor. Awaiting for transport staff. Report given to Jo Lisa RN 3E. Verified with [employer] that the patient is on cardiac monitor. Awaiting for transport staff. Home monitor device given to Luci, patient's daughter. Report given to Jo Lisa RN 3E. Verified with [employer] that the patient is on cardiac monitor. Transferred patient with this RN and A. Mucha RN at 1038. Home monitor device given to Luci, patient's daughter.

## 2019-06-10 NOTE — PHYSICAL THERAPY INITIAL EVALUATION ADULT - RANGE OF MOTION EXAMINATION, REHAB EVAL
++crepitus R shoulder joint ,mod limited elevation R shoulder in saggital & coronal planes ,suspect RC arthropathy ,decreased ability to SLR RLE vs LLE/bilateral lower extremity ROM was WFL (within functional limits)/bilateral upper extremity ROM was WFL (within functional limits)

## 2019-06-10 NOTE — PROGRESS NOTE ADULT - REASON FOR ADMISSION
Fall / RLE weakness / uncontrolled HTN

## 2019-06-10 NOTE — PHYSICAL THERAPY INITIAL EVALUATION ADULT - MANUAL MUSCLE TESTING RESULTS, REHAB EVAL
mild R sided weakness parveen. hip/knee EXTENSORS/hip ABD 3+ to 4-/5 ,ankle DF 5-/5 ,PF=5/5 ; pt with difficulty elevating R shoulder in the 3/5 range ?due to DJD shoulder vs hemiparesis ,LUE/LLE >4+/5 thruout

## 2019-06-10 NOTE — CONSULT NOTE ADULT - SUBJECTIVE AND OBJECTIVE BOX
[facility]/ [facility] consult   HPI:  86 year old Woman with pmhx of asthma, HTN, Polycythemia presented to ED c/o fall d/t RLE weakness. She reports that while trying to get out of bed this morning her R leg felt weak and when she attempted to stand her knee "gave way" then she fell to the ground on her knees, denies precipitating factors, no numbness/tingling. Ambulates with a cane at home. Denies CP/palpitation/SOB/HA/dizziness/abd pain/n/v/d/f/c.. Denies dysuria/urgency/frequency. Has had no hospitalization since her last hospital admit in [facility] 1/2018.    In the ED BP initially 141/69 with subsequent assessments with 's - 160's, , T 36.9, RR 16, 98% rm air. CBC/BMP stable, TNI negative x 2; CT head no evidence of an acute territorial infarction. 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 Small vessel ischemic changes. CXR no acute findings, R knee xray degenerative changes, no acute pathology/fx. She received albuterol neb x2, asa 325mg x1, and NS 1L bolus IV. (07 Jun 2019 19:18)    Hematology:   patient is an 84 yo female well known to our practice and seen by Dr. 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   Patient was admitted wtih an acute thalamic nonhemorrhagic infarct.     Allergies    No Known Allergies    Intolerances        MEDICATIONS  (STANDING):  amLODIPine   Tablet 10 milliGRAM(s) Oral daily  arformoterol for Nebulization 15 MICROGram(s) Nebulizer every 12 hours  aspirin 325 milliGRAM(s) Oral daily  atorvastatin 40 milliGRAM(s) Oral at bedtime  buDESOnide   0.5 milliGRAM(s) Respule 0.5 milliGRAM(s) Nebulizer two times a day  calcium carbonate 1250 mG  + Vitamin D (OsCal 500 + D) 1 Tablet(s) Oral daily  heparin  Injectable 5000 Unit(s) SubCutaneous every 12 hours  hydroxyurea 1000 milliGRAM(s) Oral every other day  hydroxyurea 500 milliGRAM(s) Oral every other day  ipratropium    for Nebulization 500 MICROGram(s) Nebulizer every 6 hours  levothyroxine 100 MICROGram(s) Oral <User Schedule>  montelukast 10 milliGRAM(s) Oral daily  tiotropium 18 MICROgram(s) Capsule 1 Capsule(s) Inhalation daily    MEDICATIONS  (PRN):  guaiFENesin ER 1200 milliGRAM(s) Oral every 12 hours PRN Cough      PAST MEDICAL & SURGICAL HISTORY:  MDS (myelodysplastic syndrome)  Asthma, unspecified asthma severity, unspecified whether complicated, unspecified whether persistent  Hypothyroidism, unspecified type  Essential hypertension  Rib fractures  No significant past surgical history      FAMILY HISTORY:  FH: diabetes mellitus (Sibling)      SOCIAL HISTORY: No EtOH, no tobacco    Interval:     Todays's Evaluation:    GENERAL: NAD, well-developed  HEAD:  Atraumatic, Normocephalic  EYES: EOMI, PERRLA, conjunctiva and sclera clear  NECK: Supple, No JVD  CHEST/LUNG: Clear to auscultation bilaterally; No wheeze  HEART: Regular rate and rhythm; No murmurs, rubs, or gallops  ABDOMEN: Soft, Nontender, Nondistended; Bowel sounds present  EXTREMITIES:  2+ Peripheral Pulses, No clubbing, cyanosis, or edema  NEUROLOGY: non-focal  SKIN: No rashes or lesions    Laboratories:     .  LABS:                     RADIOLOGY, EKG & ADDITIONAL TESTS: Reviewed.     Summary:    Plan:

## 2019-06-10 NOTE — PHYSICAL THERAPY INITIAL EVALUATION ADULT - LEVEL OF INDEPENDENCE: STAND/SIT, REHAB EVAL
moderate assist (50% patients effort)/pt attempted to sit ?prematurely with loss of balance to RIGHT laterally

## 2019-06-11 ENCOUNTER — TRANSCRIPTION ENCOUNTER (OUTPATIENT)
Age: 84
End: 2019-06-11

## 2019-06-11 VITALS
OXYGEN SATURATION: 96 % | HEART RATE: 97 BPM | RESPIRATION RATE: 17 BRPM | SYSTOLIC BLOOD PRESSURE: 118 MMHG | TEMPERATURE: 97 F | DIASTOLIC BLOOD PRESSURE: 50 MMHG

## 2019-06-11 LAB
ANION GAP SERPL CALC-SCNC: 8 MMOL/L — SIGNIFICANT CHANGE UP (ref 5–17)
BUN SERPL-MCNC: 35 MG/DL — HIGH (ref 7–23)
CALCIUM SERPL-MCNC: 9 MG/DL — SIGNIFICANT CHANGE UP (ref 8.5–10.1)
CHLORIDE SERPL-SCNC: 108 MMOL/L — SIGNIFICANT CHANGE UP (ref 96–108)
CO2 SERPL-SCNC: 25 MMOL/L — SIGNIFICANT CHANGE UP (ref 22–31)
CREAT SERPL-MCNC: 0.72 MG/DL — SIGNIFICANT CHANGE UP (ref 0.5–1.3)
GLUCOSE SERPL-MCNC: 98 MG/DL — SIGNIFICANT CHANGE UP (ref 70–99)
HCT VFR BLD CALC: 32 % — LOW (ref 34.5–45)
HGB BLD-MCNC: 10.1 G/DL — LOW (ref 11.5–15.5)
MCHC RBC-ENTMCNC: 29.7 PG — SIGNIFICANT CHANGE UP (ref 27–34)
MCHC RBC-ENTMCNC: 31.6 GM/DL — LOW (ref 32–36)
MCV RBC AUTO: 94.1 FL — SIGNIFICANT CHANGE UP (ref 80–100)
PLATELET # BLD AUTO: 159 K/UL — SIGNIFICANT CHANGE UP (ref 150–400)
POTASSIUM SERPL-MCNC: 4.1 MMOL/L — SIGNIFICANT CHANGE UP (ref 3.5–5.3)
POTASSIUM SERPL-SCNC: 4.1 MMOL/L — SIGNIFICANT CHANGE UP (ref 3.5–5.3)
RBC # BLD: 3.4 M/UL — LOW (ref 3.8–5.2)
RBC # FLD: 17.1 % — HIGH (ref 10.3–14.5)
SODIUM SERPL-SCNC: 141 MMOL/L — SIGNIFICANT CHANGE UP (ref 135–145)
WBC # BLD: 3.31 K/UL — LOW (ref 3.8–10.5)
WBC # FLD AUTO: 3.31 K/UL — LOW (ref 3.8–10.5)

## 2019-06-11 RX ORDER — ASPIRIN/CALCIUM CARB/MAGNESIUM 324 MG
1 TABLET ORAL
Qty: 0 | Refills: 0 | DISCHARGE
Start: 2019-06-11

## 2019-06-11 RX ORDER — ATORVASTATIN CALCIUM 80 MG/1
1 TABLET, FILM COATED ORAL
Qty: 0 | Refills: 0 | DISCHARGE
Start: 2019-06-11

## 2019-06-11 RX ADMIN — Medication 325 MILLIGRAM(S): at 12:24

## 2019-06-11 RX ADMIN — HYDROXYUREA 1000 MILLIGRAM(S): 500 CAPSULE ORAL at 12:24

## 2019-06-11 RX ADMIN — HEPARIN SODIUM 5000 UNIT(S): 5000 INJECTION INTRAVENOUS; SUBCUTANEOUS at 05:35

## 2019-06-11 RX ADMIN — Medication 100 MICROGRAM(S): at 05:35

## 2019-06-11 RX ADMIN — Medication 0.5 MILLIGRAM(S): at 09:08

## 2019-06-11 RX ADMIN — Medication 1 TABLET(S): at 12:25

## 2019-06-11 RX ADMIN — AMLODIPINE BESYLATE 10 MILLIGRAM(S): 2.5 TABLET ORAL at 05:35

## 2019-06-11 RX ADMIN — ARFORMOTEROL TARTRATE 15 MICROGRAM(S): 15 SOLUTION RESPIRATORY (INHALATION) at 09:07

## 2019-06-11 NOTE — DISCHARGE NOTE NURSING/CASE MANAGEMENT/SOCIAL WORK - NSDCPEPTSTRK_GEN_ALL_CORE
Signs and symptoms of stroke/Stroke warning signs and symptoms/Risk factors for stroke/Stroke support groups for patients, families, and friends/Need for follow up after discharge/Prescribed medications/Stroke education booklet/Call 911 for stroke

## 2019-06-11 NOTE — DISCHARGE NOTE PROVIDER - CARE PROVIDERS DIRECT ADDRESSES
[email or web address]

## 2019-06-11 NOTE — DISCHARGE NOTE PROVIDER - HOSPITAL COURSE
PHYSICAL EXAM:        Daily       Daily         ICU Vital Signs Last 24 Hrs    T(C): 36.3 (11 Jun 2019 10:13), Max: 37.1 (10 Ady 2019 16:28)    T(F): 97.4 (11 Jun 2019 10:13), Max: 98.7 (10 Ady 2019 16:28)    HR: 97 (11 Jun 2019 10:13) (75 - 97)    BP: 118/50 (11 Jun 2019 10:13) (118/50 - 146/57)    BP(mean): --    ABP: --    ABP(mean): --    RR: 17 (11 Jun 2019 10:13) (17 - 17)    SpO2: 96% (11 Jun 2019 10:13) (95% - 96%)            Constitutional: Well appearing    HEENT: Atraumatic, MARISSA, Normal, No congestion    Respiratory: Breath Sounds normal, no rhonchi/wheeze    Cardiovascular: N S1S2;     Gastrointestinal: Abdomen soft, non tender, Bowel Sounds present    Extremities: No edema, peripheral pulses present    Neurological: AAO x 3, no gross focal motor deficits    Skin: Non cellulitic, no rash, ulcers    Lymph Nodes: No lymphadenopathy noted    Back: No CVA tenderness     Musculoskeletal: non tender    Breasts: Deferred    Genitourinary: deferred    Rectal: Deferred            86 year old Woman with pmhx of asthma, HTN, Polycythemia admitted with         Fall due to CVA    right leg minimal weakness    neuro / cardio consults appreciated    MRI/MRA noted, ac lacunar left thalamic infarct sec to HTN    asa increased to 325    lipid profile noted; LDL 86    consult PT    tylenol PRN for pain    admitted to tele    BP control    EP consult appreciated; s/p ILR; for long term HR monitoring; f/u with Dr. Zavala periodically to check ILR        Asthma    nebs - avoid albuterol d/t borderline tachycardia    con't home dose budenoside        HTN poorly controlled: better controlled now    monitor    cont home med for now: norvasc 10mg            Polycythemia    well controlled for "nearly a year" per pt    cont home dose hydroxyurea -        Hypothyroidism:    con't home dose        d/c to [facility] today        time spent 39 min        poc discussed with pt, daughter, team

## 2019-06-11 NOTE — DISCHARGE NOTE NURSING/CASE MANAGEMENT/SOCIAL WORK - NSDCDPATPORTLINK_GEN_ALL_CORE
You can access the [facility] Patient Portal, offered by [facility], by registering with the following website: [email or web address]

## 2019-06-11 NOTE — DISCHARGE NOTE NURSING/CASE MANAGEMENT/SOCIAL WORK - NSDPDISTO_GEN_ALL_CORE
[facility]  [phone]/Acute rehab

## 2019-06-11 NOTE — DISCHARGE NOTE PROVIDER - CARE PROVIDER_API CALL
Joanna Tucker)  Neurology  [address], [address]  [place] [address]  Phone: [phone]  Fax: [phone]  Follow Up Time:     Cordell Yousif)  Family Medicine  [address] [place] [address]  Phone: [phone]  Fax: [phone]  Follow Up Time:     Primitivo Zavala)  Cardiac Electrophysiology; Cardiovascular Disease  [address] [place] [address]  Phone: [phone]  Fax: [phone]  Follow Up Time:

## 2019-06-11 NOTE — DISCHARGE NOTE PROVIDER - NSDCCPCAREPLAN_GEN_ALL_CORE_FT
PRINCIPAL DISCHARGE DIAGNOSIS  Diagnosis: CVA (cerebrovascular accident)  Assessment and Plan of Treatment:       SECONDARY DISCHARGE DIAGNOSES  Diagnosis: HTN (hypertension)  Assessment and Plan of Treatment:     Diagnosis: Fall  Assessment and Plan of Treatment:

## 2019-06-11 NOTE — DISCHARGE NOTE PROVIDER - PROVIDER TOKENS
PROVIDER:[TOKEN:[[id number]]],PROVIDER:[TOKEN:[[id number]]],PROVIDER:[TOKEN:[[id number]]]

## 2019-06-14 DIAGNOSIS — E03.9 HYPOTHYROIDISM, UNSPECIFIED: ICD-10-CM

## 2019-06-14 DIAGNOSIS — D75.1 SECONDARY POLYCYTHEMIA: ICD-10-CM

## 2019-06-14 DIAGNOSIS — G83.11 MONOPLEGIA OF LOWER LIMB AFFECTING RIGHT DOMINANT SIDE: ICD-10-CM

## 2019-06-14 DIAGNOSIS — I10 ESSENTIAL (PRIMARY) HYPERTENSION: ICD-10-CM

## 2019-06-14 DIAGNOSIS — I63.9 CEREBRAL INFARCTION, UNSPECIFIED: ICD-10-CM

## 2019-06-14 DIAGNOSIS — J45.30 MILD PERSISTENT ASTHMA, UNCOMPLICATED: ICD-10-CM

## 2019-06-14 DIAGNOSIS — Z79.51 LONG TERM (CURRENT) USE OF INHALED STEROIDS: ICD-10-CM

## 2019-06-14 DIAGNOSIS — D47.4 OSTEOMYELOFIBROSIS: ICD-10-CM

## 2019-06-14 DIAGNOSIS — Z79.52 LONG TERM (CURRENT) USE OF SYSTEMIC STEROIDS: ICD-10-CM

## 2019-06-14 DIAGNOSIS — I47.1 SUPRAVENTRICULAR TACHYCARDIA: ICD-10-CM

## 2019-06-24 ENCOUNTER — APPOINTMENT (OUTPATIENT)
Dept: ELECTROPHYSIOLOGY | Facility: CLINIC | Age: 84
End: 2019-06-24
Payer: MEDICARE

## 2019-06-24 VITALS
HEIGHT: 60 IN | BODY MASS INDEX: 23.56 KG/M2 | SYSTOLIC BLOOD PRESSURE: 120 MMHG | DIASTOLIC BLOOD PRESSURE: 58 MMHG | WEIGHT: 120 LBS

## 2019-06-24 PROCEDURE — 93285 PRGRMG DEV EVAL SCRMS IP: CPT

## 2019-06-24 RX ORDER — ALBUTEROL SULFATE 90 UG/1
108 (90 BASE) POWDER, METERED RESPIRATORY (INHALATION) EVERY 6 HOURS
Qty: 3 | Refills: 3 | Status: DISCONTINUED | COMMUNITY
Start: 2018-07-02 | End: 2019-06-24

## 2019-07-01 ENCOUNTER — APPOINTMENT (OUTPATIENT)
Dept: FAMILY MEDICINE | Facility: CLINIC | Age: 84
End: 2019-07-01
Payer: MEDICARE

## 2019-07-01 VITALS
SYSTOLIC BLOOD PRESSURE: 122 MMHG | BODY MASS INDEX: 25.52 KG/M2 | HEART RATE: 91 BPM | OXYGEN SATURATION: 98 % | HEIGHT: 60 IN | DIASTOLIC BLOOD PRESSURE: 62 MMHG | WEIGHT: 130 LBS

## 2019-07-01 PROCEDURE — G0444 DEPRESSION SCREEN ANNUAL: CPT | Mod: 59

## 2019-07-01 PROCEDURE — 99495 TRANSJ CARE MGMT MOD F2F 14D: CPT | Mod: 25

## 2019-07-01 RX ORDER — GUAIFENESIN AND PSEUDOEPHEDRINE HYDROCHLORIDE 600; 60 MG/1; MG/1
60-600 TABLET, EXTENDED RELEASE ORAL
Refills: 0 | Status: COMPLETED | COMMUNITY

## 2019-07-01 RX ORDER — ASPIRIN 81 MG
81 TABLET, DELAYED RELEASE (ENTERIC COATED) ORAL
Refills: 0 | Status: COMPLETED | COMMUNITY

## 2019-07-01 RX ORDER — ATORVASTATIN CALCIUM 40 MG/1
40 TABLET, FILM COATED ORAL
Refills: 0 | Status: COMPLETED | COMMUNITY

## 2019-07-01 NOTE — PHYSICAL EXAM
[No Acute Distress] : no acute distress [Clear to Auscultation] : lungs were clear to auscultation bilaterally [Regular Rhythm] : with a regular rhythm [No Murmur] : no murmur heard [Pedal Pulses Present] : the pedal pulses are present [No Edema] : there was no peripheral edema [No Focal Deficits] : no focal deficits

## 2019-07-01 NOTE — PLAN
[FreeTextEntry1] : Ischemic stroke most likely due to atherosclerosis Atrial fibrillation being ruled out continue  Current meds continue antidepressant

## 2019-07-01 NOTE — HISTORY OF PRESENT ILLNESS
[de-identified] : Admitted to [facility] with ischemic stroke on June 7, affecting her right leg. Admitted for 4 days discharged on aspirin 325, and atorvastatin 40. Has links subcutaneously to monitor for atrial fibrillation. She was sent to rehabilitation for several weeks now receiving rehabilitation at home Patient still feels leg is weak. Physical exam.:   Of right leg Deep tendon reflexes, symmetrical. Good motor exam sensory exam normal. Position sense normal . Good dorsalis pedis pulse.\par \par Patient somewhat depressed declines antidepressant at this time\par \par Needs cardiologist to monitor links

## 2019-07-08 ENCOUNTER — NON-APPOINTMENT (OUTPATIENT)
Age: 84
End: 2019-07-08

## 2019-07-08 ENCOUNTER — RX RENEWAL (OUTPATIENT)
Age: 84
End: 2019-07-08

## 2019-07-08 ENCOUNTER — APPOINTMENT (OUTPATIENT)
Dept: CARDIOLOGY | Facility: CLINIC | Age: 84
End: 2019-07-08
Payer: MEDICARE

## 2019-07-08 VITALS — SYSTOLIC BLOOD PRESSURE: 145 MMHG | OXYGEN SATURATION: 95 % | HEART RATE: 93 BPM | DIASTOLIC BLOOD PRESSURE: 72 MMHG

## 2019-07-08 PROCEDURE — 93000 ELECTROCARDIOGRAM COMPLETE: CPT

## 2019-07-08 PROCEDURE — 99204 OFFICE O/P NEW MOD 45 MIN: CPT | Mod: 25

## 2019-07-08 NOTE — PHYSICAL EXAM
[General Appearance - Well Developed] : well developed [Normal Appearance] : normal appearance [Well Groomed] : well groomed [General Appearance - Well Nourished] : well nourished [No Deformities] : no deformities [General Appearance - In No Acute Distress] : no acute distress [Normal Conjunctiva] : the conjunctiva exhibited no abnormalities [Eyelids - No Xanthelasma] : the eyelids demonstrated no xanthelasmas [Normal Oral Mucosa] : normal oral mucosa [No Oral Pallor] : no oral pallor [No Oral Cyanosis] : no oral cyanosis [FreeTextEntry1] : no JVD or bruits  [Heart Rate And Rhythm] : heart rate and rhythm were normal [Heart Sounds] : normal S1 and S2 [Murmurs] : no murmurs present [Edema] : no peripheral edema present [Arterial Pulses Normal] : the arterial pulses were normal [Respiration, Rhythm And Depth] : normal respiratory rhythm and effort [Exaggerated Use Of Accessory Muscles For Inspiration] : no accessory muscle use [Auscultation Breath Sounds / Voice Sounds] : lungs were clear to auscultation bilaterally [Abdomen Tenderness] : non-tender [Abdomen Soft] : soft [] : no hepato-splenomegaly [Abdomen Mass (___ Cm)] : no abdominal mass palpated [Oriented To Time, Place, And Person] : oriented to person, place, and time [Affect] : the affect was normal [Mood] : the mood was normal [No Anxiety] : not feeling anxious

## 2019-07-08 NOTE — HISTORY OF PRESENT ILLNESS
[FreeTextEntry1] : Pt is an 85 y/o F who is referred here today by their PCP for evaluation.  Recent hospitalization for ischemic stroke at [facility] (went to ED for R leg weakness).  Had ILR placed by Dr Kc. She is feeling better but still with right sided weakness for which she has PT/OT.\par Had EMILIANO in hospital\par \par PMH: HTN, hypothyroidism, polycythemia vera dx 2017 on hydroxyurea, ischemic stroke 2019\par Smoking status: never\par social ETOH\par no drug use\par Current exercise: PT/OT\par Daily water intake: 8 oz\par Daily caffeine intake: 1/2 cup coffee\par OTC medications: calcium, vit D, ASA, mucinex PRN\par Previous cardiac testing: none\par denies any cardiac family history \par 3 children - no problem with pregnancies\par LMP at age 50

## 2019-07-08 NOTE — DISCUSSION/SUMMARY
[FreeTextEntry1] : Pt is an 87 y/o F who is referred here today by their PCP for evaluation.  Recent hospitalization for ischemic stroke at [facility] (went to ED for R leg weakness).  Had ILR placed by Dr Kc. She is feeling better but still with right sided weakness for which she has PT/OT.\par Had EMILIANO in hospital\par ILR thus far no afib\par c/w statin and ASA\par She has f/u with EP in 6 mnths\par HTN: well controlled, c/w current meds\par The described plan was discussed with the pt.  All questions and concerns were addressed to the best of my knowledge.

## 2019-07-11 ENCOUNTER — APPOINTMENT (OUTPATIENT)
Dept: ELECTROPHYSIOLOGY | Facility: CLINIC | Age: 84
End: 2019-07-11
Payer: MEDICARE

## 2019-07-11 DIAGNOSIS — Z95.818 PRESENCE OF OTHER CARDIAC IMPLANTS AND GRAFTS: ICD-10-CM

## 2019-07-11 PROCEDURE — 93298 REM INTERROG DEV EVAL SCRMS: CPT

## 2019-07-11 PROCEDURE — 93299: CPT

## 2019-07-17 ENCOUNTER — APPOINTMENT (OUTPATIENT)
Dept: NEUROLOGY | Facility: CLINIC | Age: 84
End: 2019-07-17
Payer: MEDICARE

## 2019-07-17 VITALS
BODY MASS INDEX: 24.54 KG/M2 | HEIGHT: 60 IN | HEART RATE: 89 BPM | DIASTOLIC BLOOD PRESSURE: 60 MMHG | WEIGHT: 125 LBS | SYSTOLIC BLOOD PRESSURE: 114 MMHG

## 2019-07-17 DIAGNOSIS — Z82.49 FAMILY HISTORY OF ISCHEMIC HEART DISEASE AND OTHER DISEASES OF THE CIRCULATORY SYSTEM: ICD-10-CM

## 2019-07-17 DIAGNOSIS — Z83.3 FAMILY HISTORY OF DIABETES MELLITUS: ICD-10-CM

## 2019-07-17 PROCEDURE — 99214 OFFICE O/P EST MOD 30 MIN: CPT

## 2019-07-17 NOTE — HISTORY OF PRESENT ILLNESS
[FreeTextEntry1] : 86-year-old female with PMH remarkable for hypertension, hypothyroid, MPN and polycythemia vera; presented to [facility] on 6 less 7/19 with right upper motor lower extremity weakness, patient was unable to move the arm antigravity, stroke workup included CT scan head that was unremarkable, MRI of the brain positive for left thalamic infarct, MRA's revealed no significant stenosis. Patient's aspirin was increased to 325 mg, in addition, atorvastatin 40 mgs added. Patient had a EMILIANO, was also seen by cardiology, LINQ was implanted, patient was discharged to [facility], she spent 11 days at the rehab, since discharge home, she has been receiving home PT/OT twice weekly.\par \par Patient is coming for followup visit today, she is accompanied by her daughter, she has made remarkable improvement of right upper extremity weakness however continues to have limitation of right shoulder/proximal muscles, in addition, patient has been complaining of pain in the right knee, measurements at the knee of painful (patient informs me that she had fallen down at the onset of stroke and injured her right knee). Patient is walking with the assistance of a walker, she has no complaints of speech disturbance, visual blurring, facial droop, headaches or cognitive decline.

## 2019-07-17 NOTE — DATA REVIEWED
[de-identified] : 6/9/19: Acute nonhemorrhagic  infarct involving the left thalamus, atrophy and chronic small ischemic changes.\par MRA brain and carotids: no significant stenosis

## 2019-07-17 NOTE — CONSULT LETTER
[Dear  ___] : Dear  [unfilled], [Courtesy Letter:] : I had the pleasure of seeing your patient, [unfilled], in my office today. [DrStacey  ___] : Dr. NICHOLE

## 2019-07-17 NOTE — REVIEW OF SYSTEMS
[Arm Weakness] : arm weakness [Leg Weakness] : leg weakness [Difficulty Walking] : difficulty walking [As Noted in HPI] : as noted in HPI [Negative] : Heme/Lymph [de-identified] : Right knee pain

## 2019-07-17 NOTE — PHYSICAL EXAM
[General Appearance - Alert] : alert [General Appearance - In No Acute Distress] : in no acute distress [Oriented To Time, Place, And Person] : oriented to person, place, and time [Impaired Insight] : insight and judgment were intact [Mood] : the mood was normal [Person] : oriented to person [Place] : oriented to place [Time] : oriented to time [Concentration Intact] : normal concentrating ability [Visual Intact] : visual attention was ~T not ~L decreased [Naming Objects] : no difficulty naming common objects [Repeating Phrases] : no difficulty repeating a phrase [Writing A Sentence] : no difficulty writing a sentence [Fluency] : fluency intact [Comprehension] : comprehension intact [Reading] : reading intact [Past History] : adequate knowledge of personal past history [Cranial Nerves Optic (II)] : visual acuity intact bilaterally,  visual fields full to confrontation, pupils equal round and reactive to light [Cranial Nerves Oculomotor (III)] : extraocular motion intact [Cranial Nerves Trigeminal (V)] : facial sensation intact symmetrically [Cranial Nerves Facial (VII)] : face symmetrical [Cranial Nerves Vestibulocochlear (VIII)] : hearing was intact bilaterally [Cranial Nerves Glossopharyngeal (IX)] : tongue and palate midline [Cranial Nerves Accessory (XI - Cranial And Spinal)] : head turning and shoulder shrug symmetric [Cranial Nerves Hypoglossal (XII)] : there was no tongue deviation with protrusion [Motor Tone] : muscle tone was normal in all four extremities [No Muscle Atrophy] : normal bulk in all four extremities [Sensation Tactile Decrease] : light touch was intact [Proprioception] : proprioception was intact [Vibration Decrease Leg / Foot Both Ankles] : decreased at both ankles [Vibration Decrease Leg / Foot Toes Both Feet] : decreased at the toes of both feet  [Abnormal Walk] : normal gait [Balance] : balance was intact [Past-pointing] : there was no past-pointing [Tremor] : no tremor present [2+] : Brachioradialis left 2+ [1+] : Ankle jerk right 1+ [0] : Ankle jerk left 0 [Plantar Reflex Right Only] : normal on the right [Plantar Reflex Left Only] : normal on the left [FreeTextEntry6] : Motor examination: Right upper extremity proximal 4-/5, fine motor movements 5-/5, right lower extremity 5-/5 (Limited and painful right knee movements ), left upper and lower extremities 5/5.\par No rigidity, hypertonia or tremors or bradykinesia. Mild atrophy of bilateral APB and intrinsic hand muscles [PERRL With Normal Accommodation] : pupils were equal in size, round, reactive to light, with normal accommodation [Extraocular Movements] : extraocular movements were intact [Full Visual Field] : full visual field [Outer Ear] : the ears and nose were normal in appearance [Hearing Threshold Finger Rub Not [place]] : hearing was normal [Neck Cervical Mass (___cm)] : no neck mass was observed [Auscultation Breath Sounds / Voice Sounds] : lungs were clear to auscultation bilaterally [Heart Rate And Rhythm] : heart rate was normal and rhythm regular [Arterial Pulses Carotid] : carotid pulses were normal with no bruits [FreeTextEntry1] : Limited ROM right shoulder and right knee [] : no rash

## 2019-07-17 NOTE — DISCUSSION/SUMMARY
[FreeTextEntry1] : 86-year-old female with PMH remarkable for hypertension, hypothyroid, MPN and polycythemia vera; presented to [facility] on 6/7/19 with right side weakness, CT scan head was unremarkable, MRI brain positive for left thalamic infarct, MRA's no significant stenosis. Patient's aspirin was increased to 325 mg, in addition, atorvastatin 40 mgs added. Patient had a EMILIANO, ILR was implanted, patient was discharged to [facility] rehab, is receiving home PT/OT twice weekly.\par \par # Left thalamic infarct with residual right upper > lower extremity mild hemiparesis; most likely etiology hypertension.\par \par - I have recommended continuing aspirin 325 mg daily in addition to atorvastatin 40 mg as daily.\par - Continue home PT/OT\par - Followup with Dr. Tapia for ILR monitoring\par - Stroke education provided\par \par # Right knee arthritis/traumatic contusion\par \par - Refer to a consult ASAP

## 2019-07-23 ENCOUNTER — APPOINTMENT (OUTPATIENT)
Dept: ORTHOPEDIC SURGERY | Facility: CLINIC | Age: 84
End: 2019-07-23
Payer: MEDICARE

## 2019-07-23 VITALS
HEIGHT: 60 IN | HEART RATE: 93 BPM | WEIGHT: 125 LBS | SYSTOLIC BLOOD PRESSURE: 165 MMHG | BODY MASS INDEX: 24.54 KG/M2 | DIASTOLIC BLOOD PRESSURE: 72 MMHG

## 2019-07-23 DIAGNOSIS — Z87.39 PERSONAL HISTORY OF OTHER DISEASES OF THE MUSCULOSKELETAL SYSTEM AND CONNECTIVE TISSUE: ICD-10-CM

## 2019-07-23 DIAGNOSIS — Z86.73 PERSONAL HISTORY OF TRANSIENT ISCHEMIC ATTACK (TIA), AND CEREBRAL INFARCTION W/OUT RESIDUAL DEFICITS: ICD-10-CM

## 2019-07-23 DIAGNOSIS — Z87.09 PERSONAL HISTORY OF OTHER DISEASES OF THE RESPIRATORY SYSTEM: ICD-10-CM

## 2019-07-23 PROCEDURE — 73030 X-RAY EXAM OF SHOULDER: CPT | Mod: RT

## 2019-07-23 PROCEDURE — 20610 DRAIN/INJ JOINT/BURSA W/O US: CPT | Mod: RT

## 2019-07-23 PROCEDURE — 99203 OFFICE O/P NEW LOW 30 MIN: CPT | Mod: 25

## 2019-07-23 PROCEDURE — 73560 X-RAY EXAM OF KNEE 1 OR 2: CPT | Mod: RT

## 2019-07-25 ENCOUNTER — APPOINTMENT (OUTPATIENT)
Dept: INTERNAL MEDICINE | Facility: CLINIC | Age: 84
End: 2019-07-25
Payer: MEDICARE

## 2019-07-25 ENCOUNTER — NON-APPOINTMENT (OUTPATIENT)
Age: 84
End: 2019-07-25

## 2019-07-25 VITALS
DIASTOLIC BLOOD PRESSURE: 52 MMHG | WEIGHT: 126.99 LBS | OXYGEN SATURATION: 95 % | RESPIRATION RATE: 18 BRPM | TEMPERATURE: 98.4 F | HEIGHT: 60 IN | BODY MASS INDEX: 24.93 KG/M2 | HEART RATE: 110 BPM | SYSTOLIC BLOOD PRESSURE: 140 MMHG

## 2019-07-25 PROCEDURE — 94060 EVALUATION OF WHEEZING: CPT

## 2019-07-25 PROCEDURE — 99214 OFFICE O/P EST MOD 30 MIN: CPT | Mod: 25

## 2019-07-25 NOTE — PHYSICAL EXAM
[No Acute Distress] : no acute distress [Well Nourished] : well nourished [Normal Outer Ear/Nose] : the outer ears and nose were normal in appearance [No JVD] : no jugular venous distention [Supple] : supple [No Respiratory Distress] : no respiratory distress  [No Accessory Muscle Use] : no accessory muscle use [Clear to Auscultation] : lungs were clear to auscultation bilaterally [Regular Rhythm] : with a regular rhythm [Normal S1, S2] : normal S1 and S2 [No Edema] : there was no peripheral edema [No Extremity Clubbing/Cyanosis] : no extremity clubbing/cyanosis [Soft] : abdomen soft [Normal Bowel Sounds] : normal bowel sounds [Normal Supraclavicular Nodes] : no supraclavicular lymphadenopathy [Normal Posterior Cervical Nodes] : no posterior cervical lymphadenopathy [Normal Anterior Cervical Nodes] : no anterior cervical lymphadenopathy [No CVA Tenderness] : no CVA  tenderness [No Rash] : no rash

## 2019-07-25 NOTE — DATA REVIEWED
[FreeTextEntry1] : Spirometric analysis reveals a mild to moderate degree of obstruction. Bronchodilator reactivity is not demonstrated at this time.

## 2019-07-25 NOTE — HISTORY OF PRESENT ILLNESS
[de-identified] : The patient comes in today for a followup evaluation, and reassessment of her pulmonary status.\par \par Patient states that at this time, she is continuing to recover from a left-sided cerebrovascular accident, that she suffered back in June. Apparently she awakened with right-sided weakness. She fell after getting out of bed. She was taken to the hospital where she was treated. She then went to an acute rehabilitation facility. Since being discharged, she has been slowly improving. She is now walking with the assistance of a walker.\par \par With regards to her underlying pulmonary status, she remains compliant with her medications. She uses a nebulizer twice a day, in addition to the use of Spiriva. She is also taking Singulair as well. She has remained on Mucinex, but she does not feel that this is doing much for her at this time. She denies any purulent sputum production, in fact there is minimal sputum. She denies chest pain or pleuritic pain.\par \par The patient is not having any allergies or sinus issues. She remained stable in this regard. She is able to ambulate without much breathlessness. She now comes in for assessment

## 2019-07-25 NOTE — PLAN
[FreeTextEntry1] : 1. Continue with medication as outlined above.\par \par 2. We'll now discontinue Mucinex.\par \par 3. Flu shot in the fall.\par \par 4. Routine medical followup with her primary care physician.\par \par 5. Follow up with myself in 6 months with full pulmonary function testing.

## 2019-07-26 PROBLEM — Z95.818 STATUS POST PLACEMENT OF IMPLANTABLE LOOP RECORDER: Status: ACTIVE | Noted: 2019-06-24

## 2019-07-29 ENCOUNTER — APPOINTMENT (OUTPATIENT)
Dept: FAMILY MEDICINE | Facility: CLINIC | Age: 84
End: 2019-07-29

## 2019-07-29 NOTE — REVIEW OF SYSTEMS
[Joint Pain] : joint pain [Joint Stiffness] : joint stiffness [Joint Swelling] : joint swelling [Feeling Tired] : fatigue [Feeling Weak] : feeling weak [Muscle Weakness] : muscle weakness [Negative] : Heme/Lymph

## 2019-07-30 NOTE — PROCEDURE
[de-identified] : Consent: \par At this time, I have recommended an injection to the right knee.  The risks and benefits of the procedure were discussed with the patient in detail.  Upon verbal consent of the patient, we proceeded with the injection as noted below.  \par \par Procedure:  \par After a sterile prep, the patient underwent an injection of 9 cc of 1% Lidocaine without epinephrine and 1 cc of Kenalog into the right knee.  The patient tolerated the procedure well.  There were no complications.  \par \par  Consent: \par At this time, I have recommended an injection to the right shoulder.  The risks and benefits of the procedure were discussed with the patient in detail.  Upon verbal consent of the patient, we proceeded with the injection as noted below.  \par \par Procedure:  \par After a sterile prep, the patient underwent an injection of 9 cc of 1% Lidocaine without epinephrine and 1 cc of Kenalog into the right shoulder.  The patient tolerated the procedure well.  There were no complications.  \par \par

## 2019-07-30 NOTE — DISCUSSION/SUMMARY
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  The patient will ice and elevate the right knee and right shoulder.  Discussion of visco was discussed with the patient and her daughter.  They will come back in three weeks to see how she is feeling.  \par \par The patient has been advised that their blood pressure today was outside normal ranges and the patient was instructed accordingly. Our Blood Pressure Monitoring Sheet with instructions was given to the patient.\par

## 2019-07-30 NOTE — HISTORY OF PRESENT ILLNESS
[6] : a current pain level of 6/10 [2] : the relief from treatment is 2/10 [3] : the relief from treatment is 3/10 [(Does not interfere) 0] : the ailment interference is 0/10 (does not interfere) [9] : the ailment interference is 9/10 [10  (interferes completely)] : the ailment interference is10/10 (interferes completely) [8] : the ailment interference is 8/10 [de-identified] : The patient comes in today with complaints of right shoulder and right knee pain.  This injury is not work related or due to an automobile accident.  The patient states the pain is intermittent.  The patient describes the pain as sharp. [de-identified] : none use, laying down, sitting  [de-identified] : walking, bending, reaching [de-identified] : Physical therapy [] : No [de-identified] : Loss of balance - trouble walking - can't go up stairs [de-identified] : Occupational therapy

## 2019-07-30 NOTE — PHYSICAL EXAM
[de-identified] : Right Shoulder: Range of Motion in Degrees:  	\par 	                                  Claimant:	Normal:	\par Abduction (Active)	                  180	               180 degrees	\par Abduction (Passive)	  180	               180 degrees	\par Forward elevation (Active):	  180	               180 degrees	\par Forward elevation (Passive):	  180	               180 degrees	\par External rotation (Active):	   45	               45 degrees	\par External rotation (Passive):	   45	               45 degrees	\par Internal rotation (Active):	   L-1	               L-1	\par Internal rotation (Passive):	   L-1	               L-1	\par \par Diffuse crepitations and tenderness throughout range of motion.  Pain and swelling throughout range of motion, more significant at extremes.  No motor weakness to internal rotation, external rotation or abduction in the scapular plane.  Negative crank test.  Negative O’Avila’s test.  Negative Speed’s test.  Negative Yergason’s test.  Negative cross arm test.  No tenderness to palpation at the AC joint. Negative Hawkin’s sign.  Negative Neer’s sign.  Negative apprehension. Negative sulcus sign.  No gross neurological or vascular deficits distally.  Skin is intact.  No rashes, scars or lesions.  2+ radial and ulnar pulses. No extra-articular swelling or tenderness.\par \par Left Shoulder: \par Shoulder: Range of Motion in Degrees:   	\par    	                        Claimant:          Normal:  	\par Abduction (Active)  	180  	180 degrees  	\par Abduction (Passive)  	180  	180 degrees  	\par Forward elevation (Active):  	180  	180 degrees  	\par Forward elevation (Passive):  180  	180 degrees  	\par External rotation (Active):  	45  	45 degrees  	\par External rotation (Passive):  	45  	45 degrees  	\par Internal rotation (Active):  	L-1  	L-1  	\par Internal rotation (Passive):  	L-1  	L-1  	\par \par No motor weakness to internal rotation, external rotation or abduction in the scapular plane.  Negative crank test.  Negative O’Avila’s test.  Negative Speed’s test. Negative Yergason’s test.  Negative cross arm test.  No tenderness to palpation at the AC joint. Negative Hawkin’s sign.  Negative Neer’s sign.  Negative apprehension. Negative sulcus sign.  No gross neurological or vascular deficits distally.  Skin is intact.  No rashes, scars or lesions.  2+ radial and ulnar pulses.  No extra-articular swelling or tenderness.  \par \par Right Knee: Range of Motion in Degrees	\par 	                  Claimant:	Normal:	\par Flexion Active	  135 	                135-degrees	\par Flexion Passive	  135	                135-degrees	\par Extension Active	  0-5	                0-5-degrees	\par Extension Passive	  0-5	                0-5-degrees	\par \par No weakness to flexion/extension.  No evidence of instability in the AP plane or varus or valgus stress.  Negative  Lachman.  Negative pivot shift.  Negative anterior drawer test.  Negative posterior drawer test.  Negative Komal.  Negative Apley grind.  No medial or lateral joint line tenderness.  Positive tenderness over the medial and lateral facet of the patella.  Positive patellofemoral crepitations.  No lateral tilting patella.  No patella apprehension.  Positive crepitation in the medial and lateral femoral condyle.  No proximal or distal swelling, edema or tenderness.  No gross motor or sensory deficits.  Mild intra-articular swelling.  2+ DP and PT pulses.  No varus or valgus malalignment.  Skin is intact.  No rashes, scars or lesions.  \par \par Left Knee: Range of Motion in Degrees	\par 	                  Claimant:	Normal:	\par Flexion Active	  135 	                135-degrees	\par Flexion Passive	  135	                135-degrees	\par Extension Active	  0-5	                0-5-degrees	\par Extension Passive	  0-5	                0-5-degrees	\par \par No weakness to flexion/extension.  No evidence of instability in the AP plane or varus or valgus stress.  Negative  Lachman.  Negative pivot shift.  Negative anterior drawer test.  Negative posterior drawer test.  Negative Komal.  Negative Apley grind.  No medial or lateral joint line tenderness.  No tenderness over the medial and lateral facet of the patella.  No patellofemoral crepitations.  No lateral tilting patella.  No patellar apprehension.  No crepitation in the medial and lateral femoral condyle.  No proximal or distal swelling, edema or tenderness.  No gross motor or sensory deficits.  No intra-articular swelling.  2+ DP and PT pulses. No varus or valgus malalignment.  Skin is intact.  No rashes, scars or lesions.  \par    [de-identified] : Appearance:  Well-developed, well-nourished female in no acute distress.\par   [de-identified] : Gait and Station:  Ambulating with a slightly antalgic to antalgic gait.  Station:  Normal.  [de-identified] : Radiographs, one to two views of the right knee, reveal osteoarthritis.\par \par Radiographs, two views of the right shoulder, reveal osteoarthritis.

## 2019-07-30 NOTE — CONSULT LETTER
[Please see my note below.] : Please see my note below. [Dear  ___] : Dear  [unfilled], [Consult Letter:] : I had the pleasure of evaluating your patient, [unfilled]. [Sincerely,] : Sincerely, [Consult Closing:] : Thank you very much for allowing me to participate in the care of this patient.  If you have any questions, please do not hesitate to contact me. [FreeTextEntry3] : Riki Callejas III, MD\par [place]

## 2019-07-30 NOTE — ADDENDUM
[FreeTextEntry1] : This note was dictated by Yajaira Jasso, OTR/L, PA \par \par This note was written by Annie Simpson on 07/29/2019 acting as scribe for Riki Callejas III, MD

## 2019-08-08 ENCOUNTER — APPOINTMENT (OUTPATIENT)
Dept: FAMILY MEDICINE | Facility: CLINIC | Age: 84
End: 2019-08-08
Payer: MEDICARE

## 2019-08-08 VITALS
HEART RATE: 95 BPM | BODY MASS INDEX: 25.23 KG/M2 | OXYGEN SATURATION: 98 % | HEIGHT: 60 IN | SYSTOLIC BLOOD PRESSURE: 140 MMHG | RESPIRATION RATE: 16 BRPM | WEIGHT: 128.5 LBS | DIASTOLIC BLOOD PRESSURE: 62 MMHG

## 2019-08-08 PROCEDURE — 96372 THER/PROPH/DIAG INJ SC/IM: CPT

## 2019-08-08 PROCEDURE — 99214 OFFICE O/P EST MOD 30 MIN: CPT | Mod: 25

## 2019-08-08 RX ORDER — DENOSUMAB 60 MG/ML
60 INJECTION SUBCUTANEOUS
Qty: 1 | Refills: 0 | Status: COMPLETED | OUTPATIENT
Start: 2019-08-08

## 2019-08-08 RX ADMIN — DENOSUMAB 0 MG/ML: 60 INJECTION SUBCUTANEOUS at 00:00

## 2019-08-08 NOTE — HISTORY OF PRESENT ILLNESS
[de-identified] : Here for semiannual Prolia shot\par \par Recent steroid injections right shoulder and right knee with Dr. Callejas partially effective patient would like to continue physical therapy and occupational therapy which has been helpful\par \par Status post thalamic infarct without significant residual blood pressure good today on aspirin and atorvastatin\par \par Chronic asthma well-controlled on current medications\par \par Request for shingles shot given

## 2019-08-08 NOTE — ASSESSMENT
[FreeTextEntry1] : Prolia given left deltoid\par \par Other problems as listed above\par \par See me in the fall

## 2019-08-08 NOTE — PHYSICAL EXAM
[Normal] : normal rate, regular rhythm, normal S1 and S2 and no murmur heard [de-identified] : Decreased breath sounds [de-identified] : Arthritic deformity of hands decreased range of motion of shoulders right greater than left

## 2019-08-11 ENCOUNTER — APPOINTMENT (OUTPATIENT)
Dept: ELECTROPHYSIOLOGY | Facility: CLINIC | Age: 84
End: 2019-08-11
Payer: MEDICARE

## 2019-08-11 PROCEDURE — 93298 REM INTERROG DEV EVAL SCRMS: CPT

## 2019-08-11 PROCEDURE — 93299: CPT

## 2019-08-13 ENCOUNTER — APPOINTMENT (OUTPATIENT)
Dept: ORTHOPEDIC SURGERY | Facility: CLINIC | Age: 84
End: 2019-08-13

## 2019-08-13 ENCOUNTER — APPOINTMENT (OUTPATIENT)
Dept: ORTHOPEDIC SURGERY | Facility: CLINIC | Age: 84
End: 2019-08-13
Payer: MEDICARE

## 2019-08-13 VITALS — BODY MASS INDEX: 25.13 KG/M2 | WEIGHT: 128 LBS | HEIGHT: 60 IN

## 2019-08-13 DIAGNOSIS — M17.11 UNILATERAL PRIMARY OSTEOARTHRITIS, RIGHT KNEE: ICD-10-CM

## 2019-08-13 DIAGNOSIS — M19.011 PRIMARY OSTEOARTHRITIS, RIGHT SHOULDER: ICD-10-CM

## 2019-08-13 PROCEDURE — 99213 OFFICE O/P EST LOW 20 MIN: CPT

## 2019-08-16 NOTE — ADDENDUM
[FreeTextEntry1] : This note was written by Mehrdad Zhou on 08/16/2019, acting as a scribe for TONIO LOZOYA, MICHAEL/GULSHAN PA

## 2019-08-16 NOTE — DISCUSSION/SUMMARY
[de-identified] : At this time, due to right knee and right shoulder osteoarthritis, the patient was advised to return as needed.\par

## 2019-08-16 NOTE — PHYSICAL EXAM
[Normal] : Gait: normal [de-identified] : Right Knee: Range of Motion in Degrees	\par 	                  Claimant:	Normal:	\par Flexion Active	  135 	                135-degrees	\par Flexion Passive	  135	                135-degrees	\par Extension Active	  0-5	                0-5-degrees	\par Extension Passive	  0-5	                0-5-degrees	\par \par No weakness to flexion/extension.  No evidence of instability in the AP plane or varus or valgus stress.  Negative  Lachman.  Negative pivot shift.  Negative anterior drawer test.  Negative posterior drawer test.  Negative Komal.  Negative Apley grind.  No medial or lateral joint line tenderness.  Positive tenderness over the medial and lateral facet of the patella.  Positive patellofemoral crepitations.  No lateral tilting patella.  No patella apprehension.  Positive crepitation in the medial and lateral femoral condyle.  No proximal or distal swelling, edema or tenderness.  No gross motor or sensory deficits.  Mild intra-articular swelling.  2+ DP and PT pulses.  No varus or valgus malalignment.  Skin is intact.  No rashes, scars or lesions.  \par \par Right Shoulder: Range of Motion in Degrees:  	\par 	                                  Claimant:	Normal:	\par Abduction (Active)	                  180	               180 degrees	\par Abduction (Passive)	  180	               180 degrees	\par Forward elevation (Active):	  180	               180 degrees	\par Forward elevation (Passive):	  180	               180 degrees	\par External rotation (Active):	   45	               45 degrees	\par External rotation (Passive):	   45	               45 degrees	\par Internal rotation (Active):	   L-1	               L-1	\par Internal rotation (Passive):	   L-1	               L-1	\par \par Diffuse crepitations and tenderness throughout range of motion.  Pain and swelling throughout range of motion, more significant at extremes.  No motor weakness to internal rotation, external rotation or abduction in the scapular plane.  Negative crank test.  Negative O’Avila’s test.  Negative Speed’s test.  Negative Yergason’s test.  Negative cross arm test.  No tenderness to palpation at the AC joint. Negative Hawkin’s sign.  Negative Neer’s sign.  Negative apprehension. Negative sulcus sign.  No gross neurological or vascular deficits distally.  Skin is intact.  No rashes, scars or lesions.  2+ radial and ulnar pulses. No extra-articular swelling or tenderness.\par   [de-identified] : Appearance:  Well developed, well-nourished female in no acute distress.\par

## 2019-08-16 NOTE — HISTORY OF PRESENT ILLNESS
[de-identified] : The patient comes in today for her right knee and right shoulder.  The patient had a cortisone injection to her right knee at her last visit.  She states she is feeling excellent.

## 2019-09-11 ENCOUNTER — APPOINTMENT (OUTPATIENT)
Dept: ELECTROPHYSIOLOGY | Facility: CLINIC | Age: 84
End: 2019-09-11
Payer: MEDICARE

## 2019-09-11 PROCEDURE — 93299: CPT

## 2019-09-11 PROCEDURE — 93298 REM INTERROG DEV EVAL SCRMS: CPT

## 2019-09-19 ENCOUNTER — RX RENEWAL (OUTPATIENT)
Age: 84
End: 2019-09-19

## 2019-09-20 ENCOUNTER — APPOINTMENT (OUTPATIENT)
Dept: FAMILY MEDICINE | Facility: CLINIC | Age: 84
End: 2019-09-20
Payer: MEDICARE

## 2019-09-20 ENCOUNTER — LABORATORY RESULT (OUTPATIENT)
Age: 84
End: 2019-09-20

## 2019-09-20 VITALS
BODY MASS INDEX: 24.74 KG/M2 | DIASTOLIC BLOOD PRESSURE: 62 MMHG | OXYGEN SATURATION: 97 % | SYSTOLIC BLOOD PRESSURE: 134 MMHG | HEART RATE: 105 BPM | HEIGHT: 60 IN | WEIGHT: 126 LBS

## 2019-09-20 DIAGNOSIS — G89.29 DORSALGIA, UNSPECIFIED: ICD-10-CM

## 2019-09-20 DIAGNOSIS — E03.9 HYPOTHYROIDISM, UNSPECIFIED: ICD-10-CM

## 2019-09-20 DIAGNOSIS — G56.03 CARPAL TUNNEL SYNDROM,BILATERAL UPPER LIMBS: ICD-10-CM

## 2019-09-20 DIAGNOSIS — M54.9 DORSALGIA, UNSPECIFIED: ICD-10-CM

## 2019-09-20 PROCEDURE — 99214 OFFICE O/P EST MOD 30 MIN: CPT | Mod: 25

## 2019-09-20 PROCEDURE — 90653 IIV ADJUVANT VACCINE IM: CPT

## 2019-09-20 PROCEDURE — G0008: CPT

## 2019-09-20 NOTE — ADDENDUM
[FreeTextEntry1] : I, Robin Greene, acted solely as a scribe for Dr. Yousif on this date 09/20/2019.

## 2019-09-20 NOTE — REVIEW OF SYSTEMS
[Fatigue] : fatigue [Negative] : Psychiatric [FreeTextEntry4] : difficulty swallowing  [de-identified] : See HPI [FreeTextEntry9] : See HPI

## 2019-09-20 NOTE — END OF VISIT
[FreeTextEntry3] : I, Robin Greene, acted solely as a scribe for Dr. Cordell Yousif MD on this date 09/20/2019. \par \par All medical records entries made by the Scribe were at my, Dr. Cordell Yousif MD, direction and personally dictated by me on 09/20/2019. I have personally reviewed the chart and agree that the record accurately reflects my personal performance of the history, physical exam, assessment, and plan. I have also personally directed, reviewed, and agreed with the chart.

## 2019-09-20 NOTE — PHYSICAL EXAM
[No Accessory Muscle Use] : no accessory muscle use [No Respiratory Distress] : no respiratory distress  [Normal] : affect was normal and insight and judgment were intact [de-identified] : mild crackles

## 2019-09-20 NOTE — HISTORY OF PRESENT ILLNESS
[de-identified] : The patient comes in today for a followup evaluation and reassessment. \par \par The patient still has issues with stiffness to her hands that has been constant due to chronic carpal tunnel, left worse than right. She notes that she given wrist brace for carpal tunnel but has not been wearing them because she states it does not help.\par She has chronic back pain secondary  to osteoporosis and compression fractures. \par She is being followed by hematology for her Myeloproliferative disorder.  \par \par The patient reports having difficulty swallowing but no choking. She also endorses increased fatigue, and loss of interest in daily activities. Her sleeping habits have been stable. Her appetite has been normal. Her weight has been stable. She denies suicidal ideation or depression. \par

## 2019-09-20 NOTE — PLAN
[FreeTextEntry1] : \par 1. Continue with medications as outlined above.\par \par 2. High-dose flu shot has been administered.\par \par 3. The patient was referred to hand surgeon Dr. Jacobson. \par \par 4. Routine followup with hematology for her Myeloproliferative disorder. \par \par 5. The patient was sent for blood work to test her thyroid functions.

## 2019-09-21 LAB — TSH SERPL-ACNC: 5.28 UIU/ML

## 2019-10-12 ENCOUNTER — APPOINTMENT (OUTPATIENT)
Dept: ELECTROPHYSIOLOGY | Facility: CLINIC | Age: 84
End: 2019-10-12
Payer: MEDICARE

## 2019-10-12 PROCEDURE — 93298 REM INTERROG DEV EVAL SCRMS: CPT

## 2019-10-12 PROCEDURE — 93299: CPT

## 2019-10-14 ENCOUNTER — APPOINTMENT (OUTPATIENT)
Dept: NEUROLOGY | Facility: CLINIC | Age: 84
End: 2019-10-14
Payer: MEDICARE

## 2019-10-14 VITALS
BODY MASS INDEX: 24.74 KG/M2 | SYSTOLIC BLOOD PRESSURE: 132 MMHG | DIASTOLIC BLOOD PRESSURE: 73 MMHG | HEIGHT: 60 IN | WEIGHT: 126 LBS | HEART RATE: 106 BPM

## 2019-10-14 PROCEDURE — 99214 OFFICE O/P EST MOD 30 MIN: CPT

## 2019-10-14 NOTE — DISCUSSION/SUMMARY
[FreeTextEntry1] : 86-year-old female with PMHx of hypertension, hypothyroid, MPN and polycythemia vera; presented to [facility] on 6/7/19 with right side weakness, CT scan head was unremarkable, MRI brain positive for left thalamic infarct, MRA's no significant stenosis. Patient's aspirin was increased to 325 mg, in addition, atorvastatin 40 mgs added. 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

## 2019-10-14 NOTE — PHYSICAL EXAM
[General Appearance - Alert] : alert [General Appearance - In No Acute Distress] : in no acute distress [Oriented To Time, Place, And Person] : oriented to person, place, and time [Impaired Insight] : insight and judgment were intact [Mood] : the mood was normal [Person] : oriented to person [Place] : oriented to place [Time] : oriented to time [Visual Intact] : visual attention was ~T not ~L decreased [Concentration Intact] : normal concentrating ability [Naming Objects] : no difficulty naming common objects [Repeating Phrases] : no difficulty repeating a phrase [Writing A Sentence] : no difficulty writing a sentence [Fluency] : fluency intact [Comprehension] : comprehension intact [Reading] : reading intact [Past History] : adequate knowledge of personal past history [Cranial Nerves Optic (II)] : visual acuity intact bilaterally,  visual fields full to confrontation, pupils equal round and reactive to light [Cranial Nerves Oculomotor (III)] : extraocular motion intact [Cranial Nerves Trigeminal (V)] : facial sensation intact symmetrically [Cranial Nerves Vestibulocochlear (VIII)] : hearing was intact bilaterally [Cranial Nerves Facial (VII)] : face symmetrical [Cranial Nerves Glossopharyngeal (IX)] : tongue and palate midline [Cranial Nerves Accessory (XI - Cranial And Spinal)] : head turning and shoulder shrug symmetric [Cranial Nerves Hypoglossal (XII)] : there was no tongue deviation with protrusion [Motor Tone] : muscle tone was normal in all four extremities [No Muscle Atrophy] : normal bulk in all four extremities [Sensation Tactile Decrease] : light touch was intact [Proprioception] : proprioception was intact [Vibration Decrease Leg / Foot Toes Both Feet] : decreased at the toes of both feet  [Vibration Decrease Leg / Foot Both Ankles] : decreased at both ankles [Balance] : balance was intact [Abnormal Walk] : normal gait [2+] : Brachioradialis left 2+ [0] : Ankle jerk left 0 [1+] : Ankle jerk right 1+ [PERRL With Normal Accommodation] : pupils were equal in size, round, reactive to light, with normal accommodation [Extraocular Movements] : extraocular movements were intact [Full Visual Field] : full visual field [Hearing Threshold Finger Rub Not [place]] : hearing was normal [Outer Ear] : the ears and nose were normal in appearance [Neck Cervical Mass (___cm)] : no neck mass was observed [Heart Rate And Rhythm] : heart rate was normal and rhythm regular [Auscultation Breath Sounds / Voice Sounds] : lungs were clear to auscultation bilaterally [Arterial Pulses Carotid] : carotid pulses were normal with no bruits [] : no rash [Past-pointing] : there was no past-pointing [Plantar Reflex Right Only] : normal on the right [Tremor] : no tremor present [Plantar Reflex Left Only] : normal on the left [FreeTextEntry6] : Motor examination: Right upper extremity proximal 4-/5, fine motor movements 5-/5, right lower extremity 5-/5 (Limited and painful right knee movements ), left upper and lower extremities 5/5.\par No rigidity, hypertonia or tremors or bradykinesia. Mild atrophy of bilateral APB and intrinsic hand muscles [FreeTextEntry1] : Limited ROM right shoulder and right knee

## 2019-10-14 NOTE — DATA REVIEWED
[de-identified] : 6/9/19: Acute nonhemorrhagic  infarct involving the left thalamus, atrophy and chronic small ischemic changes.\par MRA brain and carotids: no significant stenosis

## 2019-10-14 NOTE — HISTORY OF PRESENT ILLNESS
[FreeTextEntry1] : Patient is here for followup visit today, was last seen on 7/17/19. Patient is accompanied by her daughter, she reports patient consulted orthopedist, received an injection in the right knee and shoulder, she noted remarkable improvement of pain/swelling right knee, is able to ambulate well. She continues to have pain in the right shoulder, is scheduled to have a follow-up.\par \par Patient received physical therapy, apparently it lapsed for a month, is rescheduled to have PT OT soon. Patient walks with the assistance of a cane, she has not noted any new weakness.\par \par The patient complains of numbness in the right > left hand, it's constant, she has difficulty holding objects and has noted diminished dexterity of hand movements.

## 2019-10-14 NOTE — REVIEW OF SYSTEMS
[Arm Weakness] : arm weakness [Difficulty Walking] : difficulty walking [Leg Weakness] : leg weakness [Negative] : Heme/Lymph [Numbness] : numbness [Joint Pain] : joint pain [Tingling] : no tingling [de-identified] : Right shoulder pain

## 2019-10-15 ENCOUNTER — MEDICATION RENEWAL (OUTPATIENT)
Age: 84
End: 2019-10-15

## 2019-11-12 ENCOUNTER — APPOINTMENT (OUTPATIENT)
Dept: ELECTROPHYSIOLOGY | Facility: CLINIC | Age: 84
End: 2019-11-12
Payer: MEDICARE

## 2019-11-12 PROCEDURE — 93299: CPT

## 2019-11-12 PROCEDURE — 93298 REM INTERROG DEV EVAL SCRMS: CPT

## 2019-11-20 ENCOUNTER — APPOINTMENT (OUTPATIENT)
Dept: NEUROLOGY | Facility: CLINIC | Age: 84
End: 2019-11-20
Payer: MEDICARE

## 2019-11-20 DIAGNOSIS — G56.02 CARPAL TUNNEL SYNDROME, LEFT UPPER LIMB: ICD-10-CM

## 2019-11-20 DIAGNOSIS — G56.01 CARPAL TUNNEL SYNDROME, RIGHT UPPER LIMB: ICD-10-CM

## 2019-11-20 PROCEDURE — 95886 MUSC TEST DONE W/N TEST COMP: CPT

## 2019-11-20 PROCEDURE — 99213 OFFICE O/P EST LOW 20 MIN: CPT | Mod: 25

## 2019-11-20 PROCEDURE — 95910 NRV CNDJ TEST 7-8 STUDIES: CPT

## 2019-11-20 NOTE — DATA REVIEWED
[de-identified] : 6/9/19: Acute nonhemorrhagic  infarct involving the left thalamus, atrophy and chronic small ischemic changes.\par MRA brain and carotids: no significant stenosis

## 2019-11-20 NOTE — HISTORY OF PRESENT ILLNESS
[FreeTextEntry1] : Patient is here for followup visit today, was last seen on 10/1419. Patient complains of numbness in the right > left hand, it's constant, has difficulty holding objects and has diminished dexterity of hand movements. Patient was advised to use cockup splint for right hand at night, she is using it, it helps little but she has difficulty keeping it on at night, she is here for EMG/NCV study is of upper extremities to evaluate for CTS\par \par \par \par

## 2019-11-20 NOTE — PROCEDURE
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

## 2019-11-20 NOTE — REVIEW OF SYSTEMS
[Arm Weakness] : arm weakness [Leg Weakness] : leg weakness [Numbness] : numbness [Joint Pain] : joint pain [Difficulty Walking] : difficulty walking [Negative] : Heme/Lymph [Tingling] : no tingling [de-identified] : Right shoulder pain

## 2019-11-20 NOTE — DISCUSSION/SUMMARY
[FreeTextEntry1] : 86-year-old female with PMHx of HTN, hypothyroid, MPN and polycythemia vera; presented to [facility] on 6/7/19 with right side weakness, CT scan head was unremarkable, MRI brain positive for left thalamic infarct, MRA's no significant stenosis. Patient's aspirin was increased to 325 mg, in addition, atorvastatin 40 mgs added. 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

## 2019-11-20 NOTE — REASON FOR VISIT
[Procedure: _________] : a [unfilled] procedure visit [FreeTextEntry1] : for EMG/NCV studies of upper extremities to evaluate for carpal tunnel syndrome

## 2019-11-27 ENCOUNTER — CLINICAL ADVICE (OUTPATIENT)
Age: 84
End: 2019-11-27

## 2019-12-03 ENCOUNTER — APPOINTMENT (OUTPATIENT)
Dept: ELECTROPHYSIOLOGY | Facility: CLINIC | Age: 84
End: 2019-12-03

## 2019-12-03 RX ORDER — ASPIRIN 325 MG
325 TABLET ORAL DAILY
Refills: 0 | Status: ACTIVE | COMMUNITY

## 2019-12-04 ENCOUNTER — APPOINTMENT (OUTPATIENT)
Dept: ELECTROPHYSIOLOGY | Facility: CLINIC | Age: 84
End: 2019-12-04

## 2019-12-13 ENCOUNTER — APPOINTMENT (OUTPATIENT)
Dept: ELECTROPHYSIOLOGY | Facility: CLINIC | Age: 84
End: 2019-12-13
Payer: MEDICARE

## 2019-12-13 PROCEDURE — 93299: CPT

## 2019-12-13 PROCEDURE — 93298 REM INTERROG DEV EVAL SCRMS: CPT

## 2020-01-08 ENCOUNTER — APPOINTMENT (OUTPATIENT)
Dept: CARDIOLOGY | Facility: CLINIC | Age: 85
End: 2020-01-08

## 2020-01-13 ENCOUNTER — APPOINTMENT (OUTPATIENT)
Dept: ELECTROPHYSIOLOGY | Facility: CLINIC | Age: 85
End: 2020-01-13
Payer: MEDICARE

## 2020-01-13 PROCEDURE — G2066: CPT

## 2020-01-13 PROCEDURE — 93298 REM INTERROG DEV EVAL SCRMS: CPT

## 2020-01-28 RX ORDER — TIOTROPIUM BROMIDE 18 UG/1
18 CAPSULE ORAL; RESPIRATORY (INHALATION)
Qty: 90 | Refills: 1 | Status: DISCONTINUED | COMMUNITY
Start: 2019-01-15 | End: 2020-01-28

## 2020-02-06 ENCOUNTER — APPOINTMENT (OUTPATIENT)
Dept: INTERNAL MEDICINE | Facility: CLINIC | Age: 85
End: 2020-02-06
Payer: MEDICARE

## 2020-02-06 VITALS
OXYGEN SATURATION: 95 % | DIASTOLIC BLOOD PRESSURE: 70 MMHG | TEMPERATURE: 97.9 F | HEIGHT: 60 IN | BODY MASS INDEX: 25.13 KG/M2 | WEIGHT: 128 LBS | SYSTOLIC BLOOD PRESSURE: 142 MMHG | HEART RATE: 111 BPM | RESPIRATION RATE: 20 BRPM

## 2020-02-06 PROCEDURE — 94727 GAS DIL/WSHOT DETER LNG VOL: CPT

## 2020-02-06 PROCEDURE — 99215 OFFICE O/P EST HI 40 MIN: CPT | Mod: 25

## 2020-02-06 PROCEDURE — 94729 DIFFUSING CAPACITY: CPT

## 2020-02-06 PROCEDURE — ZZZZZ: CPT

## 2020-02-06 PROCEDURE — 94060 EVALUATION OF WHEEZING: CPT

## 2020-02-06 NOTE — REVIEW OF SYSTEMS
[Shortness Of Breath] : shortness of breath [Dyspnea on Exertion] : dyspnea on exertion [Cough] : cough [Negative] : Heme/Lymph [de-identified] : see history of present illness [FreeTextEntry6] : see history of present illness

## 2020-02-06 NOTE — PHYSICAL EXAM
[Well Nourished] : well nourished [No Acute Distress] : no acute distress [No JVD] : no jugular venous distention [Normal Outer Ear/Nose] : the outer ears and nose were normal in appearance [No Accessory Muscle Use] : no accessory muscle use [No Respiratory Distress] : no respiratory distress  [Supple] : supple [Regular Rhythm] : with a regular rhythm [No Extremity Clubbing/Cyanosis] : no extremity clubbing/cyanosis [No Edema] : there was no peripheral edema [Normal S1, S2] : normal S1 and S2 [Soft] : abdomen soft [Non Tender] : non-tender [Normal Bowel Sounds] : normal bowel sounds [Normal Supraclavicular Nodes] : no supraclavicular lymphadenopathy [Normal Posterior Cervical Nodes] : no posterior cervical lymphadenopathy [Normal Anterior Cervical Nodes] : no anterior cervical lymphadenopathy [No CVA Tenderness] : no CVA  tenderness [No Rash] : no rash [Normal Affect] : the affect was normal [Coordination Grossly Intact] : coordination grossly intact [Normal Insight/Judgement] : insight and judgment were intact [de-identified] : There are crackles in the mid lung zones bilaterally, with a few minor or rhonchi appreciated. [de-identified] : The gait is slightly unsteady.

## 2020-02-06 NOTE — DATA REVIEWED
[FreeTextEntry1] : A pulmonary function test is performed. Lung volumes reveal a normal total lung capacity and FRC. The residual volume is supernormal. Vital capacity is mildly reduced. Lung mechanics reveal a moderate to significant decrease in flow rates. Slight bronchodilator reactivity is demonstrated. This represents a moderate to significant degree of obstructive lung disease with slight airway reactivity,possibly consistent with her clinical diagnosis of asthma. The DLCO, when corrected for alveolar volume is normal.

## 2020-02-06 NOTE — PLAN
[FreeTextEntry1] : 1. Continue with regimen as outlined above, including the use of her nebulizer with formoterol and budesonide b.i.d. basis, as well as Incruse one puff daily, and Singulair 10 mg daily.\par \par 2. The patient can discontinue the Mucinex as it does not appear to be helping\par \par 3. A modified barium swallow will be performed to evaluate for possible aspiration.\par \par 4. Followup CAT scan of the chest will be performed to reevaluate the bronchiectatic changes, as well as the mild tree in bud abnormalities that were previously noted. This does raise the question of possible atypical infection such as ALONDRA.\par \par 5. We'll now institute a trial of prednisone, given the fact that she has cough with airway reactivity, and a decrease in flow rates. To this end, she will take 40 mg for 4 days, followed by 20 mg for 5 days, and then 10 mg for 6 days before discontinuing.\par \par 6. We'll now institute Zithromax 250 mg t.i.w. to treat her underlying bronchiectasis.\par \par 7. Followup in 6 months with pre-post-spirometry.\par \par 8. Routine medical follow up with her primary care physician, Dr. Yousif.

## 2020-02-06 NOTE — HISTORY OF PRESENT ILLNESS
[de-identified] : The patient comes in today for a followup evaluation, and yearly reassessment of her pulmonary status.\par \par At this time, the patient states that she is complaining of shortness of breath with exertional type activities. She states that she can only walk approximately 10 feet, before becoming breathless. She has been compliant with a nebulizer using it twice a day with formoterol and budesonide. She also uses her Incruse inhaler and Singulair tablets. She had previously discontinued the Mucinex, but recently it was again restarted, due to the fact that she felt some congestion in her chest. She has not been able to produce any sputum, however. She denies any chest pains.\par \par The continues to be followed carefully by her cardiologist. She does have a Linq recorder in place, and apparently it has not revealed any arrhythmias. She denies any chest pain or pressure upon ambulating.\par \par Lastly, according to the patient's daughter, she denies at times choke when swallowing food. This seems to have started, after she suffered a stroke back in June of 2019. At times, she will cough as well. She has not had any obvious aspirations that we are aware of at this time. She now comes in to this assessment.

## 2020-02-14 ENCOUNTER — APPOINTMENT (OUTPATIENT)
Dept: ELECTROPHYSIOLOGY | Facility: CLINIC | Age: 85
End: 2020-02-14
Payer: MEDICARE

## 2020-02-14 PROCEDURE — 93298 REM INTERROG DEV EVAL SCRMS: CPT

## 2020-02-14 PROCEDURE — G2066: CPT

## 2020-02-23 ENCOUNTER — EMERGENCY (EMERGENCY)
Facility: HOSPITAL | Age: 85
LOS: 0 days | Discharge: HOME | End: 2020-02-23
Attending: EMERGENCY MEDICINE
Payer: MEDICARE

## 2020-02-23 VITALS
SYSTOLIC BLOOD PRESSURE: 156 MMHG | RESPIRATION RATE: 16 BRPM | WEIGHT: 126.1 LBS | TEMPERATURE: 98 F | HEIGHT: 60 IN | OXYGEN SATURATION: 95 % | HEART RATE: 92 BPM | DIASTOLIC BLOOD PRESSURE: 71 MMHG

## 2020-02-23 VITALS
HEART RATE: 94 BPM | RESPIRATION RATE: 16 BRPM | OXYGEN SATURATION: 95 % | SYSTOLIC BLOOD PRESSURE: 154 MMHG | TEMPERATURE: 98 F | DIASTOLIC BLOOD PRESSURE: 79 MMHG

## 2020-02-23 DIAGNOSIS — I25.10 ATHEROSCLEROTIC HEART DISEASE OF NATIVE CORONARY ARTERY WITHOUT ANGINA PECTORIS: ICD-10-CM

## 2020-02-23 DIAGNOSIS — M54.41 LUMBAGO WITH SCIATICA, RIGHT SIDE: ICD-10-CM

## 2020-02-23 DIAGNOSIS — I69.951 HEMIPLEGIA AND HEMIPARESIS FOLLOWING UNSPECIFIED CEREBROVASCULAR DISEASE AFFECTING RIGHT DOMINANT SIDE: ICD-10-CM

## 2020-02-23 DIAGNOSIS — I10 ESSENTIAL (PRIMARY) HYPERTENSION: ICD-10-CM

## 2020-02-23 DIAGNOSIS — M53.3 SACROCOCCYGEAL DISORDERS, NOT ELSEWHERE CLASSIFIED: ICD-10-CM

## 2020-02-23 DIAGNOSIS — J44.9 CHRONIC OBSTRUCTIVE PULMONARY DISEASE, UNSPECIFIED: ICD-10-CM

## 2020-02-23 PROCEDURE — 76376 3D RENDER W/INTRP POSTPROCES: CPT | Mod: 26

## 2020-02-23 PROCEDURE — 99284 EMERGENCY DEPT VISIT MOD MDM: CPT

## 2020-02-23 PROCEDURE — 72192 CT PELVIS W/O DYE: CPT

## 2020-02-23 PROCEDURE — 99284 EMERGENCY DEPT VISIT MOD MDM: CPT | Mod: 25

## 2020-02-23 PROCEDURE — 72192 CT PELVIS W/O DYE: CPT | Mod: 26

## 2020-02-23 PROCEDURE — 73502 X-RAY EXAM HIP UNI 2-3 VIEWS: CPT | Mod: RT

## 2020-02-23 PROCEDURE — 71250 CT THORAX DX C-: CPT | Mod: 26

## 2020-02-23 PROCEDURE — 73502 X-RAY EXAM HIP UNI 2-3 VIEWS: CPT | Mod: 26,RT

## 2020-02-23 PROCEDURE — 71250 CT THORAX DX C-: CPT

## 2020-02-23 PROCEDURE — 73552 X-RAY EXAM OF FEMUR 2/>: CPT | Mod: RT

## 2020-02-23 PROCEDURE — 72131 CT LUMBAR SPINE W/O DYE: CPT | Mod: 26

## 2020-02-23 PROCEDURE — 76376 3D RENDER W/INTRP POSTPROCES: CPT

## 2020-02-23 PROCEDURE — 73552 X-RAY EXAM OF FEMUR 2/>: CPT | Mod: 26,RT

## 2020-02-23 PROCEDURE — 72131 CT LUMBAR SPINE W/O DYE: CPT

## 2020-02-23 NOTE — ED PROVIDER NOTE - RESPIRATORY, MLM
Respirations non labored. +diffuse decrease breath sounds, +expiratory inspiratory course wheezing, no retractions.

## 2020-02-23 NOTE — ED PROVIDER NOTE - OBJECTIVE STATEMENT
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 States pain exacerbates with weight bearing and ambulation. No pain at rest. Ambulates with walker at baseline. As per family, pt ambulates slower than usual today. Denies recent trauma or injury. No fever, numbness, tingling, urinary or bowel incontinence. As per daughter, pt more SAENZ x 2-3 weeks, saw pulmo on 02/06, who ordered outpatient CT of lungs but hasn't gone yet. Denies recent worsening of SAENZ. Pt was placed on Prednisone for 1.5 week and finished 2 days ago, now on Zithromax. NKDA. PMD: Dr. Yousif. Pulmo: Dr. Lagos. Neuro: Dr. Tucker. 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 States pain exacerbates with weight bearing and ambulation. No pain at rest. Ambulates with walker at baseline. As per family, pt ambulates slower than usual today. Denies recent trauma or injury. No fever, numbness, tingling, urinary or bowel incontinence. Daughter reports pt has SAENZ x 2-3 weeks, saw pulmo on 02/06, who ordered outpatient CT of lungs but hasn't gone yet. Denies recent worsening of SAENZ. Pt was placed on Prednisone for 1.5 week and finished 2 days ago, now on Zithromax. NKDA. PMD: Dr. Yousif. Pulmo: Dr. Lagos. Neuro: Dr. Tucker.

## 2020-02-23 NOTE — ED PROVIDER NOTE - CARE PROVIDER_API CALL
Jama Olivares)  [facility]  [address], [place] [address]  Phone: [phone]  Fax: [phone]  Follow Up Time:     Chris Lagos)  Internal Medicine; Pulmonary Disease  [address] [place] [address]  Phone: [phone]  Fax: [phone]  Follow Up Time:

## 2020-02-23 NOTE — ED PROVIDER NOTE - MUSCULOSKELETAL, MLM
Neck non tender, supple. Back non tender. b/l LE non tender, no swelling, b/l SLR 40-45 degrees without pain, normal motor. b/l LE distal motor sensory intact. +R sciatica notch tenderness. Pelvis non tender, stable. R hip full ROM without pain.

## 2020-02-23 NOTE — ED PROVIDER NOTE - NSFOLLOWUPINSTRUCTIONS_ED_ALL_ED_FT
Tylenol 325 mg 2 tabs every 6 hours as needed for discomfort.  Continue current medications as per routine.  ALWAYS use walker for assistance ambulating.  Walk slowly, don't rush.  Follow up this week with Dr. Lagos & Spine doctor: call offices tomorrow for appointments.      ED evaluation and management discussed with the patient and family (if available) in detail.  Close PMD follow up encouraged.  Strict ED return instructions discussed in detail and patient given the opportunity to ask any questions about their discharge diagnosis and instructions. Patient verbalized understanding.      Back Pain    Back pain is very common in adults. The cause of back pain is rarely dangerous and the pain often gets better over time. The cause of your back pain may not be known and may include strain of muscles or ligaments, degeneration of the spinal disks, or arthritis. Occasionally the pain may radiate down your leg(s). Over-the-counter medicines to reduce pain and inflammation are often the most helpful. Stretching and remaining active frequently helps the healing process.     SEEK IMMEDIATE MEDICAL CARE IF YOU HAVE ANY OF THE FOLLOWING SYMPTOMS: bowel or bladder control problems, unusual weakness or numbness in your arms or legs, nausea or vomiting, abdominal pain, fever, dizziness/lightheadedness.

## 2020-02-23 NOTE — ED PROVIDER NOTE - PATIENT PORTAL LINK FT
You can access the FollowMyHealth Patient Portal offered by [facility] by registering at the following website: [email or web address]. By joining [employer]’s FollowMyHealth portal, you will also be able to view your health information using other applications (apps) compatible with our system.

## 2020-02-23 NOTE — ED PROVIDER NOTE - PROGRESS NOTE DETAILS
Dr. Silva:  CTs pelvis, LS spine: no fx, + mult levels of DDD w/o obvious nerve impingement.  Ct chest + evidence of chronic ALONDRA, no acute infiltrate nor CHF.  Preliminary results of XRs & CTs d/w pt & daughters at bedside, no clinical indication for admission.  They agree with D/C home, close outpt f/u with Spine MD & Dr. Lagos.

## 2020-02-23 NOTE — ED PROVIDER NOTE - CLINICAL SUMMARY MEDICAL DECISION MAKING FREE TEXT BOX
88 y/o F with multiple PMHx including COPD, bronchiectasis, chronic low back pain, BIBA from home c/o 4 days of R buttock pain exacerbated by weight bearing and ambulating. no known injury or trauma. distal motor sensory intact, no complaints, no hip tenderness/ +SLR without pain. +R sciatica notch tenderness noted on exam. Back non tender. Lungs: wheezing, rhonchi b/l with decrease breath sounds consistent with chronic. Plan; start with x ray pelvis R hip/femur, if negative advance to CT bony pelvis, ct chest (family having difficulty obtaining outpatient CT chest as ordered by recent pulmonary eval)

## 2020-02-23 NOTE — ED ADULT NURSE NOTE - OBJECTIVE STATEMENT
PT comes in for right buttock pain that started Thursday. Pt states pain only happens when she walks, denies any current pain/numbness/tingling. no trauma or fall recently. pain is non radiating. pt uses a walker at home. pt aox3. family at bedside. Daughter states  елена wanted a ct chest due to dyspnea on exertion. pt currently on room air, in no respiratory distress, talking in complete sentences.

## 2020-03-12 ENCOUNTER — RX RENEWAL (OUTPATIENT)
Age: 85
End: 2020-03-12

## 2020-03-18 ENCOUNTER — APPOINTMENT (OUTPATIENT)
Dept: ELECTROPHYSIOLOGY | Facility: CLINIC | Age: 85
End: 2020-03-18
Payer: MEDICARE

## 2020-03-18 PROCEDURE — G2066: CPT

## 2020-03-18 PROCEDURE — 93298 REM INTERROG DEV EVAL SCRMS: CPT

## 2020-04-09 ENCOUNTER — APPOINTMENT (OUTPATIENT)
Dept: INTERNAL MEDICINE | Facility: CLINIC | Age: 85
End: 2020-04-09
Payer: MEDICARE

## 2020-04-09 PROCEDURE — 99442: CPT

## 2020-04-21 ENCOUNTER — APPOINTMENT (OUTPATIENT)
Dept: ELECTROPHYSIOLOGY | Facility: CLINIC | Age: 85
End: 2020-04-21
Payer: MEDICARE

## 2020-04-21 PROCEDURE — G2066: CPT

## 2020-04-21 PROCEDURE — 93298 REM INTERROG DEV EVAL SCRMS: CPT

## 2020-05-22 ENCOUNTER — APPOINTMENT (OUTPATIENT)
Dept: ELECTROPHYSIOLOGY | Facility: CLINIC | Age: 85
End: 2020-05-22
Payer: MEDICARE

## 2020-05-22 PROCEDURE — 93298 REM INTERROG DEV EVAL SCRMS: CPT

## 2020-05-22 PROCEDURE — G2066: CPT

## 2020-05-27 ENCOUNTER — APPOINTMENT (OUTPATIENT)
Dept: FAMILY MEDICINE | Facility: CLINIC | Age: 85
End: 2020-05-27

## 2020-06-02 ENCOUNTER — RX RENEWAL (OUTPATIENT)
Age: 85
End: 2020-06-02

## 2020-06-15 ENCOUNTER — RX RENEWAL (OUTPATIENT)
Age: 85
End: 2020-06-15

## 2020-06-16 ENCOUNTER — RX RENEWAL (OUTPATIENT)
Age: 85
End: 2020-06-16

## 2020-06-24 ENCOUNTER — APPOINTMENT (OUTPATIENT)
Dept: ELECTROPHYSIOLOGY | Facility: CLINIC | Age: 85
End: 2020-06-24
Payer: MEDICARE

## 2020-06-24 ENCOUNTER — RX RENEWAL (OUTPATIENT)
Age: 85
End: 2020-06-24

## 2020-06-24 PROCEDURE — 93298 REM INTERROG DEV EVAL SCRMS: CPT

## 2020-06-24 PROCEDURE — G2066: CPT

## 2020-07-24 ENCOUNTER — APPOINTMENT (OUTPATIENT)
Dept: INTERNAL MEDICINE | Facility: CLINIC | Age: 85
End: 2020-07-24
Payer: MEDICARE

## 2020-07-24 ENCOUNTER — INPATIENT (INPATIENT)
Facility: HOSPITAL | Age: 85
LOS: 3 days | Discharge: HOME HEALTH | DRG: 191 | End: 2020-07-28
Attending: INTERNAL MEDICINE | Admitting: FAMILY MEDICINE
Payer: MEDICARE

## 2020-07-24 VITALS
WEIGHT: 126 LBS | SYSTOLIC BLOOD PRESSURE: 110 MMHG | DIASTOLIC BLOOD PRESSURE: 60 MMHG | HEART RATE: 88 BPM | HEIGHT: 60 IN | TEMPERATURE: 88 F | OXYGEN SATURATION: 90 % | RESPIRATION RATE: 18 BRPM | BODY MASS INDEX: 24.74 KG/M2

## 2020-07-24 VITALS — WEIGHT: 119.93 LBS | HEIGHT: 60 IN

## 2020-07-24 DIAGNOSIS — I10 ESSENTIAL (PRIMARY) HYPERTENSION: ICD-10-CM

## 2020-07-24 DIAGNOSIS — R06.00 DYSPNEA, UNSPECIFIED: ICD-10-CM

## 2020-07-24 LAB
ALBUMIN SERPL ELPH-MCNC: 3.5 G/DL — SIGNIFICANT CHANGE UP (ref 3.3–5)
ALP SERPL-CCNC: 113 U/L — SIGNIFICANT CHANGE UP (ref 40–120)
ALT FLD-CCNC: 16 U/L — SIGNIFICANT CHANGE UP (ref 12–78)
ANION GAP SERPL CALC-SCNC: 8 MMOL/L — SIGNIFICANT CHANGE UP (ref 5–17)
APPEARANCE UR: ABNORMAL
APTT BLD: 30.9 SEC — SIGNIFICANT CHANGE UP (ref 27.5–35.5)
AST SERPL-CCNC: 20 U/L — SIGNIFICANT CHANGE UP (ref 15–37)
BASOPHILS # BLD AUTO: 0.13 K/UL — SIGNIFICANT CHANGE UP (ref 0–0.2)
BASOPHILS NFR BLD AUTO: 0.7 % — SIGNIFICANT CHANGE UP (ref 0–2)
BILIRUB SERPL-MCNC: 0.5 MG/DL — SIGNIFICANT CHANGE UP (ref 0.2–1.2)
BILIRUB UR-MCNC: NEGATIVE — SIGNIFICANT CHANGE UP
BUN SERPL-MCNC: 26 MG/DL — HIGH (ref 7–23)
CALCIUM SERPL-MCNC: 9.3 MG/DL — SIGNIFICANT CHANGE UP (ref 8.5–10.1)
CHLORIDE SERPL-SCNC: 100 MMOL/L — SIGNIFICANT CHANGE UP (ref 96–108)
CO2 SERPL-SCNC: 31 MMOL/L — SIGNIFICANT CHANGE UP (ref 22–31)
COLOR SPEC: YELLOW — SIGNIFICANT CHANGE UP
CREAT SERPL-MCNC: 0.8 MG/DL — SIGNIFICANT CHANGE UP (ref 0.5–1.3)
D DIMER BLD IA.RAPID-MCNC: 203 NG/ML DDU — SIGNIFICANT CHANGE UP
DIFF PNL FLD: ABNORMAL
EOSINOPHIL # BLD AUTO: 0.64 K/UL — HIGH (ref 0–0.5)
EOSINOPHIL NFR BLD AUTO: 3.2 % — SIGNIFICANT CHANGE UP (ref 0–6)
GLUCOSE SERPL-MCNC: 94 MG/DL — SIGNIFICANT CHANGE UP (ref 70–99)
GLUCOSE UR QL: NEGATIVE MG/DL — SIGNIFICANT CHANGE UP
HCT VFR BLD CALC: 39.1 % — SIGNIFICANT CHANGE UP (ref 34.5–45)
HGB BLD-MCNC: 10.3 G/DL — LOW (ref 11.5–15.5)
IMM GRANULOCYTES NFR BLD AUTO: 0.8 % — SIGNIFICANT CHANGE UP (ref 0–1.5)
INR BLD: 1.06 RATIO — SIGNIFICANT CHANGE UP (ref 0.88–1.16)
KETONES UR-MCNC: NEGATIVE — SIGNIFICANT CHANGE UP
LACTATE SERPL-SCNC: 1.3 MMOL/L — SIGNIFICANT CHANGE UP (ref 0.7–2)
LEUKOCYTE ESTERASE UR-ACNC: ABNORMAL
LYMPHOCYTES # BLD AUTO: 1 K/UL — SIGNIFICANT CHANGE UP (ref 1–3.3)
LYMPHOCYTES # BLD AUTO: 5 % — LOW (ref 13–44)
MCHC RBC-ENTMCNC: 18 PG — LOW (ref 27–34)
MCHC RBC-ENTMCNC: 26.3 GM/DL — LOW (ref 32–36)
MCV RBC AUTO: 68.2 FL — LOW (ref 80–100)
MONOCYTES # BLD AUTO: 0.71 K/UL — SIGNIFICANT CHANGE UP (ref 0–0.9)
MONOCYTES NFR BLD AUTO: 3.6 % — SIGNIFICANT CHANGE UP (ref 2–14)
NEUTROPHILS # BLD AUTO: 17.31 K/UL — HIGH (ref 1.8–7.4)
NEUTROPHILS NFR BLD AUTO: 86.7 % — HIGH (ref 43–77)
NITRITE UR-MCNC: NEGATIVE — SIGNIFICANT CHANGE UP
NT-PROBNP SERPL-SCNC: 420 PG/ML — SIGNIFICANT CHANGE UP (ref 0–450)
PH UR: 6 — SIGNIFICANT CHANGE UP (ref 5–8)
PLATELET # BLD AUTO: 749 K/UL — HIGH (ref 150–400)
POTASSIUM SERPL-MCNC: 4 MMOL/L — SIGNIFICANT CHANGE UP (ref 3.5–5.3)
POTASSIUM SERPL-SCNC: 4 MMOL/L — SIGNIFICANT CHANGE UP (ref 3.5–5.3)
PROT SERPL-MCNC: 8.1 GM/DL — SIGNIFICANT CHANGE UP (ref 6–8.3)
PROT UR-MCNC: 500 MG/DL
PROTHROM AB SERPL-ACNC: 12.3 SEC — SIGNIFICANT CHANGE UP (ref 10.6–13.6)
RBC # BLD: 5.73 M/UL — HIGH (ref 3.8–5.2)
RBC # FLD: 20.8 % — HIGH (ref 10.3–14.5)
SARS-COV-2 RNA SPEC QL NAA+PROBE: SIGNIFICANT CHANGE UP
SODIUM SERPL-SCNC: 139 MMOL/L — SIGNIFICANT CHANGE UP (ref 135–145)
SP GR SPEC: 1.02 — SIGNIFICANT CHANGE UP (ref 1.01–1.02)
TROPONIN I SERPL-MCNC: <0.015 NG/ML — SIGNIFICANT CHANGE UP (ref 0.01–0.04)
UROBILINOGEN FLD QL: 1 MG/DL
WBC # BLD: 19.94 K/UL — HIGH (ref 3.8–10.5)
WBC # FLD AUTO: 19.94 K/UL — HIGH (ref 3.8–10.5)

## 2020-07-24 PROCEDURE — 80048 BASIC METABOLIC PNL TOTAL CA: CPT

## 2020-07-24 PROCEDURE — 93010 ELECTROCARDIOGRAM REPORT: CPT

## 2020-07-24 PROCEDURE — 71250 CT THORAX DX C-: CPT | Mod: 26

## 2020-07-24 PROCEDURE — 94760 N-INVAS EAR/PLS OXIMETRY 1: CPT

## 2020-07-24 PROCEDURE — 94667 MNPJ CHEST WALL 1ST: CPT

## 2020-07-24 PROCEDURE — 85025 COMPLETE CBC W/AUTO DIFF WBC: CPT

## 2020-07-24 PROCEDURE — 99214 OFFICE O/P EST MOD 30 MIN: CPT | Mod: 25

## 2020-07-24 PROCEDURE — 84100 ASSAY OF PHOSPHORUS: CPT

## 2020-07-24 PROCEDURE — 97162 PT EVAL MOD COMPLEX 30 MIN: CPT | Mod: GP

## 2020-07-24 PROCEDURE — 36415 COLL VENOUS BLD VENIPUNCTURE: CPT

## 2020-07-24 PROCEDURE — 80053 COMPREHEN METABOLIC PANEL: CPT

## 2020-07-24 PROCEDURE — 97116 GAIT TRAINING THERAPY: CPT | Mod: GP

## 2020-07-24 PROCEDURE — 36600 WITHDRAWAL OF ARTERIAL BLOOD: CPT

## 2020-07-24 PROCEDURE — 82803 BLOOD GASES ANY COMBINATION: CPT

## 2020-07-24 PROCEDURE — 83735 ASSAY OF MAGNESIUM: CPT

## 2020-07-24 PROCEDURE — 71045 X-RAY EXAM CHEST 1 VIEW: CPT | Mod: 26

## 2020-07-24 PROCEDURE — 99223 1ST HOSP IP/OBS HIGH 75: CPT

## 2020-07-24 PROCEDURE — 81001 URINALYSIS AUTO W/SCOPE: CPT

## 2020-07-24 PROCEDURE — 94640 AIRWAY INHALATION TREATMENT: CPT

## 2020-07-24 PROCEDURE — 85027 COMPLETE CBC AUTOMATED: CPT

## 2020-07-24 RX ORDER — AZITHROMYCIN 500 MG/1
500 TABLET, FILM COATED ORAL ONCE
Refills: 0 | Status: COMPLETED | OUTPATIENT
Start: 2020-07-24 | End: 2020-07-24

## 2020-07-24 RX ORDER — SODIUM CHLORIDE 9 MG/ML
3 INJECTION INTRAMUSCULAR; INTRAVENOUS; SUBCUTANEOUS ONCE
Refills: 0 | Status: COMPLETED | OUTPATIENT
Start: 2020-07-24 | End: 2020-07-24

## 2020-07-24 RX ORDER — ATORVASTATIN CALCIUM 80 MG/1
40 TABLET, FILM COATED ORAL AT BEDTIME
Refills: 0 | Status: DISCONTINUED | OUTPATIENT
Start: 2020-07-24 | End: 2020-07-28

## 2020-07-24 RX ORDER — AMLODIPINE BESYLATE 2.5 MG/1
10 TABLET ORAL DAILY
Refills: 0 | Status: DISCONTINUED | OUTPATIENT
Start: 2020-07-24 | End: 2020-07-28

## 2020-07-24 RX ORDER — LEVOTHYROXINE SODIUM 125 MCG
100 TABLET ORAL DAILY
Refills: 0 | Status: DISCONTINUED | OUTPATIENT
Start: 2020-07-24 | End: 2020-07-28

## 2020-07-24 RX ORDER — PREDNISONE 20 MG/1
20 TABLET ORAL AS DIRECTED
Qty: 16 | Refills: 0 | Status: DISCONTINUED | COMMUNITY
Start: 2020-02-06 | End: 2020-07-24

## 2020-07-24 RX ORDER — ALBUTEROL 90 UG/1
2 AEROSOL, METERED ORAL EVERY 6 HOURS
Refills: 0 | Status: DISCONTINUED | OUTPATIENT
Start: 2020-07-24 | End: 2020-07-28

## 2020-07-24 RX ORDER — TIOTROPIUM BROMIDE 18 UG/1
1 CAPSULE ORAL; RESPIRATORY (INHALATION)
Qty: 0 | Refills: 0 | DISCHARGE

## 2020-07-24 RX ORDER — PREDNISONE 20 MG/1
20 TABLET ORAL DAILY
Qty: 18 | Refills: 0 | Status: DISCONTINUED | COMMUNITY
Start: 2020-04-10 | End: 2020-07-24

## 2020-07-24 RX ORDER — ALBUTEROL 90 UG/1
4 AEROSOL, METERED ORAL ONCE
Refills: 0 | Status: COMPLETED | OUTPATIENT
Start: 2020-07-24 | End: 2020-07-24

## 2020-07-24 RX ORDER — CEFTRIAXONE 500 MG/1
1000 INJECTION, POWDER, FOR SOLUTION INTRAMUSCULAR; INTRAVENOUS ONCE
Refills: 0 | Status: COMPLETED | OUTPATIENT
Start: 2020-07-24 | End: 2020-07-24

## 2020-07-24 RX ORDER — PREDNISONE 20 MG/1
20 TABLET ORAL DAILY
Qty: 18 | Refills: 0 | Status: DISCONTINUED | COMMUNITY
Start: 2020-04-09 | End: 2020-07-24

## 2020-07-24 RX ORDER — TIOTROPIUM BROMIDE 18 UG/1
1 CAPSULE ORAL; RESPIRATORY (INHALATION) DAILY
Refills: 0 | Status: DISCONTINUED | OUTPATIENT
Start: 2020-07-24 | End: 2020-07-28

## 2020-07-24 RX ORDER — HYDROXYUREA 500 MG/1
500 CAPSULE ORAL
Refills: 0 | Status: DISCONTINUED | COMMUNITY
Start: 2018-03-09 | End: 2020-07-24

## 2020-07-24 RX ORDER — BUDESONIDE, MICRONIZED 100 %
0.5 POWDER (GRAM) MISCELLANEOUS
Refills: 0 | Status: DISCONTINUED | OUTPATIENT
Start: 2020-07-24 | End: 2020-07-24

## 2020-07-24 RX ORDER — MONTELUKAST 4 MG/1
10 TABLET, CHEWABLE ORAL DAILY
Refills: 0 | Status: DISCONTINUED | OUTPATIENT
Start: 2020-07-24 | End: 2020-07-28

## 2020-07-24 RX ADMIN — ALBUTEROL 4 PUFF(S): 90 AEROSOL, METERED ORAL at 15:35

## 2020-07-24 RX ADMIN — SODIUM CHLORIDE 3 MILLILITER(S): 9 INJECTION INTRAMUSCULAR; INTRAVENOUS; SUBCUTANEOUS at 15:38

## 2020-07-24 RX ADMIN — Medication 125 MILLIGRAM(S): at 15:35

## 2020-07-24 RX ADMIN — MONTELUKAST 10 MILLIGRAM(S): 4 TABLET, CHEWABLE ORAL at 23:58

## 2020-07-24 RX ADMIN — CEFTRIAXONE 1000 MILLIGRAM(S): 500 INJECTION, POWDER, FOR SOLUTION INTRAMUSCULAR; INTRAVENOUS at 17:17

## 2020-07-24 RX ADMIN — AZITHROMYCIN 255 MILLIGRAM(S): 500 TABLET, FILM COATED ORAL at 17:17

## 2020-07-24 RX ADMIN — ATORVASTATIN CALCIUM 40 MILLIGRAM(S): 80 TABLET, FILM COATED ORAL at 23:58

## 2020-07-24 NOTE — H&P ADULT - NSHPLABSRESULTS_GEN_ALL_CORE
· EKG Date/Time: 24-Jul-2020 14:41	     · Rate: 94	     · Interpretation: Normal sinus rhythm	     · Other Findings: 1st degree AV block

## 2020-07-24 NOTE — ED PROVIDER NOTE - CLINICAL SUMMARY MEDICAL DECISION MAKING FREE TEXT BOX
Pt with hx of bronchiectasis, asthma, HTN here with increasing SOB and low O2 at pulmonologist office. Plan: labs, EKG, chest XR, CT, steroids, inhaler, probable admission.

## 2020-07-24 NOTE — ED ADULT NURSE NOTE - CADM POA PRESS ULCER
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       Past Surgical History:   Procedure Laterality Date    ABDOMINAL SURGERY      peg tube     SMNTEPLO-AWXMGDNIOXE-CINUXDQUZKSE N/A 5/19/2015    Performed by Deny Dias Jr., MD at [facility] [place], operated by [facility] OR    BIOPSY-BRONCHUS N/A 12/20/2016    Performed by Jake Alvarez MD at [facility] OR 2ND FLR    BRONCHOSCOPY N/A 5/29/2018    Procedure: BRONCHOSCOPY; Bronchoscopy with BAL and transbronchial biopsies under general anesthesia;  Surgeon: Jake Alvarez MD;  Location: [facility] OR [address] [place] [address];  Service: Transplant;  Laterality: N/A;    BRONCHOSCOPY Bilateral 12/20/2016    Performed by Jake Alvarez MD at [facility] OR 2ND FLR    BRONCHOSCOPY - flexible bronchoscopy with probable tissue biopsy CPT 68713 N/A 7/21/2015    Performed by Jake Alvarez MD at [facility] OR 2ND FLR    BRONCHOSCOPY - flexible bronchoscopy with probable tissue biospy CPT 23318 N/A 10/20/2015    Performed by Jake Alvarez MD at [facility] OR 2ND FLR    BRONCHOSCOPY - flexible bronchoscopy with tissue biopsy CPT 77642 N/A 9/29/2015    Performed by Jake Alvarez MD at [facility] OR 2ND FLR    BRONCHOSCOPY - flexible bronchoscopy with tissue biopsy CPT 58700 N/A 5/26/2015    Performed by Jake Alvarez MD at [facility] OR 1ST FLR    BRONCHOSCOPY flexible bronchoscopy with tissue biopsy N/A 9/8/2014    Performed by Jake Alvarez MD at [facility] OR 2ND FLR    BRONCHOSCOPY flexible with possible tissue biopsy N/A 8/8/2014    Performed by Jake Alvarez MD at [facility] OR 2ND FLR    BRONCHOSCOPY, BAL, BIOPSIES N/A 3/20/2018     Performed by Jake Alvarez MD at [facility] OR [address] [place] [address]    BRONCHOSCOPY-FIBEROPTIC N/A 1/29/2016    Performed by Joann Cifuentes DO at [facility] OR [address] [place] [address]    BRONCHOSCOPY; Bronchoscopy with BAL and transbronchial biopsies under general anesthesia N/A 5/29/2018    Performed by Jake Alvarez MD at [facility] OR [address] [place] [address]    CHOLECYSTECTOMY  2016    CHOLECYSTECTOMY-LAPAROSCOPIC N/A 7/22/2016    Performed by Joshua Goldberg, MD at [facility] OR [address] [place] [address]    ENDOMETRIAL ABLATION  2015    KJB    FESS      FESS Bilateral 10/21/2013    Performed by Jared Whatley MD at [facility] OR [address] [place] [address]    FINE NEEDLE ASPIRATION (FNA)  of a cervical lymphadenopathy  1/29/2016    Performed by Joann Cifuentes DO at [facility] OR [address] [place] [address]    flex bronchoscopy with probable tissue biopsy N/A 7/31/2014    Performed by [facility] Diagnostic Provider at [facility] OR [address] [place] [address]    flexible bronchoscopy with tissue biopsy N/A 12/11/2014    Performed by Jake Alvarez MD at [facility] OR [address] [place] [address]    INJECTION-VOCAL CORD Left 6/24/2014    Performed by Jared Whatley MD at [facility] OR [address] [place] [address]    VESTLWHDI-EYXM-K-CATH to right chest and removal of portacath to left chests wall. Bilateral 6/24/2014    Performed by Zhen Dorado MD at [facility] OR [address] [place] [address]    LARYNX SURGERY  2016    LUNG TRANSPLANT Bilateral 6/12/14    MYRINGOTOMY W/ TUBES Right 04/2017    MYRINGOTOMY WITH INSERTION OF PE TUBES Right 4/15/2017    [facility]    PLACEMENT-TUBE-PEG  5/15/2014    [facility]    PORTACATH PLACEMENT Right     rt sc    REMOVAL-TUBE-PEG  5/15/2014    Performed by David Moses MD at [facility] OR [address] [place] [address]    ROBOTIC ASSISTED LAPAROSCOPIC HYSTERECTOMY N/A 4/25/2017    Performed by [facility]y Dias Jr[facility] MD at [facility] OR    SALPINGECTOMY Bilateral 2015    KJB    SALPINGECTOMY-LAPAROSCOPIC Bilateral 5/19/2015    Performed by Deny Dias Jr., MD at [facility] OR    SINUS SURGERY FUNCTIONAL ENDOSCOPIC  WITH NAVIGATION Bilateral 4/3/2017    Performed by Cesar Olsen MD at [facility] OR [address] [place] [address]    SINUS SURGERY FUNCTIONAL ENDOSCOPIC WITH NAVIGATION, 34719, 06870, 90553, 75031 Bilateral 2/5/2015    Performed by Cesar Olsen MD at [facility] OR [address] [place] [address]    THYROPLASTY - Medialization laryngoplasty, cricothyroid subluxation, arytenoid repositioning Left 8/2/2016    Performed by Gary Null MD at [facility] OR [address] [place] [address]    TRANSPLANT-LUNG Bilateral 6/11/2014    Performed by Adolfo Huston MD at [facility] OR [address] [place] [address]       Review of patient's allergies indicates:   Allergen Reactions    Albuterol Palpitations    Colistin Anaphylaxis    Vancomycin analogues      Infusion reaction that does not resolve with slowing    Neupogen [filgrastim] Other (See Comments)     Ostealgia after five daily doses of 300 mcg.      Bactrim [sulfamethoxazole-trimethoprim] Hives    Ceftazidime Hives     Pt stated can tolerate cefapine not ceftazidime    Ceftazidime     Dronabinol Other (See Comments)     Mental changes/hallucinations    Haldol [haloperidol lactate] Other (See Comments)     Seizure like activity    Nsaids (non-steroidal anti-inflammatory drug)      Cannot have due to lung transplant    Adhesive Rash     Cloth tape- please use tegaderm or paper tape    Aztreonam Rash    Ciprofloxacin Nausea And Vomiting     Projectile N/V, per patient.  Unwilling to retry therapy.       Medications:  Medications Prior to Admission   Medication Sig    aripiprazole (ABILIFY) 2 MG Tab Take 2 mg by mouth once daily.    butalbital-acetaminophen-caffeine -40 mg (FIORICET, ESGIC) -40 mg per tablet TAKE 1 TABLET BY MOUTH EVERY 6 HOURS AS NEEDED FOR HEADACHE    calcium-vitamin D3 500 mg(1,250mg) -200 unit per tablet Take 1 tablet by mouth 2 (two) times daily with meals.    dapsone 100 MG Tab Take 1 tablet (100 mg total) by mouth once daily.    DULERA 100-5 mcg/actuation HFAA INHALE 1 PUFF BY MOUTH TWICE DAILY    fexofenadine  (ALLEGRA) 180 MG tablet Take 180 mg by mouth once daily.    fluconazole (DIFLUCAN) 150 MG Tab TAKE 1 TABLET BY MOUTH EVERY WEEK    fluticasone (FLONASE) 50 mcg/actuation nasal spray INSERT 2 SPRAYS IN EACH NOSTRIL DAILY    folic acid (FOLVITE) 1 MG tablet Take 1 tablet (1,000 mcg total) by mouth once daily.    gabapentin (NEURONTIN) 300 MG capsule Take 1 capsule (300 mg total) by mouth 3 (three) times daily. (Patient taking differently: Take 1,100 mg by mouth 3 (three) times daily. )    levalbuterol (XOPENEX HFA) 45 mcg/actuation inhaler INHALE 1 TO 2 PUFFS INTO THE LUNGS EVERY 6 HOURS AS NEEDED FOR WHEEZING OR SHORTNESS OF BREATH. USE WITH SPACER.    lipase-protease-amylase (CREON) 36,000-114,000- 180,000 unit CpDR Take 4 capsules by mouth 3 (three) times daily with meals. Take 3 with snacks prn.    lisinopril 10 MG tablet Take 10 mg by mouth once daily.    LORazepam (ATIVAN) 1 MG tablet Take 1 mg by mouth every 6 (six) hours as needed for Anxiety.    magnesium oxide (MAG-OX) 400 mg tablet Take 1 tablet (400 mg total) by mouth 2 (two) times daily.    montelukast (SINGULAIR) 10 mg tablet Take 1 tablet (10 mg total) by mouth once daily.    morphine (MS CONTIN) 15 MG 12 hr tablet Take 15 mg by mouth 3 (three) times daily.    multivit,min52-folic-vitK-cQ10 (AQUADEKS) 100-700-10 mcg-mcg-mg Cap cap 1 tab twice daily    mycophenolate (CELLCEPT) 250 mg Cap Take 2 capsules (500 mg total) by mouth 2 (two) times daily.    omeprazole (PRILOSEC) 40 MG capsule TAKE 1 CAPSULE BY MOUTH EVERY MORNING    ondansetron (ZOFRAN) 8 MG tablet Take 1 tablet (8 mg total) by mouth every 8 (eight) hours as needed for Nausea.    oxycodone (ROXICODONE) 5 MG immediate release tablet Take 10 mg by mouth every 4 (four) hours as needed for Pain.    predniSONE (DELTASONE) 5 MG tablet Take 1 tablet (5 mg total) by mouth once daily.    PROCHAMBER USE AS DIRECTED    promethazine (PHENERGAN) 25 MG tablet Take 1 tablet (25 mg total) by  mouth every 8 (eight) hours as needed.    tacrolimus (PROGRAF) 0.5 MG Cap Take 1 capsule (0.5 mg total) by mouth every 12 (twelve) hours.    tacrolimus (PROGRAF) 1 MG Cap Take 3 capsules (3 mg total) by mouth every 12 (twelve) hours. Daily doses: 3.5 mg every 12 hours.    tiZANidine (ZANAFLEX) 4 MG tablet TAKE 2 TABLETS BY MOUTH EVERY 6 HOURS AS NEEDED    topiramate (TOPAMAX) 25 MG tablet Take 1 tablet (25 mg total) by mouth 2 (two) times daily.    amlodipine (NORVASC) 5 MG tablet Take 10 mg by mouth once daily.     diphenhydrAMINE (BENADRYL) 50 MG tablet Take 1 tablet (50 mg total) by mouth every 6 (six) hours as needed for Itching.    mirtazapine (REMERON) 30 MG tablet Take 30 mg by mouth every evening.    polyethylene glycol (GLYCOLAX) 17 gram PwPk Take 17 g by mouth 2 (two) times daily.     Antibiotics (From admission, onward)    Start     Stop Route Frequency Ordered    09/28/18 0930  vancomycin 750 mg in dextrose 5 % 250 mL IVPB (ready to mix system)      -- IV Every 12 hours (non-standard times) 09/28/18 0821    09/27/18 1715  meropenem (MERREM) 2 g in sodium chloride 0.9% 100 mL IVPB      -- IV Every 8 hours (non-standard times) 09/27/18 1612    09/27/18 1445  dapsone tablet 100 mg      -- Oral Daily 09/27/18 1434        Antifungals (From admission, onward)    None        Antivirals (From admission, onward)    None           Immunization History   Administered Date(s) Administered    Cytomegalovirus Immune Globulin 06/13/2014       Family History     Problem Relation (Age of Onset)    Drug abuse Maternal Grandfather    Thrombocytopenia Maternal Grandfather        Social History     Socioeconomic History    Marital status: Single     Spouse name: None    Number of children: None    Years of education: None    Highest education level: None   Social Needs    Financial resource strain: None    Food insecurity - worry: None    Food insecurity - inability: None    Transportation needs - medical:  None    Transportation needs - non-medical: None   Occupational History    None   Tobacco Use    Smoking status: Never Smoker    Smokeless tobacco: Never Used   Substance and Sexual Activity    Alcohol use: No     Comment: Has not had an alcoholic drink in more than 2 months.    Drug use: No    Sexual activity: Yes     Partners: Male     Birth control/protection: Implant   Other Topics Concern    None   Social History Narrative    None     Review of Systems   Respiratory: Positive for cough.    All other systems reviewed and are negative.    Objective:     Vital Signs (Most Recent):  Temp: 98.1 °F (36.7 °C) (09/28/18 0400)  Pulse: 100 (09/28/18 1059)  Resp: 13 (09/28/18 1059)  BP: 113/72 (09/28/18 1059)  SpO2: (!) 94 % (09/28/18 1059) Vital Signs (24h Range):  Temp:  [98.1 °F (36.7 °C)-98.5 °F (36.9 °C)] 98.1 °F (36.7 °C)  Pulse:  [] 100  Resp:  [11-18] 13  SpO2:  [93 %-95 %] 94 %  BP: ()/(68-94) 113/72     Weight: 58.9 kg (129 lb 13.6 oz)  Body mass index is 24.54 kg/m².    Estimated Creatinine Clearance: 62.2 mL/min (based on SCr of 1.1 mg/dL).    Physical Exam   Constitutional: No distress.   Neck: No JVD present.   Cardiovascular: Normal rate and regular rhythm.   No murmur heard.  Pulmonary/Chest: Effort normal and breath sounds normal. She has no wheezes. She has no rales.   Abdominal: Soft. Bowel sounds are normal. She exhibits no distension. There is no tenderness.   Musculoskeletal: She exhibits no edema.   Neurological: She is alert.   Skin: Skin is warm. No rash noted. She is not diaphoretic. No pallor.   Psychiatric: She has a normal mood and affect.   Very reluctant historian, seems annoyed        Significant Labs:   CBC:   Recent Labs   Lab  09/27/18   1610  09/28/18   0330   WBC  4.93  5.50   HGB  7.4*  8.0*   HCT  25.9*  27.2*   PLT  153  166       Significant Imaging: I have reviewed all pertinent imaging results/findings within the past 24 hours.   No

## 2020-07-24 NOTE — H&P ADULT - NSHPPHYSICALEXAM_GEN_ALL_CORE
Vital Signs Last 24 Hrs  T(C): 36.8 (24 Jul 2020 14:44), Max: 36.8 (24 Jul 2020 14:44)  T(F): 98.2 (24 Jul 2020 14:44), Max: 98.2 (24 Jul 2020 14:44)  HR: 98 (24 Jul 2020 15:15) (94 - 98)  BP: 162/82 (24 Jul 2020 15:15) (151/82 - 162/82)  RR: 26 (24 Jul 2020 15:15) (24 - 26)  SpO2: 100% (24 Jul 2020 15:15) (90% - 100%)

## 2020-07-24 NOTE — ED PROVIDER NOTE - CARE PLAN
Principal Discharge DX:	Dyspnea, unspecified type  Secondary Diagnosis:	Bronchiectasis with acute exacerbation

## 2020-07-24 NOTE — PHYSICAL EXAM
[Normal Outer Ear/Nose] : the outer ears and nose were normal in appearance [Regular Rhythm] : with a regular rhythm [Supple] : supple [Normal S1, S2] : normal S1 and S2 [Soft] : abdomen soft [No Extremity Clubbing/Cyanosis] : no extremity clubbing/cyanosis [Normal Anterior Cervical Nodes] : no anterior cervical lymphadenopathy [Normal Bowel Sounds] : normal bowel sounds [Normal Posterior Cervical Nodes] : no posterior cervical lymphadenopathy [No CVA Tenderness] : no CVA  tenderness [de-identified] : Thin, chronically ill-appearing female [de-identified] : there are bibasilar crackles approximately 1/4 of the way up [de-identified] : there are occasional extrasystoles. There is a 1-2/6 systolic ejection murmur at the left border [de-identified] : there is 1+ edema bilaterally

## 2020-07-24 NOTE — ED ADULT NURSE NOTE - OBJECTIVE STATEMENT
pt c/o SOB x 2-3 days, seen by PCP and sent here. pt has hx of asthma but denies any other lung disease. pt denies 02 use at home.

## 2020-07-24 NOTE — H&P ADULT - NSHPOUTPATIENTPROVIDERS_GEN_ALL_CORE
PCP; Dr. Lagos  Cardiology; Dr. Alejo PCP; Dr. Cordell Yousif  Pulmonology; Dr. Lagos  Cardiology; Dr. Alejo

## 2020-07-24 NOTE — HISTORY OF PRESENT ILLNESS
[FreeTextEntry8] : Patient comes in today for an emergency evaluation.\par \par According to the patient's daughter, the patient has been experiencing increasing breathlessness over the past few weeks to she was last seen by myself, and February. At that time, she did have shortness of breath with exertion. She had approximately 10 foot exercise tolerance before becoming breathless. She states that she had been compliant with a nebulizer using it twice a day with formoterol and budesonide. She also uses increase in Singulair. At that visit, she was given a short course of prednisone for 15 days. She was also started on Zithromax 250 mg 3 times a week to treat her bronchiectasis. She only had a slight improvement from the prednisone.\par \par The patient does have a history of myelodysplastic syndrome. Several weeks ago, she was noted to have a hemoglobin of approximately 7. She required 2 units of packed red blood cells. Her last hemoglobin was checked approximately 10 days ago, and he was noted to be 10. According to the daughter, the oxygen saturations at home have been between 83 and 90. She does not have supplemental oxygen at home.\par \par The patient denies any chest pain. She does have slight chest congestion. She did undergo a transesophageal echocardiogram in June of 2019, that only revealed aortic sclerosis. At this time, she is producing scant amounts of yellow sputum. She now comes in for this assessment.

## 2020-07-24 NOTE — H&P ADULT - HISTORY OF PRESENT ILLNESS
88 y/o F PMHx significant for asthma (moderate persistent), aortic valve sclerosis, essential HTN, hypothyroidism, loop recorder, CVA (left thalamic infarct), and MDS was referred to  by her PCP for further evaluation and management of c/o progressive shortness of breath over the past 2 days.   Vitals in triage => /82, HR 94/min, RR 26/min, SpO2 90%. Labs => WBC 19.94, MCV/MCH 68.2/18.0. In the ED the patient was given Methylprednisolone 125mg IVP x 1, Albuterol MDI x 2 puffs, Montelukast 10mg PO x 1, Atorvastatin 40mg PO x 1, Ceftriaxone 1g IVP x 1, and Azithromycin 500mg IVPB x 1. 86 y/o F PMHx significant for asthma (moderate persistent), aortic valve sclerosis, essential HTN, hypothyroidism, loop recorder, CVA (left thalamic infarct), and MDS was referred to  by her PCP for further evaluation and management of c/o progressive shortness of breath over the past 2 days.   Vitals in triage => /82, HR 94/min, RR 26/min, SpO2 90%. Labs => WBC 19.94, MCV/MCH 68.2/18.0. CT Chest => Bronchiectasis and atelectasis in the right middle lobe and lingula as well as mild bronchiectasis in both lower lobes. Cardiomegaly. Moderate-sized hiatus hernia. In the ED the patient was given Methylprednisolone 125mg IVP x 1, Albuterol MDI x 2 puffs, Montelukast 10mg PO x 1, Atorvastatin 40mg PO x 1, Ceftriaxone 1g IVP x 1, and Azithromycin 500mg IVPB x 1.

## 2020-07-24 NOTE — PLAN
[FreeTextEntry1] : 1. Continued medication as outlined above.\par \par 2. The patient has now been sent to the emergency room to be further evaluated due to her hypoxemia, and breathlessness. She will need to be evaluated by cardiology as well.\par \par 3. Follow up with myself next month with pre-post spirometry.

## 2020-07-24 NOTE — ED PROVIDER NOTE - OBJECTIVE STATEMENT
88 y/o female with a PMHx of asthma, aortic valve sclerosis, essential HTN, hypothyroid, loop recorder, MDS, rib fractures, presents to the ED c/o constant SOB x2 days. Not on home O2. Pt sent to ED by Dr. Lagos for evaluation of hypoxia and SOB. Denies cough, CP. Last Hgb 2 weeks ago 10. Has required transfusion in the past. required No other complaints at this time. Cardio: Dr. Alejo.

## 2020-07-24 NOTE — H&P ADULT - NSICDXFAMILYHX_GEN_ALL_CORE_FT
FAMILY HISTORY:  Sibling  Still living? No  FH: diabetes mellitus, Age at diagnosis: Age Unknown FAMILY HISTORY:  FH: HTN (hypertension)    Sibling  Still living? No  FH: diabetes mellitus, Age at diagnosis: Age Unknown

## 2020-07-24 NOTE — H&P ADULT - ASSESSMENT
88 y/o F PMHx significant for asthma (moderate persistent), aortic valve sclerosis, essential HTN, hypothyroidism, loop recorder, CVA (left thalamic infarct), and MDS was referred to  by her PCP for further evaluation and management of c/o progressive shortness of breath over the past 2 days.   Vitals in triage => /82, HR 94/min, RR 26/min, SpO2 90%. Labs => WBC 19.94, MCV/MCH 68.2/18.0. In the ED the patient was given Methylprednisolone 125mg IVP x 1, Albuterol MDI x 2 puffs, Montelukast 10mg PO x 1, Atorvastatin 40mg PO x 1, Ceftriaxone 1g IVP x 1, and Azithromycin 500mg IVPB x 1. 86 y/o F PMHx significant for asthma (moderate persistent), aortic valve sclerosis, essential HTN, hypothyroidism, loop recorder, CVA (left thalamic infarct), and MDS was referred to  by her PCP for further evaluation and management of c/o progressive shortness of breath over the past 2 days.   Vitals in triage => /82, HR 94/min, RR 26/min, SpO2 90%. Labs => WBC 19.94, MCV/MCH 68.2/18.0. CT Chest => Bronchiectasis and atelectasis in the right middle lobe and lingula as well as mild bronchiectasis in both lower lobes. Cardiomegaly. Moderate-sized hiatus hernia. In the ED the patient was given Methylprednisolone 125mg IVP x 1, Albuterol MDI x 2 puffs, Montelukast 10mg PO x 1, Atorvastatin 40mg PO x 1, Ceftriaxone 1g IVP x 1, and Azithromycin 500mg IVPB x 1. 86 y/o F PMHx significant for asthma (moderate persistent), aortic valve sclerosis, essential HTN, hypothyroidism, loop recorder, CVA (left thalamic infarct), and MDS was referred to  by her PCP for further evaluation and management of c/o progressive shortness of breath over the past 2 days.   Vitals in triage => /82, HR 94/min, RR 26/min, SpO2 90%. Labs => WBC 19.94, MCV/MCH 68.2/18.0. CT Chest => Bronchiectasis and atelectasis in the right middle lobe and lingula as well as mild bronchiectasis in both lower lobes. Cardiomegaly. Moderate-sized hiatus hernia. In the ED the patient was given Methylprednisolone 125mg IVP x 1, Albuterol MDI x 2 puffs, Montelukast 10mg PO x 1, Atorvastatin 40mg PO x 1, Ceftriaxone 1g IVP x 1, and Azithromycin 500mg IVPB x 1.    #Acute Exacerabation of Bronchiectasis  ~admit to Medicine  ~cont. supplemental O2  ~f/u w/ Pulmonology in the am  ~cont. IV steroids  ~f/u PAN C+S  ~cont. IV abx    #CAD/Hypertension  ~cont. ASA 325mg po daily  ~cont. Amlodipine 10mg po daily    #Hyperlipidemia  ~cont. statin therapy w/ Atorvastatin 40mg po qhs    #MDS  ~please reconcile patient's dosing of Hydrea in the am    #Hypothyroidism  ~cont. Levothyroxine 100mcg po qam    #Vte ppx  ~IMPROVE VTE Risk Score is 2  [  ] Previous VTE                                                  3  [  ] Thrombophilia                                               2  [  ] Lower limb paralysis                                      2        (unable to hold up >15 seconds)    [  ] Current Cancer                                              2         (within 6 months)  [X] Immobilization > 24 hrs                                1  [  ] ICU/CCU stay > 24 hours                              1  [X] Age > 60                                                      1  ~cont. Heparin sq

## 2020-07-24 NOTE — H&P ADULT - NSICDXPASTMEDICALHX_GEN_ALL_CORE_FT
PAST MEDICAL HISTORY:  Asthma, unspecified asthma severity, unspecified whether complicated, unspecified whether persistent     Essential hypertension     Hypothyroidism, unspecified type     MDS (myelodysplastic syndrome)     Rib fractures PAST MEDICAL HISTORY:  Asthma, unspecified asthma severity, unspecified whether complicated, unspecified whether persistent     Cerebrovascular accident (CVA) 6/7/19 Left thalamic infarct    Essential hypertension     Hypothyroidism, unspecified type     MDS (myelodysplastic syndrome)     Rib fractures

## 2020-07-24 NOTE — ED PROVIDER NOTE - PROGRESS NOTE DETAILS
Tanika Hightower for attending Dr. Whitehead: Spoke to daughter, Anel Ibarra. Cell [phone]. Home [phone]. Pt had recent transfusion. Pt was on hydroxyurea.

## 2020-07-25 LAB
ALBUMIN SERPL ELPH-MCNC: 3.2 G/DL — LOW (ref 3.3–5)
ALP SERPL-CCNC: 105 U/L — SIGNIFICANT CHANGE UP (ref 40–120)
ALT FLD-CCNC: 18 U/L — SIGNIFICANT CHANGE UP (ref 12–78)
ANION GAP SERPL CALC-SCNC: 5 MMOL/L — SIGNIFICANT CHANGE UP (ref 5–17)
AST SERPL-CCNC: 19 U/L — SIGNIFICANT CHANGE UP (ref 15–37)
BASOPHILS # BLD AUTO: 0 K/UL — SIGNIFICANT CHANGE UP (ref 0–0.2)
BASOPHILS NFR BLD AUTO: 0 % — SIGNIFICANT CHANGE UP (ref 0–2)
BILIRUB SERPL-MCNC: 0.3 MG/DL — SIGNIFICANT CHANGE UP (ref 0.2–1.2)
BUN SERPL-MCNC: 32 MG/DL — HIGH (ref 7–23)
CALCIUM SERPL-MCNC: 8.7 MG/DL — SIGNIFICANT CHANGE UP (ref 8.5–10.1)
CHLORIDE SERPL-SCNC: 103 MMOL/L — SIGNIFICANT CHANGE UP (ref 96–108)
CO2 SERPL-SCNC: 29 MMOL/L — SIGNIFICANT CHANGE UP (ref 22–31)
CREAT SERPL-MCNC: 0.71 MG/DL — SIGNIFICANT CHANGE UP (ref 0.5–1.3)
EOSINOPHIL # BLD AUTO: 0 K/UL — SIGNIFICANT CHANGE UP (ref 0–0.5)
EOSINOPHIL NFR BLD AUTO: 0 % — SIGNIFICANT CHANGE UP (ref 0–6)
GLUCOSE SERPL-MCNC: 116 MG/DL — HIGH (ref 70–99)
HCT VFR BLD CALC: 35.5 % — SIGNIFICANT CHANGE UP (ref 34.5–45)
HGB BLD-MCNC: 9.4 G/DL — LOW (ref 11.5–15.5)
LYMPHOCYTES # BLD AUTO: 1.25 K/UL — SIGNIFICANT CHANGE UP (ref 1–3.3)
LYMPHOCYTES # BLD AUTO: 5 % — LOW (ref 13–44)
MAGNESIUM SERPL-MCNC: 2.2 MG/DL — SIGNIFICANT CHANGE UP (ref 1.6–2.6)
MCHC RBC-ENTMCNC: 18 PG — LOW (ref 27–34)
MCHC RBC-ENTMCNC: 26.5 GM/DL — LOW (ref 32–36)
MCV RBC AUTO: 68.1 FL — LOW (ref 80–100)
MONOCYTES # BLD AUTO: 0 K/UL — SIGNIFICANT CHANGE UP (ref 0–0.9)
MONOCYTES NFR BLD AUTO: 0 % — LOW (ref 2–14)
NEUTROPHILS # BLD AUTO: 23.73 K/UL — HIGH (ref 1.8–7.4)
NEUTROPHILS NFR BLD AUTO: 91 % — HIGH (ref 43–77)
NRBC # BLD: SIGNIFICANT CHANGE UP /100 WBCS (ref 0–0)
PLATELET # BLD AUTO: 719 K/UL — HIGH (ref 150–400)
POTASSIUM SERPL-MCNC: 4.4 MMOL/L — SIGNIFICANT CHANGE UP (ref 3.5–5.3)
POTASSIUM SERPL-SCNC: 4.4 MMOL/L — SIGNIFICANT CHANGE UP (ref 3.5–5.3)
PROT SERPL-MCNC: 7.5 GM/DL — SIGNIFICANT CHANGE UP (ref 6–8.3)
RBC # BLD: 5.21 M/UL — HIGH (ref 3.8–5.2)
RBC # FLD: 20.2 % — HIGH (ref 10.3–14.5)
SARS-COV-2 IGG SERPL QL IA: NEGATIVE — SIGNIFICANT CHANGE UP
SARS-COV-2 IGM SERPL IA-ACNC: 0.1 INDEX — SIGNIFICANT CHANGE UP
SODIUM SERPL-SCNC: 137 MMOL/L — SIGNIFICANT CHANGE UP (ref 135–145)
WBC # BLD: 24.98 K/UL — HIGH (ref 3.8–10.5)
WBC # FLD AUTO: 24.98 K/UL — HIGH (ref 3.8–10.5)

## 2020-07-25 PROCEDURE — 99233 SBSQ HOSP IP/OBS HIGH 50: CPT

## 2020-07-25 RX ORDER — HEPARIN SODIUM 5000 [USP'U]/ML
5000 INJECTION INTRAVENOUS; SUBCUTANEOUS EVERY 12 HOURS
Refills: 0 | Status: DISCONTINUED | OUTPATIENT
Start: 2020-07-25 | End: 2020-07-28

## 2020-07-25 RX ORDER — BUDESONIDE, MICRONIZED 100 %
0.5 POWDER (GRAM) MISCELLANEOUS EVERY 12 HOURS
Refills: 0 | Status: DISCONTINUED | OUTPATIENT
Start: 2020-07-25 | End: 2020-07-26

## 2020-07-25 RX ORDER — ARFORMOTEROL TARTRATE 15 UG/2ML
15 SOLUTION RESPIRATORY (INHALATION) EVERY 12 HOURS
Refills: 0 | Status: DISCONTINUED | OUTPATIENT
Start: 2020-07-25 | End: 2020-07-26

## 2020-07-25 RX ORDER — ASPIRIN/CALCIUM CARB/MAGNESIUM 324 MG
325 TABLET ORAL DAILY
Refills: 0 | Status: DISCONTINUED | OUTPATIENT
Start: 2020-07-25 | End: 2020-07-28

## 2020-07-25 RX ORDER — CEFEPIME 1 G/1
2000 INJECTION, POWDER, FOR SOLUTION INTRAMUSCULAR; INTRAVENOUS EVERY 12 HOURS
Refills: 0 | Status: COMPLETED | OUTPATIENT
Start: 2020-07-25 | End: 2020-07-27

## 2020-07-25 RX ORDER — HYDROXYUREA 500 MG/1
2 CAPSULE ORAL
Qty: 0 | Refills: 0 | DISCHARGE

## 2020-07-25 RX ORDER — BUDESONIDE AND FORMOTEROL FUMARATE DIHYDRATE 160; 4.5 UG/1; UG/1
2 AEROSOL RESPIRATORY (INHALATION)
Refills: 0 | Status: DISCONTINUED | OUTPATIENT
Start: 2020-07-25 | End: 2020-07-28

## 2020-07-25 RX ORDER — HYDROXYUREA 500 MG/1
1 CAPSULE ORAL
Qty: 0 | Refills: 0 | DISCHARGE

## 2020-07-25 RX ADMIN — Medication 40 MILLIGRAM(S): at 05:43

## 2020-07-25 RX ADMIN — AMLODIPINE BESYLATE 10 MILLIGRAM(S): 2.5 TABLET ORAL at 10:42

## 2020-07-25 RX ADMIN — Medication 1200 MILLIGRAM(S): at 21:25

## 2020-07-25 RX ADMIN — ALBUTEROL 2 PUFF(S): 90 AEROSOL, METERED ORAL at 19:55

## 2020-07-25 RX ADMIN — MONTELUKAST 10 MILLIGRAM(S): 4 TABLET, CHEWABLE ORAL at 10:46

## 2020-07-25 RX ADMIN — HEPARIN SODIUM 5000 UNIT(S): 5000 INJECTION INTRAVENOUS; SUBCUTANEOUS at 21:25

## 2020-07-25 RX ADMIN — Medication 40 MILLIGRAM(S): at 21:26

## 2020-07-25 RX ADMIN — Medication 100 MICROGRAM(S): at 05:43

## 2020-07-25 RX ADMIN — CEFEPIME 100 MILLIGRAM(S): 1 INJECTION, POWDER, FOR SOLUTION INTRAMUSCULAR; INTRAVENOUS at 11:21

## 2020-07-25 RX ADMIN — HEPARIN SODIUM 5000 UNIT(S): 5000 INJECTION INTRAVENOUS; SUBCUTANEOUS at 10:42

## 2020-07-25 RX ADMIN — CEFEPIME 100 MILLIGRAM(S): 1 INJECTION, POWDER, FOR SOLUTION INTRAMUSCULAR; INTRAVENOUS at 21:26

## 2020-07-25 RX ADMIN — Medication 325 MILLIGRAM(S): at 10:42

## 2020-07-25 RX ADMIN — ATORVASTATIN CALCIUM 40 MILLIGRAM(S): 80 TABLET, FILM COATED ORAL at 21:25

## 2020-07-26 LAB
ANION GAP SERPL CALC-SCNC: 6 MMOL/L — SIGNIFICANT CHANGE UP (ref 5–17)
BUN SERPL-MCNC: 45 MG/DL — HIGH (ref 7–23)
CALCIUM SERPL-MCNC: 8.6 MG/DL — SIGNIFICANT CHANGE UP (ref 8.5–10.1)
CHLORIDE SERPL-SCNC: 106 MMOL/L — SIGNIFICANT CHANGE UP (ref 96–108)
CO2 SERPL-SCNC: 29 MMOL/L — SIGNIFICANT CHANGE UP (ref 22–31)
CREAT SERPL-MCNC: 0.82 MG/DL — SIGNIFICANT CHANGE UP (ref 0.5–1.3)
GLUCOSE SERPL-MCNC: 124 MG/DL — HIGH (ref 70–99)
HCT VFR BLD CALC: 35.2 % — SIGNIFICANT CHANGE UP (ref 34.5–45)
HGB BLD-MCNC: 9.3 G/DL — LOW (ref 11.5–15.5)
MAGNESIUM SERPL-MCNC: 2.4 MG/DL — SIGNIFICANT CHANGE UP (ref 1.6–2.6)
MCHC RBC-ENTMCNC: 18.1 PG — LOW (ref 27–34)
MCHC RBC-ENTMCNC: 26.4 GM/DL — LOW (ref 32–36)
MCV RBC AUTO: 68.6 FL — LOW (ref 80–100)
PHOSPHATE SERPL-MCNC: 4 MG/DL — SIGNIFICANT CHANGE UP (ref 2.5–4.5)
PLATELET # BLD AUTO: 726 K/UL — HIGH (ref 150–400)
POTASSIUM SERPL-MCNC: 4.5 MMOL/L — SIGNIFICANT CHANGE UP (ref 3.5–5.3)
POTASSIUM SERPL-SCNC: 4.5 MMOL/L — SIGNIFICANT CHANGE UP (ref 3.5–5.3)
RBC # BLD: 5.13 M/UL — SIGNIFICANT CHANGE UP (ref 3.8–5.2)
RBC # FLD: 20.2 % — HIGH (ref 10.3–14.5)
SODIUM SERPL-SCNC: 141 MMOL/L — SIGNIFICANT CHANGE UP (ref 135–145)
WBC # BLD: 34.2 K/UL — HIGH (ref 3.8–10.5)
WBC # FLD AUTO: 34.2 K/UL — HIGH (ref 3.8–10.5)

## 2020-07-26 PROCEDURE — 99232 SBSQ HOSP IP/OBS MODERATE 35: CPT

## 2020-07-26 RX ORDER — LACTOBACILLUS ACIDOPHILUS 100MM CELL
1 CAPSULE ORAL
Refills: 0 | Status: DISCONTINUED | OUTPATIENT
Start: 2020-07-26 | End: 2020-07-28

## 2020-07-26 RX ORDER — PANTOPRAZOLE SODIUM 20 MG/1
40 TABLET, DELAYED RELEASE ORAL DAILY
Refills: 0 | Status: DISCONTINUED | OUTPATIENT
Start: 2020-07-26 | End: 2020-07-28

## 2020-07-26 RX ADMIN — BUDESONIDE AND FORMOTEROL FUMARATE DIHYDRATE 2 PUFF(S): 160; 4.5 AEROSOL RESPIRATORY (INHALATION) at 07:54

## 2020-07-26 RX ADMIN — CEFEPIME 100 MILLIGRAM(S): 1 INJECTION, POWDER, FOR SOLUTION INTRAMUSCULAR; INTRAVENOUS at 21:26

## 2020-07-26 RX ADMIN — CEFEPIME 100 MILLIGRAM(S): 1 INJECTION, POWDER, FOR SOLUTION INTRAMUSCULAR; INTRAVENOUS at 09:19

## 2020-07-26 RX ADMIN — HEPARIN SODIUM 5000 UNIT(S): 5000 INJECTION INTRAVENOUS; SUBCUTANEOUS at 09:15

## 2020-07-26 RX ADMIN — Medication 1 TABLET(S): at 21:25

## 2020-07-26 RX ADMIN — TIOTROPIUM BROMIDE 1 CAPSULE(S): 18 CAPSULE ORAL; RESPIRATORY (INHALATION) at 08:01

## 2020-07-26 RX ADMIN — ALBUTEROL 2 PUFF(S): 90 AEROSOL, METERED ORAL at 07:58

## 2020-07-26 RX ADMIN — AMLODIPINE BESYLATE 10 MILLIGRAM(S): 2.5 TABLET ORAL at 09:15

## 2020-07-26 RX ADMIN — Medication 1200 MILLIGRAM(S): at 21:26

## 2020-07-26 RX ADMIN — Medication 100 MICROGRAM(S): at 05:32

## 2020-07-26 RX ADMIN — Medication 325 MILLIGRAM(S): at 09:15

## 2020-07-26 RX ADMIN — Medication 1200 MILLIGRAM(S): at 09:19

## 2020-07-26 RX ADMIN — ATORVASTATIN CALCIUM 40 MILLIGRAM(S): 80 TABLET, FILM COATED ORAL at 21:26

## 2020-07-26 RX ADMIN — Medication 40 MILLIGRAM(S): at 09:12

## 2020-07-26 RX ADMIN — Medication 20 MILLIGRAM(S): at 21:26

## 2020-07-26 RX ADMIN — HEPARIN SODIUM 5000 UNIT(S): 5000 INJECTION INTRAVENOUS; SUBCUTANEOUS at 21:26

## 2020-07-27 LAB
BASE EXCESS BLDA CALC-SCNC: 1.8 MMOL/L — SIGNIFICANT CHANGE UP (ref -2–2)
BLOOD GAS COMMENTS ARTERIAL: SIGNIFICANT CHANGE UP
GAS PNL BLDA: SIGNIFICANT CHANGE UP
HCO3 BLDA-SCNC: 27 MMOL/L — SIGNIFICANT CHANGE UP (ref 21–29)
HCT VFR BLD CALC: 35.2 % — SIGNIFICANT CHANGE UP (ref 34.5–45)
HGB BLD-MCNC: 9.3 G/DL — LOW (ref 11.5–15.5)
HOROWITZ INDEX BLDA+IHG-RTO: 21 — SIGNIFICANT CHANGE UP
MCHC RBC-ENTMCNC: 18 PG — LOW (ref 27–34)
MCHC RBC-ENTMCNC: 26.4 GM/DL — LOW (ref 32–36)
MCV RBC AUTO: 68.1 FL — LOW (ref 80–100)
PCO2 BLDA: 48 MMHG — HIGH (ref 32–46)
PH BLDA: 7.36 — SIGNIFICANT CHANGE UP (ref 7.35–7.45)
PLATELET # BLD AUTO: 686 K/UL — HIGH (ref 150–400)
PO2 BLDA: 56 MMHG — LOW (ref 74–108)
RBC # BLD: 5.17 M/UL — SIGNIFICANT CHANGE UP (ref 3.8–5.2)
RBC # FLD: 20.6 % — HIGH (ref 10.3–14.5)
SAO2 % BLDA: 88 % — LOW (ref 92–96)
WBC # BLD: 32.46 K/UL — HIGH (ref 3.8–10.5)
WBC # FLD AUTO: 32.46 K/UL — HIGH (ref 3.8–10.5)

## 2020-07-27 PROCEDURE — 99233 SBSQ HOSP IP/OBS HIGH 50: CPT

## 2020-07-27 PROCEDURE — 99232 SBSQ HOSP IP/OBS MODERATE 35: CPT

## 2020-07-27 RX ADMIN — BUDESONIDE AND FORMOTEROL FUMARATE DIHYDRATE 2 PUFF(S): 160; 4.5 AEROSOL RESPIRATORY (INHALATION) at 20:10

## 2020-07-27 RX ADMIN — HEPARIN SODIUM 5000 UNIT(S): 5000 INJECTION INTRAVENOUS; SUBCUTANEOUS at 10:09

## 2020-07-27 RX ADMIN — AMLODIPINE BESYLATE 10 MILLIGRAM(S): 2.5 TABLET ORAL at 10:08

## 2020-07-27 RX ADMIN — PANTOPRAZOLE SODIUM 40 MILLIGRAM(S): 20 TABLET, DELAYED RELEASE ORAL at 10:09

## 2020-07-27 RX ADMIN — HEPARIN SODIUM 5000 UNIT(S): 5000 INJECTION INTRAVENOUS; SUBCUTANEOUS at 21:21

## 2020-07-27 RX ADMIN — CEFEPIME 100 MILLIGRAM(S): 1 INJECTION, POWDER, FOR SOLUTION INTRAMUSCULAR; INTRAVENOUS at 10:08

## 2020-07-27 RX ADMIN — BUDESONIDE AND FORMOTEROL FUMARATE DIHYDRATE 2 PUFF(S): 160; 4.5 AEROSOL RESPIRATORY (INHALATION) at 07:59

## 2020-07-27 RX ADMIN — Medication 20 MILLIGRAM(S): at 11:19

## 2020-07-27 RX ADMIN — Medication 1 TABLET(S): at 21:21

## 2020-07-27 RX ADMIN — Medication 325 MILLIGRAM(S): at 10:08

## 2020-07-27 RX ADMIN — Medication 100 MICROGRAM(S): at 05:30

## 2020-07-27 RX ADMIN — Medication 1200 MILLIGRAM(S): at 10:08

## 2020-07-27 RX ADMIN — CEFEPIME 100 MILLIGRAM(S): 1 INJECTION, POWDER, FOR SOLUTION INTRAMUSCULAR; INTRAVENOUS at 21:21

## 2020-07-27 RX ADMIN — Medication 20 MILLIGRAM(S): at 05:30

## 2020-07-27 RX ADMIN — Medication 1 TABLET(S): at 10:09

## 2020-07-27 RX ADMIN — MONTELUKAST 10 MILLIGRAM(S): 4 TABLET, CHEWABLE ORAL at 10:09

## 2020-07-27 RX ADMIN — Medication 1200 MILLIGRAM(S): at 21:21

## 2020-07-27 RX ADMIN — ALBUTEROL 2 PUFF(S): 90 AEROSOL, METERED ORAL at 08:00

## 2020-07-27 RX ADMIN — TIOTROPIUM BROMIDE 1 CAPSULE(S): 18 CAPSULE ORAL; RESPIRATORY (INHALATION) at 07:58

## 2020-07-27 RX ADMIN — ATORVASTATIN CALCIUM 40 MILLIGRAM(S): 80 TABLET, FILM COATED ORAL at 21:21

## 2020-07-27 NOTE — DIETITIAN INITIAL EVALUATION ADULT. - OTHER INFO
86 y/o F PMHx significant for asthma (moderate persistent), aortic valve sclerosis, essential HTN, hypothyroidism, loop recorder, CVA (left thalamic infarct), and MDS was referred to  by her PCP for further evaluation and management of c/o progressive shortness of breath over the past 2 days.   Vitals in triage => /82, HR 94/min, RR 26/min, SpO2 90%. Labs => WBC 19.94, MCV/MCH 68.2/18.0. CT Chest => Bronchiectasis and atelectasis in the right middle lobe and lingula as well as mild bronchiectasis in both lower lobes. Cardiomegaly. Moderate-sized hiatus hernia. In the ED the patient was given Methylprednisolone 125mg IVP x 1, Albuterol MDI x 2 puffs, Montelukast 10mg PO x 1, Atorvastatin 40mg PO x 1, Ceftriaxone 1g IVP x 1, and Azithromycin 500mg IVPB x 1.    Pt seen for LOS. Pt reports good appetite wth no changes in intake. Pt denies c/s difficulty. pt denies n/v/d/c. pt denies food allergies. Pt states she has been eating well and enjoying food in hospital.  Pt with no edema and erwin: 20. Overall pt appears in good nutritional status. Will continue to monitor pt as needed.

## 2020-07-27 NOTE — DIETITIAN INITIAL EVALUATION ADULT. - PERTINENT MEDS FT
MEDICATIONS  (STANDING):  amLODIPine   Tablet 10 milliGRAM(s) Oral daily  aspirin 325 milliGRAM(s) Oral daily  atorvastatin 40 milliGRAM(s) Oral at bedtime  budesonide  80 MICROgram(s)/formoterol 4.5 MICROgram(s) Inhaler 2 Puff(s) Inhalation two times a day  cefepime   IVPB 2000 milliGRAM(s) IV Intermittent every 12 hours  guaiFENesin ER 1200 milliGRAM(s) Oral every 12 hours  heparin   Injectable 5000 Unit(s) SubCutaneous every 12 hours  lactobacillus acidophilus 1 Tablet(s) Oral two times a day  levothyroxine 100 MICROGram(s) Oral daily  montelukast 10 milliGRAM(s) Oral daily  pantoprazole    Tablet 40 milliGRAM(s) Oral daily  predniSONE   Tablet 20 milliGRAM(s) Oral daily  tiotropium 18 MICROgram(s) Capsule 1 Capsule(s) Inhalation daily    MEDICATIONS  (PRN):  ALBUTerol    90 MICROgram(s) HFA Inhaler 2 Puff(s) Inhalation every 6 hours PRN Bronchospasm

## 2020-07-27 NOTE — CONSULT NOTE ADULT - ASSESSMENT
86 yo female with history of myelofibrosis JAK2+ here with PNA   - Patient has been closely followed by Dr. Joaquim Rodas   - I called the pateints daughter this evening yet no answer   - The pateint presently demonstrates persistent Leukocytosis and thromocytosis. Unfortunately, the patient had been previously on hydrea yet led to progressive cytopenias. the patient has had several discussions with Dr. Rodas regarding the possibility of initiation of JAKAFI yet will follow up with Dr. Rodas as an outpatient   - reccomend continuing ASA given high risk disease   - will plan for D/C tomorrow as clinically has improved.   - Patient to follow up with Dr. Rodas later this week following discharge.
PROBLEMS:    Acute Exacerabation of Bronchiectasis  CAD/Hypertension  Hyperlipidemia  MDS  Hypothyroidism    PLAN:    IV cefepime  iv solu-medrols 40mg q8hr  aerososl/pulmicort/spirivia  mucinex  chest pt  supportive care  dvt prophylasix

## 2020-07-27 NOTE — CONSULT NOTE ADULT - SUBJECTIVE AND OBJECTIVE BOX
HEMATOLOGY ONCOLOGY CONSULT     Patient is a 87y old  Female who presents with a chief complaint of Shortness of breath (27 Jul 2020 14:39)      HPI:  86 y/o F PMHx significant for asthma (moderate persistent), aortic valve sclerosis, essential HTN, hypothyroidism, loop recorder, CVA (left thalamic infarct), and MDS presently followed by my senior collegue Dr. Rodas who was referred to  by her PCP for further evaluation and management of c/o progressive shortness of breath over the past 2 days. As of reccent the pateint had been discontinued from hydrea due to progressive cytopenias.    CT Chest => Bronchiectasis and atelectasis in the right middle lobe and lingula as well as mild bronchiectasis in both lower lobes. Cardiomegaly. Moderate-sized hiatus hernia. In the ED the patient was given Methylprednisolone 125mg IVP x 1, Albuterol MDI x 2 puffs, Montelukast 10mg PO x 1, Atorvastatin 40mg PO x 1, Ceftriaxone 1g IVP x 1, and Azithromycin 500mg IVPB x 1. (24 Jul 2020 18:14)       ROS:  Negative except for:    PAST MEDICAL & SURGICAL HISTORY:  Cerebrovascular accident (CVA): 6/7/19 Left thalamic infarct  MDS (myelodysplastic syndrome)  Asthma, unspecified asthma severity, unspecified whether complicated, unspecified whether persistent  Hypothyroidism, unspecified type  Essential hypertension  Rib fractures  No significant past surgical history      SOCIAL HISTORY:    FAMILY HISTORY:  FH: HTN (hypertension)  FH: diabetes mellitus (Sibling)      MEDICATIONS  (STANDING):  amLODIPine   Tablet 10 milliGRAM(s) Oral daily  aspirin 325 milliGRAM(s) Oral daily  atorvastatin 40 milliGRAM(s) Oral at bedtime  budesonide  80 MICROgram(s)/formoterol 4.5 MICROgram(s) Inhaler 2 Puff(s) Inhalation two times a day  guaiFENesin ER 1200 milliGRAM(s) Oral every 12 hours  heparin   Injectable 5000 Unit(s) SubCutaneous every 12 hours  lactobacillus acidophilus 1 Tablet(s) Oral two times a day  levothyroxine 100 MICROGram(s) Oral daily  montelukast 10 milliGRAM(s) Oral daily  pantoprazole    Tablet 40 milliGRAM(s) Oral daily  predniSONE   Tablet 20 milliGRAM(s) Oral daily  tiotropium 18 MICROgram(s) Capsule 1 Capsule(s) Inhalation daily    MEDICATIONS  (PRN):  ALBUTerol    90 MICROgram(s) HFA Inhaler 2 Puff(s) Inhalation every 6 hours PRN Bronchospasm      Allergies    No Known Allergies    Intolerances        Vital Signs Last 24 Hrs  T(C): 36.7 (27 Jul 2020 23:00), Max: 37 (27 Jul 2020 15:30)  T(F): 98.1 (27 Jul 2020 23:00), Max: 98.6 (27 Jul 2020 15:30)  HR: 99 (27 Jul 2020 23:00) (86 - 100)  BP: 118/88 (27 Jul 2020 23:00) (118/88 - 148/57)  BP(mean): --  RR: 18 (27 Jul 2020 23:00) (18 - 18)  SpO2: 100% (27 Jul 2020 23:00) (97% - 100%)    PHYSICAL EXAM  General: adult in NAD  HEENT: clear oropharynx, anicteric sclera, pink conjunctiva  Neck: supple  CV: normal S1/S2 with no murmur rubs or gallops  Lungs: positive air movement b/l ant lungs,clear to auscultation, no wheezes, no rales  Abdomen: soft non-tender non-distended, no hepatosplenomegaly  Ext: no clubbing cyanosis or edema  Skin: no rashes and no petechiae  Neuro: alert and oriented X 4, no focal deficits      07-27-20 @ 07:01  -  07-28-20 @ 02:03  --------------------------------------------------------  IN: 100 mL / OUT: 0 mL / NET: 100 mL      LABS:                          9.3    32.46 )-----------( 686      ( 27 Jul 2020 07:26 )             35.2         Mean Cell Volume : 68.1 fl  Mean Cell Hemoglobin : 18.0 pg  Mean Cell Hemoglobin Concentration : 26.4 gm/dL  Auto Neutrophil # : x  Auto Lymphocyte # : x  Auto Monocyte # : x  Auto Eosinophil # : x  Auto Basophil # : x  Auto Neutrophil % : x  Auto Lymphocyte % : x  Auto Monocyte % : x  Auto Eosinophil % : x  Auto Basophil % : x      07-26    141  |  106  |  45<H>  ----------------------------<  124<H>  4.5   |  29  |  0.82    Ca    8.6      26 Jul 2020 07:32  Phos  4.0     07-26  Mg     2.4     07-26                        BLOOD SMEAR INTERPRETATION:       RADIOLOGY & ADDITIONAL STUDIES:
HPI:    87 y.o.f PMHx significant for asthma (moderate persistent), aortic valve sclerosis, essential HTN, hypothyroidism, loop recorder, CVA (left thalamic infarct), and MDS pat was admitted c/o progressive shortness of breath over the past 2 days. CT Chest => Bronchiectasis and atelectasis in the right middle lobe and lingula as well as mild bronchiectasis in both lower lobes. Cardiomegaly. Moderate-sized hiatus hernia. In the ED the patient was given Methylprednisolone 125mg IVP x 1, Albuterol MDI x 2 puffs, Montelukast 10mg PO x 1, Atorvastatin 40mg PO x 1, Ceftriaxone 1g IVP x 1, and Azithromycin 500mg IVPB x 1. pat feeling better, pat seen for pulmonary eval. pat feeling better.      PAST MEDICAL & SURGICAL HISTORY:  Cerebrovascular accident (CVA): 19 Left thalamic infarct  MDS (myelodysplastic syndrome)  Asthma, unspecified asthma severity, unspecified whether complicated, unspecified whether persistent  Hypothyroidism, unspecified type  Essential hypertension  Rib fractures  No significant past surgical history      Home Medications:  amLODIPine 10 mg oral tablet: 1 tab(s) orally once a day (2020 19:56)  aspirin 325 mg oral tablet: 1 tab(s) orally once a day (2020 19:56)  atorvastatin 40 mg oral tablet: 1 tab(s) orally once a day (at bedtime) (2020 19:56)  azithromycin 250 mg oral tablet: 1 tab(s) orally 3 times a week (2020 19:56)  Brovana 15 mcg/2 mL inhalation solution: 2 milliliter(s) inhaled 2 times a day (2020 19:56)  budesonide 0.5 mg/2 mL inhalation suspension: 2 milliliter(s) inhaled 2 times a day (2020 19:56)  Incruse Ellipta 62.5 mcg/inh inhalation powder: 1 puff(s) inhaled once a day (2020 19:56)  levothyroxine 100 mcg (0.1 mg) oral tablet: 1 tab(s) orally once a day (2020 19:56)  montelukast 10 mg oral tablet: 1 tab(s) orally once a day (2020 19:56)  ProAir HFA 90 mcg/inh inhalation aerosol: 2 puff(s) inhaled every 6 hours, As Needed (2020 19:56)      MEDICATIONS  (STANDING):  amLODIPine   Tablet 10 milliGRAM(s) Oral daily  arformoterol for Nebulization 15 MICROGram(s) Nebulizer every 12 hours  aspirin 325 milliGRAM(s) Oral daily  atorvastatin 40 milliGRAM(s) Oral at bedtime  buDESOnide    Inhalation Suspension 0.5 milliGRAM(s) Inhalation every 12 hours  cefepime   IVPB 2000 milliGRAM(s) IV Intermittent every 12 hours  heparin   Injectable 5000 Unit(s) SubCutaneous every 12 hours  levothyroxine 100 MICROGram(s) Oral daily  methylPREDNISolone sodium succinate Injectable 40 milliGRAM(s) IV Push every 8 hours  montelukast 10 milliGRAM(s) Oral daily  tiotropium 18 MICROgram(s) Capsule 1 Capsule(s) Inhalation at bedtime    MEDICATIONS  (PRN):  ALBUTerol    90 MICROgram(s) HFA Inhaler 2 Puff(s) Inhalation every 6 hours PRN Bronchospasm      Allergies    No Known Allergies    Intolerances        SOCIAL HISTORY: Denies tobacco, etoh abuse or illicit drug use    FAMILY HISTORY:  FH: HTN (hypertension)  FH: diabetes mellitus (Sibling)      Vital Signs Last 24 Hrs  T(C): 36.6 (2020 07:17), Max: 37.1 (2020 23:46)  T(F): 97.8 (2020 07:17), Max: 98.8 (2020 23:46)  HR: 95 (2020 07:17) (89 - 98)  BP: 149/74 (2020 07:17) (121/54 - 162/82)  BP(mean): 99 (2020 18:37) (99 - 99)  RR: 20 (2020 07:17) (20 - 26)  SpO2: 99% (2020 07:17) (90% - 100%)        REVIEW OF SYSTEMS:    CONSTITUTIONAL:  As per HPI.  HEENT:  Eyes:  No diplopia or blurred vision. ENT:  No earache, sore throat or runny nose.  CARDIOVASCULAR:  No pressure, squeezing, tightness, heaviness or aching about the chest, neck, axilla or epigastrium.  RESPIRATORY:  No cough, +shortness of breath, PND or orthopnea.  GASTROINTESTINAL:  No nausea, vomiting or diarrhea.  GENITOURINARY:  No dysuria, frequency or urgency.  MUSCULOSKELETAL:  As per HPI.  SKIN:  No change in skin, hair or nails.  NEUROLOGIC:  No paresthesias, fasciculations, seizures or weakness.  PSYCHIATRIC:  No disorder of thought or mood.  ENDOCRINE:  No heat or cold intolerance, polyuria or polydipsia.  HEMATOLOGICAL:  No easy bruising or bleedings:  .     PHYSICAL EXAMINATION:    GENERAL APPEARANCE:  Pt. is not currently dyspneic, in no distress. Pt. is alert, oriented, and pleasant.  HEENT:  Pupils are normal and react normally. No icterus. Mucous membranes well colored.  NECK:  Supple. No lymphadenopathy. Jugular venous pressure not elevated. Carotids equal.   HEART:   The cardiac impulse has a normal quality. Regular. Normal S1 and S2. There are no murmurs, rubs or gallops noted  CHEST:  Chest crackles to auscultation. Normal respiratory effort.  ABDOMEN:  Soft and nontender.   EXTREMITIES:  There is no cyanosis, clubbing or edema.   SKIN:  No rash or significant lesions are noted.    LABS:                        9.4    24.98 )-----------( 719      ( 2020 08:01 )             35.5     07-25    137  |  103  |  32<H>  ----------------------------<  116<H>  4.4   |  29  |  0.71    Ca    8.7      2020 08:01  Mg     2.2     07-25    TPro  7.5  /  Alb  3.2<L>  /  TBili  0.3  /  DBili  x   /  AST  19  /  ALT  18  /  AlkPhos  105  07-25    LIVER FUNCTIONS - ( 2020 08:01 )  Alb: 3.2 g/dL / Pro: 7.5 gm/dL / ALK PHOS: 105 U/L / ALT: 18 U/L / AST: 19 U/L / GGT: x           PT/INR - ( 2020 15:10 )   PT: 12.3 sec;   INR: 1.06 ratio         PTT - ( 2020 15:10 )  PTT:30.9 sec  CARDIAC MARKERS ( 2020 15:10 )  <0.015 ng/mL / x     / x     / x     / x          Urinalysis Basic - ( 2020 17:13 )    Color: Yellow / Appearance: Slightly Turbid / S.020 / pH: x  Gluc: x / Ketone: Negative  / Bili: Negative / Urobili: 1 mg/dL   Blood: x / Protein: 500 mg/dL / Nitrite: Negative   Leuk Esterase: Moderate / RBC: 3-5 /HPF / WBC 0-2   Sq Epi: x / Non Sq Epi: Occasional / Bacteria: Moderate    COVID-19 PCR: NotDetec (2020 15:34)    RADIOLOGY & ADDITIONAL STUDIES:     CT Chest No Cont (20 @ 15:33) >  IMPRESSION:   Bronchiectasis and atelectasis in the right middle lobe and lingula as well as mild bronchiectasis in both lower lobes, unchanged from the prior study.    Cardiomegaly.    Moderate-sized hiatus hernia.

## 2020-07-27 NOTE — PROGRESS NOTE ADULT - ASSESSMENT
#Acute Exacerabation of Bronchiectasis  ~cont. supplemental O2 and taper  ~f/u w/ Pulmonology in the am  ~taper IV solumedrol 20mg Q8H  -Start protonix  -continue Cefepime and hold Azithro as patient takes that prophylactically 3 times a week for her lungs   -continue Singlular, Brovanna, budesonide and spiriva   ~f/u PAN C+S    #MDS  -As per the daughter patient was taken off hydroxyurea due to anemia and blood transfusion that started in March  -on 7/15 her WBC Was 19 and her plt was 796 which was done in her [facility] Dr [facility] office   -WBC elevated due to Steroids  [facility]     #CAD/Hypertension  ~cont. ASA 325mg po daily  ~cont. Amlodipine 10mg po daily    #Hyperlipidemia  ~cont. statin therapy w/ Atorvastatin 40mg po qhs    #Hypothyroidism  ~cont. Levothyroxine 100mcg po qam    #Vte ppx
#Acute Exacerabation of Bronchiectasis  ~cont. supplemental O2, patient requires home Oxygen; Pulse Ox Room Air Rest: 88;Pulse Ox on O2  3  L: 93%.  -Pulmonary consult appreciated   ~S/P IV solumedrol switch to PO prednisone   -Start protonix  -S/P Cefepime#3, can resume Azithro on D/C  as patient takes that prophylactically 3 times a week for her lungs   -continue Singlular, Brovanna, budesonide and spiriva   ~f/u PAN C+S  -ABG noted     #MDS  -As per the daughter patient was taken off hydroxyurea due to anemia and blood transfusion that started in March  -on 7/15 her WBC Was 19 and her plt was 796 which was done in her [facility] office   -WBC elevated due to Steroids  -Hemeonc consult - Discussed with Dr costa who will see her tonight     #CAD/Hypertension  ~cont. ASA 325mg po daily  ~cont. Amlodipine 10mg po daily    #Hyperlipidemia  ~cont. statin therapy w/ Atorvastatin 40mg po qhs    #Hypothyroidism  ~cont. Levothyroxine 100mcg po qam    #Vte ppx    DIspo - Patient here with Acute Exacerbation of Bronchiectasis; had a quick taper of IV solumedrol due to elevated WBC; Hypoxic on ABG and requires home O2; hx of MDS has been off of Hydroxyurea due to hx of anemia and blood transfusion,dr costa to see the patient; FU with PT; POssibel D/C makeda FU with Pulm
#Acute Exacerabation of Bronchiectasis  ~cont. supplemental O2 and taper  ~f/u w/ Pulmonology in the am  ~taper IV solumedrol 40mg Q12H  -continue Cefepime and hold Azithro as patient takes that prophylactically 3 times a week for her lungs   -continue Singlular, Brovanna, budesonide and spiriva   ~f/u PAN C+S    #MDS  -As per the daughter patient was taken off hydroxyurea due to anemia and blood transfusion that started in March  -on 7/15 her WBC Was 19 and her plt was 796 which was done in her [facility] Dr [facility] office   -WBC elevated due to Steroids  -Hemeonc consult     #CAD/Hypertension  ~cont. ASA 325mg po daily  ~cont. Amlodipine 10mg po daily    #Hyperlipidemia  ~cont. statin therapy w/ Atorvastatin 40mg po qhs    #Hypothyroidism  ~cont. Levothyroxine 100mcg po qam    #Vte ppx
PROBLEMS:    Acute Exacerabation of Bronchiectasis  CAD/Hypertension  Hyperlipidemia  MDS  Hypothyroidism    PLAN:    HIGh wbc may be MDS or related to iv steroids  IV cefepime  hematology eval for high wbc with h/o MDS  taper iv solu-medrols 20mg q8hr  aerososl/pulmicort/spirivia  mucinex  chest pt  supportive care  dvt prophylasix

## 2020-07-28 ENCOUNTER — TRANSCRIPTION ENCOUNTER (OUTPATIENT)
Age: 85
End: 2020-07-28

## 2020-07-28 ENCOUNTER — APPOINTMENT (OUTPATIENT)
Dept: ELECTROPHYSIOLOGY | Facility: CLINIC | Age: 85
End: 2020-07-28
Payer: MEDICARE

## 2020-07-28 VITALS
HEART RATE: 91 BPM | DIASTOLIC BLOOD PRESSURE: 71 MMHG | SYSTOLIC BLOOD PRESSURE: 139 MMHG | RESPIRATION RATE: 18 BRPM | TEMPERATURE: 98 F | OXYGEN SATURATION: 96 %

## 2020-07-28 LAB
ANION GAP SERPL CALC-SCNC: 5 MMOL/L — SIGNIFICANT CHANGE UP (ref 5–17)
BUN SERPL-MCNC: 35 MG/DL — HIGH (ref 7–23)
CALCIUM SERPL-MCNC: 8.2 MG/DL — LOW (ref 8.5–10.1)
CHLORIDE SERPL-SCNC: 109 MMOL/L — HIGH (ref 96–108)
CO2 SERPL-SCNC: 29 MMOL/L — SIGNIFICANT CHANGE UP (ref 22–31)
CREAT SERPL-MCNC: 0.62 MG/DL — SIGNIFICANT CHANGE UP (ref 0.5–1.3)
GLUCOSE SERPL-MCNC: 81 MG/DL — SIGNIFICANT CHANGE UP (ref 70–99)
HCT VFR BLD CALC: 34.6 % — SIGNIFICANT CHANGE UP (ref 34.5–45)
HGB BLD-MCNC: 8.9 G/DL — LOW (ref 11.5–15.5)
MAGNESIUM SERPL-MCNC: 2.5 MG/DL — SIGNIFICANT CHANGE UP (ref 1.6–2.6)
MCHC RBC-ENTMCNC: 18.1 PG — LOW (ref 27–34)
MCHC RBC-ENTMCNC: 25.7 GM/DL — LOW (ref 32–36)
MCV RBC AUTO: 70.2 FL — LOW (ref 80–100)
PHOSPHATE SERPL-MCNC: 1.9 MG/DL — LOW (ref 2.5–4.5)
PLATELET # BLD AUTO: 672 K/UL — HIGH (ref 150–400)
POTASSIUM SERPL-MCNC: 3.9 MMOL/L — SIGNIFICANT CHANGE UP (ref 3.5–5.3)
POTASSIUM SERPL-SCNC: 3.9 MMOL/L — SIGNIFICANT CHANGE UP (ref 3.5–5.3)
RBC # BLD: 4.93 M/UL — SIGNIFICANT CHANGE UP (ref 3.8–5.2)
RBC # FLD: 20.4 % — HIGH (ref 10.3–14.5)
SODIUM SERPL-SCNC: 143 MMOL/L — SIGNIFICANT CHANGE UP (ref 135–145)
WBC # BLD: 29.69 K/UL — HIGH (ref 3.8–10.5)
WBC # FLD AUTO: 29.69 K/UL — HIGH (ref 3.8–10.5)

## 2020-07-28 PROCEDURE — 99233 SBSQ HOSP IP/OBS HIGH 50: CPT

## 2020-07-28 PROCEDURE — 99239 HOSP IP/OBS DSCHRG MGMT >30: CPT

## 2020-07-28 RX ORDER — SODIUM,POTASSIUM PHOSPHATES 278-250MG
1 POWDER IN PACKET (EA) ORAL THREE TIMES A DAY
Refills: 0 | Status: DISCONTINUED | OUTPATIENT
Start: 2020-07-28 | End: 2020-07-28

## 2020-07-28 RX ADMIN — Medication 20 MILLIGRAM(S): at 09:52

## 2020-07-28 RX ADMIN — Medication 100 MICROGRAM(S): at 05:23

## 2020-07-28 RX ADMIN — AMLODIPINE BESYLATE 10 MILLIGRAM(S): 2.5 TABLET ORAL at 09:52

## 2020-07-28 RX ADMIN — Medication 325 MILLIGRAM(S): at 09:52

## 2020-07-28 RX ADMIN — Medication 1 TABLET(S): at 09:51

## 2020-07-28 RX ADMIN — MONTELUKAST 10 MILLIGRAM(S): 4 TABLET, CHEWABLE ORAL at 09:52

## 2020-07-28 RX ADMIN — TIOTROPIUM BROMIDE 1 CAPSULE(S): 18 CAPSULE ORAL; RESPIRATORY (INHALATION) at 08:58

## 2020-07-28 RX ADMIN — HEPARIN SODIUM 5000 UNIT(S): 5000 INJECTION INTRAVENOUS; SUBCUTANEOUS at 09:51

## 2020-07-28 RX ADMIN — Medication 1 PACKET(S): at 13:23

## 2020-07-28 RX ADMIN — BUDESONIDE AND FORMOTEROL FUMARATE DIHYDRATE 2 PUFF(S): 160; 4.5 AEROSOL RESPIRATORY (INHALATION) at 09:05

## 2020-07-28 RX ADMIN — PANTOPRAZOLE SODIUM 40 MILLIGRAM(S): 20 TABLET, DELAYED RELEASE ORAL at 09:52

## 2020-07-28 RX ADMIN — Medication 1200 MILLIGRAM(S): at 09:52

## 2020-07-28 NOTE — PHYSICAL THERAPY INITIAL EVALUATION ADULT - PERTINENT HX OF CURRENT PROBLEM, REHAB EVAL
Pt was referred to  by her PCP for further evaluation and management of c/o progressive shortness of breath over the past 2 days. CT Chest => Bronchiectasis and atelectasis in the right middle lobe and lingula as well as mild bronchiectasis in both lower lobes. Cardiomegaly. Moderate-sized hiatus hernia., ~cont. supplemental O2, patient requires home Oxygen;

## 2020-07-28 NOTE — DISCHARGE NOTE NURSING/CASE MANAGEMENT/SOCIAL WORK - PATIENT PORTAL LINK FT
You can access the FollowMyHealth Patient Portal offered by [facility] by registering at the following website: [email or web address]. By joining [employer]’s FollowMyHealth portal, you will also be able to view your health information using other applications (apps) compatible with our system.

## 2020-07-28 NOTE — DISCHARGE NOTE PROVIDER - NSDCMRMEDTOKEN_GEN_ALL_CORE_FT
amLODIPine 10 mg oral tablet: 1 tab(s) orally once a day  aspirin 325 mg oral tablet: 1 tab(s) orally once a day  atorvastatin 40 mg oral tablet: 1 tab(s) orally once a day (at bedtime)  azithromycin 250 mg oral tablet: 1 tab(s) orally 3 times a week  Brovana 15 mcg/2 mL inhalation solution: 2 milliliter(s) inhaled 2 times a day  budesonide 0.5 mg/2 mL inhalation suspension: 2 milliliter(s) inhaled 2 times a day  Incruse Ellipta 62.5 mcg/inh inhalation powder: 1 puff(s) inhaled once a day  levothyroxine 100 mcg (0.1 mg) oral tablet: 1 tab(s) orally once a day  montelukast 10 mg oral tablet: 1 tab(s) orally once a day  predniSONE 10 mg oral tablet: start 20mg daily x 7 days, then 10mg daily x 7 days  ProAir HFA 90 mcg/inh inhalation aerosol: 2 puff(s) inhaled every 6 hours, As Needed

## 2020-07-28 NOTE — DISCHARGE NOTE PROVIDER - HOSPITAL COURSE
Vital Signs Last 24 Hrs    T(C): 36.7 (28 Jul 2020 07:45), Max: 37 (27 Jul 2020 15:30)    T(F): 98 (28 Jul 2020 07:45), Max: 98.6 (27 Jul 2020 15:30)    HR: 84 (28 Jul 2020 09:00) (84 - 99)    BP: 156/78 (28 Jul 2020 07:45) (118/88 - 156/78)    BP(mean): --    RR: 19 (28 Jul 2020 07:45) (18 - 19)    SpO2: 95% (28 Jul 2020 07:45) (95% - 100%)        HEENT:   pupils equal and reactive, EOMI, no oropharyngeal lesions, erythema, exudates, oral thrush        NECK:   supple, no carotid bruits, no palpable lymph nodes, no thyromegaly        CV:  +S1, +S2, regular, no murmurs or rubs        RESP:   lungs clear to auscultation bilaterally, no wheezing, rales, rhonchi, good air entry bilaterally        BREAST:  not examined        GI:  abdomen soft, non-tender, non-distended, normal BS, no bruits, no abdominal masses, no palpable masses        RECTAL:  not examined        :  not examined        MSK:   normal muscle tone, no atrophy, no rigidity, no contractions        EXT:   no clubbing, no cyanosis, no edema, no calf pain, swelling or erythema        VASCULAR:  pulses equal and symmetric in the upper and lower extremities        NEURO:  AAOX3, no focal neurological deficits, follows all commands, able to move extremities spontaneously        SKIN:  no ulcers, lesions or rashes        28 Jul 2020 08:19        143    |  109    |  35       ----------------------------<  81       3.9     |  29     |  0.62         Ca    8.2        28 Jul 2020 08:19    Phos  1.9       28 Jul 2020 08:19    Mg     2.5       28 Jul 2020 08:19        CBC Full  -  ( 28 Jul 2020 08:19 )    WBC Count : 29.69 K/uL    Hemoglobin : 8.9 g/dL    Hematocrit : 34.6 %    Platelet Count - Automated : 672 K/uL    Mean Cell Volume : 70.2 fl    Mean Cell Hemoglobin : 18.1 pg    Mean Cell Hemoglobin Concentration : 25.7 gm/dL    Auto Neutrophil # : x    Auto Lymphocyte # : x    Auto Monocyte # : x    Auto Eosinophil # : x    Auto Basophil # : x    Auto Neutrophil % : x    Auto Lymphocyte % : x    Auto Monocyte % : x    Auto Eosinophil % : x    Auto Basophil % : x                        Hospital Course:        88 y/o F PMHx significant for asthma (moderate persistent), aortic valve sclerosis, essential HTN, hypothyroidism, loop recorder, CVA (left thalamic infarct), and MDS was referred to  by her PCP for further evaluation and management of c/o progressive shortness of breath over the past 2 days.     Vitals in triage => /82, HR 94/min, RR 26/min, SpO2 90%. Labs => WBC 19.94, MCV/MCH 68.2/18.0.     CT Chest => Bronchiectasis and atelectasis in the right middle lobe and lingula as well as mild bronchiectasis in both lower lobes. Cardiomegaly. Moderate-sized hiatus hernia.            #Acute Exacerabation of Bronchiectasis    ~cont. supplemental O2, patient requires home Oxygen; Pulse Ox Room Air Rest: 88;Pulse Ox on O2  3  L: 93%.    -Pulmonary consult appreciated     ~S/P IV solumedrol switch to PO prednisone     -S/P Cefepime#3, can resume Azithro on D/C  as patient takes that prophylactically 3 times a week for her lungs

## 2020-07-28 NOTE — DISCHARGE NOTE PROVIDER - NSDCCPCAREPLAN_GEN_ALL_CORE_FT
PRINCIPAL DISCHARGE DIAGNOSIS  Diagnosis: Bronchiectasis with acute exacerbation  Assessment and Plan of Treatment: continue with oral prednisone as prescribed.  c/w home meds  follow up ouptpatient with Dr. Lagos in one week.      SECONDARY DISCHARGE DIAGNOSES  Diagnosis: Bronchiectasis with acute exacerbation  Assessment and Plan of Treatment:

## 2020-07-28 NOTE — PHYSICAL THERAPY INITIAL EVALUATION ADULT - CRITERIA FOR SKILLED THERAPEUTIC INTERVENTIONS
risk reduction/prevention/therapy frequency/anticipated discharge recommendation/impairments found/predicted duration of therapy intervention/anticipated equipment needs at discharge

## 2020-07-28 NOTE — DISCHARGE NOTE PROVIDER - CARE PROVIDER_API CALL
Chris Lagos  INTERNAL MEDICINE  [address] [place] [address]  Phone: [phone]  Fax: [phone]  Follow Up Time:

## 2020-07-29 PROCEDURE — 93298 REM INTERROG DEV EVAL SCRMS: CPT

## 2020-07-29 PROCEDURE — G2066: CPT

## 2020-07-30 DIAGNOSIS — J98.11 ATELECTASIS: ICD-10-CM

## 2020-07-30 DIAGNOSIS — K44.9 DIAPHRAGMATIC HERNIA WITHOUT OBSTRUCTION OR GANGRENE: ICD-10-CM

## 2020-07-30 DIAGNOSIS — Z79.82 LONG TERM (CURRENT) USE OF ASPIRIN: ICD-10-CM

## 2020-07-30 DIAGNOSIS — E78.5 HYPERLIPIDEMIA, UNSPECIFIED: ICD-10-CM

## 2020-07-30 DIAGNOSIS — E03.9 HYPOTHYROIDISM, UNSPECIFIED: ICD-10-CM

## 2020-07-30 DIAGNOSIS — J47.1 BRONCHIECTASIS WITH (ACUTE) EXACERBATION: ICD-10-CM

## 2020-07-30 DIAGNOSIS — J45.40 MODERATE PERSISTENT ASTHMA, UNCOMPLICATED: ICD-10-CM

## 2020-07-30 DIAGNOSIS — D46.9 MYELODYSPLASTIC SYNDROME, UNSPECIFIED: ICD-10-CM

## 2020-07-30 DIAGNOSIS — I35.8 OTHER NONRHEUMATIC AORTIC VALVE DISORDERS: ICD-10-CM

## 2020-07-30 DIAGNOSIS — I10 ESSENTIAL (PRIMARY) HYPERTENSION: ICD-10-CM

## 2020-07-30 DIAGNOSIS — Z86.73 PERSONAL HISTORY OF TRANSIENT ISCHEMIC ATTACK (TIA), AND CEREBRAL INFARCTION WITHOUT RESIDUAL DEFICITS: ICD-10-CM

## 2020-07-30 DIAGNOSIS — I25.10 ATHEROSCLEROTIC HEART DISEASE OF NATIVE CORONARY ARTERY WITHOUT ANGINA PECTORIS: ICD-10-CM

## 2020-08-11 ENCOUNTER — APPOINTMENT (OUTPATIENT)
Dept: INTERNAL MEDICINE | Facility: CLINIC | Age: 85
End: 2020-08-11
Payer: MEDICARE

## 2020-08-11 VITALS
OXYGEN SATURATION: 98 % | TEMPERATURE: 98.2 F | HEIGHT: 60 IN | BODY MASS INDEX: 24.54 KG/M2 | HEART RATE: 96 BPM | RESPIRATION RATE: 18 BRPM | SYSTOLIC BLOOD PRESSURE: 120 MMHG | WEIGHT: 125 LBS | DIASTOLIC BLOOD PRESSURE: 60 MMHG

## 2020-08-11 PROCEDURE — 99495 TRANSJ CARE MGMT MOD F2F 14D: CPT | Mod: 25

## 2020-08-11 NOTE — PHYSICAL EXAM
[Normal Outer Ear/Nose] : the outer ears and nose were normal in appearance [Supple] : supple [No JVD] : no jugular venous distention [Regular Rhythm] : with a regular rhythm [Normal S1, S2] : normal S1 and S2 [No Edema] : there was no peripheral edema [No Extremity Clubbing/Cyanosis] : no extremity clubbing/cyanosis [Soft] : abdomen soft [Normal Posterior Cervical Nodes] : no posterior cervical lymphadenopathy [Normal Bowel Sounds] : normal bowel sounds [Normal Anterior Cervical Nodes] : no anterior cervical lymphadenopathy [Normal Insight/Judgement] : insight and judgment were intact [de-identified] : chronically ill-appearing female [de-identified] : there are cracklesin the mid lung zones bilaterally as well as few crackles at the right base.There are no wheezes. [de-identified] : mildly anxious

## 2020-08-11 NOTE — HISTORY OF PRESENT ILLNESS
[de-identified] : the patient comes in today for a followup evaluation, and a transition of care assessment status post her recent discharge from [facility].\par \par The patient was admitted on 7/24/20, for breathlessness. She was felt to have exacerbation of her underlying issues with bronchiectasis. She was treated with intravenous antibiotics in the form of cefepime, and she was also given IV corticosteroids. She improved after several days. Of note is the fact that the patient does have myeloproliferative disorder, and she was noted to have an elevation in her white blood cell count, thrombocytosis, and a moderate degree of anemia. At the time of discharge, her hemoglobin was 8.9. She was sent home on tapering doses of prednisone which he took 20 mg a day for 7 days, followed by 10 mg a day for 7 days.Prednisone was completed today.\par \par According to the daughter, the patient has been compliant with the nebulizer at home, using formoterol and budesonide. She also uses Incruse one puff daily. She denies any significant cough or purulent sputum production. Of note is the fact, however, that she did have a moderate degree of dyspnea yesterday. Her respiratory rate increased. There was concern about possible component of anxiety. Her oxygen was increased from 3-4, with a reduction in her respiratory rate from the mid 40s, down to the mid 20s. Her oxygen saturation remained between 95 and 96%. She now comes in today for this assessment. She is somewhat improved.

## 2020-08-11 NOTE — PLAN
[FreeTextEntry1] : #1. Continue with bronchodilator regimen as outlined above.\par \par 2.The patient will contact her primary care physician, Dr. Yousif for followup regarding possible institution of anxiolytic medications Anxiety may be contributing to some extent with regards to her breathlessness.\par \par 3. The patient is to follow up with her hematologist ASAP regarding the myeloproliferative disorder as her white blood cell count and platelet counts are both elevated.she needs repeat blood work to be performed.\par \par 4. Followup in 3 months with Mrs. Mccain with office visit and spirometry\par \par 5. Follow up with myself in 6 months with full pulmonary function testing\par \par 6. Maintain supplemental oxygen at 3 L per minute 24 hours a day.

## 2020-08-17 ENCOUNTER — APPOINTMENT (OUTPATIENT)
Dept: INTERNAL MEDICINE | Facility: CLINIC | Age: 85
End: 2020-08-17

## 2020-08-20 ENCOUNTER — APPOINTMENT (OUTPATIENT)
Dept: FAMILY MEDICINE | Facility: CLINIC | Age: 85
End: 2020-08-20
Payer: MEDICARE

## 2020-08-20 VITALS
SYSTOLIC BLOOD PRESSURE: 142 MMHG | DIASTOLIC BLOOD PRESSURE: 60 MMHG | HEART RATE: 94 BPM | HEIGHT: 60 IN | WEIGHT: 126 LBS | OXYGEN SATURATION: 99 % | BODY MASS INDEX: 24.74 KG/M2

## 2020-08-20 DIAGNOSIS — M81.0 AGE-RELATED OSTEOPOROSIS W/OUT CURRENT PATHOLOGICAL FRACTURE: ICD-10-CM

## 2020-08-20 PROCEDURE — 99214 OFFICE O/P EST MOD 30 MIN: CPT | Mod: 25

## 2020-08-20 PROCEDURE — 96372 THER/PROPH/DIAG INJ SC/IM: CPT

## 2020-08-20 RX ADMIN — DENOSUMAB 0 MG/ML: 60 INJECTION SUBCUTANEOUS at 00:00

## 2020-08-20 NOTE — HISTORY OF PRESENT ILLNESS
[de-identified] : Patient was discharged from hospital on July 24 for exacerbation of bronchiectasis responded to antibiotics has since followed up with Dr. christiansen is now oxygen dependent at 2 to 3 L/min\par \par Here for Prolia treatment for osteopenia.  This is her 10th injection over the course of 5 years with recent bone scan showed osteopenia.  Patient has now completed treatment for osteoporosis\par \par Here with her daughter patient admits to feeling depressed wakes up in the morning wishing she had not..  She lives with her daughter and grandchildren she is not isolated has good rapport with family likes to play cards.  She denies anxiety

## 2020-08-20 NOTE — PLAN
[FreeTextEntry1] : Involutional depression patient agrees to therapeutic trial of sertraline 25 mg follow-up in 6 weeks\par \par Follow-up with hematology\par \par Bronchiectasis patient now stable on home oxygen 3 L/min

## 2020-08-24 ENCOUNTER — MED ADMIN CHARGE (OUTPATIENT)
Age: 85
End: 2020-08-24

## 2020-08-24 RX ORDER — DENOSUMAB 60 MG/ML
60 INJECTION SUBCUTANEOUS
Qty: 1 | Refills: 0 | Status: COMPLETED | OUTPATIENT
Start: 2020-08-20

## 2020-09-01 ENCOUNTER — APPOINTMENT (OUTPATIENT)
Dept: ELECTROPHYSIOLOGY | Facility: CLINIC | Age: 85
End: 2020-09-01
Payer: MEDICARE

## 2020-09-02 PROCEDURE — 93298 REM INTERROG DEV EVAL SCRMS: CPT

## 2020-09-02 PROCEDURE — G2066: CPT

## 2020-10-01 ENCOUNTER — APPOINTMENT (OUTPATIENT)
Dept: FAMILY MEDICINE | Facility: CLINIC | Age: 85
End: 2020-10-01

## 2020-10-01 ENCOUNTER — APPOINTMENT (OUTPATIENT)
Dept: FAMILY MEDICINE | Facility: CLINIC | Age: 85
End: 2020-10-01
Payer: MEDICARE

## 2020-10-01 ENCOUNTER — NON-APPOINTMENT (OUTPATIENT)
Age: 85
End: 2020-10-01

## 2020-10-01 DIAGNOSIS — Z23 ENCOUNTER FOR IMMUNIZATION: ICD-10-CM

## 2020-10-01 PROCEDURE — 99213 OFFICE O/P EST LOW 20 MIN: CPT | Mod: 95

## 2020-10-01 NOTE — ASSESSMENT
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  Advise for control of nasal symptoms on oxygen therapy given\par \par Patient had flu shot at hematologist last week\par \par face to face with patient for 15 minutes giving counseling and discussing medical issues. Time also spent before and after visit reviewing chart\par

## 2020-10-01 NOTE — HISTORY OF PRESENT ILLNESS
[Home] : at home, [unfilled] , at the time of the visit. [Medical Office: ([facility])___] : at the medical office located in  [Family Member] : family member [Verbal consent obtained from patient] : the patient, [unfilled] [FreeTextEntry1] : Anxious depression [de-identified] : Patient's mood is brighter appetite is good on sertraline 25 mg daily.  Patient's daughter agrees this is the case.  Will increase sertraline to 50 mg\par \par Saw hematologist last week platelet count is 900 K white blood cell count is 20 K hemoglobin 9.6 being followed for myelodysplasia.  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 Lagos occasional nasal stuffiness bloody nose recommend saline mist Vaseline

## 2020-10-01 NOTE — REVIEW OF SYSTEMS
[Shortness Of Breath] : shortness of breath [Dyspnea on Exertion] : dyspnea on exertion [Negative] : Cardiovascular return to ED if symptoms worsen, persist or questions arise

## 2020-10-06 ENCOUNTER — APPOINTMENT (OUTPATIENT)
Dept: ELECTROPHYSIOLOGY | Facility: CLINIC | Age: 85
End: 2020-10-06
Payer: MEDICARE

## 2020-10-07 PROCEDURE — G2066: CPT

## 2020-10-07 PROCEDURE — 93298 REM INTERROG DEV EVAL SCRMS: CPT

## 2020-10-08 ENCOUNTER — EMERGENCY (EMERGENCY)
Facility: HOSPITAL | Age: 85
LOS: 0 days | Discharge: HOME | End: 2020-10-08
Attending: STUDENT IN AN ORGANIZED HEALTH CARE EDUCATION/TRAINING PROGRAM
Payer: MEDICARE

## 2020-10-08 VITALS
HEART RATE: 98 BPM | TEMPERATURE: 98 F | OXYGEN SATURATION: 97 % | DIASTOLIC BLOOD PRESSURE: 66 MMHG | SYSTOLIC BLOOD PRESSURE: 164 MMHG | RESPIRATION RATE: 18 BRPM

## 2020-10-08 VITALS
OXYGEN SATURATION: 96 % | DIASTOLIC BLOOD PRESSURE: 66 MMHG | HEART RATE: 99 BPM | HEIGHT: 60 IN | SYSTOLIC BLOOD PRESSURE: 153 MMHG | RESPIRATION RATE: 22 BRPM | TEMPERATURE: 98 F | WEIGHT: 126.1 LBS

## 2020-10-08 DIAGNOSIS — Z79.82 LONG TERM (CURRENT) USE OF ASPIRIN: ICD-10-CM

## 2020-10-08 DIAGNOSIS — S00.12XA CONTUSION OF LEFT EYELID AND PERIOCULAR AREA, INITIAL ENCOUNTER: ICD-10-CM

## 2020-10-08 DIAGNOSIS — J45.40 MODERATE PERSISTENT ASTHMA, UNCOMPLICATED: ICD-10-CM

## 2020-10-08 DIAGNOSIS — I70.0 ATHEROSCLEROSIS OF AORTA: ICD-10-CM

## 2020-10-08 DIAGNOSIS — E03.9 HYPOTHYROIDISM, UNSPECIFIED: ICD-10-CM

## 2020-10-08 DIAGNOSIS — D46.9 MYELODYSPLASTIC SYNDROME, UNSPECIFIED: ICD-10-CM

## 2020-10-08 DIAGNOSIS — I10 ESSENTIAL (PRIMARY) HYPERTENSION: ICD-10-CM
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
## 2020-10-08 DIAGNOSIS — S00.83XA CONTUSION OF OTHER PART OF HEAD, INITIAL ENCOUNTER: ICD-10-CM
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
## 2020-10-08 DIAGNOSIS — S02.2XXA FRACTURE OF NASAL BONES, INITIAL ENCOUNTER FOR CLOSED FRACTURE: ICD-10-CM

## 2020-10-08 DIAGNOSIS — Z86.73 PERSONAL HISTORY OF TRANSIENT ISCHEMIC ATTACK (TIA), AND CEREBRAL INFARCTION WITHOUT RESIDUAL DEFICITS: ICD-10-CM

## 2020-10-08 PROCEDURE — 99284 EMERGENCY DEPT VISIT MOD MDM: CPT | Mod: 25

## 2020-10-08 PROCEDURE — 70486 CT MAXILLOFACIAL W/O DYE: CPT | Mod: 26

## 2020-10-08 PROCEDURE — 99283 EMERGENCY DEPT VISIT LOW MDM: CPT

## 2020-10-08 PROCEDURE — 72125 CT NECK SPINE W/O DYE: CPT | Mod: 26

## 2020-10-08 PROCEDURE — 76376 3D RENDER W/INTRP POSTPROCES: CPT | Mod: 26

## 2020-10-08 PROCEDURE — 70450 CT HEAD/BRAIN W/O DYE: CPT

## 2020-10-08 PROCEDURE — 71045 X-RAY EXAM CHEST 1 VIEW: CPT | Mod: 26

## 2020-10-08 PROCEDURE — 71045 X-RAY EXAM CHEST 1 VIEW: CPT

## 2020-10-08 PROCEDURE — 70486 CT MAXILLOFACIAL W/O DYE: CPT

## 2020-10-08 PROCEDURE — 72125 CT NECK SPINE W/O DYE: CPT

## 2020-10-08 PROCEDURE — 70450 CT HEAD/BRAIN W/O DYE: CPT | Mod: 26

## 2020-10-08 PROCEDURE — 76376 3D RENDER W/INTRP POSTPROCES: CPT

## 2020-10-08 NOTE — ED ADULT NURSE NOTE - OBJECTIVE STATEMENT
Patient is a 87 year old female comes to ED from home with daughter, [email or web address]. patient reports falling out of bed this morning, denies any chest pain or SOB. denies any dizziness. Patient is on asa. Denies any pain or discomfort. patient has left eye ecchymosis and swelling. Patient is on chronic oxygen 3L nasal cannula.

## 2020-10-08 NOTE — ED PROVIDER NOTE - NSTIMEPROVIDERCAREINITIATE_GEN_ER
Marleni Young arrives to ER via MFD from home for CC of difficulty breathing onset last night after taking a shower. Patient has hx of CHF, COPD, chronic a-fib. Patient does not wear home O2. Per EMS, patients O2 sats would drop into 80's with movement at home and put her on NRB with stable saturations during transport. Upon arrival, patient talking in broken sentences and wheezing.      08-Oct-2020 08:53

## 2020-10-08 NOTE — ED ADULT NURSE NOTE - CAS ELECT INFOMATION PROVIDED
no abdominal pain, no bloating, no constipation, no diarrhea, no nausea and no vomiting. DC instructions

## 2020-10-08 NOTE — ED PROVIDER NOTE - PATIENT PORTAL LINK FT
You can access the FollowMyHealth Patient Portal offered by [facility] by registering at the following website: [email or web address]. By joining [employer]’s FollowMyHealth portal, you will also be able to view your health information using other applications (apps) compatible with our system.

## 2020-10-08 NOTE — ED ADULT NURSE NOTE - NSIMPLEMENTINTERV_GEN_ALL_ED
Implemented All Fall with Harm Risk Interventions:  [place] to call system. Call bell, personal items and telephone within reach. Instruct patient to call for assistance. Room bathroom lighting operational. Non-slip footwear when patient is off stretcher. Physically safe environment: no spills, clutter or unnecessary equipment. Stretcher in lowest position, wheels locked, appropriate side rails in place. Provide visual cue, wrist band, yellow gown, etc. Monitor gait and stability. Monitor for mental status changes and reorient to person, place, and time. Review medications for side effects contributing to fall risk. Reinforce activity limits and safety measures with patient and family. Provide visual clues: red socks.

## 2020-10-08 NOTE — ED ADULT NURSE REASSESSMENT NOTE - NS ED NURSE REASSESS COMMENT FT1
Spoke with patients daughter pt has been following up with pulmonologist, pt baseline at home O2 desats when walking

## 2020-10-08 NOTE — ED PROVIDER NOTE - OBJECTIVE STATEMENT
86 y/o F PMHx significant for asthma (moderate persistent), aortic valve sclerosis, essential HTN, hypothyroidism, loop recorder, CVA (left thalamic infarct), and MDS presents to the ED s/p fall off of her bed x today. Pt denies feeling dizzy before the fall. Pt states that her legs gave out before the fall. Pt reports that she hit her head on the fall but denies injuring anything else. Presents with a hematoma on her left eye and bleeding from her nose. No LOC. Denies headache, chest pain, SOB, fevers, chills, cough, N/V, diarrhea, constipation, extremity pain. Pt is on ASA but not on any other blood thinners.

## 2020-10-08 NOTE — ED PROVIDER NOTE - NSFOLLOWUPINSTRUCTIONS_ED_ALL_ED_FT
Nasal Fracture    WHAT YOU NEED TO KNOW:    A nasal fracture is a crack or break in your nose. You may have a break in the upper nose (bridge), the side, or in the septum. The septum is in the middle of the nose and divides your nostrils. Nasal fractures are caused by a hard hit to the nose. They may be caused by a motor vehicle crash, sports injury, fall, or a fight.         DISCHARGE INSTRUCTIONS:    Return to the emergency department if:     You feel like one or both of your nasal passages are blocked and you have trouble breathing.       Clear fluid is leaking from your nose.      Your have severe nose pain, even after you take medicine.       You have double vision or have problems moving your eyes.     Contact your healthcare provider if:     You have a fever.       You continue to have nosebleeds.      You have a headache that gets worse, even after you take pain medicine.      Your splint or packing are loose.      You have questions about your condition or care.    Medicines:     Medicine may be given to decrease pain or help prevent a bacterial infection. Ask how to take pain medicine safely. Medicine may also be given to decrease nasal swelling and help make breathing easier.       Take your medicine as directed. Contact your healthcare provider if you think your medicine is not helping or if you have side effects. Tell him or her if you are allergic to any medicine. Keep a list of the medicines, vitamins, and herbs you take. Include the amounts, and when and why you take them. Bring the list or the pill bottles to follow-up visits. Carry your medicine list with you in case of an emergency.    Wound care: Ask your healthcare provider how to care for your wounds, splint, or packing.    How to care for your nasal fracture:     Apply ice on your nose for 15 to 20 minutes every hour or as directed. Use an ice pack, or put crushed ice in a plastic bag. Cover it with a towel. Ice helps prevent tissue damage and decreases swelling and pain.      Elevate your head when you lie down. This will help decrease swelling and pain. You may need to see a specialist 3 to 5 days later for tests or more treatment after swelling has decreased.       Protect your nose to prevent bleeding, bruising, or another fracture. Try not to bump your nose on anything. You may not be able to participate in sports for up to 6 weeks.     Follow up with a specialist or your healthcare provider in 2 to 5 days as directed: Write down any questions you have so you remember to ask them during your visits. Sometimes follow-up care is needed months or even years later to correct problems.

## 2020-10-08 NOTE — ED PROVIDER NOTE - PMH
Asthma, unspecified asthma severity, unspecified whether complicated, unspecified whether persistent    Cerebrovascular accident (CVA)  6/7/19 Left thalamic infarct  Essential hypertension    Hypothyroidism, unspecified type    MDS (myelodysplastic syndrome)    Rib fractures

## 2020-10-08 NOTE — ED ADULT TRIAGE NOTE - CHIEF COMPLAINT QUOTE
FALL HIT HEAD NO LOC. LIVES WITH DAUGHTER PANCHO ERNANDEZ [phone] (HOME) [phone] ( CELL) AS PER EMS PT USES HUMIDIFIED OXYGEN 3LPN NASAL CANNULA AT HOME

## 2020-10-08 NOTE — ED PROVIDER NOTE - CARE PLAN
Principal Discharge DX:	Nasal bone fracture  Secondary Diagnosis:	Closed head injury, initial encounter

## 2020-10-08 NOTE — ED PROVIDER NOTE - PHYSICAL EXAMINATION
Vital signs as available reviewed.  General:  Comfortable, no acute distress.  Head:  +large hematoma over left eye, no TTP of scalp,   ENT: + dried blood in jaimee after cleaning to examine for septal hematoma, small amount of bleeding noted, no septal hematoma noted, stopped bleeding with pressure.  Eyes:  Conjunctiva pink, no icterus.  Cardiovascular:  Regular rate, no obvious murmur.  Respiratory:  +decreased at bases  Abdomen:  Soft, non-tender.  Musculoskeletal:  No deformity or calf tenderness. No midline spinal tenderness  Neurologic: Alert and oriented, moving all extremities.  Skin:  Warm and dry.

## 2020-10-08 NOTE — ED PROVIDER NOTE - NS_ ATTENDINGSCRIBEDETAILS _ED_A_ED_FT
I, Ryan Turner DO,  performed the initial face to face bedside interview with this patient regarding history of present illness, review of symptoms and relevant past medical, social and family history. I completed an independent physical examination. I was the initial provider who evaluated this patient.    The history, relevant review of systems, past medical and surgical history, medical decision making, and physical examination was documented by the scribe in my presence and I attest to the accuracy of the documentation.

## 2020-10-08 NOTE — ED PROVIDER NOTE - NS ED ROS FT
Constitutional: No fever.  Neurological: No headache.  Eyes: No vision changes.   Ears, Nose, Mouth, Throat: No congestion. +epistaxis  Cardiovascular: No chest pain.  Respiratory: No difficulty breathing.  Gastrointestinal: No nausea or vomiting.  Genitourinary: No dysuria.  Musculoskeletal: No joint pain.  Integumentary (skin and/or breast): No rash. +hematoma over left eye

## 2020-10-08 NOTE — ED ADULT NURSE NOTE - CHIEF COMPLAINT QUOTE
FALL HIT HEAD NO LOC. LIVES WITH DAUGHTER PANCHO ERNANDEZ [phone] (HOME) [phone] ( CELL) AS PER EMS PT USES HUMIDIFIED OXYGEN 3LPN NASAL CANNULA AT HOME
Ears: no ear pain and no hearing problems.Nose: no nasal congestion and no nasal drainage.Mouth/Throat: no dysphagia, no hoarseness and no throat pain.Neck: no lumps, no pain, no stiffness and no swollen glands.

## 2020-10-12 ENCOUNTER — RX RENEWAL (OUTPATIENT)
Age: 85
End: 2020-10-12

## 2020-10-14 DIAGNOSIS — R06.00 DYSPNEA, UNSPECIFIED: ICD-10-CM

## 2020-10-19 ENCOUNTER — NON-APPOINTMENT (OUTPATIENT)
Age: 85
End: 2020-10-19

## 2020-10-23 ENCOUNTER — NON-APPOINTMENT (OUTPATIENT)
Age: 85
End: 2020-10-23

## 2020-10-27 ENCOUNTER — APPOINTMENT (OUTPATIENT)
Dept: FAMILY MEDICINE | Facility: CLINIC | Age: 85
End: 2020-10-27
Payer: MEDICARE

## 2020-10-27 VITALS
WEIGHT: 126 LBS | DIASTOLIC BLOOD PRESSURE: 60 MMHG | BODY MASS INDEX: 26.45 KG/M2 | TEMPERATURE: 98.1 F | HEART RATE: 97 BPM | OXYGEN SATURATION: 98 % | SYSTOLIC BLOOD PRESSURE: 126 MMHG | HEIGHT: 58 IN

## 2020-10-27 PROCEDURE — 99213 OFFICE O/P EST LOW 20 MIN: CPT

## 2020-10-28 ENCOUNTER — APPOINTMENT (OUTPATIENT)
Dept: ORTHOPEDIC SURGERY | Facility: CLINIC | Age: 85
End: 2020-10-28
Payer: MEDICARE

## 2020-10-28 VITALS
BODY MASS INDEX: 24.74 KG/M2 | DIASTOLIC BLOOD PRESSURE: 66 MMHG | WEIGHT: 126 LBS | TEMPERATURE: 97.5 F | HEART RATE: 82 BPM | HEIGHT: 60 IN | SYSTOLIC BLOOD PRESSURE: 147 MMHG

## 2020-10-28 PROCEDURE — 26600 TREAT METACARPAL FRACTURE: CPT | Mod: RT

## 2020-10-28 PROCEDURE — 99214 OFFICE O/P EST MOD 30 MIN: CPT | Mod: 57

## 2020-10-28 PROCEDURE — 73120 X-RAY EXAM OF HAND: CPT | Mod: RT

## 2020-11-02 ENCOUNTER — APPOINTMENT (OUTPATIENT)
Dept: ORTHOPEDIC SURGERY | Facility: CLINIC | Age: 85
End: 2020-11-02
Payer: MEDICARE

## 2020-11-02 VITALS
TEMPERATURE: 97.1 F | SYSTOLIC BLOOD PRESSURE: 169 MMHG | WEIGHT: 120 LBS | HEART RATE: 101 BPM | DIASTOLIC BLOOD PRESSURE: 69 MMHG | HEIGHT: 60 IN | BODY MASS INDEX: 23.56 KG/M2

## 2020-11-02 DIAGNOSIS — S62.304A UNSPECIFIED FRACTURE OF FOURTH METACARPAL BONE, RIGHT HAND, INITIAL ENCOUNTER FOR CLOSED FRACTURE: ICD-10-CM

## 2020-11-02 PROCEDURE — 73120 X-RAY EXAM OF HAND: CPT | Mod: RT

## 2020-11-02 PROCEDURE — 99024 POSTOP FOLLOW-UP VISIT: CPT

## 2020-11-02 NOTE — ADDENDUM
[FreeTextEntry1] : This note was written by Mehrdad Zhou on 11/02/2020, acting as a scribe for Riki Callejas III, MD

## 2020-11-02 NOTE — PHYSICAL EXAM
[Normal] : Gait: normal [de-identified] : \par Left Fingers/Hand: Range of Motion in Degrees:\par                                    			                Claimant:  	   Normal:  \par \par Thumb Interphalangeal Hyperextension/Flexion		15H/80		   15H/80\par \par Thumb Metacarpophalangeal Hyperextension/Flexion	10/55		    10/55\par \par Finger DIP Joints Extension/Flexion			0/80		     0/80\par \par Finger PIP Joints Extension/Flexion 			0/100		     0/100\par \par Finger MCP Joints Hyperextension/Flexion 		(0-45H)/90	    (0-45H)/90\par \par Diffuse atrophy.  Pain with range of motion.  FDS, FDP intact.  No triggering.  Moderate swelling to all joints.  No instability to varus or valgus stress.  No gross neurologic or vascular deficits distally.   Less than two second capillary refill.  Skin is intact.  No rashes, scars or lesions.\par  \par Right Fingers/Hand:  Limited range of motion secondary to pain.  Moderate swelling.  No signs of cellulitis.  Diffusely tender, more specifically tender over the mid aspect of the fourth metacarpal.   \par  [de-identified] : Appearance:  Well developed, well-nourished female in no acute distress.\par   [de-identified] : Radiographs, two views of the right hand, show a question of fracture of the fourth metacarpal.\par

## 2020-11-02 NOTE — DISCUSSION/SUMMARY
[de-identified] : At this time, due to possible fracture of right fourth metacarpal, I recommended a wrist splint, ice and elevation.  She will be reassessed in 1 week.\par \par I declare responsibility and liability for caring for this fracture for the next 90 days.

## 2020-11-02 NOTE — HISTORY OF PRESENT ILLNESS
[de-identified] : The patient comes in today for her right hand.  She was brought in by her daughter.  She had a fall 2-1/2 weeks ago, but over the last several days, she noted the atraumatic onset of pain and swelling to the hand.  She is not sure what she had done. \par

## 2020-11-08 ENCOUNTER — APPOINTMENT (OUTPATIENT)
Dept: ELECTROPHYSIOLOGY | Facility: CLINIC | Age: 85
End: 2020-11-08
Payer: MEDICARE

## 2020-11-09 PROCEDURE — 93298 REM INTERROG DEV EVAL SCRMS: CPT

## 2020-11-09 PROCEDURE — G2066: CPT

## 2020-11-11 ENCOUNTER — APPOINTMENT (OUTPATIENT)
Dept: INTERNAL MEDICINE | Facility: CLINIC | Age: 85
End: 2020-11-11
Payer: MEDICARE

## 2020-11-11 VITALS
DIASTOLIC BLOOD PRESSURE: 64 MMHG | HEIGHT: 60 IN | RESPIRATION RATE: 18 BRPM | OXYGEN SATURATION: 97 % | SYSTOLIC BLOOD PRESSURE: 112 MMHG | BODY MASS INDEX: 23.56 KG/M2 | HEART RATE: 92 BPM | TEMPERATURE: 98.4 F | WEIGHT: 120 LBS

## 2020-11-11 PROCEDURE — 99214 OFFICE O/P EST MOD 30 MIN: CPT

## 2020-11-11 RX ORDER — DOXYCYCLINE HYCLATE 100 MG/1
100 CAPSULE ORAL TWICE DAILY
Qty: 14 | Refills: 0 | Status: COMPLETED | COMMUNITY
Start: 2020-11-11 | End: 2020-11-18

## 2020-11-11 NOTE — ADDENDUM
[FreeTextEntry1] : This note was written by Mehrdad Zhou on 11/04/2020, acting as a scribe for Riki Callejas III, MD

## 2020-11-11 NOTE — PHYSICAL EXAM
[Ill-Appearing] : ill-appearing [Chronically Ill] : chronically ill [No Respiratory Distress] : no respiratory distress  [No Accessory Muscle Use] : no accessory muscle use [Normal Rate] : normal rate  [Regular Rhythm] : with a regular rhythm [Normal S1, S2] : normal S1 and S2 [No Extremity Clubbing/Cyanosis] : no extremity clubbing/cyanosis [Normal] : affect was normal and insight and judgment were intact [de-identified] : frail [de-identified] : crackles L mid lung and R base.  [de-identified] : 2 scab wounds to anterior shin with  5 cm surrounding erythema, swelling and warmth.

## 2020-11-11 NOTE — HISTORY OF PRESENT ILLNESS
[Family Member] : family member [FreeTextEntry1] : FU Bronchiectasis [de-identified] : 3 month pulm FU.  OveraLL she does feel tired but since she has been on oxygen she is improved. Oxygen sat has been 94-97%  on 3l/min.    Sats do drop during PT with recovery with rest.  She denies cough, wheeze or worsening SOB.   She fell last month causing a nasal fx. She followed with ENT and this healed without any intervention. \par She scraped her leg 2 weeks ago.  Noted increased redness and swelling over L shin past 2 days.  No fevers or chills.

## 2020-11-11 NOTE — PHYSICAL EXAM
[de-identified] : Right Fingers/Hand:  Limited range of motion secondary to pain.  She is still somewhat tender over the fourth metacarpal.  The swelling is less.\par  [de-identified] : Radiographs, two views of the right hand, show what appears to be acceptable position of the possible fourth metacarpal fracture.\par

## 2020-11-11 NOTE — DISCUSSION/SUMMARY
[de-identified] : At this time, due to possible right fourth metacarpal fracture, I recommended to continue current modalities and be reassessed in 3-4 weeks.\par

## 2020-11-11 NOTE — ASSESSMENT
[FreeTextEntry1] : \par Continue with current nebulizer /INcruse regimen.\par \par O2 at 3l/min\par \par Start Doxycycline bid x 7 days.\par \par Monitor for any fevers, increasing erythema or pain.  FU with PCP if not improving.  \par \par After Doxycycline completed resume Zithromycin 250 mg M-W-F. \par \par FU with PFT /EV visit 3 months.

## 2020-11-11 NOTE — HISTORY OF PRESENT ILLNESS
[de-identified] : The patient comes in today for fourth metacarpal of her right hand.  She states she is definitely feeling better.\par

## 2020-11-15 ENCOUNTER — RX RENEWAL (OUTPATIENT)
Age: 85
End: 2020-11-15

## 2020-11-23 ENCOUNTER — APPOINTMENT (OUTPATIENT)
Dept: ORTHOPEDIC SURGERY | Facility: CLINIC | Age: 85
End: 2020-11-23
Payer: MEDICARE

## 2020-11-23 VITALS
BODY MASS INDEX: 23.56 KG/M2 | DIASTOLIC BLOOD PRESSURE: 76 MMHG | HEART RATE: 94 BPM | HEIGHT: 60 IN | SYSTOLIC BLOOD PRESSURE: 160 MMHG | WEIGHT: 120 LBS

## 2020-11-23 PROCEDURE — 99024 POSTOP FOLLOW-UP VISIT: CPT

## 2020-11-23 PROCEDURE — 73120 X-RAY EXAM OF HAND: CPT | Mod: RT

## 2020-11-25 NOTE — HISTORY OF PRESENT ILLNESS
[de-identified] : Dolores comes in today for her right hand.  She states she is feeling much better.

## 2020-11-25 NOTE — ADDENDUM
[FreeTextEntry1] : This note was written by Cely Gallagher on 11/24/2020 acting as scribe for Riki Callejas III, MD

## 2020-11-25 NOTE — PHYSICAL EXAM
[de-identified] : Right hand:\par Minimally tender over the mid-fourth, a little bit less at the base of the fifth.  Range of motion not assessed secondary to pain. [de-identified] : Radiographs, two views of the right hand, show a healing fracture, fourth and fifth.

## 2020-11-25 NOTE — DISCUSSION/SUMMARY
[de-identified] : The patient presents with a fracture of the fourth and fifth metacarpal.  At this time I recommend continuing her current modalities, wean off her brace and be reassessed in three or four weeks.

## 2020-12-07 ENCOUNTER — RX RENEWAL (OUTPATIENT)
Age: 85
End: 2020-12-07

## 2020-12-13 ENCOUNTER — APPOINTMENT (OUTPATIENT)
Dept: ELECTROPHYSIOLOGY | Facility: CLINIC | Age: 85
End: 2020-12-13
Payer: MEDICARE

## 2020-12-14 PROCEDURE — G2066: CPT

## 2020-12-14 PROCEDURE — 93298 REM INTERROG DEV EVAL SCRMS: CPT

## 2020-12-28 ENCOUNTER — NON-APPOINTMENT (OUTPATIENT)
Age: 85
End: 2020-12-28

## 2020-12-28 ENCOUNTER — APPOINTMENT (OUTPATIENT)
Dept: FAMILY MEDICINE | Facility: CLINIC | Age: 85
End: 2020-12-28
Payer: MEDICARE

## 2020-12-28 PROCEDURE — 99213 OFFICE O/P EST LOW 20 MIN: CPT | Mod: 95

## 2020-12-28 RX ORDER — PREDNISONE 20 MG/1
20 TABLET ORAL TWICE DAILY
Qty: 12 | Refills: 0 | Status: DISCONTINUED | COMMUNITY
Start: 2020-12-02 | End: 2020-12-28

## 2020-12-29 ENCOUNTER — APPOINTMENT (OUTPATIENT)
Dept: INTERNAL MEDICINE | Facility: CLINIC | Age: 85
End: 2020-12-29
Payer: MEDICARE

## 2020-12-29 PROCEDURE — 99442: CPT | Mod: 95

## 2020-12-29 NOTE — ASSESSMENT
[FreeTextEntry1] : #1  88-year-old female with moderate persistent asthma, COPD, bronchiectasis, on home O2, with recent exacerbation.  She is doing better today after starting prednisone taper yesterday.  She will continue this: 40 mg/day for 2 more days, 20 mg/day for 3 days, 10 mg/day for 4 days, then 5 mg/day.  He is not having body aches, sore throat, or fever and all the others that were together on [place] Day tested negative for Covid.  Daughter knows that if the patient should develop symptoms, that she go to urgent care and be Covid tested.  To ER if emergent symptoms.\par \par Total time spent on telephone visit today was 18 minutes.

## 2020-12-29 NOTE — HISTORY OF PRESENT ILLNESS
[Home] : at home, [unfilled] , at the time of the visit. [Medical Office: ([facility])___] : at the medical office located in  [FreeTextEntry3] : donovan Tam [FreeTextEntry1] : RV, telephonic. [de-identified] : This is a return visit, telephonic, for this 88-year-old female with a history of moderate persistent asthma.  She developed more shortness of breath with chest congestion and cough and was started on prednisone yesterday.  Spoke with her daughter Anel today and the patient is doing well and feeling better.  She is not having coughing, sputum production, fever, or chills.  She is not having body aches or sore throat.  Although the patient was not Covid tested, all the others who were together on [place] Day were Covid tested negative.\par \par The patient continues to be maintained on nasal cannula O2.

## 2021-01-01 ENCOUNTER — INPATIENT (INPATIENT)
Facility: HOSPITAL | Age: 86
LOS: 4 days | Discharge: HOME HEALTH | DRG: 193 | End: 2021-12-14
Attending: INTERNAL MEDICINE | Admitting: FAMILY MEDICINE
Payer: MEDICARE

## 2021-01-01 ENCOUNTER — RX RENEWAL (OUTPATIENT)
Age: 86
End: 2021-01-01

## 2021-01-01 ENCOUNTER — NON-APPOINTMENT (OUTPATIENT)
Age: 86
End: 2021-01-01

## 2021-01-01 ENCOUNTER — APPOINTMENT (OUTPATIENT)
Dept: ELECTROPHYSIOLOGY | Facility: CLINIC | Age: 86
End: 2021-01-01
Payer: MEDICARE

## 2021-01-01 ENCOUNTER — APPOINTMENT (OUTPATIENT)
Dept: FAMILY MEDICINE | Facility: CLINIC | Age: 86
End: 2021-01-01
Payer: MEDICARE

## 2021-01-01 ENCOUNTER — TRANSCRIPTION ENCOUNTER (OUTPATIENT)
Age: 86
End: 2021-01-01

## 2021-01-01 ENCOUNTER — APPOINTMENT (OUTPATIENT)
Dept: INTERNAL MEDICINE | Facility: CLINIC | Age: 86
End: 2021-01-01

## 2021-01-01 ENCOUNTER — APPOINTMENT (OUTPATIENT)
Dept: ELECTROPHYSIOLOGY | Facility: CLINIC | Age: 86
End: 2021-01-01

## 2021-01-01 ENCOUNTER — APPOINTMENT (OUTPATIENT)
Dept: FAMILY MEDICINE | Facility: CLINIC | Age: 86
End: 2021-01-01

## 2021-01-01 ENCOUNTER — APPOINTMENT (OUTPATIENT)
Dept: INTERNAL MEDICINE | Facility: CLINIC | Age: 86
End: 2021-01-01
Payer: MEDICARE

## 2021-01-01 VITALS
HEART RATE: 102 BPM | SYSTOLIC BLOOD PRESSURE: 130 MMHG | TEMPERATURE: 97.2 F | WEIGHT: 120 LBS | BODY MASS INDEX: 23.56 KG/M2 | DIASTOLIC BLOOD PRESSURE: 72 MMHG | HEIGHT: 60 IN | OXYGEN SATURATION: 96 %

## 2021-01-01 VITALS
SYSTOLIC BLOOD PRESSURE: 177 MMHG | TEMPERATURE: 98 F | HEART RATE: 86 BPM | OXYGEN SATURATION: 97 % | HEIGHT: 60 IN | WEIGHT: 119.93 LBS | DIASTOLIC BLOOD PRESSURE: 58 MMHG | RESPIRATION RATE: 20 BRPM

## 2021-01-01 VITALS
HEIGHT: 60 IN | BODY MASS INDEX: 22.78 KG/M2 | SYSTOLIC BLOOD PRESSURE: 126 MMHG | RESPIRATION RATE: 18 BRPM | OXYGEN SATURATION: 97 % | WEIGHT: 116 LBS | DIASTOLIC BLOOD PRESSURE: 56 MMHG | TEMPERATURE: 96.7 F | HEART RATE: 106 BPM

## 2021-01-01 VITALS — SYSTOLIC BLOOD PRESSURE: 152 MMHG | DIASTOLIC BLOOD PRESSURE: 70 MMHG

## 2021-01-01 DIAGNOSIS — J18.9 PNEUMONIA, UNSPECIFIED ORGANISM: ICD-10-CM

## 2021-01-01 DIAGNOSIS — D72.9 DISORDER OF WHITE BLOOD CELLS, UNSPECIFIED: ICD-10-CM

## 2021-01-01 DIAGNOSIS — Z92.89 PERSONAL HISTORY OF OTHER MEDICAL TREATMENT: ICD-10-CM

## 2021-01-01 DIAGNOSIS — J69.0 PNEUMONITIS DUE TO INHALATION OF FOOD AND VOMIT: ICD-10-CM

## 2021-01-01 DIAGNOSIS — J96.21 ACUTE AND CHRONIC RESPIRATORY FAILURE WITH HYPOXIA: ICD-10-CM

## 2021-01-01 DIAGNOSIS — R53.1 WEAKNESS: ICD-10-CM

## 2021-01-01 DIAGNOSIS — K92.1 MELENA: ICD-10-CM

## 2021-01-01 DIAGNOSIS — I10 ESSENTIAL (PRIMARY) HYPERTENSION: ICD-10-CM

## 2021-01-01 DIAGNOSIS — M51.36 OTHER INTERVERTEBRAL DISC DEGENERATION, LUMBAR REGION: ICD-10-CM

## 2021-01-01 DIAGNOSIS — Z00.00 ENCOUNTER FOR GENERAL ADULT MEDICAL EXAMINATION W/OUT ABNORMAL FINDINGS: ICD-10-CM

## 2021-01-01 DIAGNOSIS — J44.9 CHRONIC OBSTRUCTIVE PULMONARY DISEASE, UNSPECIFIED: ICD-10-CM

## 2021-01-01 DIAGNOSIS — J98.01 ACUTE BRONCHOSPASM: ICD-10-CM

## 2021-01-01 DIAGNOSIS — I35.8 OTHER NONRHEUMATIC AORTIC VALVE DISORDERS: ICD-10-CM

## 2021-01-01 DIAGNOSIS — R53.81 OTHER MALAISE: ICD-10-CM

## 2021-01-01 DIAGNOSIS — I63.522 CEREBRAL INFARCTION DUE TO UNSPECIFIED OCCLUSION OR STENOSIS OF LEFT ANTERIOR CEREBRAL ARTERY: ICD-10-CM

## 2021-01-01 DIAGNOSIS — G81.91 HEMIPLEGIA, UNSPECIFIED AFFECTING RIGHT DOMINANT SIDE: ICD-10-CM

## 2021-01-01 DIAGNOSIS — J47.0 BRONCHIECTASIS WITH ACUTE LOWER RESPIRATORY INFECTION: ICD-10-CM

## 2021-01-01 DIAGNOSIS — D46.9 MYELODYSPLASTIC SYNDROME, UNSPECIFIED: ICD-10-CM

## 2021-01-01 DIAGNOSIS — D47.1 CHRONIC MYELOPROLIFERATIVE DISEASE: ICD-10-CM

## 2021-01-01 DIAGNOSIS — I63.9 CEREBRAL INFARCTION, UNSPECIFIED: ICD-10-CM

## 2021-01-01 DIAGNOSIS — Z86.73 PERSONAL HISTORY OF TRANSIENT ISCHEMIC ATTACK (TIA), AND CEREBRAL INFARCTION WITHOUT RESIDUAL DEFICITS: ICD-10-CM

## 2021-01-01 DIAGNOSIS — T50.B95A ADVERSE EFFECT OF OTHER VIRAL VACCINES, INITIAL ENCOUNTER: ICD-10-CM

## 2021-01-01 DIAGNOSIS — J45.40 MODERATE PERSISTENT ASTHMA, UNCOMPLICATED: ICD-10-CM

## 2021-01-01 DIAGNOSIS — E03.9 HYPOTHYROIDISM, UNSPECIFIED: ICD-10-CM

## 2021-01-01 LAB
-  AMIKACIN: SIGNIFICANT CHANGE UP
-  AMOXICILLIN/CLAVULANIC ACID: SIGNIFICANT CHANGE UP
-  AMPICILLIN/SULBACTAM: SIGNIFICANT CHANGE UP
-  AMPICILLIN: SIGNIFICANT CHANGE UP
-  AZTREONAM: SIGNIFICANT CHANGE UP
-  CEFAZOLIN: SIGNIFICANT CHANGE UP
-  CEFEPIME: SIGNIFICANT CHANGE UP
-  CEFOXITIN: SIGNIFICANT CHANGE UP
-  CEFTRIAXONE: SIGNIFICANT CHANGE UP
-  CIPROFLOXACIN: SIGNIFICANT CHANGE UP
-  ERTAPENEM: SIGNIFICANT CHANGE UP
-  GENTAMICIN: SIGNIFICANT CHANGE UP
-  IMIPENEM: SIGNIFICANT CHANGE UP
-  LEVOFLOXACIN: SIGNIFICANT CHANGE UP
-  MEROPENEM: SIGNIFICANT CHANGE UP
-  NITROFURANTOIN: SIGNIFICANT CHANGE UP
-  PIPERACILLIN/TAZOBACTAM: SIGNIFICANT CHANGE UP
-  TIGECYCLINE: SIGNIFICANT CHANGE UP
-  TOBRAMYCIN: SIGNIFICANT CHANGE UP
-  TRIMETHOPRIM/SULFAMETHOXAZOLE: SIGNIFICANT CHANGE UP
ABO RH CONFIRMATION: SIGNIFICANT CHANGE UP
ACANTHOCYTES BLD QL SMEAR: SLIGHT — SIGNIFICANT CHANGE UP
ALBUMIN SERPL ELPH-MCNC: 2.4 G/DL — LOW (ref 3.3–5)
ALBUMIN SERPL ELPH-MCNC: 2.5 G/DL — LOW (ref 3.3–5)
ALP SERPL-CCNC: 100 U/L — SIGNIFICANT CHANGE UP (ref 40–120)
ALP SERPL-CCNC: 186 U/L — HIGH (ref 40–120)
ALT FLD-CCNC: 10 U/L — LOW (ref 12–78)
ALT FLD-CCNC: 11 U/L — LOW (ref 12–78)
ANION GAP SERPL CALC-SCNC: 2 MMOL/L — LOW (ref 5–17)
ANION GAP SERPL CALC-SCNC: 3 MMOL/L — LOW (ref 5–17)
ANION GAP SERPL CALC-SCNC: 4 MMOL/L — LOW (ref 5–17)
ANION GAP SERPL CALC-SCNC: 5 MMOL/L — SIGNIFICANT CHANGE UP (ref 5–17)
ANISOCYTOSIS BLD QL: SIGNIFICANT CHANGE UP
APPEARANCE UR: ABNORMAL
APTT BLD: 33.5 SEC — SIGNIFICANT CHANGE UP (ref 27.5–35.5)
AST SERPL-CCNC: 13 U/L — LOW (ref 15–37)
AST SERPL-CCNC: 17 U/L — SIGNIFICANT CHANGE UP (ref 15–37)
BACTERIA # UR AUTO: ABNORMAL
BASOPHILS # BLD AUTO: 0 K/UL — SIGNIFICANT CHANGE UP (ref 0–0.2)
BASOPHILS # BLD AUTO: 0.15 K/UL — SIGNIFICANT CHANGE UP (ref 0–0.2)
BASOPHILS NFR BLD AUTO: 0 % — SIGNIFICANT CHANGE UP (ref 0–2)
BASOPHILS NFR BLD AUTO: 0.8 % — SIGNIFICANT CHANGE UP (ref 0–2)
BILIRUB SERPL-MCNC: 0.3 MG/DL — SIGNIFICANT CHANGE UP (ref 0.2–1.2)
BILIRUB SERPL-MCNC: 0.4 MG/DL — SIGNIFICANT CHANGE UP (ref 0.2–1.2)
BILIRUB UR-MCNC: NEGATIVE — SIGNIFICANT CHANGE UP
BLD GP AB SCN SERPL QL: SIGNIFICANT CHANGE UP
BUN SERPL-MCNC: 24 MG/DL — HIGH (ref 7–23)
BUN SERPL-MCNC: 27 MG/DL — HIGH (ref 7–23)
BUN SERPL-MCNC: 40 MG/DL — HIGH (ref 7–23)
BUN SERPL-MCNC: 42 MG/DL — HIGH (ref 7–23)
CALCIUM SERPL-MCNC: 8.4 MG/DL — LOW (ref 8.5–10.1)
CALCIUM SERPL-MCNC: 8.5 MG/DL — SIGNIFICANT CHANGE UP (ref 8.5–10.1)
CHLORIDE SERPL-SCNC: 103 MMOL/L — SIGNIFICANT CHANGE UP (ref 96–108)
CHLORIDE SERPL-SCNC: 103 MMOL/L — SIGNIFICANT CHANGE UP (ref 96–108)
CHLORIDE SERPL-SCNC: 104 MMOL/L — SIGNIFICANT CHANGE UP (ref 96–108)
CHLORIDE SERPL-SCNC: 106 MMOL/L — SIGNIFICANT CHANGE UP (ref 96–108)
CO2 SERPL-SCNC: 31 MMOL/L — SIGNIFICANT CHANGE UP (ref 22–31)
CO2 SERPL-SCNC: 32 MMOL/L — HIGH (ref 22–31)
CO2 SERPL-SCNC: 32 MMOL/L — HIGH (ref 22–31)
CO2 SERPL-SCNC: 33 MMOL/L — HIGH (ref 22–31)
COLOR SPEC: YELLOW — SIGNIFICANT CHANGE UP
COVID-19 SPIKE DOMAIN ANTIBODY INTERPRETATION: POSITIVE
CREAT SERPL-MCNC: 0.7 MG/DL — SIGNIFICANT CHANGE UP (ref 0.5–1.3)
CREAT SERPL-MCNC: 0.7 MG/DL — SIGNIFICANT CHANGE UP (ref 0.5–1.3)
CREAT SERPL-MCNC: 0.74 MG/DL — SIGNIFICANT CHANGE UP (ref 0.5–1.3)
CREAT SERPL-MCNC: 0.82 MG/DL — SIGNIFICANT CHANGE UP (ref 0.5–1.3)
CULTURE RESULTS: SIGNIFICANT CHANGE UP
DACRYOCYTES BLD QL SMEAR: SLIGHT — SIGNIFICANT CHANGE UP
DIFF PNL FLD: ABNORMAL
ELLIPTOCYTES BLD QL SMEAR: SLIGHT — SIGNIFICANT CHANGE UP
EOSINOPHIL # BLD AUTO: 0 K/UL — SIGNIFICANT CHANGE UP (ref 0–0.5)
EOSINOPHIL # BLD AUTO: 0.24 K/UL — SIGNIFICANT CHANGE UP (ref 0–0.5)
EOSINOPHIL NFR BLD AUTO: 0 % — SIGNIFICANT CHANGE UP (ref 0–6)
EOSINOPHIL NFR BLD AUTO: 1.3 % — SIGNIFICANT CHANGE UP (ref 0–6)
EPI CELLS # UR: NEGATIVE — SIGNIFICANT CHANGE UP
GLUCOSE SERPL-MCNC: 112 MG/DL — HIGH (ref 70–99)
GLUCOSE SERPL-MCNC: 128 MG/DL — HIGH (ref 70–99)
GLUCOSE SERPL-MCNC: 169 MG/DL — HIGH (ref 70–99)
GLUCOSE SERPL-MCNC: 92 MG/DL — SIGNIFICANT CHANGE UP (ref 70–99)
GLUCOSE UR QL: NEGATIVE MG/DL — SIGNIFICANT CHANGE UP
HCT VFR BLD CALC: 36.8 % — SIGNIFICANT CHANGE UP (ref 34.5–45)
HCT VFR BLD CALC: 39.7 % — SIGNIFICANT CHANGE UP (ref 34.5–45)
HCT VFR BLD CALC: 39.9 % — SIGNIFICANT CHANGE UP (ref 34.5–45)
HCT VFR BLD CALC: 41.8 % — SIGNIFICANT CHANGE UP (ref 34.5–45)
HEMOCCULT STL QL IA: POSITIVE
HGB BLD-MCNC: 10.1 G/DL — LOW (ref 11.5–15.5)
HGB BLD-MCNC: 10.6 G/DL — LOW (ref 11.5–15.5)
HGB BLD-MCNC: 10.8 G/DL — LOW (ref 11.5–15.5)
HGB BLD-MCNC: 11.3 G/DL — LOW (ref 11.5–15.5)
HYPOCHROMIA BLD QL: SIGNIFICANT CHANGE UP
IMM GRANULOCYTES NFR BLD AUTO: 1.1 % — SIGNIFICANT CHANGE UP (ref 0–1.5)
INR BLD: 1.26 RATIO — HIGH (ref 0.88–1.16)
KETONES UR-MCNC: ABNORMAL
LEUKOCYTE ESTERASE UR-ACNC: ABNORMAL
LIDOCAIN IGE QN: 57 U/L — LOW (ref 73–393)
LYMPHOCYTES # BLD AUTO: 0.47 K/UL — LOW (ref 1–3.3)
LYMPHOCYTES # BLD AUTO: 0.74 K/UL — LOW (ref 1–3.3)
LYMPHOCYTES # BLD AUTO: 2 % — LOW (ref 13–44)
LYMPHOCYTES # BLD AUTO: 3.9 % — LOW (ref 13–44)
MAGNESIUM SERPL-MCNC: 2.3 MG/DL — SIGNIFICANT CHANGE UP (ref 1.6–2.6)
MANUAL SMEAR VERIFICATION: SIGNIFICANT CHANGE UP
MCHC RBC-ENTMCNC: 18.8 PG — LOW (ref 27–34)
MCHC RBC-ENTMCNC: 19.1 PG — LOW (ref 27–34)
MCHC RBC-ENTMCNC: 19.1 PG — LOW (ref 27–34)
MCHC RBC-ENTMCNC: 19.2 PG — LOW (ref 27–34)
MCHC RBC-ENTMCNC: 26.6 GM/DL — LOW (ref 32–36)
MCHC RBC-ENTMCNC: 27 GM/DL — LOW (ref 32–36)
MCHC RBC-ENTMCNC: 27.2 GM/DL — LOW (ref 32–36)
MCHC RBC-ENTMCNC: 27.4 GM/DL — LOW (ref 32–36)
MCV RBC AUTO: 69.4 FL — LOW (ref 80–100)
MCV RBC AUTO: 70.4 FL — LOW (ref 80–100)
MCV RBC AUTO: 70.6 FL — LOW (ref 80–100)
MCV RBC AUTO: 71 FL — LOW (ref 80–100)
METHOD TYPE: SIGNIFICANT CHANGE UP
MICROCYTES BLD QL: SIGNIFICANT CHANGE UP
MONOCYTES # BLD AUTO: 0.24 K/UL — SIGNIFICANT CHANGE UP (ref 0–0.9)
MONOCYTES # BLD AUTO: 0.63 K/UL — SIGNIFICANT CHANGE UP (ref 0–0.9)
MONOCYTES NFR BLD AUTO: 1 % — LOW (ref 2–14)
MONOCYTES NFR BLD AUTO: 3.3 % — SIGNIFICANT CHANGE UP (ref 2–14)
NEUTROPHILS # BLD AUTO: 17.13 K/UL — HIGH (ref 1.8–7.4)
NEUTROPHILS # BLD AUTO: 22.8 K/UL — HIGH (ref 1.8–7.4)
NEUTROPHILS NFR BLD AUTO: 89.6 % — HIGH (ref 43–77)
NEUTROPHILS NFR BLD AUTO: 97 % — HIGH (ref 43–77)
NITRITE UR-MCNC: NEGATIVE — SIGNIFICANT CHANGE UP
NRBC # BLD: SIGNIFICANT CHANGE UP /100 WBCS (ref 0–0)
NT-PROBNP SERPL-MCNC: 459 PG/ML
NT-PROBNP SERPL-SCNC: 1167 PG/ML — HIGH (ref 0–450)
ORGANISM # SPEC MICROSCOPIC CNT: SIGNIFICANT CHANGE UP
ORGANISM # SPEC MICROSCOPIC CNT: SIGNIFICANT CHANGE UP
OVALOCYTES BLD QL SMEAR: SLIGHT — SIGNIFICANT CHANGE UP
PH UR: 5 — SIGNIFICANT CHANGE UP (ref 5–8)
PHOSPHATE SERPL-MCNC: 3 MG/DL — SIGNIFICANT CHANGE UP (ref 2.5–4.5)
PLAT MORPH BLD: NORMAL — SIGNIFICANT CHANGE UP
PLATELET # BLD AUTO: 561 K/UL — HIGH (ref 150–400)
PLATELET # BLD AUTO: 618 K/UL — HIGH (ref 150–400)
PLATELET # BLD AUTO: 645 K/UL — HIGH (ref 150–400)
PLATELET # BLD AUTO: 840 K/UL — HIGH (ref 150–400)
POIKILOCYTOSIS BLD QL AUTO: SIGNIFICANT CHANGE UP
POLYCHROMASIA BLD QL SMEAR: SLIGHT — SIGNIFICANT CHANGE UP
POTASSIUM SERPL-MCNC: 3.7 MMOL/L — SIGNIFICANT CHANGE UP (ref 3.5–5.3)
POTASSIUM SERPL-MCNC: 3.9 MMOL/L — SIGNIFICANT CHANGE UP (ref 3.5–5.3)
POTASSIUM SERPL-MCNC: 4.1 MMOL/L — SIGNIFICANT CHANGE UP (ref 3.5–5.3)
POTASSIUM SERPL-MCNC: 4.2 MMOL/L — SIGNIFICANT CHANGE UP (ref 3.5–5.3)
POTASSIUM SERPL-SCNC: 3.7 MMOL/L — SIGNIFICANT CHANGE UP (ref 3.5–5.3)
POTASSIUM SERPL-SCNC: 3.9 MMOL/L — SIGNIFICANT CHANGE UP (ref 3.5–5.3)
POTASSIUM SERPL-SCNC: 4.1 MMOL/L — SIGNIFICANT CHANGE UP (ref 3.5–5.3)
POTASSIUM SERPL-SCNC: 4.2 MMOL/L — SIGNIFICANT CHANGE UP (ref 3.5–5.3)
PROT SERPL-MCNC: 6.9 GM/DL — SIGNIFICANT CHANGE UP (ref 6–8.3)
PROT SERPL-MCNC: 6.9 GM/DL — SIGNIFICANT CHANGE UP (ref 6–8.3)
PROT UR-MCNC: 100 MG/DL
PROTHROM AB SERPL-ACNC: 14.6 SEC — HIGH (ref 10.6–13.6)
RBC # BLD: 5.3 M/UL — HIGH (ref 3.8–5.2)
RBC # BLD: 5.64 M/UL — HIGH (ref 3.8–5.2)
RBC # BLD: 5.65 M/UL — HIGH (ref 3.8–5.2)
RBC # BLD: 5.89 M/UL — HIGH (ref 3.8–5.2)
RBC # FLD: 23.3 % — HIGH (ref 10.3–14.5)
RBC # FLD: 23.3 % — HIGH (ref 10.3–14.5)
RBC # FLD: 23.4 % — HIGH (ref 10.3–14.5)
RBC # FLD: 23.9 % — HIGH (ref 10.3–14.5)
RBC BLD AUTO: ABNORMAL
RBC CASTS # UR COMP ASSIST: SIGNIFICANT CHANGE UP /HPF (ref 0–4)
SARS-COV-2 AB SERPL IA-ACNC: 4.3 U/ML
SARS-COV-2 RNA SPEC QL NAA+PROBE: SIGNIFICANT CHANGE UP
SODIUM SERPL-SCNC: 137 MMOL/L — SIGNIFICANT CHANGE UP (ref 135–145)
SODIUM SERPL-SCNC: 138 MMOL/L — SIGNIFICANT CHANGE UP (ref 135–145)
SODIUM SERPL-SCNC: 140 MMOL/L — SIGNIFICANT CHANGE UP (ref 135–145)
SODIUM SERPL-SCNC: 143 MMOL/L — SIGNIFICANT CHANGE UP (ref 135–145)
SP GR SPEC: 1.02 — SIGNIFICANT CHANGE UP (ref 1.01–1.02)
SPECIMEN SOURCE: SIGNIFICANT CHANGE UP
UROBILINOGEN FLD QL: NEGATIVE MG/DL — SIGNIFICANT CHANGE UP
WBC # BLD: 19.1 K/UL — HIGH (ref 3.8–10.5)
WBC # BLD: 20.84 K/UL — HIGH (ref 3.8–10.5)
WBC # BLD: 23.5 K/UL — HIGH (ref 3.8–10.5)
WBC # BLD: 25.59 K/UL — HIGH (ref 3.8–10.5)
WBC # FLD AUTO: 19.1 K/UL — HIGH (ref 3.8–10.5)
WBC # FLD AUTO: 20.84 K/UL — HIGH (ref 3.8–10.5)
WBC # FLD AUTO: 23.5 K/UL — HIGH (ref 3.8–10.5)
WBC # FLD AUTO: 25.59 K/UL — HIGH (ref 3.8–10.5)
WBC UR QL: SIGNIFICANT CHANGE UP

## 2021-01-01 PROCEDURE — 83880 ASSAY OF NATRIURETIC PEPTIDE: CPT

## 2021-01-01 PROCEDURE — G2066: CPT

## 2021-01-01 PROCEDURE — 99285 EMERGENCY DEPT VISIT HI MDM: CPT | Mod: CS

## 2021-01-01 PROCEDURE — 99239 HOSP IP/OBS DSCHRG MGMT >30: CPT

## 2021-01-01 PROCEDURE — 97163 PT EVAL HIGH COMPLEX 45 MIN: CPT | Mod: GP

## 2021-01-01 PROCEDURE — 93010 ELECTROCARDIOGRAM REPORT: CPT

## 2021-01-01 PROCEDURE — 99223 1ST HOSP IP/OBS HIGH 75: CPT

## 2021-01-01 PROCEDURE — 94640 AIRWAY INHALATION TREATMENT: CPT

## 2021-01-01 PROCEDURE — 93298 REM INTERROG DEV EVAL SCRMS: CPT

## 2021-01-01 PROCEDURE — 97116 GAIT TRAINING THERAPY: CPT | Mod: GP

## 2021-01-01 PROCEDURE — 99232 SBSQ HOSP IP/OBS MODERATE 35: CPT

## 2021-01-01 PROCEDURE — 85027 COMPLETE CBC AUTOMATED: CPT

## 2021-01-01 PROCEDURE — U0005: CPT

## 2021-01-01 PROCEDURE — 36415 COLL VENOUS BLD VENIPUNCTURE: CPT

## 2021-01-01 PROCEDURE — 99233 SBSQ HOSP IP/OBS HIGH 50: CPT

## 2021-01-01 PROCEDURE — 74177 CT ABD & PELVIS W/CONTRAST: CPT | Mod: 26,MA

## 2021-01-01 PROCEDURE — 93306 TTE W/DOPPLER COMPLETE: CPT | Mod: 26

## 2021-01-01 PROCEDURE — 71045 X-RAY EXAM CHEST 1 VIEW: CPT | Mod: 26

## 2021-01-01 PROCEDURE — 80048 BASIC METABOLIC PNL TOTAL CA: CPT

## 2021-01-01 PROCEDURE — 93005 ELECTROCARDIOGRAM TRACING: CPT

## 2021-01-01 PROCEDURE — 93306 TTE W/DOPPLER COMPLETE: CPT

## 2021-01-01 PROCEDURE — 71250 CT THORAX DX C-: CPT | Mod: 26

## 2021-01-01 PROCEDURE — 83735 ASSAY OF MAGNESIUM: CPT

## 2021-01-01 PROCEDURE — 84100 ASSAY OF PHOSPHORUS: CPT

## 2021-01-01 PROCEDURE — 71250 CT THORAX DX C-: CPT

## 2021-01-01 PROCEDURE — 85025 COMPLETE CBC W/AUTO DIFF WBC: CPT

## 2021-01-01 PROCEDURE — U0003: CPT

## 2021-01-01 PROCEDURE — 99214 OFFICE O/P EST MOD 30 MIN: CPT

## 2021-01-01 PROCEDURE — 80053 COMPREHEN METABOLIC PANEL: CPT

## 2021-01-01 RX ORDER — SODIUM CHLORIDE 9 MG/ML
1000 INJECTION INTRAMUSCULAR; INTRAVENOUS; SUBCUTANEOUS ONCE
Refills: 0 | Status: COMPLETED | OUTPATIENT
Start: 2021-01-01 | End: 2021-01-01

## 2021-01-01 RX ORDER — ENOXAPARIN SODIUM 100 MG/ML
40 INJECTION SUBCUTANEOUS DAILY
Refills: 0 | Status: DISCONTINUED | OUTPATIENT
Start: 2021-01-01 | End: 2021-01-01

## 2021-01-01 RX ORDER — AZITHROMYCIN 500 MG/1
500 TABLET, FILM COATED ORAL DAILY
Refills: 0 | Status: DISCONTINUED | OUTPATIENT
Start: 2021-01-01 | End: 2021-01-01

## 2021-01-01 RX ORDER — AMLODIPINE BESYLATE 2.5 MG/1
10 TABLET ORAL DAILY
Refills: 0 | Status: DISCONTINUED | OUTPATIENT
Start: 2021-01-01 | End: 2021-01-01

## 2021-01-01 RX ORDER — AZITHROMYCIN 250 MG/1
250 TABLET, FILM COATED ORAL
Qty: 12 | Refills: 11 | Status: ACTIVE | COMMUNITY
Start: 2020-02-06 | End: 1900-01-01

## 2021-01-01 RX ORDER — MONTELUKAST 4 MG/1
10 TABLET, CHEWABLE ORAL DAILY
Refills: 0 | Status: DISCONTINUED | OUTPATIENT
Start: 2021-01-01 | End: 2021-01-01

## 2021-01-01 RX ORDER — FUROSEMIDE 40 MG
20 TABLET ORAL DAILY
Refills: 0 | Status: DISCONTINUED | OUTPATIENT
Start: 2021-01-01 | End: 2021-01-01

## 2021-01-01 RX ORDER — LANOLIN ALCOHOL/MO/W.PET/CERES
3 CREAM (GRAM) TOPICAL AT BEDTIME
Refills: 0 | Status: DISCONTINUED | OUTPATIENT
Start: 2021-01-01 | End: 2021-01-01

## 2021-01-01 RX ORDER — CEFTRIAXONE 500 MG/1
1000 INJECTION, POWDER, FOR SOLUTION INTRAMUSCULAR; INTRAVENOUS EVERY 24 HOURS
Refills: 0 | Status: DISCONTINUED | OUTPATIENT
Start: 2021-01-01 | End: 2021-01-01

## 2021-01-01 RX ORDER — ALBUTEROL 90 UG/1
2 AEROSOL, METERED ORAL EVERY 6 HOURS
Refills: 0 | Status: DISCONTINUED | OUTPATIENT
Start: 2021-01-01 | End: 2021-01-01

## 2021-01-01 RX ORDER — ACETAMINOPHEN 500 MG
650 TABLET ORAL EVERY 6 HOURS
Refills: 0 | Status: DISCONTINUED | OUTPATIENT
Start: 2021-01-01 | End: 2021-01-01

## 2021-01-01 RX ORDER — ATORVASTATIN CALCIUM 40 MG/1
40 TABLET, FILM COATED ORAL
Qty: 90 | Refills: 3 | Status: ACTIVE | COMMUNITY
Start: 2020-06-02 | End: 1900-01-01

## 2021-01-01 RX ORDER — LEVOTHYROXINE SODIUM 125 MCG
100 TABLET ORAL DAILY
Refills: 0 | Status: DISCONTINUED | OUTPATIENT
Start: 2021-01-01 | End: 2021-01-01

## 2021-01-01 RX ORDER — CEFUROXIME AXETIL 250 MG
1 TABLET ORAL
Qty: 10 | Refills: 0
Start: 2021-01-01

## 2021-01-01 RX ORDER — ONDANSETRON 8 MG/1
4 TABLET, FILM COATED ORAL ONCE
Refills: 0 | Status: COMPLETED | OUTPATIENT
Start: 2021-01-01 | End: 2021-01-01

## 2021-01-01 RX ORDER — BUDESONIDE AND FORMOTEROL FUMARATE DIHYDRATE 160; 4.5 UG/1; UG/1
2 AEROSOL RESPIRATORY (INHALATION)
Refills: 0 | Status: DISCONTINUED | OUTPATIENT
Start: 2021-01-01 | End: 2021-01-01

## 2021-01-01 RX ORDER — ACETAMINOPHEN 500 MG
1000 TABLET ORAL ONCE
Refills: 0 | Status: COMPLETED | OUTPATIENT
Start: 2021-01-01 | End: 2021-01-01

## 2021-01-01 RX ORDER — AZITHROMYCIN 500 MG/1
1 TABLET, FILM COATED ORAL
Qty: 0 | Refills: 0 | DISCHARGE

## 2021-01-01 RX ORDER — SERTRALINE HYDROCHLORIDE 50 MG/1
50 TABLET, FILM COATED ORAL DAILY
Qty: 1 | Refills: 2 | Status: ACTIVE | COMMUNITY
Start: 2020-08-20 | End: 1900-01-01

## 2021-01-01 RX ORDER — PANTOPRAZOLE SODIUM 20 MG/1
40 TABLET, DELAYED RELEASE ORAL DAILY
Refills: 0 | Status: DISCONTINUED | OUTPATIENT
Start: 2021-01-01 | End: 2021-01-01

## 2021-01-01 RX ORDER — ONDANSETRON 8 MG/1
4 TABLET, FILM COATED ORAL EVERY 8 HOURS
Refills: 0 | Status: DISCONTINUED | OUTPATIENT
Start: 2021-01-01 | End: 2021-01-01

## 2021-01-01 RX ORDER — PREDNISONE 5 MG/1
5 TABLET ORAL
Qty: 120 | Refills: 1 | Status: ACTIVE | COMMUNITY
Start: 2020-12-28 | End: 1900-01-01

## 2021-01-01 RX ORDER — ATORVASTATIN CALCIUM 80 MG/1
40 TABLET, FILM COATED ORAL AT BEDTIME
Refills: 0 | Status: DISCONTINUED | OUTPATIENT
Start: 2021-01-01 | End: 2021-01-01

## 2021-01-01 RX ORDER — AMLODIPINE BESYLATE 2.5 MG/1
1 TABLET ORAL
Qty: 30 | Refills: 0
Start: 2021-01-01

## 2021-01-01 RX ORDER — PANTOPRAZOLE 40 MG/1
40 TABLET, DELAYED RELEASE ORAL
Qty: 14 | Refills: 0 | Status: ACTIVE | COMMUNITY
Start: 1900-01-01 | End: 1900-01-01

## 2021-01-01 RX ORDER — TIOTROPIUM BROMIDE 18 UG/1
1 CAPSULE ORAL; RESPIRATORY (INHALATION) DAILY
Refills: 0 | Status: DISCONTINUED | OUTPATIENT
Start: 2021-01-01 | End: 2021-01-01

## 2021-01-01 RX ORDER — ASPIRIN/CALCIUM CARB/MAGNESIUM 324 MG
325 TABLET ORAL DAILY
Refills: 0 | Status: DISCONTINUED | OUTPATIENT
Start: 2021-01-01 | End: 2021-01-01

## 2021-01-01 RX ADMIN — AMLODIPINE BESYLATE 10 MILLIGRAM(S): 2.5 TABLET ORAL at 09:02

## 2021-01-01 RX ADMIN — Medication 20 MILLIGRAM(S): at 09:27

## 2021-01-01 RX ADMIN — Medication 100 MICROGRAM(S): at 05:22

## 2021-01-01 RX ADMIN — BUDESONIDE AND FORMOTEROL FUMARATE DIHYDRATE 2 PUFF(S): 160; 4.5 AEROSOL RESPIRATORY (INHALATION) at 08:38

## 2021-01-01 RX ADMIN — AZITHROMYCIN 255 MILLIGRAM(S): 500 TABLET, FILM COATED ORAL at 12:16

## 2021-01-01 RX ADMIN — AZITHROMYCIN 255 MILLIGRAM(S): 500 TABLET, FILM COATED ORAL at 11:04

## 2021-01-01 RX ADMIN — AZITHROMYCIN 255 MILLIGRAM(S): 500 TABLET, FILM COATED ORAL at 11:53

## 2021-01-01 RX ADMIN — AMLODIPINE BESYLATE 10 MILLIGRAM(S): 2.5 TABLET ORAL at 10:44

## 2021-01-01 RX ADMIN — Medication 30 MILLIGRAM(S): at 09:02

## 2021-01-01 RX ADMIN — BUDESONIDE AND FORMOTEROL FUMARATE DIHYDRATE 2 PUFF(S): 160; 4.5 AEROSOL RESPIRATORY (INHALATION) at 20:12

## 2021-01-01 RX ADMIN — MONTELUKAST 10 MILLIGRAM(S): 4 TABLET, CHEWABLE ORAL at 21:25

## 2021-01-01 RX ADMIN — Medication 30 MILLIGRAM(S): at 21:02

## 2021-01-01 RX ADMIN — Medication 650 MILLIGRAM(S): at 14:40

## 2021-01-01 RX ADMIN — Medication 40 MILLIGRAM(S): at 10:44

## 2021-01-01 RX ADMIN — PANTOPRAZOLE SODIUM 40 MILLIGRAM(S): 20 TABLET, DELAYED RELEASE ORAL at 10:44

## 2021-01-01 RX ADMIN — ATORVASTATIN CALCIUM 40 MILLIGRAM(S): 80 TABLET, FILM COATED ORAL at 22:25

## 2021-01-01 RX ADMIN — ENOXAPARIN SODIUM 40 MILLIGRAM(S): 100 INJECTION SUBCUTANEOUS at 10:07

## 2021-01-01 RX ADMIN — PANTOPRAZOLE SODIUM 40 MILLIGRAM(S): 20 TABLET, DELAYED RELEASE ORAL at 09:02

## 2021-01-01 RX ADMIN — ENOXAPARIN SODIUM 40 MILLIGRAM(S): 100 INJECTION SUBCUTANEOUS at 09:26

## 2021-01-01 RX ADMIN — BUDESONIDE AND FORMOTEROL FUMARATE DIHYDRATE 2 PUFF(S): 160; 4.5 AEROSOL RESPIRATORY (INHALATION) at 09:07

## 2021-01-01 RX ADMIN — AMLODIPINE BESYLATE 10 MILLIGRAM(S): 2.5 TABLET ORAL at 09:27

## 2021-01-01 RX ADMIN — BUDESONIDE AND FORMOTEROL FUMARATE DIHYDRATE 2 PUFF(S): 160; 4.5 AEROSOL RESPIRATORY (INHALATION) at 21:03

## 2021-01-01 RX ADMIN — TIOTROPIUM BROMIDE 1 CAPSULE(S): 18 CAPSULE ORAL; RESPIRATORY (INHALATION) at 09:07

## 2021-01-01 RX ADMIN — PANTOPRAZOLE SODIUM 40 MILLIGRAM(S): 20 TABLET, DELAYED RELEASE ORAL at 09:26

## 2021-01-01 RX ADMIN — Medication 20 MILLIGRAM(S): at 09:02

## 2021-01-01 RX ADMIN — Medication 400 MILLIGRAM(S): at 13:50

## 2021-01-01 RX ADMIN — Medication 325 MILLIGRAM(S): at 10:43

## 2021-01-01 RX ADMIN — ATORVASTATIN CALCIUM 40 MILLIGRAM(S): 80 TABLET, FILM COATED ORAL at 21:02

## 2021-01-01 RX ADMIN — Medication 100 MICROGRAM(S): at 05:42

## 2021-01-01 RX ADMIN — AZITHROMYCIN 255 MILLIGRAM(S): 500 TABLET, FILM COATED ORAL at 12:06

## 2021-01-01 RX ADMIN — Medication 100 MICROGRAM(S): at 05:01

## 2021-01-01 RX ADMIN — SODIUM CHLORIDE 1000 MILLILITER(S): 9 INJECTION INTRAMUSCULAR; INTRAVENOUS; SUBCUTANEOUS at 12:33

## 2021-01-01 RX ADMIN — MONTELUKAST 10 MILLIGRAM(S): 4 TABLET, CHEWABLE ORAL at 22:25

## 2021-01-01 RX ADMIN — AZITHROMYCIN 255 MILLIGRAM(S): 500 TABLET, FILM COATED ORAL at 11:38

## 2021-01-01 RX ADMIN — Medication 20 MILLIGRAM(S): at 09:26

## 2021-01-01 RX ADMIN — PANTOPRAZOLE SODIUM 40 MILLIGRAM(S): 20 TABLET, DELAYED RELEASE ORAL at 09:27

## 2021-01-01 RX ADMIN — ATORVASTATIN CALCIUM 40 MILLIGRAM(S): 80 TABLET, FILM COATED ORAL at 21:25

## 2021-01-01 RX ADMIN — Medication 100 MICROGRAM(S): at 05:41

## 2021-01-01 RX ADMIN — Medication 40 MILLIGRAM(S): at 05:42

## 2021-01-01 RX ADMIN — PANTOPRAZOLE SODIUM 40 MILLIGRAM(S): 20 TABLET, DELAYED RELEASE ORAL at 10:07

## 2021-01-01 RX ADMIN — Medication 650 MILLIGRAM(S): at 17:07

## 2021-01-01 RX ADMIN — CEFTRIAXONE 100 MILLIGRAM(S): 500 INJECTION, POWDER, FOR SOLUTION INTRAMUSCULAR; INTRAVENOUS at 17:32

## 2021-01-01 RX ADMIN — Medication 3 MILLIGRAM(S): at 21:25

## 2021-01-01 RX ADMIN — ENOXAPARIN SODIUM 40 MILLIGRAM(S): 100 INJECTION SUBCUTANEOUS at 10:43

## 2021-01-01 RX ADMIN — CEFTRIAXONE 100 MILLIGRAM(S): 500 INJECTION, POWDER, FOR SOLUTION INTRAMUSCULAR; INTRAVENOUS at 17:29

## 2021-01-01 RX ADMIN — AZITHROMYCIN 255 MILLIGRAM(S): 500 TABLET, FILM COATED ORAL at 23:35

## 2021-01-01 RX ADMIN — BUDESONIDE AND FORMOTEROL FUMARATE DIHYDRATE 2 PUFF(S): 160; 4.5 AEROSOL RESPIRATORY (INHALATION) at 20:27

## 2021-01-01 RX ADMIN — CEFTRIAXONE 100 MILLIGRAM(S): 500 INJECTION, POWDER, FOR SOLUTION INTRAMUSCULAR; INTRAVENOUS at 17:08

## 2021-01-01 RX ADMIN — Medication 100 MICROGRAM(S): at 05:40

## 2021-01-01 RX ADMIN — Medication 20 MILLIGRAM(S): at 11:53

## 2021-01-01 RX ADMIN — Medication 325 MILLIGRAM(S): at 09:27

## 2021-01-01 RX ADMIN — Medication 40 MILLIGRAM(S): at 21:50

## 2021-01-01 RX ADMIN — Medication 325 MILLIGRAM(S): at 10:06

## 2021-01-01 RX ADMIN — Medication 40 MILLIGRAM(S): at 22:25

## 2021-01-01 RX ADMIN — ENOXAPARIN SODIUM 40 MILLIGRAM(S): 100 INJECTION SUBCUTANEOUS at 09:27

## 2021-01-01 RX ADMIN — BUDESONIDE AND FORMOTEROL FUMARATE DIHYDRATE 2 PUFF(S): 160; 4.5 AEROSOL RESPIRATORY (INHALATION) at 08:56

## 2021-01-01 RX ADMIN — Medication 325 MILLIGRAM(S): at 09:26

## 2021-01-01 RX ADMIN — Medication 325 MILLIGRAM(S): at 09:02

## 2021-01-01 RX ADMIN — Medication 125 MILLIGRAM(S): at 19:46

## 2021-01-01 RX ADMIN — MONTELUKAST 10 MILLIGRAM(S): 4 TABLET, CHEWABLE ORAL at 21:03

## 2021-01-01 RX ADMIN — Medication 40 MILLIGRAM(S): at 21:25

## 2021-01-01 RX ADMIN — TIOTROPIUM BROMIDE 1 CAPSULE(S): 18 CAPSULE ORAL; RESPIRATORY (INHALATION) at 08:15

## 2021-01-01 RX ADMIN — AMLODIPINE BESYLATE 10 MILLIGRAM(S): 2.5 TABLET ORAL at 00:13

## 2021-01-01 RX ADMIN — AMLODIPINE BESYLATE 10 MILLIGRAM(S): 2.5 TABLET ORAL at 06:35

## 2021-01-01 RX ADMIN — Medication 20 MILLIGRAM(S): at 10:43

## 2021-01-01 RX ADMIN — TIOTROPIUM BROMIDE 1 CAPSULE(S): 18 CAPSULE ORAL; RESPIRATORY (INHALATION) at 07:49

## 2021-01-01 RX ADMIN — MONTELUKAST 10 MILLIGRAM(S): 4 TABLET, CHEWABLE ORAL at 21:50

## 2021-01-01 RX ADMIN — ATORVASTATIN CALCIUM 40 MILLIGRAM(S): 80 TABLET, FILM COATED ORAL at 21:50

## 2021-01-01 RX ADMIN — Medication 40 MILLIGRAM(S): at 14:05

## 2021-01-01 RX ADMIN — SODIUM CHLORIDE 1000 MILLILITER(S): 9 INJECTION INTRAMUSCULAR; INTRAVENOUS; SUBCUTANEOUS at 11:32

## 2021-01-01 RX ADMIN — Medication 40 MILLIGRAM(S): at 09:26

## 2021-01-01 RX ADMIN — ENOXAPARIN SODIUM 40 MILLIGRAM(S): 100 INJECTION SUBCUTANEOUS at 09:02

## 2021-01-01 RX ADMIN — Medication 3 MILLIGRAM(S): at 21:02

## 2021-01-01 RX ADMIN — TIOTROPIUM BROMIDE 1 CAPSULE(S): 18 CAPSULE ORAL; RESPIRATORY (INHALATION) at 08:56

## 2021-01-01 RX ADMIN — CEFTRIAXONE 100 MILLIGRAM(S): 500 INJECTION, POWDER, FOR SOLUTION INTRAMUSCULAR; INTRAVENOUS at 17:15

## 2021-01-14 ENCOUNTER — APPOINTMENT (OUTPATIENT)
Dept: ELECTROPHYSIOLOGY | Facility: CLINIC | Age: 86
End: 2021-01-14
Payer: MEDICARE

## 2021-01-14 ENCOUNTER — APPOINTMENT (OUTPATIENT)
Dept: FAMILY MEDICINE | Facility: CLINIC | Age: 86
End: 2021-01-14
Payer: MEDICARE

## 2021-01-14 DIAGNOSIS — B02.9 ZOSTER W/OUT COMPLICATIONS: ICD-10-CM

## 2021-01-14 PROCEDURE — 99212 OFFICE O/P EST SF 10 MIN: CPT | Mod: 95

## 2021-01-14 PROCEDURE — G2066: CPT

## 2021-01-14 PROCEDURE — 93298 REM INTERROG DEV EVAL SCRMS: CPT

## 2021-01-14 NOTE — PHYSICAL EXAM
[Normal] : no acute distress, well nourished, well developed and well-appearing [de-identified] : patient sitting comfortably in chair  [de-identified] : oxygen on per nasal canula [de-identified] : Vesicular rash left side MCL to anterior, following T7 T8 [de-identified] : alert

## 2021-01-14 NOTE — HISTORY OF PRESENT ILLNESS
[Home] : at home, [unfilled] , at the time of the visit. [Medical Office: ([facility])___] : at the medical office located in  [Family Member] : family member [Verbal consent obtained from patient] : the patient, [unfilled] [FreeTextEntry8] : Daughter Anel noted a rash starting two weeks ago on her mothers chest. Located left side. Now rash has grown only on left side and mother complaining of itching and can't sleep. No complaints of pain or burning.\par Daughter notes that rash became blistering two days ago.

## 2021-02-11 ENCOUNTER — NON-APPOINTMENT (OUTPATIENT)
Age: 86
End: 2021-02-11

## 2021-02-14 RX ORDER — PREDNISONE 20 MG/1
20 TABLET ORAL
Qty: 11 | Refills: 0 | Status: DISCONTINUED | COMMUNITY
Start: 2020-12-28 | End: 2021-02-14

## 2021-02-17 ENCOUNTER — NON-APPOINTMENT (OUTPATIENT)
Age: 86
End: 2021-02-17

## 2021-02-18 ENCOUNTER — APPOINTMENT (OUTPATIENT)
Dept: ELECTROPHYSIOLOGY | Facility: CLINIC | Age: 86
End: 2021-02-18

## 2021-02-26 ENCOUNTER — LABORATORY RESULT (OUTPATIENT)
Age: 86
End: 2021-02-26

## 2021-02-26 ENCOUNTER — APPOINTMENT (OUTPATIENT)
Dept: FAMILY MEDICINE | Facility: CLINIC | Age: 86
End: 2021-02-26
Payer: MEDICARE

## 2021-02-26 VITALS
WEIGHT: 121 LBS | SYSTOLIC BLOOD PRESSURE: 140 MMHG | BODY MASS INDEX: 23.75 KG/M2 | HEIGHT: 60 IN | OXYGEN SATURATION: 98 % | TEMPERATURE: 98.2 F | HEART RATE: 85 BPM | DIASTOLIC BLOOD PRESSURE: 60 MMHG

## 2021-02-26 DIAGNOSIS — Z20.828 CONTACT WITH AND (SUSPECTED) EXPOSURE TO OTHER VIRAL COMMUNICABLE DISEASES: ICD-10-CM

## 2021-02-26 DIAGNOSIS — K44.9 DIAPHRAGMATIC HERNIA W/OUT OBSTRUCTION OR GANGRENE: ICD-10-CM

## 2021-02-26 DIAGNOSIS — L03.115 CELLULITIS OF RIGHT LOWER LIMB: ICD-10-CM

## 2021-02-26 DIAGNOSIS — B35.4 TINEA CORPORIS: ICD-10-CM

## 2021-02-26 DIAGNOSIS — D69.2 OTHER NONTHROMBOCYTOPENIC PURPURA: ICD-10-CM

## 2021-02-26 DIAGNOSIS — S62.304D: ICD-10-CM

## 2021-02-26 DIAGNOSIS — M25.561 PAIN IN RIGHT KNEE: ICD-10-CM

## 2021-02-26 DIAGNOSIS — J45.901 UNSPECIFIED ASTHMA WITH (ACUTE) EXACERBATION: ICD-10-CM

## 2021-02-26 DIAGNOSIS — M79.641 PAIN IN RIGHT HAND: ICD-10-CM

## 2021-02-26 PROCEDURE — 20610 DRAIN/INJ JOINT/BURSA W/O US: CPT

## 2021-02-26 PROCEDURE — 99495 TRANSJ CARE MGMT MOD F2F 14D: CPT | Mod: 25

## 2021-02-26 RX ORDER — FAMCICLOVIR 500 MG/1
500 TABLET, FILM COATED ORAL 3 TIMES DAILY
Qty: 21 | Refills: 0 | Status: DISCONTINUED | COMMUNITY
Start: 2021-01-14 | End: 2021-02-26

## 2021-02-26 RX ORDER — FUROSEMIDE 20 MG/1
20 TABLET ORAL DAILY
Qty: 5 | Refills: 0 | Status: DISCONTINUED | COMMUNITY
Start: 2020-10-14 | End: 2021-02-26

## 2021-02-26 RX ORDER — AZITHROMYCIN 250 MG/1
250 TABLET, FILM COATED ORAL
Qty: 36 | Refills: 3 | Status: DISCONTINUED | COMMUNITY
Start: 2020-11-11 | End: 2021-02-26

## 2021-02-26 RX ADMIN — METHYLPREDNISOLONE ACETATE 0 MG/ML: 40 INJECTION, SUSPENSION INTRA-ARTICULAR; INTRALESIONAL; INTRAMUSCULAR; SOFT TISSUE at 00:00

## 2021-02-26 NOTE — PHYSICAL EXAM
[Cachectic] : cachexia was observed [Normal] : normal rate, regular rhythm, normal S1 and S2 and no murmur heard [No Edema] : there was no peripheral edema [de-identified] : See H&P [de-identified] : Patch of tinea corporis red scaly itchy below left breast

## 2021-02-26 NOTE — HISTORY OF PRESENT ILLNESS
[de-identified] : Admitted to [facility] on February 5 with upper GI bleed which was complicated by aspiration pneumonia received 2 units of packed red blood cells treated with tapering dose of oral prednisone has underlying anemia due to myelodysplasia was discharged home on February 15 on Protonix 40 twice daily. Patient to follow-up for iron infusions with hematologist\par \par Bilateral shoulder pain right worse than left extremely limited range of motion. Sudden exacerbation of pain this morning no trauma physical exam there is severe muscle wasting therapeutic trial of Medrol 40 mg and 2 cc Marcaine posterior approach right shoulder after Betadine. If pain fails to improve x-rays ordered\par \par Patient will follow up with hematologist and pulmonologist for possible iron infusion and control of COPD continue on Protonix\par \par Medications reconciled

## 2021-02-26 NOTE — REVIEW OF SYSTEMS
[Fatigue] : fatigue [Recent Change In Weight] : ~T recent weight change [Negative] : Respiratory [FreeTextEntry2] : Extremely debilitated barely able to move

## 2021-02-26 NOTE — ASSESSMENT
[FreeTextEntry1] : Ketoconazole for tinea corporis\par \par Steroid right shoulder if persists x-ray\par \par Follow-up with hematology and pulmonary

## 2021-02-27 LAB
BASOPHILS # BLD AUTO: 0.1 K/UL
BASOPHILS NFR BLD AUTO: 0.5 %
EOSINOPHIL # BLD AUTO: 0.43 K/UL
EOSINOPHIL NFR BLD AUTO: 2 %
FERRITIN SERPL-MCNC: 120 NG/ML
HCT VFR BLD CALC: 37.7 %
HGB BLD-MCNC: 10.6 G/DL
IMM GRANULOCYTES NFR BLD AUTO: 0.9 %
IRON SATN MFR SERPL: 17 %
IRON SERPL-MCNC: 43 UG/DL
LYMPHOCYTES # BLD AUTO: 1.02 K/UL
LYMPHOCYTES NFR BLD AUTO: 4.8 %
MAN DIFF?: NORMAL
MCHC RBC-ENTMCNC: 21.2 PG
MCHC RBC-ENTMCNC: 28.1 GM/DL
MCV RBC AUTO: 75.2 FL
MONOCYTES # BLD AUTO: 0.71 K/UL
MONOCYTES NFR BLD AUTO: 3.3 %
NEUTROPHILS # BLD AUTO: 18.88 K/UL
NEUTROPHILS NFR BLD AUTO: 88.5 %
PLATELET # BLD AUTO: 495 K/UL
RBC # BLD: 5.01 M/UL
RBC # FLD: 26.8 %
TIBC SERPL-MCNC: 252 UG/DL
UIBC SERPL-MCNC: 209 UG/DL
WBC # FLD AUTO: 21.47 K/UL

## 2021-03-02 ENCOUNTER — MED ADMIN CHARGE (OUTPATIENT)
Age: 86
End: 2021-03-02

## 2021-03-02 RX ORDER — METHYLPRED ACET/NACL,ISO-OS/PF 40 MG/ML
40 VIAL (ML) INJECTION
Qty: 1 | Refills: 0 | Status: COMPLETED | OUTPATIENT
Start: 2021-02-26

## 2021-03-04 ENCOUNTER — APPOINTMENT (OUTPATIENT)
Dept: ELECTROPHYSIOLOGY | Facility: CLINIC | Age: 86
End: 2021-03-04

## 2021-03-08 ENCOUNTER — RX RENEWAL (OUTPATIENT)
Age: 86
End: 2021-03-08

## 2021-03-09 ENCOUNTER — RX RENEWAL (OUTPATIENT)
Age: 86
End: 2021-03-09

## 2021-03-14 LAB — SARS-COV-2 N GENE NPH QL NAA+PROBE: NOT DETECTED

## 2021-03-17 ENCOUNTER — APPOINTMENT (OUTPATIENT)
Dept: INTERNAL MEDICINE | Facility: CLINIC | Age: 86
End: 2021-03-17
Payer: MEDICARE

## 2021-03-17 VITALS
TEMPERATURE: 98.3 F | OXYGEN SATURATION: 92 % | HEART RATE: 101 BPM | RESPIRATION RATE: 24 BRPM | WEIGHT: 120 LBS | DIASTOLIC BLOOD PRESSURE: 58 MMHG | HEIGHT: 60 IN | BODY MASS INDEX: 23.56 KG/M2 | SYSTOLIC BLOOD PRESSURE: 126 MMHG

## 2021-03-17 PROCEDURE — 94729 DIFFUSING CAPACITY: CPT

## 2021-03-17 PROCEDURE — ZZZZZ: CPT

## 2021-03-17 PROCEDURE — 94727 GAS DIL/WSHOT DETER LNG VOL: CPT

## 2021-03-17 PROCEDURE — 94010 BREATHING CAPACITY TEST: CPT

## 2021-03-17 PROCEDURE — 99214 OFFICE O/P EST MOD 30 MIN: CPT | Mod: 25

## 2021-03-17 NOTE — DATA REVIEWED
[FreeTextEntry1] : PFT  Flow rates show restriction and obstruction.  DLCO was unable to be performed.  Pt had difficulty performing all aspects of test.

## 2021-03-17 NOTE — PLAN
[FreeTextEntry1] : \par \par Will now Decrease Prednisone 2.5 mg qd.  \par \par FU 6 months with Silvestre

## 2021-03-17 NOTE — PHYSICAL EXAM
[Ill-Appearing] : ill-appearing [Chronically Ill] : chronically ill [No Respiratory Distress] : no respiratory distress  [No Accessory Muscle Use] : no accessory muscle use [Normal Rate] : normal rate  [Regular Rhythm] : with a regular rhythm [Normal S1, S2] : normal S1 and S2 [No Extremity Clubbing/Cyanosis] : no extremity clubbing/cyanosis [Soft] : abdomen soft [Non Tender] : non-tender [Normal] : affect was normal and insight and judgment were intact [de-identified] : frail [de-identified] : crackles L mid lung and R base.

## 2021-03-17 NOTE — HISTORY OF PRESENT ILLNESS
[FreeTextEntry1] : Pulm FU [de-identified] : Pulmonary status has been stable.  Daughter states she is doing very well overall.  She remain on Prednisone 5 mg qd.  Hosp in Feb for GI [place]/ Aspiration.   No further bleeding. Some cough after meals which is attributed to hiatal hernia.   Using Nebulizer bid.  Using O2 at 3l/min.Appetite good.

## 2021-03-23 ENCOUNTER — RX RENEWAL (OUTPATIENT)
Age: 86
End: 2021-03-23

## 2021-03-23 ENCOUNTER — APPOINTMENT (OUTPATIENT)
Dept: ELECTROPHYSIOLOGY | Facility: CLINIC | Age: 86
End: 2021-03-23

## 2021-05-21 PROBLEM — T50.B95A ADVERSE EFFECT OF COVID-19 VACCINE: Status: ACTIVE | Noted: 2021-01-01

## 2021-05-21 PROBLEM — G81.91 HEMIPARESIS, RIGHT: Status: ACTIVE | Noted: 2019-07-17

## 2021-05-21 PROBLEM — I63.9 THALAMIC INFARCTION: Status: ACTIVE | Noted: 2019-07-17

## 2021-05-21 PROBLEM — I63.522 CEREBROVASCULAR ACCIDENT (CVA) DUE TO OCCLUSION OF LEFT ANTERIOR CEREBRAL ARTERY: Status: ACTIVE | Noted: 2019-07-01

## 2021-05-21 NOTE — HISTORY OF PRESENT ILLNESS
[Family Member] : family member [FreeTextEntry1] : Dyspnea [de-identified] : Patient has dyspnea at rest.  Followed by Dr. Lagos for bronchiectasis and COPD currently on low-dose prednisone azithromycin 3 times a week Brovana inhaler.  She is distressed at rest due to dyspnea O2 sat is 96% on continuous O2 at 3 L a minute.  Patient also has significant hiatal hernia.  Myelodysplasia hemoglobin last month was 10 and change at hematologist recent upper GI bleed currently on omeprazole 40 mg twice a day stools positive for occult blood with occasional melena.  Will add Carafate if hemoglobin dropping.  Anemia may be contributing to her dyspnea\par \par Physical exam decreased breath sounds bilaterally using accessory muscles to breathe difficult to appreciate cardiac sounds

## 2021-05-21 NOTE — ASSESSMENT
[FreeTextEntry1] : Worsening dyspnea secondary to bronchiectasis and pulmonary failure\par \par Will give low-dose lorazepam for anxiety palliative care consult called.  Check for heart failure

## 2021-05-21 NOTE — PHYSICAL EXAM
[Normal] : no acute distress, well nourished, well developed and well-appearing [de-identified] : Decreased breath sounds

## 2021-05-21 NOTE — REVIEW OF SYSTEMS
[Fatigue] : fatigue [Shortness Of Breath] : shortness of breath [Chest Pain] : no chest pain [FreeTextEntry2] : Chronic distress due to dyspnea

## 2021-08-05 PROBLEM — K92.1 MELENA: Status: ACTIVE | Noted: 2021-01-01

## 2021-11-30 PROBLEM — R53.81 DEBILITATED: Status: ACTIVE | Noted: 2021-01-01

## 2021-12-09 NOTE — ED PROVIDER NOTE - NS ED ROS FT
Constitutional: No fever.  Eyes: No vision changes.   Ears, Nose, Mouth, Throat: No congestion.  Cardiovascular: No chest pain.  Respiratory: No cough. No difficulty breathing.  Gastrointestinal: No abdominal pain. No nausea . No vomiting.  Genitourinary: No dysuria.  Musculoskeletal: No joint pain.  Neurological: No headache.  Integumentary (skin and/or breast): No rash.

## 2021-12-09 NOTE — H&P ADULT - HISTORY OF PRESENT ILLNESS
89 y/o female with PMHx of asthma (moderate persistent), bronchiectasis,  aortic valve sclerosis, essential HTN, hypothyroidism, loop recorder, CVA (left thalamic infarct), and MDS presents to the ED for evaluation for sob. As per patient she has been noticing more sob and cough, minimal sputum production without fever x 2 weeks. She denies chest pain with this. Also, as per the daughter the patient has been complaining of mid back pain, no alleviating or aggravating factors. No recent fall as per patient.

## 2021-12-09 NOTE — ED PROVIDER NOTE - CLINICAL SUMMARY MEDICAL DECISION MAKING FREE TEXT BOX
Triage says here for abdominal pain. Per daughter, has been having intermittent back pain. Pt without complaints. Will follow-up work-up as for ordered for triage note as well as CXR.

## 2021-12-09 NOTE — ED PROVIDER NOTE - OBJECTIVE STATEMENT
87 y/o female with PMHx of asthma (moderate persistent), aortic valve sclerosis, essential HTN, hypothyroidism, loop recorder, CVA (left thalamic infarct), and MDS presents to the ED for evaluation. Pt states she feels well and has no complaints at this time. Denies fevers, chest pain, cough, SOB, abdominal pain, back pain, headache. Non-smoker. Per daughter, she brought pt to the ED because pt has been c/o intermittent back pain x2 weeks.

## 2021-12-09 NOTE — PATIENT PROFILE ADULT - STATED REASON FOR ADMISSION
For her LP, continue with healthy low carb and fat diet.  Also continue exercise.  We will see if lab improves with time.    To wean off the gabapentin, dec by 100 mg every week til only on one.  Then take that every other day for a week and then dc.      shortness of breath

## 2021-12-09 NOTE — ED PROVIDER NOTE - PHYSICAL EXAMINATION
Vital signs as available reviewed.  General:  Comfortable, no acute distress.  Head:  Normocephalic, atraumatic.  Eyes:  Conjunctiva pink, no icterus.  Cardiovascular:  Regular rate, no obvious murmur.  Respiratory: OCcasionals cattered wheeze, occasionals cattered crackles.  Abdomen:  Soft, non-tender.  Musculoskeletal:  No deformity or calf tenderness. No midline spinal tenderness.  Neurologic: Alert and oriented, moving all extremities.  Skin:  Warm and dry. Vital signs as available reviewed.  General:  Comfortable, no acute distress.  Head:  Normocephalic, atraumatic.  Eyes:  Conjunctiva pink, no icterus.  Cardiovascular:  Regular rate, no obvious murmur.  Respiratory: Occasional scattered wheeze, occasional scattered crackles.  Abdomen:  Soft, non-tender.  Musculoskeletal:  No deformity or calf tenderness. No midline spinal tenderness.  Neurologic: Alert and oriented, moving all extremities.  Skin:  Warm and dry.

## 2021-12-09 NOTE — ED PROVIDER NOTE - NSICDXPASTMEDICALHX_GEN_ALL_CORE_FT
PAST MEDICAL HISTORY:  Asthma, unspecified asthma severity, unspecified whether complicated, unspecified whether persistent     Cerebrovascular accident (CVA) 6/7/19 Left thalamic infarct    Essential hypertension     Hypothyroidism, unspecified type     MDS (myelodysplastic syndrome)     Rib fractures

## 2021-12-09 NOTE — ED PROVIDER NOTE - NSICDXFAMILYHX_GEN_ALL_CORE_FT
FAMILY HISTORY:  FH: HTN (hypertension)    Sibling  Still living? No  FH: diabetes mellitus, Age at diagnosis: Age Unknown

## 2021-12-09 NOTE — H&P ADULT - NSHPLABSRESULTS_GEN_ALL_CORE
LABS:                            10.8   19.10 )-----------( 618      ( 09 Dec 2021 10:55 )             39.7         137  |  104  |  27<H>  ----------------------------<  92  4.2   |  31  |  0.70    Ca    8.4<L>      09 Dec 2021 10:55    TPro  6.9  /  Alb  2.5<L>  /  TBili  0.4  /  DBili  x   /  AST  17  /  ALT  11<L>  /  AlkPhos  100          LIVER FUNCTIONS - ( 09 Dec 2021 10:55 )  Alb: 2.5 g/dL / Pro: 6.9 gm/dL / ALK PHOS: 100 U/L / ALT: 11 U/L / AST: 17 U/L / GGT: x               PT/INR - ( 09 Dec 2021 10:55 )   PT: 14.6 sec;   INR: 1.26 ratio         PTT - ( 09 Dec 2021 10:55 )  PTT:33.5 sec  Urinalysis Basic - ( 09 Dec 2021 11:44 )    Color: Yellow / Appearance: Slightly Turbid / S.020 / pH: x  Gluc: x / Ketone: Trace  / Bili: Negative / Urobili: Negative mg/dL   Blood: x / Protein: 100 mg/dL / Nitrite: Negative   Leuk Esterase: Small / RBC: 0-2 /HPF / WBC 3-5   Sq Epi: x / Non Sq Epi: Negative / Bacteria: Moderate      Blood, Urine: Moderate ( @ 11:44)    CULTURES:  Blood, Urine: Moderate ( @ 11:44)      CT Abdomen and Pelvis w/ IV Cont     IMPRESSION:  No acute findings to explain the patient's abdominal pain.    Chest Xray: Wet read, Increased markings, no definitive pneumonia

## 2021-12-09 NOTE — H&P ADULT - NSHPPHYSICALEXAM_GEN_ALL_CORE
T(F): 98.5 (12-09-21 @ 15:23), Max: 98.5 (12-09-21 @ 15:23)  HR: 85 (12-09-21 @ 15:23) (85 - 86)  BP: 163/74 (12-09-21 @ 15:23) (163/74 - 177/58)  RR: 18 (12-09-21 @ 15:23) (18 - 20)  SpO2: 95% (12-09-21 @ 15:23) (95% - 97%)      PHYSICAL EXAM:    HEENT:  pupils equal and reactive, EOMI, no oropharyngeal lesions, erythema, exudates, oral thrush  NECK:   supple, no carotid bruits, no palpable lymph nodes, no thyromegaly  CV:  +S1, +S2, regular, no murmurs or rubs  RESP:   bilateral crackles with ronchi  BREAST:  not examined  GI:  abdomen soft, non-tender, non-distended, normal BS, no bruits, no abdominal masses, no palpable masses  RECTAL:  not examined  :  not examined  MSK:   normal muscle tone, no atrophy, no rigidity, no contractions  EXT:  no clubbing, no cyanosis, no edema, no calf pain, swelling or erythema  VASCULAR:  pulses equal and symmetric in the upper and lower extremities  NEURO:  AAOX3, no focal neurological deficits, follows all commands, able to move extremities spontaneously  SKIN:  no ulcers, lesions or rashes

## 2021-12-09 NOTE — ED ADULT NURSE REASSESSMENT NOTE - NS ED NURSE REASSESS COMMENT FT1
tyrone dtr luis over the phone, as per dtr, pt is alert with period of confusion when she is in the hospital, dtr states pt was c/o intermittent LBP which started in thanksgiving, denies any recent falls or injury, "she has no problems moving at home."  pt c/o lbp again this morning which prompt family to take pt to ED.  pt is on oxygen 3L at home d/t chronic bronchietesis.

## 2021-12-09 NOTE — ED ADULT NURSE NOTE - OBJECTIVE STATEMENT
pt is 87 yo female presents to ED for abd pain and sob, pt aaox2, pt states pain started this morning, SOB started several years ago.  no resp distress noted, neg for TTP in abd pt is 87 yo female presents to ED for abd pain and sob, pt aaox2, pt states pain started this morning, SOB started several years ago.  no resp distress noted, neg for TTP in abd.

## 2021-12-09 NOTE — H&P ADULT - ASSESSMENT
87 y/o female with PMHx of asthma (moderate persistent), bronchiectasis,  aortic valve sclerosis, essential HTN, hypothyroidism, loop recorder, CVA (left thalamic infarct), and MDS presents to the ED for evaluation for sob    # Bronchiectasis   - Solumedrol 40mg IVP q 8 hours   - Add PPI   - Spiriva   - Symbicort   - Albuterol   - Montelukast   - Azithromycin 500 IVPD Daily   - Pulmonary Consult-Dr Lagos   - Sputum Culture    # Leukocytosis Secondary to MDS and Prednisone use   - Monitor CBC    # Hyperlipidemia   - Atorvastatin    # Hypothyroid   - Synthroid     # HTN  - Amlodipine    # DVT prophylaxis   - Lovenox   89 y/o female with PMHx of asthma (moderate persistent), bronchiectasis,  aortic valve sclerosis, essential HTN, hypothyroidism, loop recorder, CVA (left thalamic infarct), and MDS presents to the ED for evaluation for sob    # Bronchiectasis   - Solumedrol 40mg IVP q 8 hours   - Add PPI   - Spiriva   - Symbicort   - Albuterol   - Montelukast   - Azithromycin 500 IVPD Daily   - Pulmonary Consult-Dr Lagos   - Sputum Culture    # Leukocytosis Secondary to MDS and Prednisone use   - Monitor CBC    # Hyperlipidemia   - Atorvastatin    # Hypothyroid   - Synthroid     # HTN  - Amlodipine    # Back pain  - likely due to chronic compression deformities in Lspine  - Tylenol  - PT Eval     # DVT prophylaxis   - Lovenox

## 2021-12-09 NOTE — PATIENT PROFILE ADULT - FALL HARM RISK - HARM RISK INTERVENTIONS
Assistance with ambulation/Assistance OOB with selected safe patient handling equipment/Communicate Risk of Fall with Harm to all staff/Discuss with provider need for PT consult/Monitor gait and stability/Provide patient with walking aids - walker, cane, crutches/Reinforce activity limits and safety measures with patient and family/Reorient to person, place and time as needed/Tailored Fall Risk Interventions/Use of alarms - bed, chair and/or voice tab/Visual Cue: Yellow wristband and red socks/Bed in lowest position, wheels locked, appropriate side rails in place/Call bell, personal items and telephone in reach/Instruct patient to call for assistance before getting out of bed or chair/Non-slip footwear when patient is out of bed/[place] to call system/Physically safe environment - no spills, clutter or unnecessary equipment/Purposeful Proactive Rounding/Room/bathroom lighting operational, light cord in reach

## 2021-12-09 NOTE — ED PROVIDER NOTE - EKG #1 DATE/TIME
NURSE NOTES:

LATE ENTRY:

TECH HERE DO CXR. PT IN NO ACUTE DISTRESS. WILL CONTINUE TO MONITOR PT. 09-Dec-2021 11:37

## 2021-12-09 NOTE — ED ADULT NURSE REASSESSMENT NOTE - NS ED NURSE REASSESS COMMENT FT1
ambulation trial done, pt not able to ambulate, leaning back and not stepping forward.  pt appears distressed +tachypnea when trying to sit up on stretcher, md goel made aware, pt to be admitted, called dtr and POC explained, dtr verbalized understanding.  will con't to monitor.

## 2021-12-10 NOTE — CONSULT NOTE ADULT - ASSESSMENT
89 y/o female with PMHx of asthma (moderate persistent), bronchiectasis,  aortic valve sclerosis, essential HTN, hypothyroidism, loop recorder, CVA (left thalamic infarct), and MDS admitted on 12/9 for evaluation of shortness of breath and cough over preceding 2 weeks; the patient history provided by daughter at the bedside who lives with the patient; history per family and medical record.     1. Patient admitted with pneumonia, patient has not been in hospital or other health care facility in past year; will treat as community acquired pneumonia  - follow up cultures   - serial cbc and monitor temperature   - reviewed prior medical records to evaluate for resistant or atypical pathogens   - oxygen and nebs as needed   - iv hydration and supportive care   - will continue ceftriaxone as ordered  - zithromax for atypical pathogens  - physical therapy as tolerated so that patient is not debilitated  2. other issues: per medicine

## 2021-12-10 NOTE — PROGRESS NOTE ADULT - SUBJECTIVE AND OBJECTIVE BOX
patient seen and examined at bedside  patient continues to be short of breath + wheezing  vitals stable  on 02  o/e : crackles on exam  on azithro patient seen and examined at bedside  patient continues to be short of breath + wheezing  vitals stable  on 02  o/e : crackles on exam  on azithro    Review of Systems:  General:denies fever chills, headache, weakness  HEENT: denies blurry vision,diffculty swallowing, difficulty hearing, tinnitus  Cardiovascular: denies chest pain  ,palpitations  Pulmonary:+ shortness of breath,++ cough, wheezing, hemoptysis  Gastrointestinal: denies abdominal pain, constipation, diarrhea,nausea , vomiting, hematochezia  : denies hematuria, dysuria, or incontinence  Neurological: denies weakness, numbness , tingling, dizziness, tremors  MSK: denies muscle pain, difficulty ambulating, swelling, back pain  skin: denies skin rash, itching, burning, or  skin lesions  Psychiatrical: denies mood disturbances, anxierty, feeling depressed, depression , or difficulty sleeping    Objective:  Vitals  T(C): 36.3 (12-10-21 @ 08:19), Max: 36.9 (12-09-21 @ 15:23)  HR: 85 (12-10-21 @ 08:19) (81 - 88)  BP: 155/69 (12-10-21 @ 08:19) (154/59 - 177/58)  RR: 17 (12-10-21 @ 08:19) (17 - 20)  SpO2: 98% (12-10-21 @ 08:19) (95% - 99%)    Physical Exam:  General: comfortable, no acute distress, well nourished  HEENT: Atraumatic, no LAD, trachea midline, PERRLA  Cardiovascular: normal s1s2, no murmurs, gallops or fricition rubs  Pulmonary: crackles, no wheezing , rhonchi  Gastrointestinal: soft non tender non distended, no masses felt, no organomegally  Muscloskeletal: no lower extremity edema, intact bilateral lower extremity pulses  Neurological: CN II-12 intact. No focal weakness  Psychiatrical: normal mood, cooperative  SKIN: no rash, lesions or ulcers    Labs:                          10.6   20.84 )-----------( 561      ( 10 Dec 2021 07:26 )             39.9     12-10    138  |  103  |  24<H>  ----------------------------<  128<H>  4.1   |  32<H>  |  0.70    Ca    8.5      10 Dec 2021 07:26    TPro  6.9  /  Alb  2.4<L>  /  TBili  0.3  /  DBili  x   /  AST  13<L>  /  ALT  10<L>  /  AlkPhos  186<H>  12-10    LIVER FUNCTIONS - ( 10 Dec 2021 07:26 )  Alb: 2.4 g/dL / Pro: 6.9 gm/dL / ALK PHOS: 186 U/L / ALT: 10 U/L / AST: 13 U/L / GGT: x           PT/INR - ( 09 Dec 2021 10:55 )   PT: 14.6 sec;   INR: 1.26 ratio         PTT - ( 09 Dec 2021 10:55 )  PTT:33.5 sec      Active Medications  MEDICATIONS  (STANDING):  amLODIPine   Tablet 10 milliGRAM(s) Oral daily  aspirin 325 milliGRAM(s) Oral daily  atorvastatin 40 milliGRAM(s) Oral at bedtime  azithromycin  IVPB 500 milliGRAM(s) IV Intermittent daily  budesonide 160 MICROgram(s)/formoterol 4.5 MICROgram(s) Inhaler 2 Puff(s) Inhalation two times a day  cefTRIAXone   IVPB 1000 milliGRAM(s) IV Intermittent every 24 hours  enoxaparin Injectable 40 milliGRAM(s) SubCutaneous daily  furosemide    Tablet 20 milliGRAM(s) Oral daily  levothyroxine 100 MICROGram(s) Oral daily  methylPREDNISolone sodium succinate Injectable 40 milliGRAM(s) IV Push every 8 hours  montelukast 10 milliGRAM(s) Oral daily  pantoprazole    Tablet 40 milliGRAM(s) Oral daily  tiotropium 18 MICROgram(s) Capsule 1 Capsule(s) Inhalation daily    MEDICATIONS  (PRN):  acetaminophen     Tablet .. 650 milliGRAM(s) Oral every 6 hours PRN Mild Pain (1 - 3)  ALBUTerol    90 MICROgram(s) HFA Inhaler 2 Puff(s) Inhalation every 6 hours PRN Bronchospasm  aluminum hydroxide/magnesium hydroxide/simethicone Suspension 30 milliLiter(s) Oral every 4 hours PRN Dyspepsia  melatonin 3 milliGRAM(s) Oral at bedtime PRN Insomnia  ondansetron Injectable 4 milliGRAM(s) IV Push every 8 hours PRN Nausea and/or Vomiting     patient seen and examined at bedside  patient continues to be short of breath although she reports she feels better.  vitals stable  on 02 at 3 liters (at baseline as per patient)  o/e : crackles on exam  on azithro    Review of Systems:  General:denies fever chills, headache, weakness  HEENT: denies blurry vision,diffculty swallowing, difficulty hearing, tinnitus  Cardiovascular: denies chest pain  ,palpitations  Pulmonary:+ shortness of breath,++ cough, wheezing, hemoptysis  Gastrointestinal: denies abdominal pain, constipation, diarrhea,nausea , vomiting, hematochezia  : denies hematuria, dysuria, or incontinence  Neurological: denies weakness, numbness , tingling, dizziness, tremors  MSK: denies muscle pain, difficulty ambulating, swelling, back pain  skin: denies skin rash, itching, burning, or  skin lesions  Psychiatrical: denies mood disturbances, anxierty, feeling depressed, depression , or difficulty sleeping    Objective:  Vitals  T(C): 36.3 (12-10-21 @ 08:19), Max: 36.9 (12-09-21 @ 15:23)  HR: 85 (12-10-21 @ 08:19) (81 - 88)  BP: 155/69 (12-10-21 @ 08:19) (154/59 - 177/58)  RR: 17 (12-10-21 @ 08:19) (17 - 20)  SpO2: 98% (12-10-21 @ 08:19) (95% - 99%)    Physical Exam:  General: comfortable, no acute distress, well nourished  HEENT: Atraumatic, no LAD, trachea midline, PERRLA  Cardiovascular: normal s1s2, no murmurs, gallops or fricition rubs  Pulmonary: crackles, no wheezing , rhonchi  Gastrointestinal: soft non tender non distended, no masses felt, no organomegally  Muscloskeletal: no lower extremity edema, intact bilateral lower extremity pulses  Neurological: CN II-12 intact. No focal weakness  Psychiatrical: normal mood, cooperative  SKIN: no rash, lesions or ulcers    Labs:                          10.6   20.84 )-----------( 561      ( 10 Dec 2021 07:26 )             39.9     12-10    138  |  103  |  24<H>  ----------------------------<  128<H>  4.1   |  32<H>  |  0.70    Ca    8.5      10 Dec 2021 07:26    TPro  6.9  /  Alb  2.4<L>  /  TBili  0.3  /  DBili  x   /  AST  13<L>  /  ALT  10<L>  /  AlkPhos  186<H>  12-10    LIVER FUNCTIONS - ( 10 Dec 2021 07:26 )  Alb: 2.4 g/dL / Pro: 6.9 gm/dL / ALK PHOS: 186 U/L / ALT: 10 U/L / AST: 13 U/L / GGT: x           PT/INR - ( 09 Dec 2021 10:55 )   PT: 14.6 sec;   INR: 1.26 ratio         PTT - ( 09 Dec 2021 10:55 )  PTT:33.5 sec      Active Medications  MEDICATIONS  (STANDING):  amLODIPine   Tablet 10 milliGRAM(s) Oral daily  aspirin 325 milliGRAM(s) Oral daily  atorvastatin 40 milliGRAM(s) Oral at bedtime  azithromycin  IVPB 500 milliGRAM(s) IV Intermittent daily  budesonide 160 MICROgram(s)/formoterol 4.5 MICROgram(s) Inhaler 2 Puff(s) Inhalation two times a day  cefTRIAXone   IVPB 1000 milliGRAM(s) IV Intermittent every 24 hours  enoxaparin Injectable 40 milliGRAM(s) SubCutaneous daily  furosemide    Tablet 20 milliGRAM(s) Oral daily  levothyroxine 100 MICROGram(s) Oral daily  methylPREDNISolone sodium succinate Injectable 40 milliGRAM(s) IV Push every 8 hours  montelukast 10 milliGRAM(s) Oral daily  pantoprazole    Tablet 40 milliGRAM(s) Oral daily  tiotropium 18 MICROgram(s) Capsule 1 Capsule(s) Inhalation daily    MEDICATIONS  (PRN):  acetaminophen     Tablet .. 650 milliGRAM(s) Oral every 6 hours PRN Mild Pain (1 - 3)  ALBUTerol    90 MICROgram(s) HFA Inhaler 2 Puff(s) Inhalation every 6 hours PRN Bronchospasm  aluminum hydroxide/magnesium hydroxide/simethicone Suspension 30 milliLiter(s) Oral every 4 hours PRN Dyspepsia  melatonin 3 milliGRAM(s) Oral at bedtime PRN Insomnia  ondansetron Injectable 4 milliGRAM(s) IV Push every 8 hours PRN Nausea and/or Vomiting

## 2021-12-10 NOTE — CONSULT NOTE ADULT - ASSESSMENT
SOB.  CXR shows increased bilateral markings, possible superimposed PNA especially on right.   Would obtain CT scan of chest, BNP level, echocardiogram.  Begin antibiotics for PNA.  Net mild diuresis.  Please consult ID.     COPD, persistent asthma, bronchiectasis.  Continue NC O2,  inhalers, steroids (taper as per clinical).      h.o. CVA    h.o. myeloproliferative disorder.     h.o. aortic sclerosis.  SOB.  CXR shows increased bilateral markings, possible superimposed PNA especially on right.   Would obtain CT scan of chest, BNP level, echocardiogram.  Begin antibiotics for PNA.  Net mild diuresis.  Please consult ID.       CT chest reviewed, continue treatment for PNA, on azithro, ceftrixone.  Continue net diuresis.  Taper steroids- have decreased solumedrol to 40 mg bid.     COPD, persistent asthma, bronchiectasis.  Continue NC O2,  inhalers, steroids (taper).      h.o. CVA    h.o. myeloproliferative disorder.     h.o. aortic sclerosis.  SOB.  CXR shows increased bilateral markings, possible superimposed PNA especially on right.   Would obtain CT scan of chest, BNP level, echocardiogram.  Begin antibiotics for PNA.  Net mild diuresis.  Please consult ID.           Addendum:  CT chest reviewed, continue treatment for PNA, on azithro, ceftrixone.  Continue net diuresis.  Taper steroids- have decreased solumedrol to 40 mg bid.       COPD, persistent asthma, bronchiectasis.  Continue NC O2,  inhalers, steroids (taper as per clinical).      h.o. CVA    h.o. myeloproliferative disorder.     h.o. aortic sclerosis.  SOB.  CXR shows increased bilateral markings, possible superimposed PNA especially on right.   Would obtain CT scan of chest, BNP level, echocardiogram.  Begin antibiotics for PNA.  Net mild diuresis.  Please consult ID.           Addendum:  CT chest reviewed, continue treatment for PNA, on azithro, ceftrixone.  Continue net diuresis.  Taper steroids- have decreased solumedrol to 40 mg bid.       COPD, persistent asthma, bronchiectasis.  Continue NC O2,  inhalers, steroids (taper as per clinical).   Pt is on outpatient home O2 3L/min and ongoing low dose prednisone.    h.o. CVA    h.o. myeloproliferative disorder.     h.o. aortic sclerosis.

## 2021-12-10 NOTE — CONSULT NOTE ADULT - SUBJECTIVE AND OBJECTIVE BOX
Patient is a 88y old  Female who presents with a chief complaint of SOB (10 Dec 2021 09:53)    HPI:  89 y/o female with PMHx of asthma (moderate persistent), bronchiectasis,  aortic valve sclerosis, essential HTN, hypothyroidism, loop recorder, CVA (left thalamic infarct), and MDS admitted on  for evaluation of shortness of breath and cough over preceding 2 weeks; the patient history provided by daughter at the bedside who lives with the patient; history per family and medical record.         PMH: as above  PSH: as above  Meds: per reconciliation sheet, noted below  MEDICATIONS  (STANDING):  amLODIPine   Tablet 10 milliGRAM(s) Oral daily  aspirin 325 milliGRAM(s) Oral daily  atorvastatin 40 milliGRAM(s) Oral at bedtime  azithromycin  IVPB 500 milliGRAM(s) IV Intermittent daily  budesonide 160 MICROgram(s)/formoterol 4.5 MICROgram(s) Inhaler 2 Puff(s) Inhalation two times a day  cefTRIAXone   IVPB 1000 milliGRAM(s) IV Intermittent every 24 hours  enoxaparin Injectable 40 milliGRAM(s) SubCutaneous daily  furosemide    Tablet 20 milliGRAM(s) Oral daily  levothyroxine 100 MICROGram(s) Oral daily  methylPREDNISolone sodium succinate Injectable 40 milliGRAM(s) IV Push every 8 hours  montelukast 10 milliGRAM(s) Oral daily  pantoprazole    Tablet 40 milliGRAM(s) Oral daily  tiotropium 18 MICROgram(s) Capsule 1 Capsule(s) Inhalation daily    MEDICATIONS  (PRN):  acetaminophen     Tablet .. 650 milliGRAM(s) Oral every 6 hours PRN Mild Pain (1 - 3)  ALBUTerol    90 MICROgram(s) HFA Inhaler 2 Puff(s) Inhalation every 6 hours PRN Bronchospasm  aluminum hydroxide/magnesium hydroxide/simethicone Suspension 30 milliLiter(s) Oral every 4 hours PRN Dyspepsia  melatonin 3 milliGRAM(s) Oral at bedtime PRN Insomnia  ondansetron Injectable 4 milliGRAM(s) IV Push every 8 hours PRN Nausea and/or Vomiting    Allergies    No Known Allergies    Intolerances      Social: no smoking, no alcohol, no illegal drugs; no recent travel, no exposure to TB  FAMILY HISTORY:  FH: diabetes mellitus (Sibling)    FH: HTN (hypertension)       ROS unable to obtain secondary to patient medical condition     Vital Signs Last 24 Hrs  T(C): 36.3 (10 Dec 2021 08:19), Max: 36.9 (09 Dec 2021 15:23)  T(F): 97.3 (10 Dec 2021 08:19), Max: 98.5 (09 Dec 2021 15:23)  HR: 85 (10 Dec 2021 08:19) (81 - 88)  BP: 155/69 (10 Dec 2021 08:19) (154/59 - 172/70)  BP(mean): 97 (09 Dec 2021 19:41) (97 - 98)  RR: 17 (10 Dec 2021 08:19) (17 - 20)  SpO2: 98% (10 Dec 2021 08:19) (95% - 99%)  Daily     Daily     PE:    Constitutional: frail looking  HEENT: NC/AT, EOMI, PERRLA, conjunctivae clear; ears and nose atraumatic; pharynx clear  Neck: supple; thyroid not palpable  Back: no tenderness  Respiratory: respiratory effort normal; clear to auscultation; decreased breath sounds at bases  Cardiovascular: S1S2 regular, no murmurs  Abdomen: soft, not tender, not distended, positive BS; no liver or spleen organomegaly  Genitourinary: no suprapubic tenderness  Musculoskeletal: no muscle tenderness, no joint swelling or tenderness  Neurological/ Psychiatric:  moving all extremities  Skin: no rashes; no palpable lesions    Labs: all available labs reviewed                        10.6   20.84 )-----------( 561      ( 10 Dec 2021 07:26 )             39.9     12-10    138  |  103  |  24<H>  ----------------------------<  128<H>  4.1   |  32<H>  |  0.70    Ca    8.5      10 Dec 2021 07:26    TPro  6.9  /  Alb  2.4<L>  /  TBili  0.3  /  DBili  x   /  AST  13<L>  /  ALT  10<L>  /  AlkPhos  186<H>  12-10     LIVER FUNCTIONS - ( 10 Dec 2021 07:26 )  Alb: 2.4 g/dL / Pro: 6.9 gm/dL / ALK PHOS: 186 U/L / ALT: 10 U/L / AST: 13 U/L / GGT: x           Urinalysis Basic - ( 09 Dec 2021 11:44 )    Color: Yellow / Appearance: Slightly Turbid / S.020 / pH: x  Gluc: x / Ketone: Trace  / Bili: Negative / Urobili: Negative mg/dL   Blood: x / Protein: 100 mg/dL / Nitrite: Negative   Leuk Esterase: Small / RBC: 0-2 /HPF / WBC 3-5   Sq Epi: x / Non Sq Epi: Negative / Bacteria: Moderate    < from: CT Chest No Cont (12.10.21 @ 10:43) >    ACC: [id number] EXAM:  CT CHEST                          PROCEDURE DATE:  12/10/2021          INTERPRETATION:  CLINICAL INDICATION: Shortness of breath.    Axial CT images of the chest are obtained without intravenous   administration of contrast.    Comparison is made with the prior chest CT of 2020.    No enlarged axillary, mediastinal or hilar lymph nodes.    Left anterior chest wall subcutaneous tissue cardiac loop recorder.   Vascular calcifications with involvement of the aorta andthe coronary   arteries. Aortic valve calcifications which can be seen in the setting of   aortic valve stenosis. Heart size is enlarged. Minimal pericardial fluid   or pericardial thickening. No pleural effusions.    Evaluation of the upper abdomen demonstrate vicarious excretion of   contrast within the gallbladder. Spleen is enlarged measuring about 14.5   cm as on the prior study. Moderate to large size hiatal hernia. Left   renal cyst. Mild enhancement of the renal parenchyma from the   contrast-enhanced abdominal CT performed on 2021 suggestive   of renal dysfunction or acute tubular necrosis.    Evaluation of the lungs demonstrate bilateral lower lung areas of linear   or subsegmental atelectasis. Scattered bilateral clusters of tiny foci of   mucoid impacted distal airways are noted as on the prior study. Areas of   scarring within the right middle lobe and lingula with internal   bronchiectasis and foci of calcifications appear unchanged dating back to   2018.    Mosaic attenuation of the lungs suggestive of air trapping given the   other findings.    Few ill-defined right upper lobe groundglass nodular opacities largest   measuring about 8 mm, some of which are new since 2020. Small   few groundglass nodular opacities are also noted within the left upper   lobe.    No central endobronchial lesions.    Degenerative changes of the spine. Spinal scoliosis. Mild loss of height   of a few vertebral bodies appear unchanged.    IMPRESSION: Scattered bilateral nonspecific subcentimeter groundglass   nodular opacities new since 2020. Differential diagnosis include   but is not limited to foci of infection/inflammation..    Areas of scarring within the lingula and right middle lobe appears   unchanged since 2018 with scattered foci of impacted distal   airways. Constellation of findings can be seen in the setting of chronic   mycobacterial avium complex infection.    Abnormal retained contrast enhancement of the partially imaged renal   parenchyma from the study performed on 2021 can be seen in   the setting of renal dysfunction or acute tubular necrosis. Correlation   with creatinine levels are recommended.    < end of copied text >        Radiology: all available radiological tests reviewed    Advanced directives addressed: full resuscitation

## 2021-12-10 NOTE — PROGRESS NOTE ADULT - ASSESSMENT
87 y/o female with PMHx of asthma (moderate persistent), bronchiectasis,  aortic valve sclerosis, essential HTN, hypothyroidism, loop recorder, CVA (left thalamic infarct), and MDS presents to the ED for evaluation for sob    Acute hypoxemic Respiratory Failure.secondary to  Bronchiectasis. XR findings concerning for PNA   -Pulm consulted   - Solumedrol 40mg IVP q 8 hours   - Add PPI   - Spiriva   - Symbicort   - Albuterol   - Montelukast   - Azithromycin 500 IVPD Daily + rocephin   - Pulmonary Consult-Dr Lagos   - check procalc, probnp , echo , Sputum Culture    Leukocytosis Secondary to MDS and Prednisone use   - Monitor CBC    Hyperlipidemia   - Atorvastatin    Hypothyroid   - Synthroid      HTN  - Amlodipine    # Back pain  - likely due to chronic compression deformities in Lspine  - Tylenol  - PT Eval     # DVT prophylaxis: lovenox 87 y/o female with PMHx of asthma (moderate persistent), bronchiectasis,  aortic valve sclerosis, essential HTN, hypothyroidism, loop recorder, CVA (left thalamic infarct), and MDS presents to the ED for evaluation for sob    Acute on chronic  hypoxemic Respiratory Failure.secondary to  Bronchiectasis. XR findings concerning for PNA   -Pulm consulted  -now on baseline 3 liters   - on Solumedrol 40mg IVP q 8 hours and  Spiriva   - Symbicort   - Albuterol   - Montelukast   - Azithromycin 500 IVPD Daily + rocephin   - Pulmonary Consult-Dr Lagos   - check procalc, probnp , echo , Sputum Culture and CT    Leukocytosis Secondary to MDS and Prednisone use   - Monitor CBC    Hyperlipidemia   - Atorvastatin    Hypothyroid   - Synthroid      HTN  - Amlodipine    # Back pain  - likely due to chronic compression deformities in Lspine  - Tylenol  - PT Eval     # DVT prophylaxis: lovenox    dispo: pending PT consult will need walk test to ensure patient is on baseline requirements

## 2021-12-10 NOTE — CONSULT NOTE ADULT - SUBJECTIVE AND OBJECTIVE BOX
HPI:  87 y/o female with PMHx of asthma (moderate persistent), bronchiectasis,  severe, COPD, aortic valve sclerosis, essential HTN, hypothyroidism, loop recorder, CVA (left thalamic infarct), and MDS presents to the ED for evaluation for sob. As per patient she has been noticing more sob and cough, minimal sputum production without fever x 2 weeks. She denies chest pain with this. Also, as per the daughter the patient has been complaining of mid back pain, no alleviating or aggravating factors. No recent fall as per patient.  (09 Dec 2021 18:48).    h.o. GIB 2021.    12/10: in bed, on NC O2, is tachypneic.       PAST MEDICAL & SURGICAL HISTORY:  Rib fractures    Essential hypertension    Hypothyroidism, unspecified type    Asthma, unspecified asthma severity, unspecified whether complicated, unspecified whether persistent    MDS (myelodysplastic syndrome)    Cerebrovascular accident (CVA)  19 Left thalamic infarct    No significant past surgical history        MEDICATIONS  (STANDING):  amLODIPine   Tablet 10 milliGRAM(s) Oral daily  aspirin 325 milliGRAM(s) Oral daily  atorvastatin 40 milliGRAM(s) Oral at bedtime  azithromycin  IVPB 500 milliGRAM(s) IV Intermittent daily  budesonide 160 MICROgram(s)/formoterol 4.5 MICROgram(s) Inhaler 2 Puff(s) Inhalation two times a day  cefTRIAXone   IVPB 1000 milliGRAM(s) IV Intermittent every 24 hours  enoxaparin Injectable 40 milliGRAM(s) SubCutaneous daily  furosemide    Tablet 20 milliGRAM(s) Oral daily  levothyroxine 100 MICROGram(s) Oral daily  methylPREDNISolone sodium succinate Injectable 40 milliGRAM(s) IV Push every 8 hours  montelukast 10 milliGRAM(s) Oral daily  pantoprazole    Tablet 40 milliGRAM(s) Oral daily  tiotropium 18 MICROgram(s) Capsule 1 Capsule(s) Inhalation daily    MEDICATIONS  (PRN):  acetaminophen     Tablet .. 650 milliGRAM(s) Oral every 6 hours PRN Mild Pain (1 - 3)  ALBUTerol    90 MICROgram(s) HFA Inhaler 2 Puff(s) Inhalation every 6 hours PRN Bronchospasm  aluminum hydroxide/magnesium hydroxide/simethicone Suspension 30 milliLiter(s) Oral every 4 hours PRN Dyspepsia  melatonin 3 milliGRAM(s) Oral at bedtime PRN Insomnia  ondansetron Injectable 4 milliGRAM(s) IV Push every 8 hours PRN Nausea and/or Vomiting      Allergies    No Known Allergies    Intolerances        SOCIAL HISTORY: Denies tobacco, etoh abuse or illicit drug use    FAMILY HISTORY:  FH: diabetes mellitus (Sibling)    FH: HTN (hypertension)        Vital Signs Last 24 Hrs  T(C): 36.3 (10 Dec 2021 08:19), Max: 36.9 (09 Dec 2021 15:23)  T(F): 97.3 (10 Dec 2021 08:19), Max: 98.5 (09 Dec 2021 15:23)  HR: 85 (10 Dec 2021 08:19) (81 - 88)  BP: 155/69 (10 Dec 2021 08:19) (154/59 - 177/58)  BP(mean): 97 (09 Dec 2021 19:41) (97 - 98)  RR: 17 (10 Dec 2021 08:19) (17 - 20)  SpO2: 98% (10 Dec 2021 08:19) (95% - 99%)    REVIEW OF SYSTEMS:    CONSTITUTIONAL:  As per HPI.  HEENT:  Eyes:  No diplopia or blurred vision. ENT:  No earache, sore throat or runny nose.  CARDIOVASCULAR:  No pressure, squeezing, tightness, heaviness or aching about the chest, neck, axilla or epigastrium.  RESPIRATORY:  see above.  GASTROINTESTINAL:  No nausea, vomiting or diarrhea.  GENITOURINARY:  No dysuria, frequency or urgency.  MUSCULOSKELETAL:  As per HPI.  SKIN:  No change in skin, hair or nails.  NEUROLOGIC:  No paresthesias, fasciculations, seizures or weakness.  PSYCHIATRIC:  No disorder of thought or mood.  ENDOCRINE:  No heat or cold intolerance, polyuria or polydipsia.  HEMATOLOGICAL:  No easy bruising or bleedings:  .     PHYSICAL EXAMINATION:    GENERAL APPEARANCE:  Pt. is tachypneic.  HEENT:  Pupils are normal and react normally. No icterus. Mucous membranes well colored.  NECK:  Supple. No lymphadenopathy. Jugular venous pressure not elevated. Carotids equal.   HEART:   The cardiac impulse has a normal quality. Regular. Normal S1 and S2. HR 86.  CHEST:  Crackles bilat, especially in bases.   ABDOMEN:  Soft and nontender.   EXTREMITIES:  There is no cyanosis, clubbing or edema.   SKIN:  No rash or significant lesions are noted.  Neuro: Alert, awake, and O x 3.      LABS:                        10.6   20.84 )-----------( 561      ( 10 Dec 2021 07:26 )             39.9     12-10    138  |  103  |  24<H>  ----------------------------<  128<H>  4.1   |  32<H>  |  0.70    Ca    8.5      10 Dec 2021 07:26    TPro  6.9  /  Alb  2.4<L>  /  TBili  0.3  /  DBili  x   /  AST  13<L>  /  ALT  10<L>  /  AlkPhos  186<H>  12-10    LIVER FUNCTIONS - ( 10 Dec 2021 07:26 )  Alb: 2.4 g/dL / Pro: 6.9 gm/dL / ALK PHOS: 186 U/L / ALT: 10 U/L / AST: 13 U/L / GGT: x           PT/INR - ( 09 Dec 2021 10:55 )   PT: 14.6 sec;   INR: 1.26 ratio         PTT - ( 09 Dec 2021 10:55 )  PTT:33.5 sec      Urinalysis Basic - ( 09 Dec 2021 11:44 )    Color: Yellow / Appearance: Slightly Turbid / S.020 / pH: x  Gluc: x / Ketone: Trace  / Bili: Negative / Urobili: Negative mg/dL   Blood: x / Protein: 100 mg/dL / Nitrite: Negative   Leuk Esterase: Small / RBC: 0-2 /HPF / WBC 3-5   Sq Epi: x / Non Sq Epi: Negative / Bacteria: Moderate          RADIOLOGY & ADDITIONAL STUDIES:       ACC: [id number] EXAM:  CT ABDOMEN AND PELVIS IC                          PROCEDURE DATE:  2021          INTERPRETATION:  CLINICAL INFORMATION: Abdominal pain.    COMPARISON: None.    CONTRAST/COMPLICATIONS:  IV Contrast: Omnipaque 350  90 cc administered   10 cc discarded  Oral Contrast: NONE  Complications: None reported at time of study completion    PROCEDURE:  CT of the Abdomen and Pelvis was performed.  Sagittal and coronal reformats were performed.    FINDINGS:  LOWER CHEST: Aortic valvular and coronary artery calcifications. Right   lower lobe atelectasis and bronchiectasis are unchanged since 2020   chest CT. Areas of peripheral small airways impaction again noted in the   lower lobes.    LIVER: Within normal limits.  BILE DUCTS: Normal caliber.  GALLBLADDER: Within normal limits.  SPLEEN: Within normal limits..  PANCREAS: Within normal limits.  ADRENALS: Within normal limits.  KIDNEYS/URETERS: Bilateral renal cysts and subcentimeter hypodense foci   that are too small to characterize. No hydronephrosis.    BLADDER: Within normal limits.  REPRODUCTIVE ORGANS: Hysterectomy.    BOWEL: Extensive colonic diverticulosis without evidence of   diverticulitis. Moderate size hiatal hernia. No bowel obstruction.   Appendix isnormal.  PERITONEUM: No ascites or pneumoperitoneum.  VESSELS: Within normal limits.  RETROPERITONEUM/LYMPH NODES: No lymphadenopathy.  ABDOMINAL WALL: Within normal limits.  BONES: Degenerative changes. Old right inferior and superior pubic rami   fractures. Multiple compression deformities in the lumbar spine, most   severe at L4, which is unchanged since CT spine and pelvis 2020.    IMPRESSION:  No acute findings to explain the patient's abdominal pain.     HPI:  89 y/o female with PMHx of asthma (moderate persistent), bronchiectasis,  severe, COPD, aortic valve sclerosis, essential HTN, hypothyroidism, loop recorder, CVA (left thalamic infarct), and MDS presents to the ED for evaluation for sob. As per patient she has been noticing more sob and cough, minimal sputum production without fever x 2 weeks. She denies chest pain with this. Also, as per the daughter the patient has been complaining of mid back pain, no alleviating or aggravating factors. No recent fall as per patient.  (09 Dec 2021 18:48).    Pt on chronic home NC O2, 3 L/min, low dose prednisone.     h.o. GIB 2021.    12/10: in bed, on NC O2, is tachypneic.       PAST MEDICAL & SURGICAL HISTORY:  Rib fractures    Essential hypertension    Hypothyroidism, unspecified type    Asthma, unspecified asthma severity, unspecified whether complicated, unspecified whether persistent    MDS (myelodysplastic syndrome)    Cerebrovascular accident (CVA)  19 Left thalamic infarct    No significant past surgical history        MEDICATIONS  (STANDING):  amLODIPine   Tablet 10 milliGRAM(s) Oral daily  aspirin 325 milliGRAM(s) Oral daily  atorvastatin 40 milliGRAM(s) Oral at bedtime  azithromycin  IVPB 500 milliGRAM(s) IV Intermittent daily  budesonide 160 MICROgram(s)/formoterol 4.5 MICROgram(s) Inhaler 2 Puff(s) Inhalation two times a day  cefTRIAXone   IVPB 1000 milliGRAM(s) IV Intermittent every 24 hours  enoxaparin Injectable 40 milliGRAM(s) SubCutaneous daily  furosemide    Tablet 20 milliGRAM(s) Oral daily  levothyroxine 100 MICROGram(s) Oral daily  methylPREDNISolone sodium succinate Injectable 40 milliGRAM(s) IV Push every 8 hours  montelukast 10 milliGRAM(s) Oral daily  pantoprazole    Tablet 40 milliGRAM(s) Oral daily  tiotropium 18 MICROgram(s) Capsule 1 Capsule(s) Inhalation daily    MEDICATIONS  (PRN):  acetaminophen     Tablet .. 650 milliGRAM(s) Oral every 6 hours PRN Mild Pain (1 - 3)  ALBUTerol    90 MICROgram(s) HFA Inhaler 2 Puff(s) Inhalation every 6 hours PRN Bronchospasm  aluminum hydroxide/magnesium hydroxide/simethicone Suspension 30 milliLiter(s) Oral every 4 hours PRN Dyspepsia  melatonin 3 milliGRAM(s) Oral at bedtime PRN Insomnia  ondansetron Injectable 4 milliGRAM(s) IV Push every 8 hours PRN Nausea and/or Vomiting      Allergies    No Known Allergies    Intolerances        SOCIAL HISTORY: Denies tobacco, etoh abuse or illicit drug use    FAMILY HISTORY:  FH: diabetes mellitus (Sibling)    FH: HTN (hypertension)        Vital Signs Last 24 Hrs  T(C): 36.3 (10 Dec 2021 08:19), Max: 36.9 (09 Dec 2021 15:23)  T(F): 97.3 (10 Dec 2021 08:19), Max: 98.5 (09 Dec 2021 15:23)  HR: 85 (10 Dec 2021 08:19) (81 - 88)  BP: 155/69 (10 Dec 2021 08:19) (154/59 - 177/58)  BP(mean): 97 (09 Dec 2021 19:41) (97 - 98)  RR: 17 (10 Dec 2021 08:19) (17 - 20)  SpO2: 98% (10 Dec 2021 08:19) (95% - 99%)    REVIEW OF SYSTEMS:    CONSTITUTIONAL:  As per HPI.  HEENT:  Eyes:  No diplopia or blurred vision. ENT:  No earache, sore throat or runny nose.  CARDIOVASCULAR:  No pressure, squeezing, tightness, heaviness or aching about the chest, neck, axilla or epigastrium.  RESPIRATORY:  see above.  GASTROINTESTINAL:  No nausea, vomiting or diarrhea.  GENITOURINARY:  No dysuria, frequency or urgency.  MUSCULOSKELETAL:  As per HPI.  SKIN:  No change in skin, hair or nails.  NEUROLOGIC:  No paresthesias, fasciculations, seizures or weakness.  PSYCHIATRIC:  No disorder of thought or mood.  ENDOCRINE:  No heat or cold intolerance, polyuria or polydipsia.  HEMATOLOGICAL:  No easy bruising or bleedings:  .     PHYSICAL EXAMINATION:    GENERAL APPEARANCE:  Pt. is tachypneic.  HEENT:  Pupils are normal and react normally. No icterus. Mucous membranes well colored.  NECK:  Supple. No lymphadenopathy. Jugular venous pressure not elevated. Carotids equal.   HEART:   The cardiac impulse has a normal quality. Regular. Normal S1 and S2. HR 86.  CHEST:  Crackles bilat, especially in bases.   ABDOMEN:  Soft and nontender.   EXTREMITIES:  There is no cyanosis, clubbing or edema.   SKIN:  No rash or significant lesions are noted.  Neuro: Alert, awake, and O x 3.      LABS:                        10.6   20.84 )-----------( 561      ( 10 Dec 2021 07:26 )             39.9     12-10    138  |  103  |  24<H>  ----------------------------<  128<H>  4.1   |  32<H>  |  0.70    Ca    8.5      10 Dec 2021 07:26    TPro  6.9  /  Alb  2.4<L>  /  TBili  0.3  /  DBili  x   /  AST  13<L>  /  ALT  10<L>  /  AlkPhos  186<H>  1210    LIVER FUNCTIONS - ( 10 Dec 2021 07:26 )  Alb: 2.4 g/dL / Pro: 6.9 gm/dL / ALK PHOS: 186 U/L / ALT: 10 U/L / AST: 13 U/L / GGT: x           PT/INR - ( 09 Dec 2021 10:55 )   PT: 14.6 sec;   INR: 1.26 ratio         PTT - ( 09 Dec 2021 10:55 )  PTT:33.5 sec      Urinalysis Basic - ( 09 Dec 2021 11:44 )    Color: Yellow / Appearance: Slightly Turbid / S.020 / pH: x  Gluc: x / Ketone: Trace  / Bili: Negative / Urobili: Negative mg/dL   Blood: x / Protein: 100 mg/dL / Nitrite: Negative   Leuk Esterase: Small / RBC: 0-2 /HPF / WBC 3-5   Sq Epi: x / Non Sq Epi: Negative / Bacteria: Moderate          RADIOLOGY & ADDITIONAL STUDIES:       ACC: [id number] EXAM:  CT ABDOMEN AND PELVIS IC                          PROCEDURE DATE:  2021          INTERPRETATION:  CLINICAL INFORMATION: Abdominal pain.    COMPARISON: None.    CONTRAST/COMPLICATIONS:  IV Contrast: Omnipaque 350  90 cc administered   10 cc discarded  Oral Contrast: NONE  Complications: None reported at time of study completion    PROCEDURE:  CT of the Abdomen and Pelvis was performed.  Sagittal and coronal reformats were performed.    FINDINGS:  LOWER CHEST: Aortic valvular and coronary artery calcifications. Right   lower lobe atelectasis and bronchiectasis are unchanged since 2020   chest CT. Areas of peripheral small airways impaction again noted in the   lower lobes.    LIVER: Within normal limits.  BILE DUCTS: Normal caliber.  GALLBLADDER: Within normal limits.  SPLEEN: Within normal limits..  PANCREAS: Within normal limits.  ADRENALS: Within normal limits.  KIDNEYS/URETERS: Bilateral renal cysts and subcentimeter hypodense foci   that are too small to characterize. No hydronephrosis.    BLADDER: Within normal limits.  REPRODUCTIVE ORGANS: Hysterectomy.    BOWEL: Extensive colonic diverticulosis without evidence of   diverticulitis. Moderate size hiatal hernia. No bowel obstruction.   Appendix isnormal.  PERITONEUM: No ascites or pneumoperitoneum.  VESSELS: Within normal limits.  RETROPERITONEUM/LYMPH NODES: No lymphadenopathy.  ABDOMINAL WALL: Within normal limits.  BONES: Degenerative changes. Old right inferior and superior pubic rami   fractures. Multiple compression deformities in the lumbar spine, most   severe at L4, which is unchanged since CT spine and pelvis 2020.    IMPRESSION:  No acute findings to explain the patient's abdominal pain.

## 2021-12-11 NOTE — PROGRESS NOTE ADULT - SUBJECTIVE AND OBJECTIVE BOX
Subjective:    pat better, lying in the bed, no respiratory distress.    Home Medications:  amLODIPine 10 mg oral tablet: 1 tab(s) orally once a day (2020 19:56)  aspirin 325 mg oral tablet: 1 tab(s) orally once a day (2020 19:56)  atorvastatin 40 mg oral tablet: 1 tab(s) orally once a day (at bedtime) (2020 19:56)  azithromycin 250 mg oral tablet: 1 tab(s) orally 3 times a week (2020 19:56)  Brovana 15 mcg/2 mL inhalation solution: 2 milliliter(s) inhaled 2 times a day (2020 19:56)  budesonide 0.5 mg/2 mL inhalation suspension: 2 milliliter(s) inhaled 2 times a day (2020 19:56)  Incruse Ellipta 62.5 mcg/inh inhalation powder: 1 puff(s) inhaled once a day (2020 19:56)  levothyroxine 100 mcg (0.1 mg) oral tablet: 1 tab(s) orally once a day (2020 19:56)  montelukast 10 mg oral tablet: 1 tab(s) orally once a day (2020 19:56)  ProAir HFA 90 mcg/inh inhalation aerosol: 2 puff(s) inhaled every 6 hours, As Needed (2020 19:56)    MEDICATIONS  (STANDING):  amLODIPine   Tablet 10 milliGRAM(s) Oral daily  aspirin 325 milliGRAM(s) Oral daily  atorvastatin 40 milliGRAM(s) Oral at bedtime  azithromycin  IVPB 500 milliGRAM(s) IV Intermittent daily  budesonide 160 MICROgram(s)/formoterol 4.5 MICROgram(s) Inhaler 2 Puff(s) Inhalation two times a day  cefTRIAXone   IVPB 1000 milliGRAM(s) IV Intermittent every 24 hours  enoxaparin Injectable 40 milliGRAM(s) SubCutaneous daily  furosemide    Tablet 20 milliGRAM(s) Oral daily  levothyroxine 100 MICROGram(s) Oral daily  methylPREDNISolone sodium succinate Injectable 40 milliGRAM(s) IV Push every 12 hours  montelukast 10 milliGRAM(s) Oral daily  pantoprazole    Tablet 40 milliGRAM(s) Oral daily  tiotropium 18 MICROgram(s) Capsule 1 Capsule(s) Inhalation daily    MEDICATIONS  (PRN):  acetaminophen     Tablet .. 650 milliGRAM(s) Oral every 6 hours PRN Mild Pain (1 - 3)  ALBUTerol    90 MICROgram(s) HFA Inhaler 2 Puff(s) Inhalation every 6 hours PRN Bronchospasm  aluminum hydroxide/magnesium hydroxide/simethicone Suspension 30 milliLiter(s) Oral every 4 hours PRN Dyspepsia  melatonin 3 milliGRAM(s) Oral at bedtime PRN Insomnia  ondansetron Injectable 4 milliGRAM(s) IV Push every 8 hours PRN Nausea and/or Vomiting      Allergies    No Known Allergies    Intolerances        Vital Signs Last 24 Hrs  T(C): 36.5 (11 Dec 2021 08:47), Max: 36.9 (10 Dec 2021 16:18)  T(F): 97.7 (11 Dec 2021 08:47), Max: 98.4 (10 Dec 2021 16:18)  HR: 101 (11 Dec 2021 08:47) (87 - 101)  BP: 178/92 (11 Dec 2021 08:47) (153/66 - 178/92)  BP(mean): --  RR: 17 (11 Dec 2021 08:47) (17 - 18)  SpO2: 95% (11 Dec 2021 08:47) (94% - 97%)      PHYSICAL EXAMINATION:    NECK:  Supple. No lymphadenopathy. Jugular venous pressure not elevated. Carotids equal.   HEART:   The cardiac impulse has a normal quality. Reg., Nl S1 and S2.  There are no murmurs, rubs or gallops noted  CHEST:  Chest crackles to auscultation. Normal respiratory effort.  ABDOMEN:  Soft and nontender.   EXTREMITIES:  There is no edema.       LABS:                        10.1   23.50 )-----------( 645      ( 11 Dec 2021 07:48 )             36.8     12-11    143  |  106  |  40<H>  ----------------------------<  112<H>  3.7   |  32<H>  |  0.82    Ca    8.5      11 Dec 2021 07:48  Phos  3.0     12-11  Mg     2.3     12-11    TPro  6.9  /  Alb  2.4<L>  /  TBili  0.3  /  DBili  x   /  AST  13<L>  /  ALT  10<L>  /  AlkPhos  186<H>  12-10      Urinalysis Basic - ( 09 Dec 2021 11:44 )    Color: Yellow / Appearance: Slightly Turbid / S.020 / pH: x  Gluc: x / Ketone: Trace  / Bili: Negative / Urobili: Negative mg/dL   Blood: x / Protein: 100 mg/dL / Nitrite: Negative   Leuk Esterase: Small / RBC: 0-2 /HPF / WBC 3-5   Sq Epi: x / Non Sq Epi: Negative / Bacteria: Moderate

## 2021-12-11 NOTE — PROGRESS NOTE ADULT - ASSESSMENT
PROBLEMS:    Pneumonia  COPD/asthma  Bronchiectasis  h.o. CVA  h.o. myeloproliferative disorder   h.o. aortic sclerosis    PLAN;    IV solu-medrols 40mg q12hr  iv abx  aerosols  chest PT  supportive care  DVT prophylasix

## 2021-12-11 NOTE — PROGRESS NOTE ADULT - SUBJECTIVE AND OBJECTIVE BOX
89 y/o female with PMHx of asthma (moderate persistent), bronchiectasis,  aortic valve sclerosis, essential HTN, hypothyroidism, loop recorder, CVA (left thalamic infarct), and MDS presents to the ED for evaluation for sob; found to have Pna and bronchospasm     12.11: less dypsnea            REVIEW OF SYSTEMS:    CONSTITUTIONAL: No weakness, No fevers or chills  ENT: No ear ache, No sorethroat  NECK: No pain, No stiffness  RESPIRATORY: No cough, No wheezing, No hemoptysis; No dyspnea  CARDIOVASCULAR: No chest pain, No palpitations  GASTROINTESTINAL: No abd pain, No nausea, No vomiting, No hematemesis, No diarrhea or constipation. No melena, No hematochezia.  GENITOURINARY: No dysuria, No  hematuria  NEUROLOGICAL: No diplopia, No paresthesia, No motor dysfunction  MUSCULOSKELETAL: No arthralgia, No myalgia  SKIN: No rashes, or lesions   PSYCH: no anxiety, no suicidal ideation    All other review of systems is negative unless indicated above    Vital Signs Last 24 Hrs  T(C): 36.7 (11 Dec 2021 15:32), Max: 36.9 (10 Dec 2021 23:36)  T(F): 98.1 (11 Dec 2021 15:32), Max: 98.4 (10 Dec 2021 23:36)  HR: 86 (11 Dec 2021 15:32) (86 - 101)  BP: 152/68 (11 Dec 2021 15:32) (152/68 - 178/92)  BP(mean): --  RR: 18 (11 Dec 2021 15:32) (17 - 18)  SpO2: 99% (11 Dec 2021 15:32) (94% - 99%)    PHYSICAL EXAM:    GENERAL: NAD  HEENT:  NC/AT, EOMI, PERRLA, No scleral icterus, Moist mucous membranes  NECK: Supple, No JVD  CNS:  Alert & Oriented X3, Motor Strength 5/5 B/L upper and lower extremities; DTRs 2+ intact   LUNG: Normal Breath sounds, Clear to auscultation bilaterally, No rales, No rhonchi, No wheezing  HEART: RRR; No murmurs, No rubs  ABDOMEN: +BS, ST/ND/NT  GENITOURINARY: Voiding, Bladder not distended  EXTREMITIES:  2+ Peripheral Pulses, No clubbing, No cyanosis, No tibial edema  MUSCULOSKELTAL: Joints normal ROM, No TTP, No effusion  VAGINAL: deferred  SKIN: no rashes  RECTAL: deferred, not indicated  BREAST: deferred                          10.1   23.50 )-----------( 645      ( 11 Dec 2021 07:48 )             36.8     12-11    143  |  106  |  40<H>  ----------------------------<  112<H>  3.7   |  32<H>  |  0.82    Ca    8.5      11 Dec 2021 07:48  Phos  3.0     12-11  Mg     2.3     12-11    TPro  6.9  /  Alb  2.4<L>  /  TBili  0.3  /  DBili  x   /  AST  13<L>  /  ALT  10<L>  /  AlkPhos  186<H>  12-10    Vancomycin levels:   Cultures:     MEDICATIONS  (STANDING):  amLODIPine   Tablet 10 milliGRAM(s) Oral daily  aspirin 325 milliGRAM(s) Oral daily  atorvastatin 40 milliGRAM(s) Oral at bedtime  azithromycin  IVPB 500 milliGRAM(s) IV Intermittent daily  budesonide 160 MICROgram(s)/formoterol 4.5 MICROgram(s) Inhaler 2 Puff(s) Inhalation two times a day  cefTRIAXone   IVPB 1000 milliGRAM(s) IV Intermittent every 24 hours  enoxaparin Injectable 40 milliGRAM(s) SubCutaneous daily  furosemide    Tablet 20 milliGRAM(s) Oral daily  levothyroxine 100 MICROGram(s) Oral daily  methylPREDNISolone sodium succinate Injectable 40 milliGRAM(s) IV Push every 12 hours  montelukast 10 milliGRAM(s) Oral daily  pantoprazole    Tablet 40 milliGRAM(s) Oral daily  tiotropium 18 MICROgram(s) Capsule 1 Capsule(s) Inhalation daily    MEDICATIONS  (PRN):  acetaminophen     Tablet .. 650 milliGRAM(s) Oral every 6 hours PRN Mild Pain (1 - 3)  ALBUTerol    90 MICROgram(s) HFA Inhaler 2 Puff(s) Inhalation every 6 hours PRN Bronchospasm  aluminum hydroxide/magnesium hydroxide/simethicone Suspension 30 milliLiter(s) Oral every 4 hours PRN Dyspepsia  melatonin 3 milliGRAM(s) Oral at bedtime PRN Insomnia  ondansetron Injectable 4 milliGRAM(s) IV Push every 8 hours PRN Nausea and/or Vomiting      all labs reviewed  all imaging reviewed          Assessment and Plan:   	  89 y/o female with PMHx of asthma (moderate persistent), bronchiectasis,  aortic valve sclerosis, essential HTN, hypothyroidism, loop recorder, CVA (left thalamic infarct), and MDS presents to the ED for evaluation for sob    Acute on chronic  hypoxemic Respiratory Failure.secondary to  Bronchiectasis and Pneumonia   -Pulm consulted  -now on baseline 3 liters   - on Solumedrol 40mg IVP q12 hours and  Spiriva   - Symbicort   - Albuterol   - Montelukast  Ceftriaxone/Zithromax for CAP day#1    Leukocytosis Secondary to Prednisone use   - Monitor CBC    Hyperlipidemia   - Atorvastatin    Hypothyroid   - Synthroid      HTN  - Amlodipine    # Back pain  - likely due to chronic compression deformities in Lspine  - Tylenol  - PT Eval     # DVT prophylaxis: lovenox    dispo: pending PT consult will need walk test to ensure patient is on baseline requirements

## 2021-12-11 NOTE — PROVIDER CONTACT NOTE (OTHER) - SITUATION
Bronchiectasis  service jamison - Susan
Pt's /95, asymptomatic.
notified Dr Yousif's office of admission please fax over discharged paperwork once patient is discharged to [phone]
Bessy Davis at answering service of consult

## 2021-12-12 NOTE — PROGRESS NOTE ADULT - ASSESSMENT
PROBLEMS:    Pneumonia  COPD/asthma  Bronchiectasis  h.o. CVA  h.o. myeloproliferative disorder   h.o. aortic sclerosis    PLAN;    pulmonary better  high wbc may due to steroids  taper IV solu-medrols 40mg q12hr  iv abx  aerosols  chest PT  supportive care  DVT prophylasix

## 2021-12-12 NOTE — PROGRESS NOTE ADULT - SUBJECTIVE AND OBJECTIVE BOX
87 y/o female with PMHx of asthma (moderate persistent), bronchiectasis,  aortic valve sclerosis, essential HTN, hypothyroidism, loop recorder, CVA (left thalamic infarct), and MDS presents to the ED for evaluation for sob; found to have Pna and bronchospasm     12.11: less dypsnea  12.12: states no dyspnea, less coughing             REVIEW OF SYSTEMS:    CONSTITUTIONAL: No weakness, No fevers or chills  ENT: No ear ache, No sorethroat  NECK: No pain, No stiffness  RESPIRATORY: No cough, No wheezing, No hemoptysis; No dyspnea  CARDIOVASCULAR: No chest pain, No palpitations  GASTROINTESTINAL: No abd pain, No nausea, No vomiting, No hematemesis, No diarrhea or constipation. No melena, No hematochezia.  GENITOURINARY: No dysuria, No  hematuria  NEUROLOGICAL: No diplopia, No paresthesia, No motor dysfunction  MUSCULOSKELETAL: No arthralgia, No myalgia  SKIN: No rashes, or lesions   PSYCH: no anxiety, no suicidal ideation    All other review of systems is negative unless indicated above    Vital Signs Last 24 Hrs  T(C): 36.9 (12 Dec 2021 09:29), Max: 36.9 (12 Dec 2021 09:29)  T(F): 98.4 (12 Dec 2021 09:29), Max: 98.4 (12 Dec 2021 09:29)  HR: 80 (12 Dec 2021 09:29) (80 - 86)  BP: 149/63 (12 Dec 2021 09:29) (149/63 - 152/68)  BP(mean): 87 (12 Dec 2021 01:37) (87 - 87)  RR: 17 (12 Dec 2021 09:29) (16 - 18)  SpO2: 99% (12 Dec 2021 09:29) (95% - 100%)    PHYSICAL EXAM:    GENERAL: NAD  HEENT:  NC/AT, EOMI, PERRLA, No scleral icterus, Moist mucous membranes  NECK: Supple, No JVD  CNS:  Alert & Oriented X3, Motor Strength 5/5 B/L upper and lower extremities; DTRs 2+ intact   LUNG: decreased BS, bilat crackles   HEART: RRR; No murmurs, No rubs  ABDOMEN: +BS, ST/ND/NT  GENITOURINARY: Voiding, Bladder not distended  EXTREMITIES:  2+ Peripheral Pulses, No clubbing, No cyanosis, No tibial edema  MUSCULOSKELTAL: Joints normal ROM, No TTP, No effusion  VAGINAL: deferred  SKIN: no rashes  RECTAL: deferred, not indicated  BREAST: deferred               Labs:                        10.1   23.50 )-----------( 645      ( 11 Dec 2021 07:48 )             36.8     12-11    143  |  106  |  40<H>  ----------------------------<  112<H>  3.7   |  32<H>  |  0.82    Ca    8.5      11 Dec 2021 07:48  Phos  3.0     12-11  Mg     2.3     12-11             Cultures:     MEDICATIONS  (STANDING):  amLODIPine   Tablet 10 milliGRAM(s) Oral daily  aspirin 325 milliGRAM(s) Oral daily  atorvastatin 40 milliGRAM(s) Oral at bedtime  azithromycin  IVPB 500 milliGRAM(s) IV Intermittent daily  budesonide 160 MICROgram(s)/formoterol 4.5 MICROgram(s) Inhaler 2 Puff(s) Inhalation two times a day  cefTRIAXone   IVPB 1000 milliGRAM(s) IV Intermittent every 24 hours  enoxaparin Injectable 40 milliGRAM(s) SubCutaneous daily  furosemide    Tablet 20 milliGRAM(s) Oral daily  levothyroxine 100 MICROGram(s) Oral daily  methylPREDNISolone sodium succinate Injectable 40 milliGRAM(s) IV Push every 12 hours  montelukast 10 milliGRAM(s) Oral daily  pantoprazole    Tablet 40 milliGRAM(s) Oral daily  tiotropium 18 MICROgram(s) Capsule 1 Capsule(s) Inhalation daily    MEDICATIONS  (PRN):  acetaminophen     Tablet .. 650 milliGRAM(s) Oral every 6 hours PRN Mild Pain (1 - 3)  ALBUTerol    90 MICROgram(s) HFA Inhaler 2 Puff(s) Inhalation every 6 hours PRN Bronchospasm  aluminum hydroxide/magnesium hydroxide/simethicone Suspension 30 milliLiter(s) Oral every 4 hours PRN Dyspepsia  melatonin 3 milliGRAM(s) Oral at bedtime PRN Insomnia  ondansetron Injectable 4 milliGRAM(s) IV Push every 8 hours PRN Nausea and/or Vomiting      all labs reviewed  all imaging reviewed          Assessment and Plan:   	  87 y/o female with PMHx of asthma (moderate persistent), bronchiectasis,  aortic valve sclerosis, essential HTN, hypothyroidism, loop recorder, CVA (left thalamic infarct), and MDS presents to the ED for evaluation for sob    Acute on chronic  hypoxemic Respiratory Failure.secondary to  Bronchiectasis and Pneumonia   -Pulm consulted  -now on baseline 3 liters   - on Solumedrol 40mg IVP q12 hours and  Spiriva   - Symbicort   - Albuterol   - Montelukast  Ceftriaxone/Zithromax for CAP day#3    Leukocytosis:  leukemoid reaction due to steroids    Hyperlipidemia   - Atorvastatin    Hypothyroid   - Synthroid      HTN  - Amlodipine    # Back pain  - likely due to chronic compression deformities in Lspine  - Tylenol  - PT Eval     # DVT prophylaxis: lovenox    dispo: pending PT consult will need walk test to ensure patient is on baseline requirements

## 2021-12-12 NOTE — PROGRESS NOTE ADULT - SUBJECTIVE AND OBJECTIVE BOX
Subjective:    pat better, lying in the bed, no respiratyory complaint.    Home Medications:  amLODIPine 10 mg oral tablet: 1 tab(s) orally once a day (24 Jul 2020 19:56)  aspirin 325 mg oral tablet: 1 tab(s) orally once a day (24 Jul 2020 19:56)  atorvastatin 40 mg oral tablet: 1 tab(s) orally once a day (at bedtime) (24 Jul 2020 19:56)  azithromycin 250 mg oral tablet: 1 tab(s) orally 3 times a week (24 Jul 2020 19:56)  Brovana 15 mcg/2 mL inhalation solution: 2 milliliter(s) inhaled 2 times a day (24 Jul 2020 19:56)  budesonide 0.5 mg/2 mL inhalation suspension: 2 milliliter(s) inhaled 2 times a day (24 Jul 2020 19:56)  Incruse Ellipta 62.5 mcg/inh inhalation powder: 1 puff(s) inhaled once a day (24 Jul 2020 19:56)  levothyroxine 100 mcg (0.1 mg) oral tablet: 1 tab(s) orally once a day (24 Jul 2020 19:56)  montelukast 10 mg oral tablet: 1 tab(s) orally once a day (24 Jul 2020 19:56)  ProAir HFA 90 mcg/inh inhalation aerosol: 2 puff(s) inhaled every 6 hours, As Needed (24 Jul 2020 19:56)    MEDICATIONS  (STANDING):  amLODIPine   Tablet 10 milliGRAM(s) Oral daily  aspirin 325 milliGRAM(s) Oral daily  atorvastatin 40 milliGRAM(s) Oral at bedtime  azithromycin  IVPB 500 milliGRAM(s) IV Intermittent daily  budesonide 160 MICROgram(s)/formoterol 4.5 MICROgram(s) Inhaler 2 Puff(s) Inhalation two times a day  cefTRIAXone   IVPB 1000 milliGRAM(s) IV Intermittent every 24 hours  enoxaparin Injectable 40 milliGRAM(s) SubCutaneous daily  furosemide    Tablet 20 milliGRAM(s) Oral daily  levothyroxine 100 MICROGram(s) Oral daily  methylPREDNISolone sodium succinate Injectable 40 milliGRAM(s) IV Push every 12 hours  montelukast 10 milliGRAM(s) Oral daily  pantoprazole    Tablet 40 milliGRAM(s) Oral daily  tiotropium 18 MICROgram(s) Capsule 1 Capsule(s) Inhalation daily    MEDICATIONS  (PRN):  acetaminophen     Tablet .. 650 milliGRAM(s) Oral every 6 hours PRN Mild Pain (1 - 3)  ALBUTerol    90 MICROgram(s) HFA Inhaler 2 Puff(s) Inhalation every 6 hours PRN Bronchospasm  aluminum hydroxide/magnesium hydroxide/simethicone Suspension 30 milliLiter(s) Oral every 4 hours PRN Dyspepsia  melatonin 3 milliGRAM(s) Oral at bedtime PRN Insomnia  ondansetron Injectable 4 milliGRAM(s) IV Push every 8 hours PRN Nausea and/or Vomiting      Allergies    No Known Allergies    Intolerances        Vital Signs Last 24 Hrs  T(C): 36.9 (12 Dec 2021 09:29), Max: 36.9 (12 Dec 2021 09:29)  T(F): 98.4 (12 Dec 2021 09:29), Max: 98.4 (12 Dec 2021 09:29)  HR: 80 (12 Dec 2021 09:29) (80 - 86)  BP: 149/63 (12 Dec 2021 09:29) (149/63 - 152/68)  BP(mean): 87 (12 Dec 2021 01:37) (87 - 87)  RR: 17 (12 Dec 2021 09:29) (16 - 18)  SpO2: 99% (12 Dec 2021 09:29) (95% - 100%)      PHYSICAL EXAMINATION:    NECK:  Supple. No lymphadenopathy. Jugular venous pressure not elevated. Carotids equal.   HEART:   The cardiac impulse has a normal quality. Reg., Nl S1 and S2.  There are no murmurs, rubs or gallops noted  CHEST:  Chest crackles to auscultation. Normal respiratory effort.  ABDOMEN:  Soft and nontender.   EXTREMITIES:  There is no edema.       LABS:                        10.1   23.50 )-----------( 645      ( 11 Dec 2021 07:48 )             36.8     12-11    143  |  106  |  40<H>  ----------------------------<  112<H>  3.7   |  32<H>  |  0.82    Ca    8.5      11 Dec 2021 07:48  Phos  3.0     12-11  Mg     2.3     12-11

## 2021-12-13 NOTE — PROGRESS NOTE ADULT - SUBJECTIVE AND OBJECTIVE BOX
87 y/o female with PMHx of asthma (moderate persistent), bronchiectasis,  aortic valve sclerosis, essential HTN, hypothyroidism, loop recorder, CVA (left thalamic infarct), and MDS presents to the ED for evaluation for sob; found to have Pna and bronchospasm     12.11: less dypsnea  12.12: states no dyspnea, less coughing   12.13: feels well, dyspnea at baseline             REVIEW OF SYSTEMS:    CONSTITUTIONAL: No weakness, No fevers or chills  ENT: No ear ache, No sorethroat  NECK: No pain, No stiffness  RESPIRATORY: No cough, No wheezing, No hemoptysis; No dyspnea  CARDIOVASCULAR: No chest pain, No palpitations  GASTROINTESTINAL: No abd pain, No nausea, No vomiting, No hematemesis, No diarrhea or constipation. No melena, No hematochezia.  GENITOURINARY: No dysuria, No  hematuria  NEUROLOGICAL: No diplopia, No paresthesia, No motor dysfunction  MUSCULOSKELETAL: No arthralgia, No myalgia  SKIN: No rashes, or lesions   PSYCH: no anxiety, no suicidal ideation    All other review of systems is negative unless indicated above    Vital Signs Last 24 Hrs  T(C): 36.8 (13 Dec 2021 15:20), Max: 36.9 (13 Dec 2021 14:11)  T(F): 98.3 (13 Dec 2021 15:20), Max: 98.4 (13 Dec 2021 14:11)  HR: 87 (13 Dec 2021 15:20) (75 - 95)  BP: 138/67 (13 Dec 2021 15:20) (138/67 - 180/76)  RR: 18 (13 Dec 2021 15:20) (17 - 19)  SpO2: 97% (13 Dec 2021 15:20) (96% - 99%)    PHYSICAL EXAM:    GENERAL: NAD  HEENT:  NC/AT, EOMI, PERRLA, No scleral icterus, Moist mucous membranes  NECK: Supple, No JVD  CNS:  Alert & Oriented X3, Motor Strength 5/5 B/L upper and lower extremities; DTRs 2+ intact   LUNG: decreased BS, bilat crackles   HEART: RRR; No murmurs, No rubs  ABDOMEN: +BS, ST/ND/NT  GENITOURINARY: Voiding, Bladder not distended  EXTREMITIES:  2+ Peripheral Pulses, No clubbing, No cyanosis, No tibial edema  MUSCULOSKELTAL: Joints normal ROM, No TTP, No effusion  VAGINAL: deferred  SKIN: no rashes  RECTAL: deferred, not indicated  BREAST: deferred               Labs:                        10.1   23.50 )-----------( 645      ( 11 Dec 2021 07:48 )             36.8     12-11    143  |  106  |  40<H>  ----------------------------<  112<H>  3.7   |  32<H>  |  0.82    Ca    8.5      11 Dec 2021 07:48  Phos  3.0     12-11  Mg     2.3     12-11             MEDICATIONS  (STANDING):  amLODIPine   Tablet 10 milliGRAM(s) Oral daily  aspirin 325 milliGRAM(s) Oral daily  atorvastatin 40 milliGRAM(s) Oral at bedtime  azithromycin  IVPB 500 milliGRAM(s) IV Intermittent daily  budesonide 160 MICROgram(s)/formoterol 4.5 MICROgram(s) Inhaler 2 Puff(s) Inhalation two times a day  cefTRIAXone   IVPB 1000 milliGRAM(s) IV Intermittent every 24 hours  enoxaparin Injectable 40 milliGRAM(s) SubCutaneous daily  furosemide    Tablet 20 milliGRAM(s) Oral daily  levothyroxine 100 MICROGram(s) Oral daily  montelukast 10 milliGRAM(s) Oral daily  pantoprazole    Tablet 40 milliGRAM(s) Oral daily  predniSONE   Tablet 20 milliGRAM(s) Oral two times a day  tiotropium 18 MICROgram(s) Capsule 1 Capsule(s) Inhalation daily        all labs reviewed  all imaging reviewed          Assessment and Plan:   	  87 y/o female with PMHx of asthma (moderate persistent), bronchiectasis,  aortic valve sclerosis, essential HTN, hypothyroidism, loop recorder, CVA (left thalamic infarct), and MDS presents to the ED for evaluation for sob    Acute on chronic  hypoxemic Respiratory Failure.secondary to  Bronchiectasis and Pneumonia   -Pulm consul appreciated   - now at  baseline on 3 liters NC   - Prednisone taper  and  Spiriva   - Symbicort   - Albuterol   - Montelukast  Ceftriaxone/Zithromax for CAP day#4/5    Leukocytosis:  leukemoid reaction due to steroids    Hyperlipidemia   - Atorvastatin    Hypothyroid   - Synthroid      HTN  - Amlodipine    # Back pain  - likely due to chronic compression deformities in Lspine  - Tylenol  - PT Yamilex    #h/o MDSl     # DVT prophylaxis: lovenox    dispo: pending PT consult will need walk test to ensure patient is on baseline requirements

## 2021-12-13 NOTE — PROGRESS NOTE ADULT - SUBJECTIVE AND OBJECTIVE BOX
Subjective:    Awake, alert. Feels better. No dyspnea or chest pain. No sputum.    MEDICATIONS  (STANDING):  amLODIPine   Tablet 10 milliGRAM(s) Oral daily  aspirin 325 milliGRAM(s) Oral daily  atorvastatin 40 milliGRAM(s) Oral at bedtime  azithromycin  IVPB 500 milliGRAM(s) IV Intermittent daily  budesonide 160 MICROgram(s)/formoterol 4.5 MICROgram(s) Inhaler 2 Puff(s) Inhalation two times a day  cefTRIAXone   IVPB 1000 milliGRAM(s) IV Intermittent every 24 hours  enoxaparin Injectable 40 milliGRAM(s) SubCutaneous daily  furosemide    Tablet 20 milliGRAM(s) Oral daily  levothyroxine 100 MICROGram(s) Oral daily  montelukast 10 milliGRAM(s) Oral daily  pantoprazole    Tablet 40 milliGRAM(s) Oral daily  predniSONE   Tablet 20 milliGRAM(s) Oral two times a day  tiotropium 18 MICROgram(s) Capsule 1 Capsule(s) Inhalation daily    MEDICATIONS  (PRN):  acetaminophen     Tablet .. 650 milliGRAM(s) Oral every 6 hours PRN Mild Pain (1 - 3)  ALBUTerol    90 MICROgram(s) HFA Inhaler 2 Puff(s) Inhalation every 6 hours PRN Bronchospasm  aluminum hydroxide/magnesium hydroxide/simethicone Suspension 30 milliLiter(s) Oral every 4 hours PRN Dyspepsia  melatonin 3 milliGRAM(s) Oral at bedtime PRN Insomnia  ondansetron Injectable 4 milliGRAM(s) IV Push every 8 hours PRN Nausea and/or Vomiting      Allergies    No Known Allergies    Intolerances        Vital Signs Last 24 Hrs  T(C): 36.7 (13 Dec 2021 08:01), Max: 36.9 (12 Dec 2021 09:29)  T(F): 98.1 (13 Dec 2021 08:01), Max: 98.4 (12 Dec 2021 09:29)  HR: 81 (13 Dec 2021 08:01) (75 - 95)  BP: 180/71 (13 Dec 2021 08:01) (149/63 - 180/76)  BP(mean): --  RR: 17 (13 Dec 2021 08:01) (17 - 19)  SpO2: 99% (13 Dec 2021 08:01) (96% - 99%)    PHYSICAL EXAMINATION:    NECK:  Supple. No lymphadenopathy. Jugular venous pressure not elevated.  HEART:   The cardiac impulse has a normal quality. Reg., Nl S1 and S2.    CHEST:  Chest with bibasilar crackles. Normal respiratory effort.  ABDOMEN:  Soft and nontender.   EXTREMITIES:  There is no edema.       LABS:                RADIOLOGY & ADDITIONAL TESTS:    Assessment and Recommendation:   · Assessment	  SOB-  BNP level, echocardiogramwith possible diastolic dysfunction.  Net mild diuresis.            Addendum:  CT chest reviewed-changes c/w bronchiectasis; Hyperlucent regions c/w air trapping. ?focal infiltrate.  Continue net diuresis.  Taper steroids- switch to prednisone 20mg BID. Consider oral antibiotics.    COPD, persistent asthma, bronchiectasis.  Continue NC O2,  inhalers, steroids (taper as outpatient).   Pt is on outpatient home O2 3L/min and ongoing low dose prednisone.    h.o. CVA    h.o. myeloproliferative disorder.     h.o. aortic sclerosis.

## 2021-12-13 NOTE — PHYSICAL THERAPY INITIAL EVALUATION ADULT - PLANNED THERAPY INTERVENTIONS, PT EVAL
Increase mobility as tolerated. Eval, transfer, bed mobility x 15'. Pt left OOB in chair with all observation section equipment/material intact and seat/pad alarm intact/activated. Pt with no c/o pain. Will focus on ther ex /ROM, bed mobility training, transfer training, and increase mobility as tolerated. Below for goal purposes./bed mobility training/gait training/ROM/strengthening/transfer training

## 2021-12-13 NOTE — PHYSICAL THERAPY INITIAL EVALUATION ADULT - GAIT PATTERN USED, PT EVAL
Pt required verbal cues to increase posture, to increase step length bilaterally, bilateral foot pronation right>left, and to keep appropriate gait pattern with RW.

## 2021-12-13 NOTE — PROGRESS NOTE ADULT - ASSESSMENT
87 y/o female with PMHx of asthma (moderate persistent), bronchiectasis,  aortic valve sclerosis, essential HTN, hypothyroidism, loop recorder, CVA (left thalamic infarct), and MDS admitted on 12/9 for evaluation of shortness of breath and cough over preceding 2 weeks; the patient history provided by daughter at the bedside who lives with the patient; history per family and medical record.     1. Patient admitted with pneumonia, patient has not been in hospital or other health care facility in past year; will treat as community acquired pneumonia  - follow up cultures   - serial cbc and monitor temperature   - reviewed prior medical records to evaluate for resistant or atypical pathogens   - oxygen and nebs as needed   - iv hydration and supportive care   - day #5 ceftriaxone and zithromax  - will transition to ceftin 250 mg po q 12 hours in am for 5 more days  - discharge planning  - physical therapy as tolerated so that patient is not debilitated  2. other issues: per medicine

## 2021-12-13 NOTE — PROGRESS NOTE ADULT - SUBJECTIVE AND OBJECTIVE BOX
Date of service: 12-13-21 @ 15:39      Patient lying in bed; feeling much improved; cough is better, still on oxygen      ROS: no fever or chills; denies dizziness, no HA,  no abdominal pain, no diarrhea or constipation; no dysuria, no urinary frequency, no legs pain, no rashes    MEDICATIONS  (STANDING):  amLODIPine   Tablet 10 milliGRAM(s) Oral daily  aspirin 325 milliGRAM(s) Oral daily  atorvastatin 40 milliGRAM(s) Oral at bedtime  azithromycin  IVPB 500 milliGRAM(s) IV Intermittent daily  budesonide 160 MICROgram(s)/formoterol 4.5 MICROgram(s) Inhaler 2 Puff(s) Inhalation two times a day  cefTRIAXone   IVPB 1000 milliGRAM(s) IV Intermittent every 24 hours  enoxaparin Injectable 40 milliGRAM(s) SubCutaneous daily  furosemide    Tablet 20 milliGRAM(s) Oral daily  levothyroxine 100 MICROGram(s) Oral daily  montelukast 10 milliGRAM(s) Oral daily  pantoprazole    Tablet 40 milliGRAM(s) Oral daily  predniSONE   Tablet 20 milliGRAM(s) Oral two times a day  tiotropium 18 MICROgram(s) Capsule 1 Capsule(s) Inhalation daily    MEDICATIONS  (PRN):  acetaminophen     Tablet .. 650 milliGRAM(s) Oral every 6 hours PRN Mild Pain (1 - 3)  ALBUTerol    90 MICROgram(s) HFA Inhaler 2 Puff(s) Inhalation every 6 hours PRN Bronchospasm  aluminum hydroxide/magnesium hydroxide/simethicone Suspension 30 milliLiter(s) Oral every 4 hours PRN Dyspepsia  melatonin 3 milliGRAM(s) Oral at bedtime PRN Insomnia  ondansetron Injectable 4 milliGRAM(s) IV Push every 8 hours PRN Nausea and/or Vomiting      Vital Signs Last 24 Hrs  T(C): 36.8 (13 Dec 2021 15:20), Max: 36.9 (13 Dec 2021 14:11)  T(F): 98.3 (13 Dec 2021 15:20), Max: 98.4 (13 Dec 2021 14:11)  HR: 87 (13 Dec 2021 15:20) (75 - 95)  BP: 138/67 (13 Dec 2021 15:20) (138/67 - 180/76)  BP(mean): --  RR: 18 (13 Dec 2021 15:20) (17 - 19)  SpO2: 97% (13 Dec 2021 15:20) (96% - 99%)        Physical Exam:          Constitutional: frail looking  HEENT: NC/AT, EOMI, PERRLA, conjunctivae clear; ears and nose atraumatic; pharynx clear  Neck: supple; thyroid not palpable  Back: no tenderness  Respiratory: respiratory effort normal; clear to auscultation; decreased breath sounds at bases  Cardiovascular: S1S2 regular, no murmurs  Abdomen: soft, not tender, not distended, positive BS; no liver or spleen organomegaly  Genitourinary: no suprapubic tenderness  Musculoskeletal: no muscle tenderness, no joint swelling or tenderness  Neurological/ Psychiatric:  moving all extremities  Skin: no rashes; no palpable lesions    Labs: all available labs reviewed                       Labs:                 Cultures:       Culture - Urine (collected 12-09-21 @ 11:44)  Source: Clean Catch Clean Catch (Midstream)  Final Report (12-12-21 @ 18:02):    >100,000 CFU/ml Klebsiella pneumoniae  Organism: Klebsiella pneumoniae (12-12-21 @ 18:02)  Organism: Klebsiella pneumoniae (12-12-21 @ 18:02)      -  Amikacin: S <=16      -  Amoxicillin/Clavulanic Acid: S <=8/4      -  Ampicillin: R >16 These ampicillin results predict results for amoxicillin      -  Ampicillin/Sulbactam: S 8/4 Enterobacter, Klebsiella aerogenes, Citrobacter, and Serratia may develop resistance during prolonged therapy (3-4 days)      -  Aztreonam: S <=4      -  Cefazolin: S 8 (MIC_CL_[id number]_[id number]) For uncomplicated UTI with K. pneumoniae, E. coli, or P. mirablis: UBALDO <=16 is sensitive and UBALDO >=32 is resistant. This also predicts results for oral agents cefaclor, cefdinir, cefpodoxime, cefprozil, cefuroxime axetil, cephalexin and locarbef for uncomplicated UTI. Note that some isolates may be susceptible to these agents while testing resistant to cefazolin.      -  Cefepime: S <=2      -  Cefoxitin: S <=8      -  Ceftriaxone: S <=1 Enterobacter, Klebsiella aerogenes, Citrobacter, and Serratia may develop resistance during prolonged therapy      -  Ciprofloxacin: S <=0.25      -  Ertapenem: S <=0.5      -  Gentamicin: I 8      -  Imipenem: S <=1      -  Levofloxacin: S <=0.5      -  Meropenem: S <=1      -  Nitrofurantoin: I 64 Should not be used to treat pyelonephritis      -  Piperacillin/Tazobactam: S <=8      -  Tigecycline: S <=2      -  Tobramycin: S 4      -  Trimethoprim/Sulfamethoxazole: R >2/38      Method Type: UBALDO            < from: CT Chest No Cont (12.10.21 @ 10:43) >    ACC: [id number] EXAM:  CT CHEST                          PROCEDURE DATE:  12/10/2021          INTERPRETATION:  CLINICAL INDICATION: Shortness of breath.    Axial CT images of the chest are obtained without intravenous   administration of contrast.    Comparison is made with the prior chest CT of July 24, 2020.    No enlarged axillary, mediastinal or hilar lymph nodes.    Left anterior chest wall subcutaneous tissue cardiac loop recorder.   Vascular calcifications with involvement of the aorta andthe coronary   arteries. Aortic valve calcifications which can be seen in the setting of   aortic valve stenosis. Heart size is enlarged. Minimal pericardial fluid   or pericardial thickening. No pleural effusions.    Evaluation of the upper abdomen demonstrate vicarious excretion of   contrast within the gallbladder. Spleen is enlarged measuring about 14.5   cm as on the prior study. Moderate to large size hiatal hernia. Left   renal cyst. Mild enhancement of the renal parenchyma from the   contrast-enhanced abdominal CT performed on December 9, 2021 suggestive   of renal dysfunction or acute tubular necrosis.    Evaluation of the lungs demonstrate bilateral lower lung areas of linear   or subsegmental atelectasis. Scattered bilateral clusters of tiny foci of   mucoid impacted distal airways are noted as on the prior study. Areas of   scarring within the right middle lobe and lingula with internal   bronchiectasis and foci of calcifications appear unchanged dating back to   February 26, 2018.    Mosaic attenuation of the lungs suggestive of air trapping given the   other findings.    Few ill-defined right upper lobe groundglass nodular opacities largest   measuring about 8 mm, some of which are new since July 24, 2020. Small   few groundglass nodular opacities are also noted within the left upper   lobe.    No central endobronchial lesions.    Degenerative changes of the spine. Spinal scoliosis. Mild loss of height   of a few vertebral bodies appear unchanged.    IMPRESSION: Scattered bilateral nonspecific subcentimeter groundglass   nodular opacities new since July 24, 2020. Differential diagnosis include   but is not limited to foci of infection/inflammation..    Areas of scarring within the lingula and right middle lobe appears   unchanged since February 26, 2018 with scattered foci of impacted distal   airways. Constellation of findings can be seen in the setting of chronic   mycobacterial avium complex infection.    Abnormal retained contrast enhancement of the partially imaged renal   parenchyma from the study performed on December 9, 2021 can be seen in   the setting of renal dysfunction or acute tubular necrosis. Correlation   with creatinine levels are recommended.    < end of copied text >        Radiology: all available radiological tests reviewed    Advanced directives addressed: full resuscitation

## 2021-12-13 NOTE — PHYSICAL THERAPY INITIAL EVALUATION ADULT - ACTIVE RANGE OF MOTION EXAMINATION, REHAB EVAL
Bilateral shoulder flex ~ 90 degrees. Bilateral DF neutral. Noted bilateral foot pronation right>left./Left UE Active ROM was WFL (within functional limits)/Right UE Active ROM was WFL (within functional limits)/Left LE Active ROM was WFL (within functional limits)/Right LE Active ROM was WFL (within functional limits)

## 2021-12-14 NOTE — PROGRESS NOTE ADULT - PROVIDER SPECIALTY LIST ADULT
Hospitalist
Infectious Disease
Internal Medicine
Pulmonology
Hospitalist
Hospitalist
Pulmonology

## 2021-12-14 NOTE — PROGRESS NOTE ADULT - SUBJECTIVE AND OBJECTIVE BOX
Subjective:    Awake, alert. Feels better. No dyspnea or chest pain. No sputum.    PHYSICAL EXAMINATION:  GEN: Elderly female, frail, on oxygen, no distress, lying flat in bed.   NECK:  Supple. No lymphadenopathy. Jugular venous pressure not elevated.  HEART:   The cardiac impulse has a normal quality. Reg., Nl S1 and S2.    CHEST:  Chest with bibasilar crackles. Normal respiratory effort.  ABDOMEN:  Soft and nontender.   EXTREMITIES:  There is no edema.     Vital Signs Last 24 Hrs  T(C): 36.7 (14 Dec 2021 07:33), Max: 36.9 (13 Dec 2021 14:11)  T(F): 98 (14 Dec 2021 07:33), Max: 98.4 (13 Dec 2021 14:11)  HR: 80 (14 Dec 2021 07:33) (76 - 87)  BP: 152/70 (14 Dec 2021 08:15) (138/67 - 169/68)  BP(mean): --  RR: 17 (14 Dec 2021 07:33) (17 - 18)  SpO2: 99% (14 Dec 2021 07:33) (97% - 99%)    MEDICATIONS  (STANDING):  amLODIPine   Tablet 10 milliGRAM(s) Oral daily  aspirin 325 milliGRAM(s) Oral daily  atorvastatin 40 milliGRAM(s) Oral at bedtime  azithromycin  IVPB 500 milliGRAM(s) IV Intermittent daily  budesonide 160 MICROgram(s)/formoterol 4.5 MICROgram(s) Inhaler 2 Puff(s) Inhalation two times a day  cefTRIAXone   IVPB 1000 milliGRAM(s) IV Intermittent every 24 hours  enoxaparin Injectable 40 milliGRAM(s) SubCutaneous daily  furosemide    Tablet 20 milliGRAM(s) Oral daily  levothyroxine 100 MICROGram(s) Oral daily  montelukast 10 milliGRAM(s) Oral daily  pantoprazole    Tablet 40 milliGRAM(s) Oral daily  predniSONE   Tablet 20 milliGRAM(s) Oral two times a day  tiotropium 18 MICROgram(s) Capsule 1 Capsule(s) Inhalation daily    MEDICATIONS  (PRN):  acetaminophen     Tablet .. 650 milliGRAM(s) Oral every 6 hours PRN Mild Pain (1 - 3)  ALBUTerol    90 MICROgram(s) HFA Inhaler 2 Puff(s) Inhalation every 6 hours PRN Bronchospasm  aluminum hydroxide/magnesium hydroxide/simethicone Suspension 30 milliLiter(s) Oral every 4 hours PRN Dyspepsia  melatonin 3 milliGRAM(s) Oral at bedtime PRN Insomnia  ondansetron Injectable 4 milliGRAM(s) IV Push every 8 hours PRN Nausea and/or Vomiting      Allergies    No Known Allergies    Intolerances          LABS:                        11.3   25.59 )-----------( 840      ( 14 Dec 2021 08:15 )             41.8     12-14    140  |  103  |  42<H>  ----------------------------<  169<H>  3.9   |  33<H>  |  0.74    Ca    8.5      14 Dec 2021 08:15

## 2021-12-14 NOTE — DISCHARGE NOTE NURSING/CASE MANAGEMENT/SOCIAL WORK - PATIENT PORTAL LINK FT
You can access the FollowMyHealth Patient Portal offered by [facility] by registering at the following website: [email or web address]. By joining [employer]’s FollowMyHealth portal, you will also be able to view your health information using other applications (apps) compatible with our system.

## 2021-12-14 NOTE — DISCHARGE NOTE PROVIDER - CARE PROVIDER_API CALL
Chris Lagos)  Internal Medicine; Pulmonary Disease  [address] [facility], [address] [place] [address]  Phone: [phone]  Fax: [phone]  Follow Up Time: 1 week

## 2021-12-14 NOTE — DISCHARGE NOTE PROVIDER - NSDCMRMEDTOKEN_GEN_ALL_CORE_FT
amLODIPine 10 mg oral tablet: 1 tab(s) orally once a day  aspirin 325 mg oral tablet: 1 tab(s) orally once a day  atorvastatin 40 mg oral tablet: 1 tab(s) orally once a day (at bedtime)  Brovana 15 mcg/2 mL inhalation solution: 2 milliliter(s) inhaled 2 times a day  budesonide 0.5 mg/2 mL inhalation suspension: 2 milliliter(s) inhaled 2 times a day  cefuroxime 250 mg oral tablet: 1 tab(s) orally 2 times a day   Incruse Ellipta 62.5 mcg/inh inhalation powder: 1 puff(s) inhaled once a day  levothyroxine 100 mcg (0.1 mg) oral tablet: 1 tab(s) orally once a day  montelukast 10 mg oral tablet: 1 tab(s) orally once a day  predniSONE 10 mg oral tablet: start 20mg daily x 7 days, then 10mg daily x 7 days  predniSONE 10 mg oral tablet: 2 tab(s) orally once a day MDD:Prednisone 20mg daily x 5d, Prednisone 10mg po daily x 5days  ProAir HFA 90 mcg/inh inhalation aerosol: 2 puff(s) inhaled every 6 hours, As Needed

## 2021-12-14 NOTE — DISCHARGE NOTE PROVIDER - NSDCCPCAREPLAN_GEN_ALL_CORE_FT
PRINCIPAL DISCHARGE DIAGNOSIS  Diagnosis: Bronchiectasis  Assessment and Plan of Treatment:       SECONDARY DISCHARGE DIAGNOSES  Diagnosis: Bronchiectasis  Assessment and Plan of Treatment:

## 2021-12-14 NOTE — DISCHARGE NOTE NURSING/CASE MANAGEMENT/SOCIAL WORK - NSDCPEFALRISK_GEN_ALL_CORE
For information on Fall & Injury Prevention, visit: [email or web address] OR  [email or web address] OR  https://www.cdc.gov/steadi/patient.html

## 2021-12-14 NOTE — PROGRESS NOTE ADULT - ASSESSMENT
88 year-old woman, with a history of obstructive lung disease, bronchiectasis,  aortic valve sclerosis, chronic resp. failure on oxygen, presented for shortness of breath.  --CT chest - images personally reviewed some  bronchiectasis and scattered ground glass opacities.  --She has been managed for fluid overload/pulm edema, pneumonia, and exacerbation of obstructive lung disease  --She appears improved, resp. status appears stable.    Recommendations:  --Continue NC O2 - she is normally on 2-L L  --continue inhalers regimen  --On prednisone give taper upon discharge       Notify me with any questions or concerns

## 2021-12-14 NOTE — DISCHARGE NOTE PROVIDER - HOSPITAL COURSE
87 y/o female with PMHx of asthma (moderate persistent), bronchiectasis,  aortic valve sclerosis, essential HTN, hypothyroidism, loop recorder, CVA (left thalamic infarct), and MDS presents to the ED for evaluation for sob; found to have Pna and bronchospasm     12.11: less dypsnea  12.12: states no dyspnea, less coughing   12.13: feels well, dyspnea at baseline   12.14: feels well, wants to go home    REVIEW OF SYSTEMS:    All other review of systems is negative unless indicated above  Vital Signs Last 24 Hrs  T(C): 36.7 (14 Dec 2021 07:33), Max: 36.9 (13 Dec 2021 14:11)  T(F): 98 (14 Dec 2021 07:33), Max: 98.4 (13 Dec 2021 14:11)  HR: 80 (14 Dec 2021 08:55) (76 - 87)  BP: 152/70 (14 Dec 2021 08:15) (138/67 - 169/68)  RR: 17 (14 Dec 2021 07:33) (17 - 18)  SpO2: 99% (14 Dec 2021 07:33) (97% - 99%)    PHYSICAL EXAM:    GENERAL: NAD  HEENT:  NC/AT, EOMI, PERRLA, No scleral icterus, Moist mucous membranes  NECK: Supple, No JVD  CNS:  Alert & Oriented X3, Motor Strength 5/5 B/L upper and lower extremities; DTRs 2+ intact   LUNG: decreased BS, bilat crackles   HEART: RRR; No murmurs, No rubs  ABDOMEN: +BS, ST/ND/NT  GENITOURINARY: Voiding, Bladder not distended  EXTREMITIES:  2+ Peripheral Pulses, No clubbing, No cyanosis, No tibial edema  MUSCULOSKELTAL: Joints normal ROM, No TTP, No effusion  VAGINAL: deferred  SKIN: no rashes  RECTAL: deferred, not indicated  BREAST: deferred  Assessment and Plan:       87 y/o female with PMHx of asthma (moderate persistent), bronchiectasis,  aortic valve sclerosis, essential HTN, hypothyroidism, loop recorder, CVA (left thalamic infarct), and MDS presents to the ED for evaluation for sob    Acute on chronic  hypoxemic Respiratory Failure.secondary to  Bronchiectasis and Pneumonia   -Pulm consul appreciated   - now at  baseline on 3 liters NC   - Prednisone taper  and  Spiriva   - Symbicort   - Albuterol   - Montelukast  Ceftriaxone/Zithromax for CAP day#5; will dc on Ceftin x 5d    Leukocytosis:  leukemoid reaction due to steroids     HTN  - Amlodipine increased to 10mg/day    # Back pain  - likely due to chronic compression deformities in Lspine  - Tylenol  - PT Yamilex    Patient with generalized muscle weakness (spinal compression fractures and advanced COPD) with extensive medical complications and prolonged hospitalization-pt will need hospital bed to keep bed elevated 30 degrees to assist with pain and shortness of breath. Patient will also need frequent repositioning not feasible with ordinary bed. Since patient is unable to make changes of body position independently (wound stage) patient requires Gel overlay. In addition , patient is unable to safely ambulate with walker, cane/crutches and will require wheelchair to access the bathroom for toileting and dining facilities. Patient is agreeable to the wheelchair and has a 24 hour assist with the wheelchair. Patient will need elevated leg rest to prevent edema.    #h/o MDS    dc planning , time 38m

## 2021-12-27 PROBLEM — Z92.89 HOSPITALIZATION WITHIN LAST 30 DAYS: Status: ACTIVE | Noted: 2021-01-01

## 2021-12-27 PROBLEM — J69.0 ASPIRATION PNEUMONIA OF RIGHT LOWER LOBE DUE TO REGURGITATED FOOD: Status: ACTIVE | Noted: 2021-01-01

## 2021-12-27 PROBLEM — J18.9 PNEUMONIA: Status: ACTIVE | Noted: 2021-01-01

## 2021-12-27 NOTE — PLAN
[FreeTextEntry1] : 1. Patient to resume azithromycin TIW and prednisone as directed for maintenance tx of bronchiectasis\par \par 2. Medications refilled at patients request \par \par 3. Continue monitoring SPO2 if less than 90% to notify or go to ER\par \par 4. Follow up with Dr. Lagos 3/2022 for PFU with PFT\par \par All questions answered. Agrees with plan above

## 2021-12-27 NOTE — PHYSICAL EXAM
[No Acute Distress] : no acute distress [No Lymphadenopathy] : no lymphadenopathy [Supple] : supple [No Respiratory Distress] : no respiratory distress  [Normal Rate] : normal rate  [Regular Rhythm] : with a regular rhythm [Normal S1, S2] : normal S1 and S2 [No Edema] : there was no peripheral edema [Soft] : abdomen soft [Non Tender] : non-tender [Non-distended] : non-distended [Normal Bowel Sounds] : normal bowel sounds [de-identified] : Patient assessed in wheelchair* [de-identified] : on 3L o2 via NC, crackles L mid lung and R base, baseline

## 2021-12-27 NOTE — HISTORY OF PRESENT ILLNESS
[Post-hospitalization from ___ Hospital] : Post-hospitalization from [unfilled] Hospital [Admitted on: ___] : The patient was admitted on [unfilled] [Discharged on ___] : discharged on [unfilled] [FreeTextEntry2] : 87 y/o female with PMHx of asthma (moderate persistent), bronchiectasis, aortic valve sclerosis, essential HTN, hypothyroidism, loop recorder, CVA (left thalamic infarct), and MDS admitted on 12/9 for evaluation of shortness of\par breath and cough over preceding 2 weeks; Patient admitted to  with dx of PNA and UTI in which she was treated with azithromycin and ceftriaxone and prednisone taper. \par \par From a pulmonary standpoint, patient has been overall doing well. Daughter at bedside reports patients SPO2 maintaining above 90%. She denies cough, increased shortness of breath. Occasional wheezing, however, patient has not been on azithromycin TIW and daily prednisone for approx 1 week since she was discharged. Reports congestion but has not been using humidifier with oxygen. Feels well today, no acute complaints.

## 2022-01-01 ENCOUNTER — APPOINTMENT (OUTPATIENT)
Dept: INTERNAL MEDICINE | Facility: CLINIC | Age: 87
End: 2022-01-01
Payer: MEDICARE

## 2022-01-01 ENCOUNTER — NON-APPOINTMENT (OUTPATIENT)
Age: 87
End: 2022-01-01

## 2022-01-01 ENCOUNTER — INPATIENT (INPATIENT)
Facility: HOSPITAL | Age: 87
LOS: 6 days | DRG: 480 | End: 2022-04-24
Attending: INTERNAL MEDICINE | Admitting: FAMILY MEDICINE
Payer: MEDICARE

## 2022-01-01 ENCOUNTER — TRANSCRIPTION ENCOUNTER (OUTPATIENT)
Age: 87
End: 2022-01-01

## 2022-01-01 ENCOUNTER — RX RENEWAL (OUTPATIENT)
Age: 87
End: 2022-01-01

## 2022-01-01 ENCOUNTER — APPOINTMENT (OUTPATIENT)
Dept: FAMILY MEDICINE | Facility: CLINIC | Age: 87
End: 2022-01-01
Payer: MEDICARE

## 2022-01-01 ENCOUNTER — APPOINTMENT (OUTPATIENT)
Dept: ELECTROPHYSIOLOGY | Facility: CLINIC | Age: 87
End: 2022-01-01
Payer: MEDICARE

## 2022-01-01 VITALS
TEMPERATURE: 97.8 F | HEART RATE: 95 BPM | SYSTOLIC BLOOD PRESSURE: 108 MMHG | OXYGEN SATURATION: 98 % | DIASTOLIC BLOOD PRESSURE: 62 MMHG | BODY MASS INDEX: 21.6 KG/M2 | HEIGHT: 60 IN | WEIGHT: 110 LBS

## 2022-01-01 VITALS
WEIGHT: 116 LBS | RESPIRATION RATE: 18 BRPM | SYSTOLIC BLOOD PRESSURE: 136 MMHG | DIASTOLIC BLOOD PRESSURE: 68 MMHG | TEMPERATURE: 97.7 F | HEART RATE: 100 BPM | OXYGEN SATURATION: 97 % | HEIGHT: 60 IN | BODY MASS INDEX: 22.78 KG/M2

## 2022-01-01 VITALS
SYSTOLIC BLOOD PRESSURE: 182 MMHG | WEIGHT: 156.09 LBS | RESPIRATION RATE: 19 BRPM | OXYGEN SATURATION: 96 % | HEIGHT: 60 IN | HEART RATE: 113 BPM | DIASTOLIC BLOOD PRESSURE: 96 MMHG | TEMPERATURE: 98 F

## 2022-01-01 VITALS
HEART RATE: 98 BPM | OXYGEN SATURATION: 99 % | DIASTOLIC BLOOD PRESSURE: 94 MMHG | WEIGHT: 116 LBS | HEIGHT: 60 IN | BODY MASS INDEX: 22.78 KG/M2 | TEMPERATURE: 97.9 F | RESPIRATION RATE: 18 BRPM | SYSTOLIC BLOOD PRESSURE: 126 MMHG

## 2022-01-01 VITALS — TEMPERATURE: 104 F | RESPIRATION RATE: 20 BRPM

## 2022-01-01 DIAGNOSIS — R06.00 DYSPNEA, UNSPECIFIED: ICD-10-CM

## 2022-01-01 DIAGNOSIS — R09.02 HYPOXEMIA: ICD-10-CM

## 2022-01-01 DIAGNOSIS — F41.8 OTHER SPECIFIED ANXIETY DISORDERS: ICD-10-CM

## 2022-01-01 DIAGNOSIS — J47.9 BRONCHIECTASIS, UNCOMPLICATED: ICD-10-CM

## 2022-01-01 DIAGNOSIS — R05.9 COUGH, UNSPECIFIED: ICD-10-CM

## 2022-01-01 DIAGNOSIS — J45.40 MODERATE PERSISTENT ASTHMA, UNCOMPLICATED: ICD-10-CM

## 2022-01-01 DIAGNOSIS — J44.9 CHRONIC OBSTRUCTIVE PULMONARY DISEASE, UNSPECIFIED: ICD-10-CM

## 2022-01-01 DIAGNOSIS — E89.0 POSTPROCEDURAL HYPOTHYROIDISM: Chronic | ICD-10-CM

## 2022-01-01 DIAGNOSIS — D64.9 ANEMIA, UNSPECIFIED: ICD-10-CM

## 2022-01-01 DIAGNOSIS — S72.002A FRACTURE OF UNSPECIFIED PART OF NECK OF LEFT FEMUR, INITIAL ENCOUNTER FOR CLOSED FRACTURE: ICD-10-CM

## 2022-01-01 DIAGNOSIS — B35.3 TINEA PEDIS: ICD-10-CM

## 2022-01-01 DIAGNOSIS — D46.9 MYELODYSPLASTIC SYNDROME, UNSPECIFIED: ICD-10-CM

## 2022-01-01 DIAGNOSIS — R60.0 LOCALIZED EDEMA: ICD-10-CM

## 2022-01-01 DIAGNOSIS — L30.9 DERMATITIS, UNSPECIFIED: ICD-10-CM

## 2022-01-01 DIAGNOSIS — Z95.818 PRESENCE OF OTHER CARDIAC IMPLANTS AND GRAFTS: Chronic | ICD-10-CM

## 2022-01-01 DIAGNOSIS — S72.009A FRACTURE OF UNSPECIFIED PART OF NECK OF UNSPECIFIED FEMUR, INITIAL ENCOUNTER FOR CLOSED FRACTURE: ICD-10-CM

## 2022-01-01 LAB
24R-OH-CALCIDIOL SERPL-MCNC: 42 NG/ML — SIGNIFICANT CHANGE UP (ref 30–80)
ALBUMIN SERPL ELPH-MCNC: 2.2 G/DL — LOW (ref 3.3–5)
ALBUMIN SERPL ELPH-MCNC: 3.4 G/DL — SIGNIFICANT CHANGE UP (ref 3.3–5)
ALP SERPL-CCNC: 115 U/L — SIGNIFICANT CHANGE UP (ref 40–120)
ALT FLD-CCNC: 20 U/L — SIGNIFICANT CHANGE UP (ref 12–78)
ANION GAP SERPL CALC-SCNC: 11 MMOL/L — SIGNIFICANT CHANGE UP (ref 5–17)
ANION GAP SERPL CALC-SCNC: 3 MMOL/L — LOW (ref 5–17)
ANION GAP SERPL CALC-SCNC: 5 MMOL/L — SIGNIFICANT CHANGE UP (ref 5–17)
ANION GAP SERPL CALC-SCNC: 5 MMOL/L — SIGNIFICANT CHANGE UP (ref 5–17)
ANION GAP SERPL CALC-SCNC: 6 MMOL/L — SIGNIFICANT CHANGE UP (ref 5–17)
ANION GAP SERPL CALC-SCNC: 6 MMOL/L — SIGNIFICANT CHANGE UP (ref 5–17)
ANION GAP SERPL CALC-SCNC: 7 MMOL/L — SIGNIFICANT CHANGE UP (ref 5–17)
ANION GAP SERPL CALC-SCNC: 7 MMOL/L — SIGNIFICANT CHANGE UP (ref 5–17)
ANION GAP SERPL CALC-SCNC: 8 MMOL/L — SIGNIFICANT CHANGE UP (ref 5–17)
ANION GAP SERPL CALC-SCNC: 8 MMOL/L — SIGNIFICANT CHANGE UP (ref 5–17)
APPEARANCE UR: ABNORMAL
APPEARANCE UR: CLEAR — SIGNIFICANT CHANGE UP
APTT BLD: 28.5 SEC — SIGNIFICANT CHANGE UP (ref 27.5–35.5)
APTT BLD: 30.1 SEC — SIGNIFICANT CHANGE UP (ref 27.5–35.5)
AST SERPL-CCNC: 20 U/L — SIGNIFICANT CHANGE UP (ref 15–37)
BASOPHILS # BLD AUTO: 0 K/UL — SIGNIFICANT CHANGE UP (ref 0–0.2)
BASOPHILS # BLD AUTO: 0.08 K/UL — SIGNIFICANT CHANGE UP (ref 0–0.2)
BASOPHILS NFR BLD AUTO: 0 % — SIGNIFICANT CHANGE UP (ref 0–2)
BASOPHILS NFR BLD AUTO: 0.5 % — SIGNIFICANT CHANGE UP (ref 0–2)
BILIRUB DIRECT SERPL-MCNC: 0.6 MG/DL — HIGH (ref 0–0.3)
BILIRUB INDIRECT FLD-MCNC: 1.9 MG/DL — HIGH (ref 0.2–1)
BILIRUB SERPL-MCNC: 0.7 MG/DL — SIGNIFICANT CHANGE UP (ref 0.2–1.2)
BILIRUB SERPL-MCNC: 2.5 MG/DL — HIGH (ref 0.2–1.2)
BILIRUB UR-MCNC: NEGATIVE — SIGNIFICANT CHANGE UP
BILIRUB UR-MCNC: NEGATIVE — SIGNIFICANT CHANGE UP
BUN SERPL-MCNC: 107 MG/DL — HIGH (ref 7–23)
BUN SERPL-MCNC: 148 MG/DL — SIGNIFICANT CHANGE UP (ref 7–23)
BUN SERPL-MCNC: 35 MG/DL — HIGH (ref 7–23)
BUN SERPL-MCNC: 41 MG/DL — HIGH (ref 7–23)
BUN SERPL-MCNC: 47 MG/DL — HIGH (ref 7–23)
BUN SERPL-MCNC: 56 MG/DL — HIGH (ref 7–23)
BUN SERPL-MCNC: 61 MG/DL — HIGH (ref 7–23)
BUN SERPL-MCNC: 65 MG/DL — HIGH (ref 7–23)
BUN SERPL-MCNC: 68 MG/DL — HIGH (ref 7–23)
BUN SERPL-MCNC: 75 MG/DL — HIGH (ref 7–23)
CALCIUM SERPL-MCNC: 7.7 MG/DL — LOW (ref 8.5–10.1)
CALCIUM SERPL-MCNC: 7.8 MG/DL — LOW (ref 8.5–10.1)
CALCIUM SERPL-MCNC: 7.8 MG/DL — LOW (ref 8.5–10.1)
CALCIUM SERPL-MCNC: 8 MG/DL — LOW (ref 8.5–10.1)
CALCIUM SERPL-MCNC: 8.1 MG/DL — LOW (ref 8.5–10.1)
CALCIUM SERPL-MCNC: 8.1 MG/DL — LOW (ref 8.5–10.1)
CALCIUM SERPL-MCNC: 8.4 MG/DL — LOW (ref 8.5–10.1)
CALCIUM SERPL-MCNC: 8.4 MG/DL — LOW (ref 8.5–10.1)
CALCIUM SERPL-MCNC: 8.6 MG/DL — SIGNIFICANT CHANGE UP (ref 8.5–10.1)
CALCIUM SERPL-MCNC: 9.6 MG/DL — SIGNIFICANT CHANGE UP (ref 8.5–10.1)
CHLORIDE SERPL-SCNC: 103 MMOL/L — SIGNIFICANT CHANGE UP (ref 96–108)
CHLORIDE SERPL-SCNC: 104 MMOL/L — SIGNIFICANT CHANGE UP (ref 96–108)
CHLORIDE SERPL-SCNC: 104 MMOL/L — SIGNIFICANT CHANGE UP (ref 96–108)
CHLORIDE SERPL-SCNC: 105 MMOL/L — SIGNIFICANT CHANGE UP (ref 96–108)
CHLORIDE SERPL-SCNC: 105 MMOL/L — SIGNIFICANT CHANGE UP (ref 96–108)
CHLORIDE SERPL-SCNC: 107 MMOL/L — SIGNIFICANT CHANGE UP (ref 96–108)
CHLORIDE SERPL-SCNC: 108 MMOL/L — SIGNIFICANT CHANGE UP (ref 96–108)
CHLORIDE SERPL-SCNC: 109 MMOL/L — HIGH (ref 96–108)
CHLORIDE SERPL-SCNC: 109 MMOL/L — HIGH (ref 96–108)
CHLORIDE SERPL-SCNC: 110 MMOL/L — HIGH (ref 96–108)
CO2 SERPL-SCNC: 21 MMOL/L — LOW (ref 22–31)
CO2 SERPL-SCNC: 24 MMOL/L — SIGNIFICANT CHANGE UP (ref 22–31)
CO2 SERPL-SCNC: 24 MMOL/L — SIGNIFICANT CHANGE UP (ref 22–31)
CO2 SERPL-SCNC: 25 MMOL/L — SIGNIFICANT CHANGE UP (ref 22–31)
CO2 SERPL-SCNC: 25 MMOL/L — SIGNIFICANT CHANGE UP (ref 22–31)
CO2 SERPL-SCNC: 26 MMOL/L — SIGNIFICANT CHANGE UP (ref 22–31)
CO2 SERPL-SCNC: 26 MMOL/L — SIGNIFICANT CHANGE UP (ref 22–31)
CO2 SERPL-SCNC: 27 MMOL/L — SIGNIFICANT CHANGE UP (ref 22–31)
CO2 SERPL-SCNC: 31 MMOL/L — SIGNIFICANT CHANGE UP (ref 22–31)
CO2 SERPL-SCNC: 32 MMOL/L — HIGH (ref 22–31)
COLOR SPEC: ABNORMAL
COLOR SPEC: YELLOW — SIGNIFICANT CHANGE UP
CORTIS AM PEAK SERPL-MCNC: 25.6 UG/DL — HIGH (ref 6–18.4)
CREAT SERPL-MCNC: 0.82 MG/DL — SIGNIFICANT CHANGE UP (ref 0.5–1.3)
CREAT SERPL-MCNC: 1.03 MG/DL — SIGNIFICANT CHANGE UP (ref 0.5–1.3)
CREAT SERPL-MCNC: 1.24 MG/DL — SIGNIFICANT CHANGE UP (ref 0.5–1.3)
CREAT SERPL-MCNC: 1.4 MG/DL — HIGH (ref 0.5–1.3)
CREAT SERPL-MCNC: 1.52 MG/DL — HIGH (ref 0.5–1.3)
CREAT SERPL-MCNC: 1.56 MG/DL — HIGH (ref 0.5–1.3)
CREAT SERPL-MCNC: 1.94 MG/DL — HIGH (ref 0.5–1.3)
CREAT SERPL-MCNC: 2.11 MG/DL — HIGH (ref 0.5–1.3)
CREAT SERPL-MCNC: 2.17 MG/DL — HIGH (ref 0.5–1.3)
CREAT SERPL-MCNC: 3.63 MG/DL — HIGH (ref 0.5–1.3)
CULTURE RESULTS: SIGNIFICANT CHANGE UP
DIFF PNL FLD: ABNORMAL
DIFF PNL FLD: ABNORMAL
EGFR: 11 ML/MIN/1.73M2 — LOW
EGFR: 21 ML/MIN/1.73M2 — LOW
EGFR: 22 ML/MIN/1.73M2 — LOW
EGFR: 24 ML/MIN/1.73M2 — LOW
EGFR: 32 ML/MIN/1.73M2 — LOW
EGFR: 33 ML/MIN/1.73M2 — LOW
EGFR: 36 ML/MIN/1.73M2 — LOW
EGFR: 42 ML/MIN/1.73M2 — LOW
EGFR: 52 ML/MIN/1.73M2 — LOW
EGFR: 68 ML/MIN/1.73M2 — SIGNIFICANT CHANGE UP
EOSINOPHIL # BLD AUTO: 0 K/UL — SIGNIFICANT CHANGE UP (ref 0–0.5)
EOSINOPHIL # BLD AUTO: 0.05 K/UL — SIGNIFICANT CHANGE UP (ref 0–0.5)
EOSINOPHIL NFR BLD AUTO: 0 % — SIGNIFICANT CHANGE UP (ref 0–6)
EOSINOPHIL NFR BLD AUTO: 0.3 % — SIGNIFICANT CHANGE UP (ref 0–6)
GLUCOSE SERPL-MCNC: 104 MG/DL — HIGH (ref 70–99)
GLUCOSE SERPL-MCNC: 116 MG/DL — HIGH (ref 70–99)
GLUCOSE SERPL-MCNC: 122 MG/DL — HIGH (ref 70–99)
GLUCOSE SERPL-MCNC: 129 MG/DL — HIGH (ref 70–99)
GLUCOSE SERPL-MCNC: 133 MG/DL — HIGH (ref 70–99)
GLUCOSE SERPL-MCNC: 142 MG/DL — HIGH (ref 70–99)
GLUCOSE SERPL-MCNC: 148 MG/DL — HIGH (ref 70–99)
GLUCOSE SERPL-MCNC: 156 MG/DL — HIGH (ref 70–99)
GLUCOSE SERPL-MCNC: 167 MG/DL — HIGH (ref 70–99)
GLUCOSE SERPL-MCNC: 95 MG/DL — SIGNIFICANT CHANGE UP (ref 70–99)
GLUCOSE UR QL: NEGATIVE — SIGNIFICANT CHANGE UP
GLUCOSE UR QL: NEGATIVE — SIGNIFICANT CHANGE UP
HAPTOGLOB SERPL-MCNC: <20 MG/DL — LOW (ref 34–200)
HCT VFR BLD CALC: 23.4 % — LOW (ref 34.5–45)
HCT VFR BLD CALC: 23.9 % — LOW (ref 34.5–45)
HCT VFR BLD CALC: 24.6 % — LOW (ref 34.5–45)
HCT VFR BLD CALC: 25.5 % — LOW (ref 34.5–45)
HCT VFR BLD CALC: 26.4 % — LOW (ref 34.5–45)
HCT VFR BLD CALC: 26.9 % — LOW (ref 34.5–45)
HCT VFR BLD CALC: 27.2 % — LOW (ref 34.5–45)
HCT VFR BLD CALC: 28.5 % — LOW (ref 34.5–45)
HCT VFR BLD CALC: 30.2 % — LOW (ref 34.5–45)
HCT VFR BLD CALC: 35.9 % — SIGNIFICANT CHANGE UP (ref 34.5–45)
HGB BLD-MCNC: 10.6 G/DL — LOW (ref 11.5–15.5)
HGB BLD-MCNC: 7 G/DL — CRITICAL LOW (ref 11.5–15.5)
HGB BLD-MCNC: 7.2 G/DL — LOW (ref 11.5–15.5)
HGB BLD-MCNC: 7.6 G/DL — LOW (ref 11.5–15.5)
HGB BLD-MCNC: 7.8 G/DL — LOW (ref 11.5–15.5)
HGB BLD-MCNC: 7.9 G/DL — LOW (ref 11.5–15.5)
HGB BLD-MCNC: 8 G/DL — LOW (ref 11.5–15.5)
HGB BLD-MCNC: 8.2 G/DL — LOW (ref 11.5–15.5)
HGB BLD-MCNC: 8.4 G/DL — LOW (ref 11.5–15.5)
HGB BLD-MCNC: 8.7 G/DL — LOW (ref 11.5–15.5)
IMM GRANULOCYTES NFR BLD AUTO: 1.8 % — HIGH (ref 0–1.5)
INR BLD: 1.12 RATIO — SIGNIFICANT CHANGE UP (ref 0.88–1.16)
INR BLD: 1.26 RATIO — HIGH (ref 0.88–1.16)
KETONES UR-MCNC: ABNORMAL
KETONES UR-MCNC: NEGATIVE — SIGNIFICANT CHANGE UP
LDH SERPL L TO P-CCNC: 1415 U/L — HIGH (ref 84–241)
LEUKOCYTE ESTERASE UR-ACNC: ABNORMAL
LEUKOCYTE ESTERASE UR-ACNC: ABNORMAL
LYMPHOCYTES # BLD AUTO: 1.14 K/UL — SIGNIFICANT CHANGE UP (ref 1–3.3)
LYMPHOCYTES # BLD AUTO: 1.28 K/UL — SIGNIFICANT CHANGE UP (ref 1–3.3)
LYMPHOCYTES # BLD AUTO: 7 % — LOW (ref 13–44)
LYMPHOCYTES # BLD AUTO: 7.7 % — LOW (ref 13–44)
MAGNESIUM SERPL-MCNC: 2.2 MG/DL — SIGNIFICANT CHANGE UP (ref 1.6–2.6)
MCHC RBC-ENTMCNC: 20.6 PG — LOW (ref 27–34)
MCHC RBC-ENTMCNC: 22.6 PG — LOW (ref 27–34)
MCHC RBC-ENTMCNC: 23.8 PG — LOW (ref 27–34)
MCHC RBC-ENTMCNC: 24.1 PG — LOW (ref 27–34)
MCHC RBC-ENTMCNC: 24.6 PG — LOW (ref 27–34)
MCHC RBC-ENTMCNC: 25.1 PG — LOW (ref 27–34)
MCHC RBC-ENTMCNC: 25.3 PG — LOW (ref 27–34)
MCHC RBC-ENTMCNC: 25.5 PG — LOW (ref 27–34)
MCHC RBC-ENTMCNC: 25.7 PG — LOW (ref 27–34)
MCHC RBC-ENTMCNC: 27.2 GM/DL — LOW (ref 32–36)
MCHC RBC-ENTMCNC: 29.3 GM/DL — LOW (ref 32–36)
MCHC RBC-ENTMCNC: 29.5 GM/DL — LOW (ref 32–36)
MCHC RBC-ENTMCNC: 29.5 GM/DL — LOW (ref 32–36)
MCHC RBC-ENTMCNC: 29.7 GM/DL — LOW (ref 32–36)
MCHC RBC-ENTMCNC: 30.5 GM/DL — LOW (ref 32–36)
MCHC RBC-ENTMCNC: 30.8 GM/DL — LOW (ref 32–36)
MCHC RBC-ENTMCNC: 30.9 GM/DL — LOW (ref 32–36)
MCHC RBC-ENTMCNC: 30.9 GM/DL — LOW (ref 32–36)
MCV RBC AUTO: 75.7 FL — LOW (ref 80–100)
MCV RBC AUTO: 76 FL — LOW (ref 80–100)
MCV RBC AUTO: 78.1 FL — LOW (ref 80–100)
MCV RBC AUTO: 80.7 FL — SIGNIFICANT CHANGE UP (ref 80–100)
MCV RBC AUTO: 82.1 FL — SIGNIFICANT CHANGE UP (ref 80–100)
MCV RBC AUTO: 82.7 FL — SIGNIFICANT CHANGE UP (ref 80–100)
MCV RBC AUTO: 83.3 FL — SIGNIFICANT CHANGE UP (ref 80–100)
MCV RBC AUTO: 84.1 FL — SIGNIFICANT CHANGE UP (ref 80–100)
MCV RBC AUTO: 85.7 FL — SIGNIFICANT CHANGE UP (ref 80–100)
MONOCYTES # BLD AUTO: 0.55 K/UL — SIGNIFICANT CHANGE UP (ref 0–0.9)
MONOCYTES # BLD AUTO: 1.05 K/UL — HIGH (ref 0–0.9)
MONOCYTES NFR BLD AUTO: 3 % — SIGNIFICANT CHANGE UP (ref 2–14)
MONOCYTES NFR BLD AUTO: 7.1 % — SIGNIFICANT CHANGE UP (ref 2–14)
NEUTROPHILS # BLD AUTO: 12.14 K/UL — HIGH (ref 1.8–7.4)
NEUTROPHILS # BLD AUTO: 16.62 K/UL — HIGH (ref 1.8–7.4)
NEUTROPHILS NFR BLD AUTO: 82.6 % — HIGH (ref 43–77)
NEUTROPHILS NFR BLD AUTO: 90 % — HIGH (ref 43–77)
NITRITE UR-MCNC: NEGATIVE — SIGNIFICANT CHANGE UP
NITRITE UR-MCNC: POSITIVE
NRBC # BLD: SIGNIFICANT CHANGE UP /100 WBCS (ref 0–0)
PH UR: 5 — SIGNIFICANT CHANGE UP (ref 5–8)
PH UR: 5 — SIGNIFICANT CHANGE UP (ref 5–8)
PHOSPHATE SERPL-MCNC: 3.8 MG/DL — SIGNIFICANT CHANGE UP (ref 2.5–4.5)
PHOSPHATE SERPL-MCNC: 5.6 MG/DL — HIGH (ref 2.5–4.5)
PLATELET # BLD AUTO: 580 K/UL — HIGH (ref 150–400)
PLATELET # BLD AUTO: 615 K/UL — HIGH (ref 150–400)
PLATELET # BLD AUTO: 656 K/UL — HIGH (ref 150–400)
PLATELET # BLD AUTO: 670 K/UL — HIGH (ref 150–400)
PLATELET # BLD AUTO: 683 K/UL — HIGH (ref 150–400)
PLATELET # BLD AUTO: 715 K/UL — HIGH (ref 150–400)
PLATELET # BLD AUTO: 719 K/UL — HIGH (ref 150–400)
PLATELET # BLD AUTO: 859 K/UL — HIGH (ref 150–400)
PLATELET # BLD AUTO: 897 K/UL — HIGH (ref 150–400)
POTASSIUM SERPL-MCNC: 4.3 MMOL/L — SIGNIFICANT CHANGE UP (ref 3.5–5.3)
POTASSIUM SERPL-MCNC: 4.5 MMOL/L — SIGNIFICANT CHANGE UP (ref 3.5–5.3)
POTASSIUM SERPL-MCNC: 5 MMOL/L — SIGNIFICANT CHANGE UP (ref 3.5–5.3)
POTASSIUM SERPL-MCNC: 5 MMOL/L — SIGNIFICANT CHANGE UP (ref 3.5–5.3)
POTASSIUM SERPL-MCNC: 5.2 MMOL/L — SIGNIFICANT CHANGE UP (ref 3.5–5.3)
POTASSIUM SERPL-MCNC: 5.4 MMOL/L — HIGH (ref 3.5–5.3)
POTASSIUM SERPL-MCNC: 5.6 MMOL/L — HIGH (ref 3.5–5.3)
POTASSIUM SERPL-MCNC: 7.5 MMOL/L — CRITICAL HIGH (ref 3.5–5.3)
POTASSIUM SERPL-SCNC: 4.3 MMOL/L — SIGNIFICANT CHANGE UP (ref 3.5–5.3)
POTASSIUM SERPL-SCNC: 4.5 MMOL/L — SIGNIFICANT CHANGE UP (ref 3.5–5.3)
POTASSIUM SERPL-SCNC: 5 MMOL/L — SIGNIFICANT CHANGE UP (ref 3.5–5.3)
POTASSIUM SERPL-SCNC: 5 MMOL/L — SIGNIFICANT CHANGE UP (ref 3.5–5.3)
POTASSIUM SERPL-SCNC: 5.2 MMOL/L — SIGNIFICANT CHANGE UP (ref 3.5–5.3)
POTASSIUM SERPL-SCNC: 5.4 MMOL/L — HIGH (ref 3.5–5.3)
POTASSIUM SERPL-SCNC: 5.6 MMOL/L — HIGH (ref 3.5–5.3)
POTASSIUM SERPL-SCNC: 7.5 MMOL/L — CRITICAL HIGH (ref 3.5–5.3)
PROT SERPL-MCNC: 7.3 GM/DL — SIGNIFICANT CHANGE UP (ref 6–8.3)
PROT UR-MCNC: 100
PROT UR-MCNC: 500 MG/DL
PROTHROM AB SERPL-ACNC: 13 SEC — SIGNIFICANT CHANGE UP (ref 10.5–13.4)
PROTHROM AB SERPL-ACNC: 14.6 SEC — HIGH (ref 10.5–13.4)
RBC # BLD: 2.79 M/UL — LOW (ref 3.8–5.2)
RBC # BLD: 2.85 M/UL — LOW (ref 3.8–5.2)
RBC # BLD: 3.15 M/UL — LOW (ref 3.8–5.2)
RBC # BLD: 3.17 M/UL — LOW (ref 3.8–5.2)
RBC # BLD: 3.29 M/UL — LOW (ref 3.8–5.2)
RBC # BLD: 3.39 M/UL — LOW (ref 3.8–5.2)
RBC # BLD: 3.54 M/UL — LOW (ref 3.8–5.2)
RBC # BLD: 3.99 M/UL — SIGNIFICANT CHANGE UP (ref 3.8–5.2)
RBC # BLD: 4.45 M/UL — SIGNIFICANT CHANGE UP (ref 3.8–5.2)
RBC # FLD: 20.9 % — HIGH (ref 10.3–14.5)
RBC # FLD: 21 % — HIGH (ref 10.3–14.5)
RBC # FLD: 21.2 % — HIGH (ref 10.3–14.5)
RBC # FLD: 22.2 % — HIGH (ref 10.3–14.5)
RBC # FLD: 22.7 % — HIGH (ref 10.3–14.5)
RBC # FLD: 23.2 % — HIGH (ref 10.3–14.5)
RBC # FLD: 23.7 % — HIGH (ref 10.3–14.5)
RBC # FLD: 24.6 % — HIGH (ref 10.3–14.5)
RBC # FLD: 25.2 % — HIGH (ref 10.3–14.5)
SARS-COV-2 RNA CT RESP QN NAA+PROBE: NEGATIVE
SARS-COV-2 RNA SPEC QL NAA+PROBE: SIGNIFICANT CHANGE UP
SODIUM SERPL-SCNC: 136 MMOL/L — SIGNIFICANT CHANGE UP (ref 135–145)
SODIUM SERPL-SCNC: 136 MMOL/L — SIGNIFICANT CHANGE UP (ref 135–145)
SODIUM SERPL-SCNC: 137 MMOL/L — SIGNIFICANT CHANGE UP (ref 135–145)
SODIUM SERPL-SCNC: 137 MMOL/L — SIGNIFICANT CHANGE UP (ref 135–145)
SODIUM SERPL-SCNC: 139 MMOL/L — SIGNIFICANT CHANGE UP (ref 135–145)
SODIUM SERPL-SCNC: 139 MMOL/L — SIGNIFICANT CHANGE UP (ref 135–145)
SODIUM SERPL-SCNC: 141 MMOL/L — SIGNIFICANT CHANGE UP (ref 135–145)
SODIUM SERPL-SCNC: 141 MMOL/L — SIGNIFICANT CHANGE UP (ref 135–145)
SODIUM SERPL-SCNC: 142 MMOL/L — SIGNIFICANT CHANGE UP (ref 135–145)
SODIUM SERPL-SCNC: 143 MMOL/L — SIGNIFICANT CHANGE UP (ref 135–145)
SP GR SPEC: 1.02 — SIGNIFICANT CHANGE UP (ref 1.01–1.02)
SP GR SPEC: 1.02 — SIGNIFICANT CHANGE UP (ref 1.01–1.02)
SPECIMEN SOURCE: SIGNIFICANT CHANGE UP
UROBILINOGEN FLD QL: 1
UROBILINOGEN FLD QL: NEGATIVE — SIGNIFICANT CHANGE UP
WBC # BLD: 12.43 K/UL — HIGH (ref 3.8–10.5)
WBC # BLD: 12.75 K/UL — HIGH (ref 3.8–10.5)
WBC # BLD: 14.72 K/UL — HIGH (ref 3.8–10.5)
WBC # BLD: 16.06 K/UL — HIGH (ref 3.8–10.5)
WBC # BLD: 17 K/UL — HIGH (ref 3.8–10.5)
WBC # BLD: 18.3 K/UL — HIGH (ref 3.8–10.5)
WBC # BLD: 19.01 K/UL — HIGH (ref 3.8–10.5)
WBC # BLD: 20.24 K/UL — HIGH (ref 3.8–10.5)
WBC # BLD: 28.37 K/UL — HIGH (ref 3.8–10.5)
WBC # FLD AUTO: 12.43 K/UL — HIGH (ref 3.8–10.5)
WBC # FLD AUTO: 12.75 K/UL — HIGH (ref 3.8–10.5)
WBC # FLD AUTO: 14.72 K/UL — HIGH (ref 3.8–10.5)
WBC # FLD AUTO: 16.06 K/UL — HIGH (ref 3.8–10.5)
WBC # FLD AUTO: 17 K/UL — HIGH (ref 3.8–10.5)
WBC # FLD AUTO: 18.3 K/UL — HIGH (ref 3.8–10.5)
WBC # FLD AUTO: 19.01 K/UL — HIGH (ref 3.8–10.5)
WBC # FLD AUTO: 20.24 K/UL — HIGH (ref 3.8–10.5)
WBC # FLD AUTO: 28.37 K/UL — HIGH (ref 3.8–10.5)

## 2022-01-01 PROCEDURE — 93298 REM INTERROG DEV EVAL SCRMS: CPT

## 2022-01-01 PROCEDURE — 94729 DIFFUSING CAPACITY: CPT

## 2022-01-01 PROCEDURE — 94010 BREATHING CAPACITY TEST: CPT

## 2022-01-01 PROCEDURE — 99233 SBSQ HOSP IP/OBS HIGH 50: CPT

## 2022-01-01 PROCEDURE — 86860 RBC ANTIBODY ELUTION: CPT

## 2022-01-01 PROCEDURE — 99214 OFFICE O/P EST MOD 30 MIN: CPT | Mod: 25

## 2022-01-01 PROCEDURE — ZZZZZ: CPT

## 2022-01-01 PROCEDURE — 81001 URINALYSIS AUTO W/SCOPE: CPT

## 2022-01-01 PROCEDURE — 83735 ASSAY OF MAGNESIUM: CPT

## 2022-01-01 PROCEDURE — G2066: CPT

## 2022-01-01 PROCEDURE — 86923 COMPATIBILITY TEST ELECTRIC: CPT

## 2022-01-01 PROCEDURE — 86077 PHYS BLOOD BANK SERV XMATCH: CPT

## 2022-01-01 PROCEDURE — 99214 OFFICE O/P EST MOD 30 MIN: CPT

## 2022-01-01 PROCEDURE — 83615 LACTATE (LD) (LDH) ENZYME: CPT

## 2022-01-01 PROCEDURE — 36415 COLL VENOUS BLD VENIPUNCTURE: CPT

## 2022-01-01 PROCEDURE — 99231 SBSQ HOSP IP/OBS SF/LOW 25: CPT

## 2022-01-01 PROCEDURE — 99213 OFFICE O/P EST LOW 20 MIN: CPT | Mod: 95

## 2022-01-01 PROCEDURE — 99215 OFFICE O/P EST HI 40 MIN: CPT | Mod: 25

## 2022-01-01 PROCEDURE — 97116 GAIT TRAINING THERAPY: CPT | Mod: GP

## 2022-01-01 PROCEDURE — C1713: CPT

## 2022-01-01 PROCEDURE — 99285 EMERGENCY DEPT VISIT HI MDM: CPT

## 2022-01-01 PROCEDURE — 82306 VITAMIN D 25 HYDROXY: CPT

## 2022-01-01 PROCEDURE — 27245 TREAT THIGH FRACTURE: CPT | Mod: LT

## 2022-01-01 PROCEDURE — 83010 ASSAY OF HAPTOGLOBIN QUANT: CPT

## 2022-01-01 PROCEDURE — 97162 PT EVAL MOD COMPLEX 30 MIN: CPT | Mod: GP

## 2022-01-01 PROCEDURE — 70450 CT HEAD/BRAIN W/O DYE: CPT | Mod: 26,MA

## 2022-01-01 PROCEDURE — 87635 SARS-COV-2 COVID-19 AMP PRB: CPT

## 2022-01-01 PROCEDURE — 93010 ELECTROCARDIOGRAM REPORT: CPT

## 2022-01-01 PROCEDURE — 86901 BLOOD TYPING SEROLOGIC RH(D): CPT

## 2022-01-01 PROCEDURE — 86900 BLOOD TYPING SEROLOGIC ABO: CPT

## 2022-01-01 PROCEDURE — 71045 X-RAY EXAM CHEST 1 VIEW: CPT | Mod: 26

## 2022-01-01 PROCEDURE — 93005 ELECTROCARDIOGRAM TRACING: CPT

## 2022-01-01 PROCEDURE — 80048 BASIC METABOLIC PNL TOTAL CA: CPT

## 2022-01-01 PROCEDURE — 99223 1ST HOSP IP/OBS HIGH 75: CPT

## 2022-01-01 PROCEDURE — 94727 GAS DIL/WSHOT DETER LNG VOL: CPT

## 2022-01-01 PROCEDURE — 85610 PROTHROMBIN TIME: CPT

## 2022-01-01 PROCEDURE — 84100 ASSAY OF PHOSPHORUS: CPT

## 2022-01-01 PROCEDURE — 82533 TOTAL CORTISOL: CPT

## 2022-01-01 PROCEDURE — 93970 EXTREMITY STUDY: CPT | Mod: 26

## 2022-01-01 PROCEDURE — 86922 COMPATIBILITY TEST ANTIGLOB: CPT

## 2022-01-01 PROCEDURE — 73502 X-RAY EXAM HIP UNI 2-3 VIEWS: CPT | Mod: 26,LT

## 2022-01-01 PROCEDURE — 86921 COMPATIBILITY TEST INCUBATE: CPT

## 2022-01-01 PROCEDURE — 93970 EXTREMITY STUDY: CPT

## 2022-01-01 PROCEDURE — 92523 SPEECH SOUND LANG COMPREHEN: CPT | Mod: GN

## 2022-01-01 PROCEDURE — 82247 BILIRUBIN TOTAL: CPT

## 2022-01-01 PROCEDURE — 86870 RBC ANTIBODY IDENTIFICATION: CPT

## 2022-01-01 PROCEDURE — 86920 COMPATIBILITY TEST SPIN: CPT

## 2022-01-01 PROCEDURE — 76000 FLUOROSCOPY <1 HR PHYS/QHP: CPT

## 2022-01-01 PROCEDURE — 86078 PHYS BLOOD BANK SERV REACTJ: CPT

## 2022-01-01 PROCEDURE — 87086 URINE CULTURE/COLONY COUNT: CPT

## 2022-01-01 PROCEDURE — 85025 COMPLETE CBC W/AUTO DIFF WBC: CPT

## 2022-01-01 PROCEDURE — 99497 ADVNCD CARE PLAN 30 MIN: CPT | Mod: 25

## 2022-01-01 PROCEDURE — 73552 X-RAY EXAM OF FEMUR 2/>: CPT | Mod: 26,LT

## 2022-01-01 PROCEDURE — 86880 COOMBS TEST DIRECT: CPT

## 2022-01-01 PROCEDURE — 85730 THROMBOPLASTIN TIME PARTIAL: CPT

## 2022-01-01 PROCEDURE — 99232 SBSQ HOSP IP/OBS MODERATE 35: CPT

## 2022-01-01 PROCEDURE — 94640 AIRWAY INHALATION TREATMENT: CPT

## 2022-01-01 PROCEDURE — 71045 X-RAY EXAM CHEST 1 VIEW: CPT

## 2022-01-01 PROCEDURE — 92610 EVALUATE SWALLOWING FUNCTION: CPT | Mod: GN

## 2022-01-01 PROCEDURE — 97530 THERAPEUTIC ACTIVITIES: CPT | Mod: GP

## 2022-01-01 PROCEDURE — 80069 RENAL FUNCTION PANEL: CPT

## 2022-01-01 PROCEDURE — 86850 RBC ANTIBODY SCREEN: CPT

## 2022-01-01 PROCEDURE — 85018 HEMOGLOBIN: CPT

## 2022-01-01 PROCEDURE — P9016: CPT

## 2022-01-01 PROCEDURE — 36430 TRANSFUSION BLD/BLD COMPNT: CPT

## 2022-01-01 PROCEDURE — 82248 BILIRUBIN DIRECT: CPT

## 2022-01-01 PROCEDURE — 85027 COMPLETE CBC AUTOMATED: CPT

## 2022-01-01 PROCEDURE — 85014 HEMATOCRIT: CPT

## 2022-01-01 PROCEDURE — 71045 X-RAY EXAM CHEST 1 VIEW: CPT | Mod: 26,76

## 2022-01-01 RX ORDER — SODIUM CHLORIDE 9 MG/ML
1000 INJECTION, SOLUTION INTRAVENOUS
Refills: 0 | Status: DISCONTINUED | OUTPATIENT
Start: 2022-01-01 | End: 2022-01-01

## 2022-01-01 RX ORDER — ATORVASTATIN CALCIUM 80 MG/1
40 TABLET, FILM COATED ORAL AT BEDTIME
Refills: 0 | Status: DISCONTINUED | OUTPATIENT
Start: 2022-01-01 | End: 2022-01-01

## 2022-01-01 RX ORDER — ENOXAPARIN SODIUM 100 MG/ML
40 INJECTION SUBCUTANEOUS EVERY 24 HOURS
Refills: 0 | Status: DISCONTINUED | OUTPATIENT
Start: 2022-01-01 | End: 2022-01-01

## 2022-01-01 RX ORDER — HEPARIN SODIUM 5000 [USP'U]/ML
5000 INJECTION INTRAVENOUS; SUBCUTANEOUS
Qty: 0 | Refills: 0 | DISCHARGE
Start: 2022-01-01

## 2022-01-01 RX ORDER — CEFAZOLIN SODIUM 1 G
2000 VIAL (EA) INJECTION EVERY 8 HOURS
Refills: 0 | Status: DISCONTINUED | OUTPATIENT
Start: 2022-01-01 | End: 2022-01-01

## 2022-01-01 RX ORDER — MORPHINE SULFATE 50 MG/1
10 CAPSULE, EXTENDED RELEASE ORAL
Refills: 0 | Status: DISCONTINUED | OUTPATIENT
Start: 2022-01-01 | End: 2022-01-01

## 2022-01-01 RX ORDER — CEFTRIAXONE 500 MG/1
1000 INJECTION, POWDER, FOR SOLUTION INTRAMUSCULAR; INTRAVENOUS EVERY 24 HOURS
Refills: 0 | Status: DISCONTINUED | OUTPATIENT
Start: 2022-01-01 | End: 2022-01-01

## 2022-01-01 RX ORDER — PANTOPRAZOLE SODIUM 20 MG/1
1 TABLET, DELAYED RELEASE ORAL
Qty: 0 | Refills: 0 | DISCHARGE

## 2022-01-01 RX ORDER — OXYCODONE HYDROCHLORIDE 5 MG/1
5 TABLET ORAL EVERY 4 HOURS
Refills: 0 | Status: DISCONTINUED | OUTPATIENT
Start: 2022-01-01 | End: 2022-01-01

## 2022-01-01 RX ORDER — CEFTRIAXONE 500 MG/1
1000 INJECTION, POWDER, FOR SOLUTION INTRAMUSCULAR; INTRAVENOUS ONCE
Refills: 0 | Status: COMPLETED | OUTPATIENT
Start: 2022-01-01 | End: 2022-01-01

## 2022-01-01 RX ORDER — ASPIRIN ENTERIC COATED TABLETS 81 MG 81 MG/1
81 TABLET, DELAYED RELEASE ORAL
Refills: 0 | Status: ACTIVE | COMMUNITY

## 2022-01-01 RX ORDER — LEVOTHYROXINE SODIUM 125 MCG
100 TABLET ORAL DAILY
Refills: 0 | Status: DISCONTINUED | OUTPATIENT
Start: 2022-01-01 | End: 2022-01-01

## 2022-01-01 RX ORDER — HEPARIN SODIUM 5000 [USP'U]/ML
5000 INJECTION INTRAVENOUS; SUBCUTANEOUS EVERY 12 HOURS
Refills: 0 | Status: DISCONTINUED | OUTPATIENT
Start: 2022-01-01 | End: 2022-01-01

## 2022-01-01 RX ORDER — LISINOPRIL 2.5 MG/1
1 TABLET ORAL
Qty: 0 | Refills: 0 | DISCHARGE

## 2022-01-01 RX ORDER — ONDANSETRON 8 MG/1
4 TABLET, FILM COATED ORAL EVERY 6 HOURS
Refills: 0 | Status: DISCONTINUED | OUTPATIENT
Start: 2022-01-01 | End: 2022-01-01

## 2022-01-01 RX ORDER — MORPHINE SULFATE 50 MG/1
2 CAPSULE, EXTENDED RELEASE ORAL EVERY 4 HOURS
Refills: 0 | Status: DISCONTINUED | OUTPATIENT
Start: 2022-01-01 | End: 2022-01-01

## 2022-01-01 RX ORDER — MORPHINE SULFATE 50 MG/1
2 CAPSULE, EXTENDED RELEASE ORAL ONCE
Refills: 0 | Status: DISCONTINUED | OUTPATIENT
Start: 2022-01-01 | End: 2022-01-01

## 2022-01-01 RX ORDER — AZITHROMYCIN 500 MG/1
250 TABLET, FILM COATED ORAL
Refills: 0 | Status: DISCONTINUED | OUTPATIENT
Start: 2022-01-01 | End: 2022-01-01

## 2022-01-01 RX ORDER — ACETAMINOPHEN 500 MG
1000 TABLET ORAL ONCE
Refills: 0 | Status: COMPLETED | OUTPATIENT
Start: 2022-01-01 | End: 2022-01-01

## 2022-01-01 RX ORDER — RUXOLITINIB 10 MG/1
10 TABLET ORAL
Refills: 0 | Status: ACTIVE | COMMUNITY

## 2022-01-01 RX ORDER — ASPIRIN/CALCIUM CARB/MAGNESIUM 324 MG
81 TABLET ORAL DAILY
Refills: 0 | Status: DISCONTINUED | OUTPATIENT
Start: 2022-01-01 | End: 2022-01-01

## 2022-01-01 RX ORDER — ONDANSETRON 8 MG/1
4 TABLET, FILM COATED ORAL ONCE
Refills: 0 | Status: DISCONTINUED | OUTPATIENT
Start: 2022-01-01 | End: 2022-01-01

## 2022-01-01 RX ORDER — TIOTROPIUM BROMIDE 18 UG/1
1 CAPSULE ORAL; RESPIRATORY (INHALATION) DAILY
Refills: 0 | Status: DISCONTINUED | OUTPATIENT
Start: 2022-01-01 | End: 2022-01-01

## 2022-01-01 RX ORDER — ACETAMINOPHEN 500 MG
1000 TABLET ORAL ONCE
Refills: 0 | Status: DISCONTINUED | OUTPATIENT
Start: 2022-01-01 | End: 2022-01-01

## 2022-01-01 RX ORDER — ACETAMINOPHEN 500 MG
650 TABLET ORAL EVERY 6 HOURS
Refills: 0 | Status: DISCONTINUED | OUTPATIENT
Start: 2022-01-01 | End: 2022-01-01

## 2022-01-01 RX ORDER — BUDESONIDE, MICRONIZED 100 %
0.5 POWDER (GRAM) MISCELLANEOUS
Refills: 0 | Status: DISCONTINUED | OUTPATIENT
Start: 2022-01-01 | End: 2022-01-01

## 2022-01-01 RX ORDER — FOLIC ACID 0.8 MG
1 TABLET ORAL DAILY
Refills: 0 | Status: DISCONTINUED | OUTPATIENT
Start: 2022-01-01 | End: 2022-01-01

## 2022-01-01 RX ORDER — HYDROCORTISONE 20 MG
40 TABLET ORAL EVERY 8 HOURS
Refills: 0 | Status: DISCONTINUED | OUTPATIENT
Start: 2022-01-01 | End: 2022-01-01

## 2022-01-01 RX ORDER — BUDESONIDE 0.5 MG/2ML
0.5 INHALANT ORAL TWICE DAILY
Qty: 2 | Refills: 5 | Status: ACTIVE | COMMUNITY
Start: 2018-03-05 | End: 1900-01-01

## 2022-01-01 RX ORDER — PREDNISONE 20 MG/1
20 TABLET ORAL DAILY
Qty: 7 | Refills: 0 | Status: DISCONTINUED | COMMUNITY
Start: 2022-01-01 | End: 2022-01-01

## 2022-01-01 RX ORDER — TRIAMCINOLONE 4 MG
60 TABLET ORAL ONCE
Refills: 0 | Status: COMPLETED | OUTPATIENT
Start: 2022-01-01 | End: 2022-01-01

## 2022-01-01 RX ORDER — MONTELUKAST 4 MG/1
10 TABLET, CHEWABLE ORAL AT BEDTIME
Refills: 0 | Status: DISCONTINUED | OUTPATIENT
Start: 2022-01-01 | End: 2022-01-01

## 2022-01-01 RX ORDER — IPRATROPIUM BROMIDE 0.2 MG/ML
500 SOLUTION, NON-ORAL INHALATION EVERY 6 HOURS
Refills: 0 | Status: DISCONTINUED | OUTPATIENT
Start: 2022-01-01 | End: 2022-01-01

## 2022-01-01 RX ORDER — MORPHINE SULFATE 50 MG/1
1 CAPSULE, EXTENDED RELEASE ORAL
Refills: 0 | Status: DISCONTINUED | OUTPATIENT
Start: 2022-01-01 | End: 2022-01-01

## 2022-01-01 RX ORDER — PANTOPRAZOLE SODIUM 20 MG/1
40 TABLET, DELAYED RELEASE ORAL
Refills: 0 | Status: DISCONTINUED | OUTPATIENT
Start: 2022-01-01 | End: 2022-01-01

## 2022-01-01 RX ORDER — IPRATROPIUM/ALBUTEROL SULFATE 18-103MCG
3 AEROSOL WITH ADAPTER (GRAM) INHALATION
Qty: 0 | Refills: 0 | DISCHARGE

## 2022-01-01 RX ORDER — OXYCODONE HYDROCHLORIDE 5 MG/1
10 TABLET ORAL
Refills: 0 | Status: DISCONTINUED | OUTPATIENT
Start: 2022-01-01 | End: 2022-01-01

## 2022-01-01 RX ORDER — ACETAMINOPHEN 500 MG
650 TABLET ORAL EVERY 6 HOURS
Refills: 0 | Status: COMPLETED | OUTPATIENT
Start: 2022-01-01 | End: 2022-01-01

## 2022-01-01 RX ORDER — AMLODIPINE BESYLATE 2.5 MG/1
5 TABLET ORAL DAILY
Refills: 0 | Status: DISCONTINUED | OUTPATIENT
Start: 2022-01-01 | End: 2022-01-01

## 2022-01-01 RX ORDER — AMMONIUM LACTATE 12 %
12 CREAM (GRAM) TOPICAL TWICE DAILY
Qty: 1 | Refills: 1 | Status: DISCONTINUED | COMMUNITY
Start: 2022-01-01 | End: 2022-01-01

## 2022-01-01 RX ORDER — KETOCONAZOLE 20 MG/G
1 AEROSOL, FOAM TOPICAL
Qty: 0 | Refills: 0 | DISCHARGE

## 2022-01-01 RX ORDER — FUROSEMIDE 40 MG
40 TABLET ORAL ONCE
Refills: 0 | Status: COMPLETED | OUTPATIENT
Start: 2022-01-01 | End: 2022-01-01

## 2022-01-01 RX ORDER — AMLODIPINE BESYLATE 5 MG/1
5 TABLET ORAL DAILY
Qty: 90 | Refills: 3 | Status: ACTIVE | COMMUNITY
Start: 2022-01-01 | End: 1900-01-01

## 2022-01-01 RX ORDER — BUDESONIDE AND FORMOTEROL FUMARATE DIHYDRATE 160; 4.5 UG/1; UG/1
2 AEROSOL RESPIRATORY (INHALATION)
Refills: 0 | Status: DISCONTINUED | OUTPATIENT
Start: 2022-01-01 | End: 2022-01-01

## 2022-01-01 RX ORDER — SERTRALINE 25 MG/1
50 TABLET, FILM COATED ORAL DAILY
Refills: 0 | Status: DISCONTINUED | OUTPATIENT
Start: 2022-01-01 | End: 2022-01-01

## 2022-01-01 RX ORDER — KETOCONAZOLE 20 MG/G
2 CREAM TOPICAL TWICE DAILY
Qty: 1 | Refills: 3 | Status: ACTIVE | COMMUNITY
Start: 2022-01-01 | End: 1900-01-01

## 2022-01-01 RX ORDER — AMLODIPINE BESYLATE 2.5 MG/1
1 TABLET ORAL
Qty: 0 | Refills: 0 | DISCHARGE

## 2022-01-01 RX ORDER — ASCORBIC ACID 60 MG
500 TABLET,CHEWABLE ORAL
Refills: 0 | Status: DISCONTINUED | OUTPATIENT
Start: 2022-01-01 | End: 2022-01-01

## 2022-01-01 RX ORDER — LORAZEPAM 0.5 MG/1
0.5 TABLET ORAL
Qty: 60 | Refills: 0 | Status: ACTIVE | COMMUNITY
Start: 2021-01-01 | End: 1900-01-01

## 2022-01-01 RX ORDER — UMECLIDINIUM 62.5 UG/1
1 AEROSOL, POWDER ORAL
Qty: 0 | Refills: 0 | DISCHARGE

## 2022-01-01 RX ORDER — KETOCONAZOLE 20 MG/G
2 CREAM TOPICAL TWICE DAILY
Qty: 1 | Refills: 3 | Status: DISCONTINUED | COMMUNITY
Start: 2021-02-26 | End: 2022-01-01

## 2022-01-01 RX ORDER — ALBUTEROL 90 UG/1
2 AEROSOL, METERED ORAL EVERY 6 HOURS
Refills: 0 | Status: DISCONTINUED | OUTPATIENT
Start: 2022-01-01 | End: 2022-01-01

## 2022-01-01 RX ORDER — PANTOPRAZOLE SODIUM 20 MG/1
20 TABLET, DELAYED RELEASE ORAL
Refills: 0 | Status: DISCONTINUED | OUTPATIENT
Start: 2022-01-01 | End: 2022-01-01

## 2022-01-01 RX ORDER — TRAMADOL HYDROCHLORIDE 50 MG/1
25 TABLET ORAL EVERY 4 HOURS
Refills: 0 | Status: DISCONTINUED | OUTPATIENT
Start: 2022-01-01 | End: 2022-01-01

## 2022-01-01 RX ORDER — POLYETHYLENE GLYCOL 3350 17 G/17G
17 POWDER, FOR SOLUTION ORAL AT BEDTIME
Refills: 0 | Status: DISCONTINUED | OUTPATIENT
Start: 2022-01-01 | End: 2022-01-01

## 2022-01-01 RX ORDER — KETOCONAZOLE 20 MG/G
1 AEROSOL, FOAM TOPICAL
Refills: 0 | Status: DISCONTINUED | OUTPATIENT
Start: 2022-01-01 | End: 2022-01-01

## 2022-01-01 RX ORDER — FENTANYL CITRATE 50 UG/ML
25 INJECTION INTRAVENOUS
Refills: 0 | Status: DISCONTINUED | OUTPATIENT
Start: 2022-01-01 | End: 2022-01-01

## 2022-01-01 RX ORDER — SERTRALINE 25 MG/1
1 TABLET, FILM COATED ORAL
Qty: 0 | Refills: 0 | DISCHARGE

## 2022-01-01 RX ORDER — LISINOPRIL 10 MG/1
10 TABLET ORAL DAILY
Qty: 90 | Refills: 0 | Status: ACTIVE | COMMUNITY
Start: 2021-02-26

## 2022-01-01 RX ORDER — CEFTRIAXONE 500 MG/1
1000 INJECTION, POWDER, FOR SOLUTION INTRAMUSCULAR; INTRAVENOUS EVERY 24 HOURS
Refills: 0 | Status: CANCELLED | OUTPATIENT
Start: 2022-01-01 | End: 2022-01-01

## 2022-01-01 RX ORDER — ARFORMOTEROL TARTRATE 15 UG/2ML
2 SOLUTION RESPIRATORY (INHALATION)
Qty: 0 | Refills: 0 | DISCHARGE

## 2022-01-01 RX ORDER — SODIUM CHLORIDE 9 MG/ML
500 INJECTION INTRAMUSCULAR; INTRAVENOUS; SUBCUTANEOUS ONCE
Refills: 0 | Status: COMPLETED | OUTPATIENT
Start: 2022-01-01 | End: 2022-01-01

## 2022-01-01 RX ORDER — ALBUTEROL SULFATE 90 UG/1
108 (90 BASE) INHALANT RESPIRATORY (INHALATION)
Qty: 1 | Refills: 3 | Status: DISCONTINUED | COMMUNITY
Start: 2021-01-01 | End: 2022-01-01

## 2022-01-01 RX ORDER — MORPHINE SULFATE 50 MG/1
5 CAPSULE, EXTENDED RELEASE ORAL EVERY 4 HOURS
Refills: 0 | Status: DISCONTINUED | OUTPATIENT
Start: 2022-01-01 | End: 2022-01-01

## 2022-01-01 RX ORDER — ARFORMOTEROL TARTRATE INHALATION SOLUTION 15 UG/2ML
15 SOLUTION RESPIRATORY (INHALATION) TWICE DAILY
Qty: 1 | Refills: 5 | Status: ACTIVE | COMMUNITY
Start: 2018-03-05 | End: 1900-01-01

## 2022-01-01 RX ORDER — ASPIRIN/CALCIUM CARB/MAGNESIUM 324 MG
1 TABLET ORAL
Qty: 0 | Refills: 0 | DISCHARGE

## 2022-01-01 RX ORDER — IPRATROPIUM BROMIDE AND ALBUTEROL SULFATE 2.5; .5 MG/3ML; MG/3ML
0.5-2.5 (3) SOLUTION RESPIRATORY (INHALATION) 4 TIMES DAILY
Qty: 120 | Refills: 5 | Status: ACTIVE | COMMUNITY
Start: 2022-01-01 | End: 1900-01-01

## 2022-01-01 RX ORDER — BETAMETHASONE DIPROPIONATE 0.5 MG/G
0.05 CREAM, AUGMENTED TOPICAL TWICE DAILY
Qty: 1 | Refills: 3 | Status: ACTIVE | COMMUNITY
Start: 2022-01-01 | End: 1900-01-01

## 2022-01-01 RX ORDER — MORPHINE SULFATE 50 MG/1
2 CAPSULE, EXTENDED RELEASE ORAL
Refills: 0 | Status: DISCONTINUED | OUTPATIENT
Start: 2022-01-01 | End: 2022-01-01

## 2022-01-01 RX ORDER — ALBUTEROL 90 UG/1
2 AEROSOL, METERED ORAL
Qty: 0 | Refills: 0 | DISCHARGE

## 2022-01-01 RX ORDER — AZITHROMYCIN 500 MG/1
1 TABLET, FILM COATED ORAL
Qty: 0 | Refills: 0 | DISCHARGE

## 2022-01-01 RX ORDER — CEFAZOLIN SODIUM 1 G
2000 VIAL (EA) INJECTION EVERY 8 HOURS
Refills: 0 | Status: COMPLETED | OUTPATIENT
Start: 2022-01-01 | End: 2022-01-01

## 2022-01-01 RX ORDER — MORPHINE SULFATE 50 MG/1
1 CAPSULE, EXTENDED RELEASE ORAL EVERY 4 HOURS
Refills: 0 | Status: DISCONTINUED | OUTPATIENT
Start: 2022-01-01 | End: 2022-01-01

## 2022-01-01 RX ORDER — HEPARIN SODIUM 5000 [USP'U]/ML
5000 INJECTION INTRAVENOUS; SUBCUTANEOUS ONCE
Refills: 0 | Status: COMPLETED | OUTPATIENT
Start: 2022-01-01 | End: 2022-01-01

## 2022-01-01 RX ORDER — HYDROMORPHONE HYDROCHLORIDE 2 MG/ML
0.5 INJECTION INTRAMUSCULAR; INTRAVENOUS; SUBCUTANEOUS ONCE
Refills: 0 | Status: DISCONTINUED | OUTPATIENT
Start: 2022-01-01 | End: 2022-01-01

## 2022-01-01 RX ORDER — OXYCODONE HYDROCHLORIDE 5 MG/1
5 TABLET ORAL
Refills: 0 | Status: DISCONTINUED | OUTPATIENT
Start: 2022-01-01 | End: 2022-01-01

## 2022-01-01 RX ORDER — SENNA PLUS 8.6 MG/1
2 TABLET ORAL AT BEDTIME
Refills: 0 | Status: DISCONTINUED | OUTPATIENT
Start: 2022-01-01 | End: 2022-01-01

## 2022-01-01 RX ORDER — BUDESONIDE, MICRONIZED 100 %
2 POWDER (GRAM) MISCELLANEOUS
Qty: 0 | Refills: 0 | DISCHARGE

## 2022-01-01 RX ORDER — HYDROCORTISONE 20 MG
50 TABLET ORAL EVERY 8 HOURS
Refills: 0 | Status: DISCONTINUED | OUTPATIENT
Start: 2022-01-01 | End: 2022-01-01

## 2022-01-01 RX ORDER — LISINOPRIL 2.5 MG/1
10 TABLET ORAL DAILY
Refills: 0 | Status: DISCONTINUED | OUTPATIENT
Start: 2022-01-01 | End: 2022-01-01

## 2022-01-01 RX ORDER — UMECLIDINIUM 62.5 UG/1
62.5 AEROSOL, POWDER ORAL
Qty: 90 | Refills: 3 | Status: DISCONTINUED | COMMUNITY
Start: 2020-06-24 | End: 2022-01-01

## 2022-01-01 RX ORDER — CEFTRIAXONE 500 MG/1
INJECTION, POWDER, FOR SOLUTION INTRAMUSCULAR; INTRAVENOUS
Refills: 0 | Status: DISCONTINUED | OUTPATIENT
Start: 2022-01-01 | End: 2022-01-01

## 2022-01-01 RX ORDER — MORPHINE SULFATE 50 MG/1
10 CAPSULE, EXTENDED RELEASE ORAL EVERY 4 HOURS
Refills: 0 | Status: DISCONTINUED | OUTPATIENT
Start: 2022-01-01 | End: 2022-01-01

## 2022-01-01 RX ORDER — FUROSEMIDE 40 MG
20 TABLET ORAL ONCE
Refills: 0 | Status: COMPLETED | OUTPATIENT
Start: 2022-01-01 | End: 2022-01-01

## 2022-01-01 RX ORDER — AMOXICILLIN AND CLAVULANATE POTASSIUM 875; 125 MG/1; MG/1
875-125 TABLET, COATED ORAL
Qty: 14 | Refills: 0 | Status: DISCONTINUED | COMMUNITY
Start: 2022-01-01 | End: 2022-01-01

## 2022-01-01 RX ADMIN — SENNA PLUS 2 TABLET(S): 8.6 TABLET ORAL at 22:10

## 2022-01-01 RX ADMIN — Medication 1 TABLET(S): at 10:21

## 2022-01-01 RX ADMIN — KETOCONAZOLE 1 APPLICATION(S): 20 AEROSOL, FOAM TOPICAL at 21:52

## 2022-01-01 RX ADMIN — MORPHINE SULFATE 10 MILLIGRAM(S): 50 CAPSULE, EXTENDED RELEASE ORAL at 17:18

## 2022-01-01 RX ADMIN — SODIUM CHLORIDE 50 MILLILITER(S): 9 INJECTION, SOLUTION INTRAVENOUS at 20:39

## 2022-01-01 RX ADMIN — MORPHINE SULFATE 5 MILLIGRAM(S): 50 CAPSULE, EXTENDED RELEASE ORAL at 23:00

## 2022-01-01 RX ADMIN — KETOCONAZOLE 1 APPLICATION(S): 20 AEROSOL, FOAM TOPICAL at 13:40

## 2022-01-01 RX ADMIN — BUDESONIDE AND FORMOTEROL FUMARATE DIHYDRATE 2 PUFF(S): 160; 4.5 AEROSOL RESPIRATORY (INHALATION) at 09:10

## 2022-01-01 RX ADMIN — SENNA PLUS 2 TABLET(S): 8.6 TABLET ORAL at 21:07

## 2022-01-01 RX ADMIN — PANTOPRAZOLE SODIUM 40 MILLIGRAM(S): 20 TABLET, DELAYED RELEASE ORAL at 05:14

## 2022-01-01 RX ADMIN — Medication 650 MILLIGRAM(S): at 21:24

## 2022-01-01 RX ADMIN — Medication 100 MILLIGRAM(S): at 10:53

## 2022-01-01 RX ADMIN — Medication 100 MICROGRAM(S): at 05:13

## 2022-01-01 RX ADMIN — AZITHROMYCIN 250 MILLIGRAM(S): 500 TABLET, FILM COATED ORAL at 10:53

## 2022-01-01 RX ADMIN — Medication 20 MILLIGRAM(S): at 13:23

## 2022-01-01 RX ADMIN — MORPHINE SULFATE 1 MILLIGRAM(S): 50 CAPSULE, EXTENDED RELEASE ORAL at 11:20

## 2022-01-01 RX ADMIN — LISINOPRIL 10 MILLIGRAM(S): 2.5 TABLET ORAL at 10:54

## 2022-01-01 RX ADMIN — MORPHINE SULFATE 1 MILLIGRAM(S): 50 CAPSULE, EXTENDED RELEASE ORAL at 11:00

## 2022-01-01 RX ADMIN — ALBUTEROL 2 PUFF(S): 90 AEROSOL, METERED ORAL at 13:47

## 2022-01-01 RX ADMIN — ATORVASTATIN CALCIUM 40 MILLIGRAM(S): 80 TABLET, FILM COATED ORAL at 22:10

## 2022-01-01 RX ADMIN — Medication 650 MILLIGRAM(S): at 06:24

## 2022-01-01 RX ADMIN — BUDESONIDE AND FORMOTEROL FUMARATE DIHYDRATE 2 PUFF(S): 160; 4.5 AEROSOL RESPIRATORY (INHALATION) at 09:12

## 2022-01-01 RX ADMIN — Medication 0.5 MILLIGRAM(S): at 22:10

## 2022-01-01 RX ADMIN — MORPHINE SULFATE 5 MILLIGRAM(S): 50 CAPSULE, EXTENDED RELEASE ORAL at 05:46

## 2022-01-01 RX ADMIN — MORPHINE SULFATE 2 MILLIGRAM(S): 50 CAPSULE, EXTENDED RELEASE ORAL at 16:10

## 2022-01-01 RX ADMIN — ATORVASTATIN CALCIUM 40 MILLIGRAM(S): 80 TABLET, FILM COATED ORAL at 21:25

## 2022-01-01 RX ADMIN — AMLODIPINE BESYLATE 5 MILLIGRAM(S): 2.5 TABLET ORAL at 13:40

## 2022-01-01 RX ADMIN — Medication 650 MILLIGRAM(S): at 13:57

## 2022-01-01 RX ADMIN — OXYCODONE HYDROCHLORIDE 5 MILLIGRAM(S): 5 TABLET ORAL at 05:34

## 2022-01-01 RX ADMIN — Medication 1000 MILLIGRAM(S): at 22:58

## 2022-01-01 RX ADMIN — HEPARIN SODIUM 5000 UNIT(S): 5000 INJECTION INTRAVENOUS; SUBCUTANEOUS at 21:07

## 2022-01-01 RX ADMIN — Medication 650 MILLIGRAM(S): at 11:15

## 2022-01-01 RX ADMIN — Medication 0.5 MILLIGRAM(S): at 21:08

## 2022-01-01 RX ADMIN — POLYETHYLENE GLYCOL 3350 17 GRAM(S): 17 POWDER, FOR SOLUTION ORAL at 22:09

## 2022-01-01 RX ADMIN — ATORVASTATIN CALCIUM 40 MILLIGRAM(S): 80 TABLET, FILM COATED ORAL at 22:09

## 2022-01-01 RX ADMIN — KETOCONAZOLE 1 APPLICATION(S): 20 AEROSOL, FOAM TOPICAL at 11:09

## 2022-01-01 RX ADMIN — Medication 650 MILLIGRAM(S): at 05:13

## 2022-01-01 RX ADMIN — ATORVASTATIN CALCIUM 40 MILLIGRAM(S): 80 TABLET, FILM COATED ORAL at 21:21

## 2022-01-01 RX ADMIN — SODIUM CHLORIDE 50 MILLILITER(S): 9 INJECTION, SOLUTION INTRAVENOUS at 22:33

## 2022-01-01 RX ADMIN — MONTELUKAST 10 MILLIGRAM(S): 4 TABLET, CHEWABLE ORAL at 21:20

## 2022-01-01 RX ADMIN — MORPHINE SULFATE 5 MILLIGRAM(S): 50 CAPSULE, EXTENDED RELEASE ORAL at 17:54

## 2022-01-01 RX ADMIN — ALBUTEROL 2 PUFF(S): 90 AEROSOL, METERED ORAL at 14:00

## 2022-01-01 RX ADMIN — BUDESONIDE AND FORMOTEROL FUMARATE DIHYDRATE 2 PUFF(S): 160; 4.5 AEROSOL RESPIRATORY (INHALATION) at 07:32

## 2022-01-01 RX ADMIN — OXYCODONE HYDROCHLORIDE 5 MILLIGRAM(S): 5 TABLET ORAL at 13:05

## 2022-01-01 RX ADMIN — MORPHINE SULFATE 2 MILLIGRAM(S): 50 CAPSULE, EXTENDED RELEASE ORAL at 12:00

## 2022-01-01 RX ADMIN — MONTELUKAST 10 MILLIGRAM(S): 4 TABLET, CHEWABLE ORAL at 21:24

## 2022-01-01 RX ADMIN — ALBUTEROL 2 PUFF(S): 90 AEROSOL, METERED ORAL at 09:10

## 2022-01-01 RX ADMIN — OXYCODONE HYDROCHLORIDE 5 MILLIGRAM(S): 5 TABLET ORAL at 06:34

## 2022-01-01 RX ADMIN — HEPARIN SODIUM 5000 UNIT(S): 5000 INJECTION INTRAVENOUS; SUBCUTANEOUS at 10:30

## 2022-01-01 RX ADMIN — MORPHINE SULFATE 2 MILLIGRAM(S): 50 CAPSULE, EXTENDED RELEASE ORAL at 15:53

## 2022-01-01 RX ADMIN — MORPHINE SULFATE 10 MILLIGRAM(S): 50 CAPSULE, EXTENDED RELEASE ORAL at 00:49

## 2022-01-01 RX ADMIN — MONTELUKAST 10 MILLIGRAM(S): 4 TABLET, CHEWABLE ORAL at 21:08

## 2022-01-01 RX ADMIN — MORPHINE SULFATE 10 MILLIGRAM(S): 50 CAPSULE, EXTENDED RELEASE ORAL at 12:00

## 2022-01-01 RX ADMIN — Medication 1 APPLICATION(S): at 15:14

## 2022-01-01 RX ADMIN — MONTELUKAST 10 MILLIGRAM(S): 4 TABLET, CHEWABLE ORAL at 22:09

## 2022-01-01 RX ADMIN — SERTRALINE 50 MILLIGRAM(S): 25 TABLET, FILM COATED ORAL at 10:53

## 2022-01-01 RX ADMIN — MORPHINE SULFATE 10 MILLIGRAM(S): 50 CAPSULE, EXTENDED RELEASE ORAL at 01:19

## 2022-01-01 RX ADMIN — OXYCODONE HYDROCHLORIDE 5 MILLIGRAM(S): 5 TABLET ORAL at 12:08

## 2022-01-01 RX ADMIN — Medication 81 MILLIGRAM(S): at 13:40

## 2022-01-01 RX ADMIN — PANTOPRAZOLE SODIUM 40 MILLIGRAM(S): 20 TABLET, DELAYED RELEASE ORAL at 05:13

## 2022-01-01 RX ADMIN — POLYETHYLENE GLYCOL 3350 17 GRAM(S): 17 POWDER, FOR SOLUTION ORAL at 21:24

## 2022-01-01 RX ADMIN — Medication 81 MILLIGRAM(S): at 10:52

## 2022-01-01 RX ADMIN — Medication 40 MILLIGRAM(S): at 21:20

## 2022-01-01 RX ADMIN — HEPARIN SODIUM 5000 UNIT(S): 5000 INJECTION INTRAVENOUS; SUBCUTANEOUS at 22:10

## 2022-01-01 RX ADMIN — MORPHINE SULFATE 2 MILLIGRAM(S): 50 CAPSULE, EXTENDED RELEASE ORAL at 13:45

## 2022-01-01 RX ADMIN — TIOTROPIUM BROMIDE 1 CAPSULE(S): 18 CAPSULE ORAL; RESPIRATORY (INHALATION) at 09:10

## 2022-01-01 RX ADMIN — MORPHINE SULFATE 10 MILLIGRAM(S): 50 CAPSULE, EXTENDED RELEASE ORAL at 11:03

## 2022-01-01 RX ADMIN — BUDESONIDE AND FORMOTEROL FUMARATE DIHYDRATE 2 PUFF(S): 160; 4.5 AEROSOL RESPIRATORY (INHALATION) at 21:11

## 2022-01-01 RX ADMIN — Medication 400 MILLIGRAM(S): at 22:58

## 2022-01-01 RX ADMIN — Medication 40 MILLIGRAM(S): at 09:26

## 2022-01-01 RX ADMIN — Medication 100 MICROGRAM(S): at 06:25

## 2022-01-01 RX ADMIN — Medication 1000 MILLIGRAM(S): at 16:33

## 2022-01-01 RX ADMIN — SENNA PLUS 2 TABLET(S): 8.6 TABLET ORAL at 21:24

## 2022-01-01 RX ADMIN — Medication 100 MILLIGRAM(S): at 02:52

## 2022-01-01 RX ADMIN — AMLODIPINE BESYLATE 5 MILLIGRAM(S): 2.5 TABLET ORAL at 10:11

## 2022-01-01 RX ADMIN — Medication 1 MILLIGRAM(S): at 10:53

## 2022-01-01 RX ADMIN — HEPARIN SODIUM 5000 UNIT(S): 5000 INJECTION INTRAVENOUS; SUBCUTANEOUS at 21:24

## 2022-01-01 RX ADMIN — CEFTRIAXONE 100 MILLIGRAM(S): 500 INJECTION, POWDER, FOR SOLUTION INTRAMUSCULAR; INTRAVENOUS at 17:54

## 2022-01-01 RX ADMIN — Medication 81 MILLIGRAM(S): at 10:11

## 2022-01-01 RX ADMIN — Medication 400 MILLIGRAM(S): at 13:55

## 2022-01-01 RX ADMIN — MORPHINE SULFATE 2 MILLIGRAM(S): 50 CAPSULE, EXTENDED RELEASE ORAL at 10:26

## 2022-01-01 RX ADMIN — Medication 500 MILLIGRAM(S): at 22:10

## 2022-01-01 RX ADMIN — Medication 1 MILLIGRAM(S): at 10:10

## 2022-01-01 RX ADMIN — MORPHINE SULFATE 2 MILLIGRAM(S): 50 CAPSULE, EXTENDED RELEASE ORAL at 19:11

## 2022-01-01 RX ADMIN — MORPHINE SULFATE 1 MILLIGRAM(S): 50 CAPSULE, EXTENDED RELEASE ORAL at 20:56

## 2022-01-01 RX ADMIN — SODIUM CHLORIDE 500 MILLILITER(S): 9 INJECTION INTRAMUSCULAR; INTRAVENOUS; SUBCUTANEOUS at 12:08

## 2022-01-01 RX ADMIN — KETOCONAZOLE 1 APPLICATION(S): 20 AEROSOL, FOAM TOPICAL at 10:12

## 2022-01-01 RX ADMIN — Medication 650 MILLIGRAM(S): at 17:54

## 2022-01-01 RX ADMIN — Medication 400 MILLIGRAM(S): at 17:57

## 2022-01-01 RX ADMIN — KETOCONAZOLE 1 APPLICATION(S): 20 AEROSOL, FOAM TOPICAL at 21:21

## 2022-01-01 RX ADMIN — Medication 1 TABLET(S): at 10:53

## 2022-01-01 RX ADMIN — HEPARIN SODIUM 5000 UNIT(S): 5000 INJECTION INTRAVENOUS; SUBCUTANEOUS at 10:22

## 2022-01-01 RX ADMIN — Medication 500 MILLIGRAM(S): at 10:53

## 2022-01-01 RX ADMIN — AMLODIPINE BESYLATE 5 MILLIGRAM(S): 2.5 TABLET ORAL at 10:56

## 2022-01-01 RX ADMIN — Medication 5 MILLIGRAM(S): at 10:12

## 2022-01-01 RX ADMIN — Medication 40 MILLIGRAM(S): at 22:57

## 2022-01-01 RX ADMIN — Medication 40 MILLIGRAM(S): at 10:30

## 2022-01-01 RX ADMIN — PANTOPRAZOLE SODIUM 40 MILLIGRAM(S): 20 TABLET, DELAYED RELEASE ORAL at 06:25

## 2022-01-01 RX ADMIN — Medication 100 MICROGRAM(S): at 05:11

## 2022-01-01 RX ADMIN — KETOCONAZOLE 1 APPLICATION(S): 20 AEROSOL, FOAM TOPICAL at 21:07

## 2022-01-01 RX ADMIN — Medication 0.5 MILLIGRAM(S): at 21:25

## 2022-01-01 RX ADMIN — AMLODIPINE BESYLATE 5 MILLIGRAM(S): 2.5 TABLET ORAL at 22:10

## 2022-01-01 RX ADMIN — MORPHINE SULFATE 5 MILLIGRAM(S): 50 CAPSULE, EXTENDED RELEASE ORAL at 06:30

## 2022-01-01 RX ADMIN — Medication 5 MILLIGRAM(S): at 10:22

## 2022-01-01 RX ADMIN — ENOXAPARIN SODIUM 40 MILLIGRAM(S): 100 INJECTION SUBCUTANEOUS at 05:14

## 2022-01-01 RX ADMIN — Medication 40 MILLIGRAM(S): at 09:12

## 2022-01-01 RX ADMIN — MORPHINE SULFATE 1 MILLIGRAM(S): 50 CAPSULE, EXTENDED RELEASE ORAL at 20:41

## 2022-01-01 RX ADMIN — ALBUTEROL 2 PUFF(S): 90 AEROSOL, METERED ORAL at 05:55

## 2022-01-01 RX ADMIN — AZITHROMYCIN 250 MILLIGRAM(S): 500 TABLET, FILM COATED ORAL at 10:22

## 2022-01-01 RX ADMIN — MORPHINE SULFATE 2 MILLIGRAM(S): 50 CAPSULE, EXTENDED RELEASE ORAL at 18:53

## 2022-01-01 RX ADMIN — PANTOPRAZOLE SODIUM 40 MILLIGRAM(S): 20 TABLET, DELAYED RELEASE ORAL at 05:46

## 2022-01-01 RX ADMIN — PANTOPRAZOLE SODIUM 40 MILLIGRAM(S): 20 TABLET, DELAYED RELEASE ORAL at 05:11

## 2022-01-01 RX ADMIN — Medication 650 MILLIGRAM(S): at 21:07

## 2022-01-01 RX ADMIN — AMLODIPINE BESYLATE 5 MILLIGRAM(S): 2.5 TABLET ORAL at 10:21

## 2022-01-01 RX ADMIN — Medication 500 MILLIGRAM(S): at 21:24

## 2022-01-01 RX ADMIN — Medication 500 MILLIGRAM(S): at 10:22

## 2022-01-01 RX ADMIN — SODIUM CHLORIDE 150 MILLILITER(S): 9 INJECTION, SOLUTION INTRAVENOUS at 06:59

## 2022-01-01 RX ADMIN — Medication 500 MILLIGRAM(S): at 10:11

## 2022-01-01 RX ADMIN — BUDESONIDE AND FORMOTEROL FUMARATE DIHYDRATE 2 PUFF(S): 160; 4.5 AEROSOL RESPIRATORY (INHALATION) at 21:14

## 2022-01-01 RX ADMIN — SODIUM CHLORIDE 150 MILLILITER(S): 9 INJECTION, SOLUTION INTRAVENOUS at 00:29

## 2022-01-01 RX ADMIN — Medication 100 MICROGRAM(S): at 05:14

## 2022-01-01 RX ADMIN — HEPARIN SODIUM 5000 UNIT(S): 5000 INJECTION INTRAVENOUS; SUBCUTANEOUS at 13:40

## 2022-01-01 RX ADMIN — POLYETHYLENE GLYCOL 3350 17 GRAM(S): 17 POWDER, FOR SOLUTION ORAL at 21:07

## 2022-01-01 RX ADMIN — BUDESONIDE AND FORMOTEROL FUMARATE DIHYDRATE 2 PUFF(S): 160; 4.5 AEROSOL RESPIRATORY (INHALATION) at 08:07

## 2022-01-01 RX ADMIN — MORPHINE SULFATE 2 MILLIGRAM(S): 50 CAPSULE, EXTENDED RELEASE ORAL at 11:41

## 2022-01-01 RX ADMIN — Medication 40 MILLIGRAM(S): at 11:02

## 2022-01-01 RX ADMIN — Medication 600 MILLIGRAM(S): at 10:55

## 2022-01-01 RX ADMIN — Medication 1 APPLICATION(S): at 22:14

## 2022-01-01 RX ADMIN — CEFTRIAXONE 100 MILLIGRAM(S): 500 INJECTION, POWDER, FOR SOLUTION INTRAMUSCULAR; INTRAVENOUS at 15:13

## 2022-01-01 RX ADMIN — TIOTROPIUM BROMIDE 1 CAPSULE(S): 18 CAPSULE ORAL; RESPIRATORY (INHALATION) at 08:06

## 2022-01-01 RX ADMIN — Medication 50 MILLIGRAM(S): at 10:57

## 2022-01-01 RX ADMIN — BUDESONIDE AND FORMOTEROL FUMARATE DIHYDRATE 2 PUFF(S): 160; 4.5 AEROSOL RESPIRATORY (INHALATION) at 09:18

## 2022-01-01 RX ADMIN — HEPARIN SODIUM 5000 UNIT(S): 5000 INJECTION INTRAVENOUS; SUBCUTANEOUS at 10:12

## 2022-01-01 RX ADMIN — AZITHROMYCIN 250 MILLIGRAM(S): 500 TABLET, FILM COATED ORAL at 13:39

## 2022-01-01 RX ADMIN — Medication 650 MILLIGRAM(S): at 21:13

## 2022-01-01 RX ADMIN — Medication 100 MICROGRAM(S): at 05:46

## 2022-01-01 RX ADMIN — Medication 5 MILLIGRAM(S): at 10:53

## 2022-01-01 RX ADMIN — Medication 0.5 MILLIGRAM(S): at 21:20

## 2022-01-01 RX ADMIN — ATORVASTATIN CALCIUM 40 MILLIGRAM(S): 80 TABLET, FILM COATED ORAL at 21:07

## 2022-01-01 RX ADMIN — Medication 81 MILLIGRAM(S): at 10:22

## 2022-01-01 RX ADMIN — Medication 1 TABLET(S): at 10:10

## 2022-01-01 RX ADMIN — Medication 1 MILLIGRAM(S): at 10:22

## 2022-01-01 RX ADMIN — Medication 60 MILLIGRAM(S): at 13:24

## 2022-01-01 RX ADMIN — MORPHINE SULFATE 2 MILLIGRAM(S): 50 CAPSULE, EXTENDED RELEASE ORAL at 14:00

## 2022-01-01 RX ADMIN — KETOCONAZOLE 1 APPLICATION(S): 20 AEROSOL, FOAM TOPICAL at 22:58

## 2022-01-01 RX ADMIN — HEPARIN SODIUM 5000 UNIT(S): 5000 INJECTION INTRAVENOUS; SUBCUTANEOUS at 21:20

## 2022-01-01 RX ADMIN — Medication 81 MILLIGRAM(S): at 10:56

## 2022-01-01 RX ADMIN — MORPHINE SULFATE 5 MILLIGRAM(S): 50 CAPSULE, EXTENDED RELEASE ORAL at 22:17

## 2022-01-03 NOTE — PHYSICAL EXAM
[Normal Outer Ear/Nose] : the outer ears and nose were normal in appearance [Supple] : supple [Regular Rhythm] : with a regular rhythm [Normal S1, S2] : normal S1 and S2 [No Extremity Clubbing/Cyanosis] : no extremity clubbing/cyanosis [Soft] : abdomen soft [Normal Bowel Sounds] : normal bowel sounds [Normal Posterior Cervical Nodes] : no posterior cervical lymphadenopathy [Normal Anterior Cervical Nodes] : no anterior cervical lymphadenopathy [No CVA Tenderness] : no CVA  tenderness [de-identified] : Thin, chronically ill-appearing female [de-identified] : there are bibasilar crackles that go to the mid lung zones [de-identified] : there are occasional extrasystoles. There is a 1-2/6 systolic ejection murmur at the left border [de-identified] : there is tr edema bilaterally

## 2022-01-03 NOTE — DATA REVIEWED
[FreeTextEntry1] : Spirometric analysis she reveals a marked degree of restriction. An obstructive component is most likely present as well.

## 2022-01-03 NOTE — PLAN
[FreeTextEntry1] : 1. Continue with medication as outlined above.\par \par 2. We'll now alter the bronchodilator regimen in that we will add DuoNeb treatments to the nebulizer as well. To this end, she will use formoterol and budesonide when she awakens in the morning, and prior to bedtime at night. During the midday, at noon and 4 PM, she will use DuoNeb. We will see if this helps to minimize the degree of breathlessness\par \par 3. Will now discontinue the Incruse.\par \par 4. Continue with Zithromax 250 mg t.i.w., and prednisone 5 mg daily.\par \par 5. Will now be more liberal with the use of lorazepam. She will take 0.25 mg b.i.d. p.r.n. to see if this helps decrease the anxiety component of her breathlessness.\par \par 6. Maintain supplemental oxygen 24 hours a day 7 days a week\par \par 7. Followup in 3 months with pulmonary function testing.

## 2022-01-03 NOTE — HISTORY OF PRESENT ILLNESS
[FreeTextEntry1] : The patient comes in today for a routine pulmonary followup evaluation.\par  [de-identified] : The patient comes in today for a followup evaluation. According to the patient's daughter, she is still having significant difficulty breathing. She was recently hospitalized at [facility] for pneumonia and exacerbation of underlying bronchiectasis. She was treated with antibiotics including ceftriaxone, and steroids. She was seen in hospital followup one week ago.\par \par On 12/29/21, she began to complain of difficulty breathing and wheezing. She was very lethargic. She has a nonproductive cough. She is back to her standard regimen of Zithromax 250 mg 3 times a week and prednisone 5 mg a day. Her daughter has intermittently given her small doses of Ativan, which takes away the age and quieted down. She has been using a nebulizer with formoterol and budesonide in a b.i.d. basis. At this time, there is no purulent sputum. She wears her supple no oxygen 24 hours a day. A CAT scan of the chest was performed during her hospitalization and it showed focal bronchiectatic changes in the mid lung zones bilaterally as well as mild groundglass opacities. She now comes in for this assessment

## 2022-01-26 PROBLEM — L30.9 ECZEMA: Status: ACTIVE | Noted: 2022-01-01

## 2022-01-26 NOTE — HISTORY OF PRESENT ILLNESS
[Home] : at home, [unfilled] , at the time of the visit. [Medical Office: ([facility])___] : at the medical office located in  [Other:____] : [unfilled] [Verbal consent obtained from patient] : the patient, [unfilled] [FreeTextEntry8] : Couple of weeks and pruritic erythematous eczematous type rash bilateral lower extremities most consistent with dry skin\par \par Will treat with ammonium lactate and betamethasone\par \par Starting prednisone 20 and Augmentin for exacerbation of COPD by Dr. christiansen

## 2022-03-21 PROBLEM — J44.9 COPD, MODERATE: Status: ACTIVE | Noted: 2020-10-05

## 2022-03-21 PROBLEM — J45.40 ASTHMA, MODERATE PERSISTENT: Status: ACTIVE | Noted: 2018-01-17

## 2022-03-21 PROBLEM — J47.9 BRONCHIECTASIS: Status: ACTIVE | Noted: 2018-01-17

## 2022-03-21 PROBLEM — R05.9 COUGH: Status: ACTIVE | Noted: 2022-01-01

## 2022-03-21 PROBLEM — F41.8 ANXIOUS DEPRESSION: Status: ACTIVE | Noted: 2020-08-20

## 2022-03-21 PROBLEM — R06.00 DYSPNEA ON EXERTION: Status: ACTIVE | Noted: 2020-02-06

## 2022-03-21 PROBLEM — R09.02 HYPOXEMIA: Status: ACTIVE | Noted: 2018-01-17

## 2022-03-21 NOTE — DATA REVIEWED
[FreeTextEntry1] : A pulmonary function test is performed. Of note is the fact that the patient had difficulty performing some of the maneuvers as a result, her lung volumes and diffusion are not accurate. Her total lung capacity is mildly reduced and the vital capacity is moderately reduced. The RV and FRC are normal. Lung mechanics revealing marked decrease in her flow rates. Bronchodilator reactivity is not assessed. The saturation is maintained. This most likely represents a significant degree of obstruction.

## 2022-03-21 NOTE — PLAN
[FreeTextEntry1] : 1. Continue his medical regimen as outlined above.\par \par 2. We'll now add Mucinex 600 mg, one tablet b.i.d. to the regimen. She will increase her fluids to help thin the secretions.\par \par 3. The patient's daughter will attempt to perform chest PT. She'll also obtain and Acapella valve to help loosen her secretions and facilitate clearance. If there is no improvement, will consider institution of Mucomyst in the future via the nebulizer\par \par 4. Continuous up on oxygen at between 3-4 L per minute 24 hours a day\par \par 5. Continue physical therapy at home\par \par 6. Followup in 3 months with Ms. Kasia with office visit and spirometry.\par \par 7. The patient will followup with myself in 6 months with office visit and spirometry.

## 2022-03-21 NOTE — HISTORY OF PRESENT ILLNESS
[FreeTextEntry1] : The patient comes in for a yearly pulmonary evaluation\par  [de-identified] : The patient, according to her daughter, is doing fairly well at this time. She has been stable, since receiving a course of prednisone several months ago. She states that the prednisone did help quite a bit. She remains on supplemental oxygen between 2-4 L 24 hours a day.\par \par According to the daughter, her mother has been coughing intermittently. At times she is unable to expectorate sputum. She does have chest congestion. She notes having slight gurgling in her chest as well. She is using a nebulizer twice a day, and at times, she will use DuoNeb intermittently.\par \par The patient notes that she has a slight degree of nasal congestion. At times she has dried bloody secretions from the nasal cannula. She does use saline at times.\par \par The patient has had a significant decrease in her anxiety level. She is feeling better in this regard with better mood. She is compliant with her other medications. She comes in for assessment.

## 2022-03-21 NOTE — PHYSICAL EXAM
[Well Nourished] : well nourished [Normal Outer Ear/Nose] : the outer ears and nose were normal in appearance [No JVD] : no jugular venous distention [Supple] : supple [No Respiratory Distress] : no respiratory distress  [No Accessory Muscle Use] : no accessory muscle use [Regular Rhythm] : with a regular rhythm [Normal S1, S2] : normal S1 and S2 [No Edema] : there was no peripheral edema [No Extremity Clubbing/Cyanosis] : no extremity clubbing/cyanosis [Soft] : abdomen soft [Non Tender] : non-tender [Normal Bowel Sounds] : normal bowel sounds [Normal Supraclavicular Nodes] : no supraclavicular lymphadenopathy [Normal Posterior Cervical Nodes] : no posterior cervical lymphadenopathy [Normal Anterior Cervical Nodes] : no anterior cervical lymphadenopathy [No CVA Tenderness] : no CVA  tenderness [No Rash] : no rash [Coordination Grossly Intact] : coordination grossly intact [Normal Affect] : the affect was normal [Normal Insight/Judgement] : insight and judgment were intact [de-identified] : There are crackles in the lower lung zones bilaterally. [de-identified] : The gait is slightly unsteady.

## 2022-04-13 PROBLEM — B35.3 TINEA PEDIS OF BOTH FEET: Status: ACTIVE | Noted: 2022-01-01

## 2022-04-13 PROBLEM — R60.0 BILATERAL EDEMA OF LOWER EXTREMITY: Status: ACTIVE | Noted: 2022-01-01

## 2022-04-13 NOTE — HISTORY OF PRESENT ILLNESS
[FreeTextEntry8] : Slight bilateral ankle edema worse in the afternoon better in the morning patient basically wheelchair-bound does not ambulate\par \par Rash of feet consistent with tinea pedis circinate erythematous plaque with scale and well-defined border.  Will treat with ketoconazole feet are erythematous and cool however there are strong dorsalis pedis pulses bilaterally\par \par Leg edema due to chronic venous stasis recommend elevation.  Decrease amlodipine from 10 mg to 5 mg

## 2022-04-17 NOTE — PHARMACOTHERAPY INTERVENTION NOTE - COMMENTS
Medication history complete, reviewed medication with list provided by patient family and confirmed with DrFirst.

## 2022-04-17 NOTE — H&P ADULT - NSICDXPASTSURGICALHX_GEN_ALL_CORE_FT
PAST SURGICAL HISTORY:  No significant past surgical history      PAST SURGICAL HISTORY:  History of partial thyroidectomy     Status post placement of implantable loop recorder

## 2022-04-17 NOTE — ED PROVIDER NOTE - OBJECTIVE STATEMENT
89 y/o female w/ a PMHx of CVA, MDS, asthma, hypothyroid, HTN, and rib fractures presents to the ED BIB daughter s/p mechanical fall. Pt states she tripped on something in the doorway while entering the room, denies head trauma or LOC. Pt c/o left hip pain. Pt is confused but daughter says pt is baseline. Pain is 7/10, non-radiating, and worse w/ movement.

## 2022-04-17 NOTE — H&P ADULT - NSHPPHYSICALEXAM_GEN_ALL_CORE
Vital Signs Last 24 Hrs  T(C): 36.8 (17 Apr 2022 09:09), Max: 36.8 (17 Apr 2022 09:09)  T(F): 98.3 (17 Apr 2022 09:09), Max: 98.3 (17 Apr 2022 09:09)  HR: 72 (17 Apr 2022 09:09) (72 - 113)  BP: 135/82 (17 Apr 2022 09:09) (135/82 - 182/96)  BP(mean): 98 (17 Apr 2022 09:09) (98 - 98)  RR: 18 (17 Apr 2022 09:09) (18 - 19)  SpO2: 99% (17 Apr 2022 09:09) (96% - 99%) Vital Signs Last 24 Hrs  T(C): 36.8 (17 Apr 2022 09:09), Max: 36.8 (17 Apr 2022 09:09)  T(F): 98.3 (17 Apr 2022 09:09), Max: 98.3 (17 Apr 2022 09:09)  HR: 72 (17 Apr 2022 09:09) (72 - 113)  BP: 135/82 (17 Apr 2022 09:09) (135/82 - 182/96)  BP(mean): 98 (17 Apr 2022 09:09) (98 - 98)  RR: 18 (17 Apr 2022 09:09) (18 - 19)  SpO2: 99% (17 Apr 2022 09:09) (96% - 99%)    GENERAL: Slight tachypnea  HEENT: PERRL, EOMI, MMM, no oropharyngeal lesions  NECK: supple, no stiffness, no JVD, no thyromegaly  PULM: respirations mildly labored, rhonchi diffusely with some faint expiratory wheezes. No obvious rales or wheezes  CV: tachycardic with regular rhythm, no murmurs, gallops, or rubs  GI: abdomen soft, nontender, nondistended, no masses felt, normal bowel sounds  MSK: full ROM in all extremities. Left proximal lower extremity limited 2/2 pain. No joint erythema or warmth.  LYMPH: no anterior cervical, posterior cervical, supraclavicular, or inguinal lymphadenopathy  NEURO: A&Ox2, no tremors, sensation intact  SKIN: + left hip and upper thigh swelling. + erythematous macular lesions on the medial left ankle and lower leg that appear to be darkening and healing

## 2022-04-17 NOTE — ED PROVIDER NOTE - CLINICAL SUMMARY MEDICAL DECISION MAKING FREE TEXT BOX
89 y/o female w/ a PMHx of CVA, MDS, asthma, hypothyroid, HTN, and rib fractures s/p trip and fall, concern for hip fracture. Will get imaging, pain control, and ortho consult.

## 2022-04-17 NOTE — H&P ADULT - HISTORY OF PRESENT ILLNESS
88 yo F with a PMH CVA (left thalamic infarct, 2019), JAK2+ myelofibrosis, asthma/bronchiectasis, HTN, and hypothyroidism who presents after a fall.    In the ED, she was given Morphine 2 mg IV x1 and  ml x1. 88 yo F with a PMH CVA (left thalamic infarct, 2019) s/p implantable loop recorder, JAK2+ myelofibrosis, bronchiectasis (on 4L NC), osteoporosis, HTN, and hypothyroidism who presents after a fall.  Her aide had been wheeling her back from the bathroom this morning around 5AM, when she slipped off and fell on her leg. Her aide was able to get her back into the bed, but she was complaining of pain in her left hip. Her daughters came by later and noticed her left upper leg was getting more and more swollen, so they took her to the ED.  She does not normally fall, but she has difficulty walking (due to osteoporosis and dyspnea), using a walker with 1-person assist.    The patient notes some dyspnea, but denies any pain at this time. She denies CP, abdominal pain, diarrhea, N/V, fevers, or chills.  She is being treated for a rash on her left ankle/foot with an antifungal cream, which is improving it.    In the ED, she was given Morphine 2 mg IV x1 and  ml x1. 88 yo F with a PMH CVA (left thalamic infarct, 2019) s/p implantable loop recorder, JAK2+ myelofibrosis, bronchiectasis (on 4L NC), osteoporosis, HTN, anxiety, and hypothyroidism who presents after a fall.  Her aide had been wheeling her back from the bathroom this morning around 5AM, when she slipped off and fell on her leg. Her aide was able to get her back into the bed, but she was complaining of pain in her left hip. Her daughters came by later and noticed her left upper leg was getting more and more swollen, so they took her to the ED.  She does not normally fall, but she has difficulty walking (due to osteoporosis and dyspnea), using a walker with 1-person assist.    The patient notes some dyspnea, but denies any pain at this time. She denies CP, abdominal pain, diarrhea, N/V, fevers, or chills.  She is being treated for a rash on her left ankle/foot with an antifungal cream, which is improving it.    In the ED, she was given Morphine 2 mg IV x1 and  ml x1.

## 2022-04-17 NOTE — H&P ADULT - NSHPSOCIALHISTORY_GEN_ALL_CORE
No smoking or drug use history.  Drinks a few sips of wine a night with a shot of [employer] liquor every Sunday.  Lives with her daughter Anel and her daughter's family.

## 2022-04-17 NOTE — PATIENT PROFILE ADULT - FALL HARM RISK - HARM RISK INTERVENTIONS

## 2022-04-17 NOTE — CONSULT NOTE ADULT - SUBJECTIVE AND OBJECTIVE BOX
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 Patient is a minimal ambulator at baseline, predominately in chair/bed. Lives at home, daughter is primary point of care during the day with hired aide at night. Denies numbness/tingling in the affected extremity. Denies head strike/LOC/other orthopedic injuries at this time. No anticoagulation     PAST MEDICAL & SURGICAL HISTORY:  Rib fractures    Essential hypertension    Hypothyroidism, unspecified type    Asthma, unspecified asthma severity, unspecified whether complicated, unspecified whether persistent    MDS (myelodysplastic syndrome)    Cerebrovascular accident (CVA)  6/7/19 Left thalamic infarct    No significant past surgical history      Home Medications:  aspirin 325 mg oral tablet: 1 tab(s) orally once a day (24 Jul 2020 19:56)  atorvastatin 40 mg oral tablet: 1 tab(s) orally once a day (at bedtime) (24 Jul 2020 19:56)  Brovana 15 mcg/2 mL inhalation solution: 2 milliliter(s) inhaled 2 times a day (24 Jul 2020 19:56)  budesonide 0.5 mg/2 mL inhalation suspension: 2 milliliter(s) inhaled 2 times a day (24 Jul 2020 19:56)  Incruse Ellipta 62.5 mcg/inh inhalation powder: 1 puff(s) inhaled once a day (24 Jul 2020 19:56)  levothyroxine 100 mcg (0.1 mg) oral tablet: 1 tab(s) orally once a day (24 Jul 2020 19:56)  montelukast 10 mg oral tablet: 1 tab(s) orally once a day (24 Jul 2020 19:56)  ProAir HFA 90 mcg/inh inhalation aerosol: 2 puff(s) inhaled every 6 hours, As Needed (24 Jul 2020 19:56)    Allergies    No Known Allergies    Intolerances                              8.2    18.30 )-----------( 897      ( 17 Apr 2022 10:01 )             30.2     04-17    139  |  103  |  35<H>  ----------------------------<  148<H>  4.3   |  31  |  0.82    Ca    9.6      17 Apr 2022 10:01    TPro  7.3  /  Alb  3.4  /  TBili  0.7  /  DBili  x   /  AST  20  /  ALT  20  /  AlkPhos  115  04-17    PT/INR - ( 17 Apr 2022 10:01 )   PT: 13.0 sec;   INR: 1.12 ratio         PTT - ( 17 Apr 2022 10:01 )  PTT:28.5 sec        Vital Signs Last 24 Hrs  T(C): 36.8 (17 Apr 2022 09:09), Max: 36.8 (17 Apr 2022 09:09)  T(F): 98.3 (17 Apr 2022 09:09), Max: 98.3 (17 Apr 2022 09:09)  HR: 72 (17 Apr 2022 09:09) (72 - 113)  BP: 135/82 (17 Apr 2022 09:09) (135/82 - 182/96)  BP(mean): 98 (17 Apr 2022 09:09) (98 - 98)  RR: 18 (17 Apr 2022 09:09) (18 - 19)  SpO2: 99% (17 Apr 2022 09:09) (96% - 99%)    PHYSICAL EXAM  General: NAD, Awake and Alert    LEFT Lower Extremity:   Skin intact  No ecchymosis or swelling  Shortened and externally rotated   TTP over the bony prominences of the hip  NTTP over the bony prominences of the knee/ankle/foot/toes  L2-S1 SILT  +EHL/FHL/TA/GSC  +DP pulses  Calf nontender  Compartments soft and compressible    Secondary Assessment:  NC/AT, NTTP of clavicles, NTTP of C-,T-,L-Spine, NTTP of Pelvis  UEs: NTTP of Shoulders, Elbows, Wrists, Hands; NT with AROM/PROM of Shoulders, Elbows, Wrists, Hands; AIN/PIN/Med/Uln/Msc/Rad/Ax intact  RLE: Able to SLR, NT with Log Roll, NT with Heel Strike, NTTP of Hip, Knee, Ankle, Feet; NT with AROM/PROM of Hip, Knee, Ankle, Feet; Q/H/Gsc/TA/EHL/FHL intact        IMAGING:  XR LEFT Hip: intertrochanteric fracture        Assessment/Plan:  89y Female with  LEFT intertrochanteric fracture    -Admit to medical team  -Pain control as needed  -Bedrest, NWB LEFT lower extremity   -Plan for IMN of  LEFT hip when medically optimized, unlikely to go today, may give diet   -Follow CBC, daughter states her platelets/hemoglobin have been fluctuating based on different therapy regiments for MDS  -Would appreciate hemo/onc recs   -DVT ppx: Please hold all chemical dvt ppx for OR, usually thrombocytotic, defer to primary v heme/onc team for AC recs, ok to start anticoagulation today   -Consider BLLE dopp given poor ambulator status   -Needs medical optimization for OR, please document medical clearance  -Appreciate pulm recs, follows outpatient with Dr. Lagos   -Follows with Dr. Zavala for ILR, may require interrogation   -Medical management per primary team  -Daughter/HCP Cathy is primary point of care, consent'd as HCP. 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 Patient is a minimal ambulator at baseline, predominately in chair/bed. Lives at home, daughter is primary point of care during the day with hired aide at night. Denies numbness/tingling in the affected extremity. Denies head strike/LOC/other orthopedic injuries at this time. No anticoagulation     PAST MEDICAL & SURGICAL HISTORY:  Rib fractures    Essential hypertension    Hypothyroidism, unspecified type    Asthma, unspecified asthma severity, unspecified whether complicated, unspecified whether persistent    MDS (myelodysplastic syndrome)    Cerebrovascular accident (CVA)  6/7/19 Left thalamic infarct    No significant past surgical history      Home Medications:  aspirin 325 mg oral tablet: 1 tab(s) orally once a day (24 Jul 2020 19:56)  atorvastatin 40 mg oral tablet: 1 tab(s) orally once a day (at bedtime) (24 Jul 2020 19:56)  Brovana 15 mcg/2 mL inhalation solution: 2 milliliter(s) inhaled 2 times a day (24 Jul 2020 19:56)  budesonide 0.5 mg/2 mL inhalation suspension: 2 milliliter(s) inhaled 2 times a day (24 Jul 2020 19:56)  Incruse Ellipta 62.5 mcg/inh inhalation powder: 1 puff(s) inhaled once a day (24 Jul 2020 19:56)  levothyroxine 100 mcg (0.1 mg) oral tablet: 1 tab(s) orally once a day (24 Jul 2020 19:56)  montelukast 10 mg oral tablet: 1 tab(s) orally once a day (24 Jul 2020 19:56)  ProAir HFA 90 mcg/inh inhalation aerosol: 2 puff(s) inhaled every 6 hours, As Needed (24 Jul 2020 19:56)    Allergies    No Known Allergies    Intolerances                              8.2    18.30 )-----------( 897      ( 17 Apr 2022 10:01 )             30.2     04-17    139  |  103  |  35<H>  ----------------------------<  148<H>  4.3   |  31  |  0.82    Ca    9.6      17 Apr 2022 10:01    TPro  7.3  /  Alb  3.4  /  TBili  0.7  /  DBili  x   /  AST  20  /  ALT  20  /  AlkPhos  115  04-17    PT/INR - ( 17 Apr 2022 10:01 )   PT: 13.0 sec;   INR: 1.12 ratio         PTT - ( 17 Apr 2022 10:01 )  PTT:28.5 sec        Vital Signs Last 24 Hrs  T(C): 36.8 (17 Apr 2022 09:09), Max: 36.8 (17 Apr 2022 09:09)  T(F): 98.3 (17 Apr 2022 09:09), Max: 98.3 (17 Apr 2022 09:09)  HR: 72 (17 Apr 2022 09:09) (72 - 113)  BP: 135/82 (17 Apr 2022 09:09) (135/82 - 182/96)  BP(mean): 98 (17 Apr 2022 09:09) (98 - 98)  RR: 18 (17 Apr 2022 09:09) (18 - 19)  SpO2: 99% (17 Apr 2022 09:09) (96% - 99%)    PHYSICAL EXAM  General: NAD, Awake and Alert    LEFT Lower Extremity:   Skin intact  No ecchymosis or swelling  Shortened and externally rotated   TTP over the bony prominences of the hip  NTTP over the bony prominences of the knee/ankle/foot/toes  L2-S1 SILT  +EHL/FHL/TA/GSC  +DP pulses  Calf nontender  Compartments soft and compressible    Secondary Assessment:  NC/AT, NTTP of clavicles, NTTP of C-,T-,L-Spine, NTTP of Pelvis  UEs: NTTP of Shoulders, Elbows, Wrists, Hands; NT with AROM/PROM of Shoulders, Elbows, Wrists, Hands; AIN/PIN/Med/Uln/Msc/Rad/Ax intact  RLE: Able to SLR, NT with Log Roll, NT with Heel Strike, NTTP of Hip, Knee, Ankle, Feet; NT with AROM/PROM of Hip, Knee, Ankle, Feet; Q/H/Gsc/TA/EHL/FHL intact        IMAGING:  XR LEFT Hip: intertrochanteric fracture        Assessment/Plan:  89y Female with  LEFT intertrochanteric fracture    -Admit to medical team  -Pain control as needed  -Bedrest, NWB LEFT lower extremity   -Plan for IMN of  LEFT hip when medically optimized, unlikely to go today, may give diet   -Follow CBC, H/H 8.2/30.2, daughter states her platelets/hemoglobin have been fluctuating based on different therapy regiments for MDS, would prefer to transfuse preoperatively if ok from medicine v heme/onc standpoint  -Would appreciate hemo/onc recs   -DVT ppx: Please hold all chemical dvt ppx for OR, usually thrombocytotic, defer to primary v heme/onc team for AC recs, ok to start anticoagulation today   -Consider BLLE dopp given poor ambulator status   -Needs medical optimization for OR, please document medical clearance  -Appreciate pulm recs, follows outpatient with Dr. Lagos   -Follows with Dr. Zavala for ILR, may require interrogation   -Medical management per primary team  -Daughter/HCP Cathy is primary point of care, consent'd as HCP. All questions answered  -Will discuss with attending, and advise if plan changes

## 2022-04-17 NOTE — H&P ADULT - NSHPREVIEWOFSYSTEMS_GEN_ALL_CORE
Gen: negative for fevers or chills  Eyes: no blurred vision  ENT: no tinnitus or vertigo  Resp: + dyspnea. No cough, hemoptysis, or pleuritic chest pain  CV: no chest pain or palpitations  GI: no nausea, vomiting, abdominal pain, diarrhea, constipation, melena, or hematochezia  : no dysuria or hematuria  MSK: + arthralgias upon movement, joint swelling  Neuro: no focal deficits, dizziness, tremors, or seizures  Skin: + rash. No lesions

## 2022-04-17 NOTE — CONSULT NOTE ADULT - ATTENDING COMMENTS
Orthopedic Sports Attending:    Agree with above resident/PA note.  Note edited where necessary.  Patient sustained a fall at home from standing height. She is a community ambulator at baseline with aides. Reviewed imaging. Patient has a left IT hip fracture with displacement    Patient seen and examined at bedside. Discussed the risks, complications, benefits and alternatives of care. Confirmed procedure and site with patient and daughter. Patient and daughter fully understands and would like to proceed with surgery. Plan will be Intramedullary nail fixation once patient is medically optimized.    Cameron Zavala, DO  Orthopaedic Surgery

## 2022-04-17 NOTE — H&P ADULT - NSHPOUTPATIENTPROVIDERS_GEN_ALL_CORE
PCP, Cordell Yousif  Hematology, Isai Rodas PCP, Cordell Yousif  Pulmonology, Chris Lagos  Hematology, Isai Rodas

## 2022-04-17 NOTE — H&P ADULT - ASSESSMENT
88 yo F with a PMH CVA (left thalamic infarct, 2019), JAK2+ myelofibrosis, asthma/bronchiectasis, HTN, and hypothyroidism who presents after a fall, found to have a left femoral neck fracture.    #Left Femoral Neck Fracture    #Primary Myelofibrosis    #Preoperative Clearance    #Bronchiectasis    #Hypothyroidism    #HTN    # 88 yo F with a PMH CVA (left thalamic infarct, 2019) s/p implantable loop recorder, JAK2+ myelofibrosis, bronchiectasis (on 4L NC), osteoporosis, HTN, and hypothyroidism who presents after a fall, found to have a left femoral neck fracture.    #Left Femoral Neck Fracture  - Left intertrochanteric fracture s/p reportedly mechanical fall. Patient does not fall normally  - Seen by ortho, plan for OR tomorrow 4/18  - Pain control  - NWB LLE  -   - _____________________    #Preoperative Clearance  - ___________________________    #Primary Myelofibrosis  - Patient has a history of myelofibrosis, JAK2+, diagnosed in 2016 after bone marrow biopsy  - Had been on Hydroxyurea for several years (with temporary hold in 0117-8101 due to cytopenias) until Feb 2022, when she was started on Jakafi 10 mg BID  - Jakafi with success but has been held for 4 weeks due to cytopenias (Hb as low as 7.5)  - Since holding Jakafi, all 3 cell lines have increased (Hb only mildly)  - MF places patient at both an increased risk of bleeding and thrombosis perioperatively as noted above  - F/u Hematology for further recs    #Bronchiectasis  - Would avoid Duonebs for now given tachycardia and only give Ipratropium  - Brovana not on formulary, pt can bring her own  - C/w Budesonide 2 puffs BID  - C/w Prednisone 5 mg QD and PPI while on steroids  - C/w Montelukast 10 mg QD  - Azithromycin 250 mg MWF for anti-inflammatory effect and PPX  - Would get Pulmonary consult to titrate meds, particularly in the setting of tachycardia    #Hypothyroidism  - S/p partial thyroidectomy many years ago  - C/w Levothyroxine 100 ucg QD    #HTN  - C/w Amlodipine 5 mg QD and Lisinopril 10 mg QD    #Fungal dermatitis  - Improved on Ketoconazole cream (will give Clotrimazole inpatient, as it is the only one on formulary here)    #Prophylactic Measure  - DVT PPX: IMPROVE score of ____, can give SQH but hold on day of surgery  - Diet: DASH, NPO after midnight  - Dispo: pending OR and postsurgical course    #Advanced Care Planning/Counseling Discussion  - Discussed advanced care planning, including code status, with the patient's HCP, daughter Anel Ibarra at bedside. The patient has previously expressed she wanted to be DNR/DNI in an incompletely filled out MOLST form. Given that the patient is having some confusion in the setting of acute fracture, deferred discussion to Ms. Fred. She statedthat in the event of cardiac or pulmonary arrest, the would not want resuscitation or intubation (DNR/DNI). In addition, she would not want a feeding tube. Ms. Ibarra is understanding that some of these requests may need to be held for the surgery itself.  - MOLST form completed  - 18 minutes spent 88 yo F with a PMH CVA (left thalamic infarct, 2019) s/p implantable loop recorder, JAK2+ myelofibrosis, bronchiectasis (on 4L NC), osteoporosis, HTN, anxiety, and hypothyroidism who presents after a fall, found to have a left femoral neck fracture.    #Left Femoral Neck Fracture  - Left intertrochanteric fracture s/p reportedly mechanical fall. Patient does not fall normally  - Seen by ortho, plan for OR tomorrow 4/18  - Pain control  - NWB LLE  -   - _____________________    #Preoperative Clearance  - ___________________________    #Primary Myelofibrosis  - Patient has a history of myelofibrosis, JAK2+, diagnosed in 2016 after bone marrow biopsy  - Had been on Hydroxyurea for several years (with temporary hold in 9808-5139 due to cytopenias) until Feb 2022, when she was started on Jakafi 10 mg BID  - Jakafi with success but has been held for 4 weeks due to cytopenias (Hb as low as 7.5)  - Since holding Jakafi, all 3 cell lines have increased (Hb only mildly)  - MF places patient at both an increased risk of bleeding and thrombosis perioperatively as noted above  - F/u Hematology for further recs    #Bronchiectasis  - Would avoid Duonebs for now given tachycardia and only give Ipratropium  - Brovana not on formulary, pt can bring her own  - C/w Budesonide 2 puffs BID  - C/w Prednisone 5 mg QD and PPI while on steroids  - C/w Montelukast 10 mg QD  - Azithromycin 250 mg MWF for anti-inflammatory effect and PPX  - Would get Pulmonary consult to titrate meds, particularly in the setting of tachycardia    #Hypothyroidism  - S/p partial thyroidectomy many years ago  - C/w Levothyroxine 100 ucg QD    #HTN  - C/w Amlodipine 5 mg QD and Lisinopril 10 mg QD    #Fungal dermatitis  - Improved on Ketoconazole cream (will give Clotrimazole inpatient, as it is the only one on formulary here)    #Prophylactic Measure  - DVT PPX: IMPROVE score of ____, can give SQH but hold on day of surgery  - Diet: DASH, NPO after midnight  - Dispo: pending OR and postsurgical course    #Advanced Care Planning/Counseling Discussion  - Discussed advanced care planning, including code status, with the patient's HCP, daughter Anel Ibarra at bedside. The patient has previously expressed she wanted to be DNR/DNI in an incompletely filled out MOLST form. Given that the patient is having some confusion in the setting of acute fracture, deferred discussion to Ms. Fred. She statedthat in the event of cardiac or pulmonary arrest, the would not want resuscitation or intubation (DNR/DNI). In addition, she would not want a feeding tube. Ms. Ibarra is understanding that some of these requests may need to be held for the surgery itself.  - MOLST form completed  - 18 minutes spent 89 yo F with a PMH CVA (left thalamic infarct, 2019) s/p implantable loop recorder, JAK2+ myelofibrosis, bronchiectasis (on 4L NC), osteoporosis, HTN, anxiety, and hypothyroidism who presents after a fall, found to have a left femoral neck fracture.    #Left Femoral Neck Fracture  - Left intertrochanteric fracture s/p reportedly mechanical fall. Patient does not fall normally  - Seen by ortho, plan for OR tomorrow 4/18  - Pain control PRN  - NWB LLE  - Hold SQH in AM  - NPO after midnight  - Perioperative risk and preoperative management as noted below    #Preoperative Clearance  - Given patient has some pathological Q waves on EKG (mild, V4-6), her RCRI score is 1, which makes her have a 1.3% risk of MI/pulmonary edema/ventricular fibrillation/cardiac arrest/complete heart block (Farhan et al.), a low-moderate risk of adverse cardiac event in a moderate risk surgery  - According to ACS NSQIP, particularly given her bronchiectasis, steroid use, and functional status, the patient has a 25% risk of any complication, and a 21.6% risk of serious complication from surgery. Overall she is at a moderate-high risk of adverse surgical complication; however, patient's surgery is an urgent surgery  - Given the patient is on chronic steroids at 5 mg Prednisone daily, hold Prednisone for today and would check AM cortisol tomorrow. If cortisol is > 5, patient should continue to take normal 5 mg Prednisone. If cortisol is < 5, would give 50 mg Hydrocortisone about 30-60 minutes before the procedure and 25 mg IV Q8H after for 24 hours for additional coverage, in addition to taking her morning Prednisone 5 mg  - Does not need evaluation with TTE or stress test prior to surgery  - Given patient has myelofibrosis  - Bronchiectasis management as noted below. F/u with Pulmonology for further recommendations    #Primary Myelofibrosis  - Patient has a history of myelofibrosis, JAK2+, diagnosed in 2016 after bone marrow biopsy  - Had been on Hydroxyurea for several years (with temporary hold in 3771-3662 due to cytopenias) until Feb 2022, when she was started on Jakafi 10 mg BID  - Jakafi with success but has been held for 4 weeks due to cytopenias (Hb as low as 7.5)  - Since holding Jakafi, all 3 cell lines have increased (Hb only mildly)  - MF places patient at both an increased risk of bleeding and thrombosis perioperatively as noted above  - F/u Hematology for further recs    #Bronchiectasis  - Would avoid Duonebs for now given tachycardia and only give Ipratropium  - Brovana not on formulary, pt can bring her own  - C/w Budesonide 2 puffs BID  - C/w Prednisone 5 mg QD and PPI while on steroids  - C/w Montelukast 10 mg QD  - Azithromycin 250 mg MWF for anti-inflammatory effect and PPX  - Would get Pulmonary consult to titrate meds, particularly in the setting of tachycardia    #Hypothyroidism  - S/p partial thyroidectomy many years ago  - C/w Levothyroxine 100 ucg QD    #HTN  - C/w Amlodipine 5 mg QD and Lisinopril 10 mg QD    #Fungal dermatitis  - Improved on Ketoconazole cream (will give Clotrimazole inpatient, as it is the only one on formulary here)    #Prophylactic Measure  - DVT PPX: IMPROVE score of ____, can give SQH but hold on day of surgery  - Diet: DASH, NPO after midnight  - Dispo: pending OR and postsurgical course    #Advanced Care Planning/Counseling Discussion  - Discussed advanced care planning, including code status, with the patient's HCP, daughter Anel Ibarra at bedside. The patient has previously expressed she wanted to be DNR/DNI in an incompletely filled out MOLST form. Given that the patient is having some confusion in the setting of acute fracture, deferred discussion to Ms. Ibarra. She statedthat in the event of cardiac or pulmonary arrest, the would not want resuscitation or intubation (DNR/DNI). In addition, she would not want a feeding tube. MsStacey Fred is understanding that some of these requests may need to be held for the surgery itself.  - MOLST form completed  - 18 minutes spent 89 yo F with a PMH CVA (left thalamic infarct, 2019) s/p implantable loop recorder, JAK2+ myelofibrosis, bronchiectasis (on 4L NC), osteoporosis, HTN, anxiety, and hypothyroidism who presents after a fall, found to have a left femoral neck fracture.    #Left Femoral Neck Fracture  - Left intertrochanteric fracture s/p reportedly mechanical fall. Patient does not fall normally  - Seen by ortho, plan for OR tomorrow 4/18  - Pain control PRN  - NWB LLE  - Hold SQH in AM  - NPO after midnight  - Perioperative risk and preoperative management as noted below    #Preoperative Clearance  - Given patient has some pathological Q waves on EKG (mild, V4-6), her RCRI score is 1, which makes her have a 1.3% risk of MI/pulmonary edema/ventricular fibrillation/cardiac arrest/complete heart block (Farhan et al.), a low-moderate risk of adverse cardiac event in a moderate risk surgery  - According to ACS NSQIP, particularly given her bronchiectasis, steroid use, and functional status, the patient has a 25% risk of any complication, and a 21.6% risk of serious complication from surgery. Overall she is at a moderate-high risk of adverse surgical complication; however, patient's surgery is an urgent surgery  - Given the patient is on chronic steroids at 5 mg Prednisone daily, hold Prednisone for today and would check AM cortisol tomorrow. If cortisol is > 5, patient should continue to take normal 5 mg Prednisone. If cortisol is < 5, would give 50 mg Hydrocortisone about 30-60 minutes before the procedure and 25 mg IV Q8H after for 24 hours for additional coverage, in addition to taking her morning Prednisone 5 mg  - Does not need evaluation with TTE or stress test prior to surgery  - Given patient has myelofibrosis, will f/u with Hematology to determine any recommendations perioperatively. Will transfuse 1U PRBCs as noted below  - Bronchiectasis management as noted below. F/u with Pulmonology for further recommendations    #Primary Myelofibrosis  - Patient has a history of myelofibrosis, JAK2+, diagnosed in 2016 after bone marrow biopsy  - Had been on Hydroxyurea for several years (with temporary hold in 7466-4124 due to cytopenias) until Feb 2022, when she was started on Jakafi 10 mg BID  - Jakafi with success but has been held for 4 weeks due to cytopenias (Hb as low as 7.5)  - Since holding Jakafi, all 3 cell lines have increased (Hb only mildly)  - MF places patient at both an increased risk of bleeding and thrombosis perioperatively as noted above  - Would recommend continuing aspirin given thrombotic risk and transfuse 1U PRBCs to Hb > 9  - F/u Hematology for further recs    #Bronchiectasis  - Would avoid Duonebs for now given tachycardia and only give Ipratropium  - Brovana not on formulary, pt can bring her own  - C/w Budesonide 2 puffs BID  - C/w Prednisone 5 mg QD and PPI while on steroids  - C/w Montelukast 10 mg QD  - Azithromycin 250 mg MWF for anti-inflammatory effect and PPX  - Would get Pulmonary consult to titrate meds, particularly in the setting of tachycardia    #Hypothyroidism  - S/p partial thyroidectomy many years ago  - C/w Levothyroxine 100 ucg QD    #HTN  - C/w Amlodipine 5 mg QD and Lisinopril 10 mg QD    #Fungal dermatitis  - Improved on Ketoconazole cream (will give Clotrimazole inpatient, as it is the only one on formulary here)    #Prophylactic Measure  - DVT PPX: IMPROVE score of 3, can give SQH tonight, hold on day of surgery, and start Lovenox POD #1  - Diet: DASH, NPO after midnight  - Dispo: pending OR and postsurgical course    #Advanced Care Planning/Counseling Discussion  - Discussed advanced care planning, including code status, with the patient's HCP, daughter Anel Ibarra at bedside. The patient has previously expressed she wanted to be DNR/DNI in an incompletely filled out MOLST form. Given that the patient is having some confusion in the setting of acute fracture, deferred discussion to Ms. Ibarra. She statedthat in the event of cardiac or pulmonary arrest, the would not want resuscitation or intubation (DNR/DNI). In addition, she would not want a feeding tube. Ms. Ibarra is understanding that some of these requests may need to be held for the surgery itself.  - MOLST form completed  - 18 minutes spent 89 yo F with a PMH CVA (left thalamic infarct, 2019) s/p implantable loop recorder, JAK2+ myelofibrosis, bronchiectasis (on 4L NC), osteoporosis, HTN, anxiety, and hypothyroidism who presents after a fall, found to have a left femoral neck fracture.    #Fracture of Femoral Neck, Left  - Left intertrochanteric fracture s/p reportedly mechanical fall. Patient does not fall normally  - Seen by ortho, plan for OR tomorrow 4/18  - Pain control PRN  - NWB LLE  - Hold SQH in AM  - NPO after midnight  - Perioperative risk and preoperative management as noted below    #Preoperative Clearance  - Given patient has some pathological Q waves on EKG (mild, V4-6), her RCRI score is 1, which makes her have a 1.3% risk of MI/pulmonary edema/ventricular fibrillation/cardiac arrest/complete heart block (Farhan et al.), a low-moderate risk of adverse cardiac event in a moderate risk surgery  - According to ACS NSQIP, particularly given her bronchiectasis, steroid use, and functional status, the patient has a 25% risk of any complication, and a 21.6% risk of serious complication from surgery. Overall she is at a moderate-high risk of adverse surgical complication; however, patient's surgery is an urgent surgery  - Given the patient is on chronic steroids at 5 mg Prednisone daily, hold Prednisone for today and would check AM cortisol tomorrow. If cortisol is > 5, patient should continue to take normal 5 mg Prednisone. If cortisol is < 5, would give 50 mg Hydrocortisone about 30-60 minutes before the procedure and 25 mg IV Q8H after for 24 hours for additional coverage, in addition to taking her morning Prednisone 5 mg  - Does not need evaluation with TTE or stress test prior to surgery  - Given patient has myelofibrosis, will f/u with Hematology to determine any recommendations perioperatively. Will transfuse 1U PRBCs as noted below  - Bronchiectasis management as noted below. F/u with Pulmonology for further recommendations    #Primary Myelofibrosis  - Patient has a history of myelofibrosis, JAK2+, diagnosed in 2016 after bone marrow biopsy  - Had been on Hydroxyurea for several years (with temporary hold in 3434-2828 due to cytopenias) until Feb 2022, when she was started on Jakafi 10 mg BID  - Jakafi with success but has been held for 4 weeks due to cytopenias (Hb as low as 7.5)  - Since holding Jakafi, all 3 cell lines have increased (Hb only mildly)  - MF places patient at both an increased risk of bleeding and thrombosis perioperatively as noted above  - Would recommend continuing aspirin given thrombotic risk and transfuse 1U PRBCs to Hb > 9  - F/u Hematology for further recs    #Adult Bronchiectasis  - Would avoid Duonebs for now given tachycardia. Discussed with pharmacy, best alternative would be Tiotropium for now  - Aritsides not on formulary, pt can bring her own  - C/w Budesonide 2 puffs BID  - C/w Prednisone 5 mg QD and PPI while on steroids  - C/w Montelukast 10 mg QD  - Azithromycin 250 mg MWF for anti-inflammatory effect and PPX  - Would get Pulmonary consult to titrate meds, particularly in the setting of tachycardia    #Hypothyroidism  - S/p partial thyroidectomy many years ago  - C/w Levothyroxine 100 ucg QD    #HTN  - C/w Amlodipine 5 mg QD and Lisinopril 10 mg QD    #Fungal dermatitis  - Improved on Ketoconazole cream (will give Clotrimazole inpatient, as it is the only one on formulary here)    #Prophylactic Measure  - DVT PPX: IMPROVE score of 3, can give SQH tonight, hold on day of surgery, and start Lovenox POD #1  - Diet: DASH, NPO after midnight  - Dispo: pending OR and postsurgical course    #Advanced Care Planning/Counseling Discussion  - Discussed advanced care planning, including code status, with the patient's HCP, daughter Anel Ibarra at bedside. The patient has previously expressed she wanted to be DNR/DNI in an incompletely filled out MOLST form. Given that the patient is having some confusion in the setting of acute fracture, deferred discussion to Ms. Ibarra. She statedthat in the event of cardiac or pulmonary arrest, the would not want resuscitation or intubation (DNR/DNI). In addition, she would not want a feeding tube. Ms. Ibarra is understanding that some of these requests may need to be held for the surgery itself.  - MOLST form completed  - 18 minutes spent

## 2022-04-17 NOTE — H&P ADULT - NSICDXPASTMEDICALHX_GEN_ALL_CORE_FT
PAST MEDICAL HISTORY:  Asthma, unspecified asthma severity, unspecified whether complicated, unspecified whether persistent     Bronchiectasis     Cerebrovascular accident (CVA) 6/7/19 Left thalamic infarct    Essential hypertension     Hypothyroidism, unspecified type     Myelofibrosis     Rib fractures      PAST MEDICAL HISTORY:  Anxiety     Asthma, unspecified asthma severity, unspecified whether complicated, unspecified whether persistent     Bronchiectasis     Cerebrovascular accident (CVA) 6/7/19 Left thalamic infarct s/p ILR    Essential hypertension     Hypothyroidism, unspecified type     Myelofibrosis     Rib fractures

## 2022-04-17 NOTE — ED PROVIDER NOTE - PHYSICAL EXAMINATION
Constitutional: Elderly female, laying in bed, in mild distress. Awake.  Eyes: PERRLA EOMI  Head: Normocephalic atraumatic  Mouth: MMM  Cardiac: regular rate   Resp: Lungs CTAB  GI: Abd s/nt/nd, no rebound or guarding.  MSK: Left hip tenderness.   Neuro: awake, alert, moving all extremities, cranial nerves 2-12 intact, sensation intact, no dysmetria.  Skin: No rashes

## 2022-04-17 NOTE — H&P ADULT - NSHPLABSRESULTS_GEN_ALL_CORE
Labs personally reviewed and interpreted. Notable for leukocytosis (WBC 18.3) that is at patient's baseline with 90% neutrophils but no lymphopenia. Hb 8.3 (was 11 most recently) with thrombocytosis of 897 (slightly higher than most recent baselines), INR 1.12, Na 139, K 4.3, Cl 105, HCO3 31, BUN elevated at 35, creatinine 0.82, , calcium 9.6 with albumin 3.4, and LFTs wnl.  UA shows SG 1.020, 100 protein, small LE, trace blood, 11-25 WBCs, 3-5 RBCs, few bacteria with occasional epithelial cells, and occasional yeast.  COVID-19 PCR result pending.    CXR personally reviewed and interpreted. Notable for slightly rotated AP film, clear lungs, no focal consolidations, effusions, interstitial markings, pneumothorax, or obvious cardiomegaly. + scoliosis.  CT head without contrast personally reviewed and interpreted. Notable for moderate atrophy and small vessel white matter ischemic changes. No hemorrhage. No change since 10/8/2020.  XR left femur and pelvis personally reviewed and interpreted. Notable for left intertrochanteric fracture.  Bilateral U/S of the extremities personally reviewed and interpreted. Notable for no evidence of deep venous thrombosis in either lower extremity.    EKG personally reviewed.______________ Labs personally reviewed and interpreted. Notable for leukocytosis (WBC 18.3) that is at patient's baseline with 90% neutrophils but no lymphopenia. Hb 8.3 (was 11 most recently) with thrombocytosis of 897 (slightly higher than most recent baselines), INR 1.12, Na 139, K 4.3, Cl 105, HCO3 31, BUN elevated at 35, creatinine 0.82, , calcium 9.6 with albumin 3.4, and LFTs wnl.  UA shows SG 1.020, 100 protein, small LE, trace blood, 11-25 WBCs, 3-5 RBCs, few bacteria with occasional epithelial cells, and occasional yeast.  COVID-19 PCR result negative.    CXR personally reviewed and interpreted. Notable for slightly rotated AP film, clear lungs, no focal consolidations, effusions, interstitial markings, pneumothorax, or obvious cardiomegaly. + scoliosis.  CT head without contrast personally reviewed and interpreted. Notable for moderate atrophy and small vessel white matter ischemic changes. No hemorrhage. No change since 10/8/2020.  XR left femur and pelvis personally reviewed and interpreted. Notable for left intertrochanteric fracture.  Bilateral U/S of the extremities personally reviewed and interpreted. Notable for no evidence of deep venous thrombosis in either lower extremity.    EKG personally reviewed. Sinus tachycardia, normal axis. Q waves in leads I, II, III, V4-6, and slight in aVF and aVL. No concerning ST changes. Rate 113, , QTc 419. Labs personally reviewed and interpreted. Notable for leukocytosis (WBC 18.3) that is at patient's baseline with 90% neutrophils but no lymphopenia. Hb 8.3 (was 11 most recently) with thrombocytosis of 897 (slightly higher than most recent baselines), INR 1.12, Na 139, K 4.3, Cl 105, HCO3 31, BUN elevated at 35, creatinine 0.82, , calcium 9.6 with albumin 3.4, and LFTs wnl.  UA shows SG 1.020, 100 protein, small LE, trace blood, 11-25 WBCs, 3-5 RBCs, few bacteria with occasional epithelial cells, and occasional yeast.  COVID-19 PCR result negative.    CXR personally reviewed and interpreted. Notable for slightly rotated AP film, clear lungs, no focal consolidations, effusions, interstitial markings, pneumothorax, or obvious cardiomegaly. + scoliosis.  CT head without contrast personally reviewed and interpreted. Notable for moderate atrophy and small vessel white matter ischemic changes. No hemorrhage. No change since 10/8/2020.  XR left femur and pelvis personally reviewed and interpreted. Notable for left intertrochanteric fracture.  Bilateral U/S of the extremities personally reviewed and interpreted. Notable for no evidence of deep venous thrombosis in either lower extremity.    EKG personally reviewed. Sinus tachycardia, normal axis. Q waves in leads I, II, III, V4-6, and slight in aVF and aVL, all of which are unchanged from prior. No concerning ST changes. Rate 113, , QTc 419.

## 2022-04-17 NOTE — ED ADULT TRIAGE NOTE - CHIEF COMPLAINT QUOTE
patient brought in by EMS from home c/o left hip pain s/p trip and fall this morning.  patient had mechanical trip and fall around 5 AM while walking with walker back to bed, witnessed by daughter.  - head strike, - LOC.  c/o left hip pain.  on baby ASA. inward rotation of left leg noted.

## 2022-04-18 NOTE — DISCHARGE NOTE PROVIDER - NSDCFUADDINST_GEN_ALL_CORE_FT
Discharge Instructions for Right**/**Left Hip IMN:    1. PAIN CONTROL: See Med Rec.  2. ACTIVITY: Weight Bearing as Tolerated with assistance and rolling walker  3. PT: daily  4. DVT/PE PROPHYLAXIS: Continue DVT/PE Prophylaxis. See Med Rec for Duration and dose.  5. BANDAGE: Change dressing to a new Mepilex Ag bandage POD7 (**add date**). May change sooner if dressing saturated or falling off. DO NOT REMOVE BANDAGE TO CHECK WOUND ON INTAKE.  6. SHOWER: Okay to shower. Do not soak, submerge or let shower stream beat on dressing/wound.  7. STAPLES/SUTURES: There are no staples or sutures to be removed.   8. FOLLOW UP: Follow-up with Orthopedic Surgeon Dr. Zavala in 14 Days. Call Office For Appointment.

## 2022-04-18 NOTE — BRIEF OPERATIVE NOTE - NSICDXBRIEFPOSTOP_GEN_ALL_CORE_FT
POST-OP DIAGNOSIS:  Intertrochanteric fracture of left hip 18-Apr-2022 20:24:44  José Miguel Ortiz

## 2022-04-18 NOTE — PROGRESS NOTE ADULT - SUBJECTIVE AND OBJECTIVE BOX
Orthopedics      Patient seen and examined at bedside. Mental status at baseline, slightly confused. Appears comfortable. No n/v. No acute events overnight.    Vital Signs Last 24 Hrs  T(C): 37.3 (04-18-22 @ 05:00), Max: 37.3 (04-18-22 @ 05:00)  T(F): 99.1 (04-18-22 @ 05:00), Max: 99.1 (04-18-22 @ 05:00)  HR: 107 (04-18-22 @ 05:00) (72 - 115)  BP: 137/64 (04-18-22 @ 05:00) (134/63 - 182/96)  BP(mean): 104 (04-17-22 @ 15:00) (98 - 104)  RR: 18 (04-18-22 @ 05:00) (18 - 19)  SpO2: 96% (04-18-22 @ 05:00) (96% - 100%)                        8.0    14.72 )-----------( 656      ( 18 Apr 2022 05:03 )             26.9     18 Apr 2022 05:03    143    |  108    |  41     ----------------------------<  142    4.5     |  32     |  1.03     Ca    8.4        18 Apr 2022 05:03  Phos  3.8       18 Apr 2022 05:03  Mg     2.2       18 Apr 2022 05:03    TPro  7.3    /  Alb  3.4    /  TBili  0.7    /  DBili  x      /  AST  20     /  ALT  20     /  AlkPhos  115    17 Apr 2022 10:01    PT/INR - ( 18 Apr 2022 05:03 )   PT: 14.6 sec;   INR: 1.26 ratio         PTT - ( 18 Apr 2022 05:03 )  PTT:30.1 sec    PHYSICAL EXAM  General: NAD, Awake and Alert    LEFT Lower Extremity:   Skin intact  No ecchymosis or swelling  Shortened and externally rotated   TTP over the bony prominences of the hip  NTTP over the bony prominences of the knee/ankle/foot/toes  L2-S1 SILT  +EHL/FHL/TA/GSC  +DP pulses  Calf nontender  Compartments soft and compressible    Assessment/Plan:  89y Female with  LEFT intertrochanteric fracture    -Pain control as needed  -Bedrest, NWB LEFT lower extremity   -Plan for IMN of  LEFT hip this evening  -Follow CBC, H/H 8.2/30.2, heme/onc recs appreciated, s/p 1U [facility] yesterday   -NPO/IVF  -DVT ppx: Please hold all chemical dvt ppx for OR  -Doppler Neg  -Needs medical optimization for OR, please document medical clearance  -Appreciate pulm recs, follows outpatient with Dr. Lagos   -Medical management per primary team  -Daughter/HCP Cathy is primary point of care, consent'd as HCP. All questions answered  -Will discuss with attending, and advise if plan changes

## 2022-04-18 NOTE — DISCHARGE NOTE PROVIDER - CARE PROVIDER_API CALL
Cameron Zavala (DO)  [address], [address]  [place] [address]  Phone: [phone]  Fax: [phone]  Follow Up Time:

## 2022-04-18 NOTE — DISCHARGE NOTE PROVIDER - NSDCCPTREATMENT_GEN_ALL_CORE_FT
PRINCIPAL PROCEDURE  Procedure: Intramedullary nail fixation of left femur  Findings and Treatment:

## 2022-04-18 NOTE — DISCHARGE NOTE PROVIDER - NSDCMRMEDTOKEN_GEN_ALL_CORE_FT
amLODIPine 5 mg oral tablet: 1 tab(s) orally once a day  Aspirin Enteric Coated 81 mg oral delayed release tablet: 1 tab(s) orally once a day  atorvastatin 40 mg oral tablet: 1 tab(s) orally once a day (at bedtime)  azithromycin 250 mg oral tablet: 1 tab(s) orally Monday, Wednesday, and Friday  Brovana 15 mcg/2 mL inhalation solution: 2 milliliter(s) inhaled 2 times a day  budesonide 0.5 mg/2 mL inhalation suspension: 2 milliliter(s) inhaled 2 times a day  Caltrate 600 + D oral tablet: 2 tab(s) orally once a day  DuoNeb 0.5 mg-2.5 mg/3 mL inhalation solution: 3 milliliter(s) inhaled 4 times a day, As Needed - for shortness of breath and/or wheezing  ketoconazole 2% topical cream: Apply topically to affected area 2 times a day to foot  levothyroxine 100 mcg (0.1 mg) oral tablet: 1 tab(s) orally once a day  lisinopril 10 mg oral tablet: 1 tab(s) orally once a day  LORazepam 0.5 mg oral tablet: 1 tab(s) orally once a day (at bedtime)  montelukast 10 mg oral tablet: 1 tab(s) orally once a day  pantoprazole 20 mg oral delayed release tablet: 1 tab(s) orally 2 times a day  predniSONE 5 mg oral tablet: 1 tab(s) orally once a day  sertraline 50 mg oral tablet: 1 tab(s) orally once a day

## 2022-04-18 NOTE — PROGRESS NOTE ADULT - SUBJECTIVE AND OBJECTIVE BOX
CC- s/p mechanical fall    HPI:  89 yo F with a PMH CVA (left thalamic infarct, 2019) s/p implantable loop recorder, JAK2+ myelofibrosis, bronchiectasis (on 4L NC), osteoporosis, HTN, anxiety, and hypothyroidism who presents after a fall.  Her aide had been wheeling her back from the bathroom this morning around 5AM, when she slipped off and fell on her leg. Her aide was able to get her back into the bed, but she was complaining of pain in her left hip. Her daughters came by later and noticed her left upper leg was getting more and more swollen, so they took her to the ED.  She does not normally fall, but she has difficulty walking (due to osteoporosis and dyspnea), using a walker with 1-person assist.  The patient notes some dyspnea, but denies any pain at this time. She denies CP, abdominal pain, diarrhea, N/V, fevers, or chills.  She is being treated for a rash on her left ankle/foot with an antifungal cream, which is improving it.  In the ED, she was given Morphine 2 mg IV x1 and  ml x1. (2022 13:18)    22- NPO for OR later on today. Getting PRBC transfusion    Review of system- All 10 systems reviewed and is as per HPI otherwise negative.     T(C): 36.4 (22 @ 11:20), Max: 37.3 (22 @ 05:00)  HR: 104 (22 @ 11:20) (61 - 115)  BP: 122/55 (22 @ 11:20) (119/68 - 172/79)  RR: 18 (22 @ 11:20) (18 - 18)  SpO2: 100% (22 @ 11:20) (96% - 100%)  Wt(kg): --    LABS:                        8.0    14.72 )-----------( 656      ( 2022 05:03 )             26.9         143  |  108  |  41<H>  ----------------------------<  142<H>  4.5   |  32<H>  |  1.03    Ca    8.4<L>      2022 05:03  Phos  3.8     04-18  Mg     2.2     04-18    TPro  7.3  /  Alb  3.4  /  TBili  0.7  /  DBili  x   /  AST  20  /  ALT  20  /  AlkPhos  115  04-17    PT/INR - ( 2022 05:03 )   PT: 14.6 sec;   INR: 1.26 ratio       PTT - ( 2022 05:03 )  PTT:30.1 sec    Urinalysis Basic - ( 2022 11:46 )  Color: Yellow / Appearance: Clear / S.020 / pH: x  Gluc: x / Ketone: Negative  / Bili: Negative / Urobili: Negative   Blood: x / Protein: 100 / Nitrite: Negative   Leuk Esterase: Small / RBC: 3-5 /HPF / WBC 11-25   Sq Epi: x / Non Sq Epi: Occasional / Bacteria: Few    RADIOLOGY & ADDITIONAL TESTS:  CT BRAIN                        PROCEDURE DATE:  2022      INTERPRETATION:  Clinical Indication: Fall with acute left hip fracture    5mm axial sections of the brain were obtained from base to vertex,   without the intravenous administration of contrast material. Coronal and   sagittal computer generated reconstructed views are available.    Comparison is made with the prior CT of 10/8/2020 and demonstrates no   significant interval change.      The ventricles and sulci are prominent consistent with moderate atrophy.   Small vessel white matter ischemic changes are noted. There is no   hemorrhage, mass, or shift of midline structures. Bone window examination   is unremarkable. There has been previous bilateral lens replacement   surgery.    IMPRESSION: Moderate atrophy and small vessel white matter ischemic   changes. No hemorrhage. No change since 10/8/2020.      PHYSICAL EXAM:  GENERAL: NAD, well-groomed, well-developed  HEAD:  Atraumatic, Normocephalic  EYES: EOMI, PERRLA, conjunctiva and sclera clear  HEENT: Moist mucous membranes  NECK: Supple, No JVD  NERVOUS SYSTEM:  Alert & Oriented X3, Motor Strength 5/5 B/L upper and lower extremities; DTRs 2+ intact and symmetric  CHEST/LUNG: Clear to auscultation bilaterally; No rales, rhonchi, wheezing, or rubs  HEART: Regular rate and rhythm; No murmurs, rubs, or gallops  ABDOMEN: Soft, Nontender, Nondistended; Bowel sounds present  GENITOURINARY- Voiding, no palpable bladder  EXTREMITIES:  2+ Peripheral Pulses, No clubbing, cyanosis, or edema  MUSCULOSKELTAL- LLE slightly rotated  SKIN-no rash, no lesion  CNS- alert, oriented X3, non focal     MEDICATIONS  (STANDING):  amLODIPine   Tablet 5 milliGRAM(s) Oral daily  aspirin enteric coated 81 milliGRAM(s) Oral daily  atorvastatin 40 milliGRAM(s) Oral at bedtime  azithromycin   Tablet 250 milliGRAM(s) Oral <User Schedule>  budesonide  80 MICROgram(s)/formoterol 4.5 MICROgram(s) Inhaler 2 Puff(s) Inhalation two times a day  calcium carbonate 1250 mG  + Vitamin D (OsCal 500 + D) 1 Tablet(s) Oral daily  cefTRIAXone   IVPB      cefTRIAXone   IVPB 1000 milliGRAM(s) IV Intermittent once  clotrimazole 1% Cream 1 Application(s) Topical two times a day  dextrose 5% + lactated ringers. 1000 milliLiter(s) (50 mL/Hr) IV Continuous <Continuous>  guaiFENesin  milliGRAM(s) Oral every 12 hours  hydrocortisone sodium succinate Injectable 50 milliGRAM(s) IV Push every 8 hours  levothyroxine 100 MICROGram(s) Oral daily  lisinopril 10 milliGRAM(s) Oral daily  LORazepam     Tablet 0.5 milliGRAM(s) Oral at bedtime  montelukast 10 milliGRAM(s) Oral at bedtime  pantoprazole    Tablet 40 milliGRAM(s) Oral before breakfast  sertraline 50 milliGRAM(s) Oral daily  tiotropium 18 MICROgram(s) Capsule 1 Capsule(s) Inhalation daily    MEDICATIONS  (PRN):  acetaminophen     Tablet .. 650 milliGRAM(s) Oral every 6 hours PRN Mild Pain (1 - 3)  morphine  - Injectable 2 milliGRAM(s) IV Push every 4 hours PRN Breakthrough  oxyCODONE    IR 5 milliGRAM(s) Oral every 4 hours PRN Severe Pain (7 - 10)  traMADol 25 milliGRAM(s) Oral every 4 hours PRN Moderate Pain (4 - 6)    Assessment/Plan  89 yo F with a PMH CVA (left thalamic infarct, 2019) s/p implantable loop recorder, JAK2+ myelofibrosis, bronchiectasis (on 4L NC), osteoporosis, HTN, anxiety, and hypothyroidism who presents after a fall, found to have a left femoral neck fracture.    #Fracture of Femoral Neck, Left  - Left intertrochanteric fracture s/p reportedly mechanical fall. Patient does not fall normally  - Seen by ortho, plan for OR tomorrow   - Pain control PRN  - NWB LLE  - Hold SQH in AM  - NPO after midnight  - Perioperative risk and preoperative management as noted below    #Preoperative Clearance  - Given patient has some pathological Q waves on EKG (mild, V4-6), her RCRI score is 1, which makes her have a 1.3% risk of MI/pulmonary edema/ventricular fibrillation/cardiac arrest/complete heart block (Farhan et al.), a low-moderate risk of adverse cardiac event in a moderate risk surgery  - According to ACS NSQIP, particularly given her bronchiectasis, steroid use, and functional status, the patient has a 25% risk of any complication, and a 21.6% risk of serious complication from surgery. Overall she is at a moderate-high risk of adverse surgical complication; however, patient's surgery is an urgent surgery  - Given the patient is on chronic steroids at 5 mg Prednisone daily, hold Prednisone for today and would check AM cortisol tomorrow. If cortisol is > 5, patient should continue to take normal 5 mg Prednisone. If cortisol is < 5, would give 50 mg Hydrocortisone about 30-60 minutes before the procedure and 25 mg IV Q8H after for 24 hours for additional coverage, in addition to taking her morning Prednisone 5 mg  - Does not need evaluation with TTE or stress test prior to surgery  - Given patient has myelofibrosis, will f/u with Hematology to determine any recommendations perioperatively. Will transfuse 1U PRBCs as noted below  - Bronchiectasis management as noted below. F/u with Pulmonology for further recommendations    #Primary Myelofibrosis  - Patient has a history of myelofibrosis, JAK2+, diagnosed in  after bone marrow biopsy  - Had been on Hydroxyurea for several years (with temporary hold in [id number] due to cytopenias) until 2022, when she was started on Jakafi 10 mg BID  - Jakafi with success but has been held for 4 weeks due to cytopenias (Hb as low as 7.5)  - Since holding Jakafi, all 3 cell lines have increased (Hb only mildly)  - MF places patient at both an increased risk of bleeding and thrombosis perioperatively as noted above  - Would recommend continuing aspirin given thrombotic risk and transfuse 1U PRBCs to Hb > 9  - F/u Hematology for further recs    #Adult Bronchiectasis  - Would avoid Duonebs for now given tachycardia. Discussed with pharmacy, best alternative would be Tiotropium for now  - Aristides not on formulary, pt can bring her own  - C/w Budesonide 2 puffs BID  - C/w Prednisone 5 mg QD and PPI while on steroids  - C/w Montelukast 10 mg QD  - Azithromycin 250 mg MWF for anti-inflammatory effect and PPX  - Would get Pulmonary consult to titrate meds, particularly in the setting of tachycardia    #Hypothyroidism  - S/p partial thyroidectomy many years ago  - C/w Levothyroxine 100 ucg QD    #HTN  - C/w Amlodipine 5 mg QD and Lisinopril 10 mg QD    #Fungal dermatitis  - Improved on Ketoconazole cream (will give Clotrimazole inpatient, as it is the only one on formulary here)    #Prophylactic Measure  - DVT PPX: IMPROVE score of 3, can give SQH tonight, hold on day of surgery, and start Lovenox POD #1  - Diet: DASH, NPO after midnight  - Dispo: pending OR and postsurgical course    #Advanced Care Planning/Counseling Discussion  - Discussed advanced care planning, including code status, with the patient's HCP, daughter Anel Ibarra at bedside. The patient has previously expressed she wanted to be DNR/DNI in an incompletely filled out MOLST form. Given that the patient is having some confusion in the setting of acute fracture, deferred discussion to Ms. Ibarra. She statedthat in the event of cardiac or pulmonary arrest, the would not want resuscitation or intubation (DNR/DNI). In addition, she would not want a feeding tube. MsStacey Fred is understanding that some of these requests may need to be held for the surgery itself.  - MOLST form completed  - 18 minutes spent     CC- s/p mechanical fall    HPI:  89 yo F with a PMH CVA (left thalamic infarct, 2019) s/p implantable loop recorder, JAK2+ myelofibrosis, bronchiectasis (on 4L NC), osteoporosis, HTN, anxiety, and hypothyroidism who presents after a fall.  Her aide had been wheeling her back from the bathroom this morning around 5AM, when she slipped off and fell on her leg. Her aide was able to get her back into the bed, but she was complaining of pain in her left hip. Her daughters came by later and noticed her left upper leg was getting more and more swollen, so they took her to the ED.  She does not normally fall, but she has difficulty walking (due to osteoporosis and dyspnea), using a walker with 1-person assist.  The patient notes some dyspnea, but denies any pain at this time. She denies CP, abdominal pain, diarrhea, N/V, fevers, or chills.  She is being treated for a rash on her left ankle/foot with an antifungal cream, which is improving it.  In the ED, she was given Morphine 2 mg IV x1 and  ml x1. (2022 13:18)    22- NPO for OR later on today. Getting PRBC transfusion    Review of system- All 10 systems reviewed and is as per HPI otherwise negative.     T(C): 36.4 (22 @ 11:20), Max: 37.3 (22 @ 05:00)  HR: 104 (22 @ 11:20) (61 - 115)  BP: 122/55 (22 @ 11:20) (119/68 - 172/79)  RR: 18 (22 @ 11:20) (18 - 18)  SpO2: 100% (22 @ 11:20) (96% - 100%)  Wt(kg): --    LABS:                        8.0    14.72 )-----------( 656      ( 2022 05:03 )             26.9         143  |  108  |  41<H>  ----------------------------<  142<H>  4.5   |  32<H>  |  1.03    Ca    8.4<L>      2022 05:03  Phos  3.8     04-18  Mg     2.2     04-18    TPro  7.3  /  Alb  3.4  /  TBili  0.7  /  DBili  x   /  AST  20  /  ALT  20  /  AlkPhos  115  04-17    PT/INR - ( 2022 05:03 )   PT: 14.6 sec;   INR: 1.26 ratio       PTT - ( 2022 05:03 )  PTT:30.1 sec    Urinalysis Basic - ( 2022 11:46 )  Color: Yellow / Appearance: Clear / S.020 / pH: x  Gluc: x / Ketone: Negative  / Bili: Negative / Urobili: Negative   Blood: x / Protein: 100 / Nitrite: Negative   Leuk Esterase: Small / RBC: 3-5 /HPF / WBC 11-25   Sq Epi: x / Non Sq Epi: Occasional / Bacteria: Few    RADIOLOGY & ADDITIONAL TESTS:  CT BRAIN                        PROCEDURE DATE:  2022      INTERPRETATION:  Clinical Indication: Fall with acute left hip fracture    5mm axial sections of the brain were obtained from base to vertex,   without the intravenous administration of contrast material. Coronal and   sagittal computer generated reconstructed views are available.    Comparison is made with the prior CT of 10/8/2020 and demonstrates no   significant interval change.      The ventricles and sulci are prominent consistent with moderate atrophy.   Small vessel white matter ischemic changes are noted. There is no   hemorrhage, mass, or shift of midline structures. Bone window examination   is unremarkable. There has been previous bilateral lens replacement   surgery.    IMPRESSION: Moderate atrophy and small vessel white matter ischemic   changes. No hemorrhage. No change since 10/8/2020.      PHYSICAL EXAM:  GENERAL: NAD, well-groomed, well-developed  HEAD:  Atraumatic, Normocephalic  EYES: EOMI, PERRLA, conjunctiva and sclera clear  HEENT: Moist mucous membranes  NECK: Supple, No JVD  NERVOUS SYSTEM:  Alert & Oriented X3, Motor Strength 5/5 B/L upper and lower extremities; DTRs 2+ intact and symmetric  CHEST/LUNG: Clear to auscultation bilaterally; No rales, rhonchi, wheezing, or rubs  HEART: Regular rate and rhythm; No murmurs, rubs, or gallops  ABDOMEN: Soft, Nontender, Nondistended; Bowel sounds present  GENITOURINARY- Voiding, no palpable bladder  EXTREMITIES:  2+ Peripheral Pulses, No clubbing, cyanosis, or edema  MUSCULOSKELTAL- LLE slightly rotated  SKIN-no rash, no lesion  CNS- alert, oriented X3, non focal     MEDICATIONS  (STANDING):  amLODIPine   Tablet 5 milliGRAM(s) Oral daily  aspirin enteric coated 81 milliGRAM(s) Oral daily  atorvastatin 40 milliGRAM(s) Oral at bedtime  azithromycin   Tablet 250 milliGRAM(s) Oral <User Schedule>  budesonide  80 MICROgram(s)/formoterol 4.5 MICROgram(s) Inhaler 2 Puff(s) Inhalation two times a day  calcium carbonate 1250 mG  + Vitamin D (OsCal 500 + D) 1 Tablet(s) Oral daily  cefTRIAXone   IVPB      cefTRIAXone   IVPB 1000 milliGRAM(s) IV Intermittent once  clotrimazole 1% Cream 1 Application(s) Topical two times a day  dextrose 5% + lactated ringers. 1000 milliLiter(s) (50 mL/Hr) IV Continuous <Continuous>  guaiFENesin  milliGRAM(s) Oral every 12 hours  hydrocortisone sodium succinate Injectable 50 milliGRAM(s) IV Push every 8 hours  levothyroxine 100 MICROGram(s) Oral daily  lisinopril 10 milliGRAM(s) Oral daily  LORazepam     Tablet 0.5 milliGRAM(s) Oral at bedtime  montelukast 10 milliGRAM(s) Oral at bedtime  pantoprazole    Tablet 40 milliGRAM(s) Oral before breakfast  sertraline 50 milliGRAM(s) Oral daily  tiotropium 18 MICROgram(s) Capsule 1 Capsule(s) Inhalation daily    MEDICATIONS  (PRN):  acetaminophen     Tablet .. 650 milliGRAM(s) Oral every 6 hours PRN Mild Pain (1 - 3)  morphine  - Injectable 2 milliGRAM(s) IV Push every 4 hours PRN Breakthrough  oxyCODONE    IR 5 milliGRAM(s) Oral every 4 hours PRN Severe Pain (7 - 10)  traMADol 25 milliGRAM(s) Oral every 4 hours PRN Moderate Pain (4 - 6)    Assessment/Plan  89 yo F with a PMH CVA (left thalamic infarct, 2019) s/p implantable loop recorder, JAK2+ myelofibrosis, bronchiectasis (on 4L NC), osteoporosis, HTN, anxiety, and hypothyroidism who presents after a fall, found to have a left femoral neck fracture.    #S/p mechanical fall with LT hip fracture  Ortho following  Bedrest  Pain meds prn  PRBC transfusion 22, getting another unit today  Will start perioperative stress dose of steroids  Patient is medically optimized for OR    #Primary Myelofibrosis  - Patient has a history of myelofibrosis, JAK2+, diagnosed in  after bone marrow biopsy  - Had been on Hydroxyurea for several years (with temporary hold in [id number] due to cytopenias) until 2022, when she was started on Jakafi 10 mg BID  - Jakafi with success but has been held for 4 weeks due to cytopenias (Hb as low as 7.5)  - Since holding Jakafi, all 3 cell lines have increased (Hb only mildly)  - MF places patient at both an increased risk of bleeding and thrombosis perioperatively as noted above  - Would recommend continuing aspirin given thrombotic risk and transfuse 1U PRBCs to Hb > 9  - F/u Hematology for further recs    #Adult Bronchiectasis  - Pulm eval DR Yolis Irving not on formulary, pt can bring her own  - C/w Budesonide 2 puffs BID  - C/w Prednisone 5 mg QD and PPI while on steroids  - C/w Montelukast 10 mg QD  - Azithromycin 250 mg MWF for anti-inflammatory effect and PPX    #UTI  Rocephin x3 days    #Hypothyroidism  - S/p partial thyroidectomy many years ago  - C/w Levothyroxine 100 ucg QD    #HTN  - C/w Amlodipine 5 mg QD and Lisinopril 10 mg QD    #Fungal dermatitis  - Improved on Ketoconazole cream (will give Clotrimazole inpatient, as it is the only one on formulary here)    #DVT proph- on hold for OR    #MOLST with DNR/DNI    #Dispo- NPO for OR today, pt is medically optimized for OR

## 2022-04-18 NOTE — PROGRESS NOTE ADULT - SUBJECTIVE AND OBJECTIVE BOX
Patient seen and examined at bedside. Pain is controlled. Lethargic but arousable. Patient denies any numbness, tingling, weakness, or any other orthopaedic complaint.    Exam:  T(C): 36.6 (04-18-22 @ 22:34), Max: 37.8 (04-18-22 @ 20:20)  T(F): 97.9 (04-18-22 @ 22:34), Max: 100.1 (04-18-22 @ 20:20)  HR: 93 (04-18-22 @ 22:34) (61 - 108)  BP: 126/61 (04-18-22 @ 22:34) (95/51 - 156/70)  RR: 18 (04-18-22 @ 22:34) (16 - 20)  SpO2: 100% (04-18-22 @ 22:34) (96% - 100%)    Gen: Resting in bed, no acute distress  LLE  Dressings in place, clean/dry/intact.   Jody-incisional tenderness to palpation; otherwise, NTTP throughout the rest of the extremity.   SILT L2-S1.  GSC/TA/EHL/FHL intact. Q/H unable to assess 2' pain.   DP pulse palpable.   No calf tenderness bilaterally.   Compartments soft and compressible.                           10.6   16.06 )-----------( 580      ( 18 Apr 2022 20:52 )             35.9     18 Apr 2022 20:52    142    |  110    |  47     ----------------------------<  116    5.2     |  27     |  1.40     Ca    8.6        18 Apr 2022 20:52  Phos  3.8       18 Apr 2022 05:03  Mg     2.2       18 Apr 2022 05:03    TPro  7.3    /  Alb  3.4    /  TBili  0.7    /  DBili  x      /  AST  20     /  ALT  20     /  AlkPhos  115    17 Apr 2022 10:01    PT/INR - ( 18 Apr 2022 05:03 )   PT: 14.6 sec;   INR: 1.26 ratio         PTT - ( 18 Apr 2022 05:03 )  PTT:30.1 sec    Assessment and Plan:  89y Female POD0 L IMN    Follow up postoperative labs  WBAT/PT/OT  Pain management PRN  Continue prophylactic antibiotics  DVT PPx: hold until POD1  Incentive spirometry  Dispo: pending recommendations per PT  Will discuss with Dr. Zavala and advise of any changes to the plan.

## 2022-04-18 NOTE — DISCHARGE NOTE PROVIDER - NSDCFUSCHEDAPPT_GEN_ALL_CORE_FT
ANAHI AGUIRRE ; 04/21/2022 ; NPP [facility] [address] ANAHI Sung ; 06/16/2022 ; NPP [facility] [address] [facility]  ANAHI AGUIRRE ; 07/07/2022 ; NPP [facility] [address] [facility]  ANAHI AGUIRRE ; 07/13/2022 ; NPP [facility] [address]

## 2022-04-18 NOTE — DISCHARGE NOTE PROVIDER - NSDCHC_MEDRECSTATUS_GEN_ALL_CORE
[facility] Medicine Daily Progress Note    Date of Service  8/4/2020    Chief Complaint  89 y.o. male admitted 7/30/2020 with shortness of breath    Interval Problem Update  On high flow 40L      Consultants/Specialty  none    Code Status  full    Disposition  home    Review of Systems  Review of Systems   Unable to perform ROS: Medical condition        Physical Exam  Temp:  [36.2 °C (97.2 °F)-36.4 °C (97.6 °F)] 36.4 °C (97.6 °F)  Pulse:  [54-68] 68  Resp:  [18] 18  BP: (115-127)/(39-57) 125/41  SpO2:  [90 %-96 %] 96 %    Physical Exam  Constitutional:       Appearance: Normal appearance.   HENT:      Head: Normocephalic and atraumatic.   Eyes:      Extraocular Movements: Extraocular movements intact.      Pupils: Pupils are equal, round, and reactive to light.   Neck:      Musculoskeletal: Normal range of motion and neck supple.   Cardiovascular:      Rate and Rhythm: Normal rate and regular rhythm.   Pulmonary:      Effort: Pulmonary effort is normal.      Breath sounds: Rhonchi present.   Abdominal:      General: Abdomen is flat. There is no distension.   Musculoskeletal: Normal range of motion.         General: No swelling or deformity.   Skin:     General: Skin is warm.      Capillary Refill: Capillary refill takes 2 to 3 seconds.      Coloration: Skin is not pale.   Neurological:      General: No focal deficit present.      Mental Status: He is alert.         Fluids    Intake/Output Summary (Last 24 hours) at 8/4/2020 1050  Last data filed at 8/4/2020 1000  Gross per 24 hour   Intake 120 ml   Output --   Net 120 ml       Laboratory  Recent Labs     08/02/20  0449 08/03/20  0332 08/04/20  0428   WBC 8.0 12.9* 13.3*   RBC 4.22* 3.90* 3.90*   HEMOGLOBIN 12.6* 11.6* 11.8*   HEMATOCRIT 38.1* 34.7* 34.7*   MCV 90.3 89.0 89.0   MCH 29.9 29.7 30.3   MCHC 33.1* 33.4* 34.0   RDW 42.3 41.8 42.6   PLATELETCT 223 266 317   MPV 10.0 10.1 10.2     Recent Labs     08/02/20  0449 08/03/20  0332 08/04/20  0428   SODIUM 137 138  Admission Reconciliation is Completed  Discharge Reconciliation is Not Complete

## 2022-04-18 NOTE — DISCHARGE NOTE PROVIDER - HOSPITAL COURSE
Orthopedic Summary  H&P:  Pt is a 89y Female   PAST MEDICAL & SURGICAL HISTORY:  Rib fractures    Essential hypertension    Hypothyroidism, unspecified type    Asthma, unspecified asthma severity, unspecified whether complicated, unspecified whether persistent    Cerebrovascular accident (CVA)  6/7/19 Left thalamic infarct s/p ILR    Bronchiectasis    Myelofibrosis    Anxiety    History of partial thyroidectomy    Status post placement of implantable loop recorder          Now s/p Left Hip IM Nail for fracture. Pt is afebrile with stable vital signs. Pain is controlled. Exam reveals intact EHL FHL TA GS, +DP. Dressing is clean and dry.    Hospital Course:  Patient presented to [facility] ED after a fall, found to have a hip fracture, and admitted to the Medical Service. Pt was  medically/pulmonlogy cleared prior to surgery. Prophylactic antibiotics were started before the procedure and continued for 24 hours. They were admitted after surgery to the orthopedic floor.  There were no orthopedic complications during the hospital stay. All home medications were continued.    Routine consults were obtained from the Anticoagulation Team for DVT/PE prophylaxis, from Physical Therapy, and followed by Medicine for Co-management. Patient was placed on  anticoagulation.  Pertinent home medications were continued.  Daily labs were followed.      On POD 0 there were no major issues. Pt received PT daily and was Discharged once cleared per Medicine.  The orthopedic Attending is aware and agrees. See addendum to DC summary per medical team below for any additional info or if any changes.

## 2022-04-18 NOTE — CONSULT NOTE ADULT - SUBJECTIVE AND OBJECTIVE BOX
HPI:  88 yo F with a PMH CVA (left thalamic infarct, 2019) s/p implantable loop recorder, JAK2+ myelofibrosis, bronchiectasis (on 4L NC), osteoporosis, HTN, anxiety, and hypothyroidism who presents after a fall.  Her aide had been wheeling her back from the bathroom this morning around 5AM, when she slipped off and fell on her leg. Her aide was able to get her back into the bed, but she was complaining of pain in her left hip. Her daughters came by later and noticed her left upper leg was getting more and more swollen, so they took her to the ED.  She does not normally fall, but she has difficulty walking (due to osteoporosis and dyspnea), using a walker with 1-person assist.    The patient notes some dyspnea, but denies any pain at this time. She denies CP, abdominal pain, diarrhea, N/V, fevers, or chills.  In the ED, she was given Morphine 2 mg IV x1 and  ml x1.     At this time she is somewhat lethargic. She is resting comfortably, without any significant sputum production.      PAST MEDICAL & SURGICAL HISTORY:  Rib fractures    Essential hypertension    Hypothyroidism, unspecified type    Asthma, unspecified asthma severity, unspecified whether complicated, unspecified whether persistent    Cerebrovascular accident (CVA)  19 Left thalamic infarct s/p ILR    Bronchiectasis    Myelofibrosis    Anxiety    History of partial thyroidectomy    Status post placement of implantable loop recorder        MEDICATIONS  (STANDING):  amLODIPine   Tablet 5 milliGRAM(s) Oral daily  aspirin enteric coated 81 milliGRAM(s) Oral daily  atorvastatin 40 milliGRAM(s) Oral at bedtime  azithromycin   Tablet 250 milliGRAM(s) Oral <User Schedule>  budesonide  80 MICROgram(s)/formoterol 4.5 MICROgram(s) Inhaler 2 Puff(s) Inhalation two times a day  calcium carbonate 1250 mG  + Vitamin D (OsCal 500 + D) 1 Tablet(s) Oral daily  cefTRIAXone   IVPB      cefTRIAXone   IVPB 1000 milliGRAM(s) IV Intermittent once  clotrimazole 1% Cream 1 Application(s) Topical two times a day  dextrose 5% + lactated ringers. 1000 milliLiter(s) (50 mL/Hr) IV Continuous <Continuous>  levothyroxine 100 MICROGram(s) Oral daily  lisinopril 10 milliGRAM(s) Oral daily  LORazepam     Tablet 0.5 milliGRAM(s) Oral at bedtime  montelukast 10 milliGRAM(s) Oral at bedtime  pantoprazole    Tablet 40 milliGRAM(s) Oral before breakfast  predniSONE   Tablet 5 milliGRAM(s) Oral daily  sertraline 50 milliGRAM(s) Oral daily  tiotropium 18 MICROgram(s) Capsule 1 Capsule(s) Inhalation daily    MEDICATIONS  (PRN):  acetaminophen     Tablet .. 650 milliGRAM(s) Oral every 6 hours PRN Mild Pain (1 - 3)  morphine  - Injectable 2 milliGRAM(s) IV Push every 4 hours PRN Breakthrough  oxyCODONE    IR 5 milliGRAM(s) Oral every 4 hours PRN Severe Pain (7 - 10)  traMADol 25 milliGRAM(s) Oral every 4 hours PRN Moderate Pain (4 - 6)      Allergies    No Known Allergies    Intolerances        SOCIAL HISTORY: Denies tobacco, etoh abuse or illicit drug use    FAMILY HISTORY:  FH: diabetes mellitus (Sibling)    FH: HTN (hypertension)        Vital Signs Last 24 Hrs  T(C): 36.3 (2022 08:38), Max: 37.3 (2022 05:00)  T(F): 97.3 (2022 08:38), Max: 99.1 (2022 05:00)  HR: 61 (2022 09:13) (61 - 115)  BP: 119/68 (2022 08:38) (119/68 - 172/79)  BP(mean): 104 (2022 15:00) (104 - 104)  RR: 18 (2022 08:38) (18 - 18)  SpO2: 96% (2022 09:13) (96% - 100%)    REVIEW OF SYSTEMS:    CONSTITUTIONAL:  As per HPI.  HEENT:  Eyes:  No diplopia or blurred vision. ENT:  No earache, sore throat or runny nose.  CARDIOVASCULAR:  No pressure, squeezing, tightness, heaviness or aching about the chest, neck, axilla or epigastrium.  RESPIRATORY:  Baseline dyspnea.  GASTROINTESTINAL:  No nausea, vomiting or diarrhea.  GENITOURINARY:  No dysuria, frequency or urgency.  MUSCULOSKELETAL:  As per HPI.  SKIN:  No change in skin, hair or nails.  NEUROLOGIC:  No paresthesias, fasciculations, seizures or weakness.  PSYCHIATRIC:  No disorder of thought or mood.  ENDOCRINE:  No heat or cold intolerance, polyuria or polydipsia.  HEMATOLOGICAL:  No easy bruising or bleedings:  .     PHYSICAL EXAMINATION:    GENERAL APPEARANCE:  Pt. is not currently dyspneic, in no distress. Pt. is alert, oriented, and pleasant.  HEENT:  Pupils are normal and react normally. No icterus. Mucous membranes well colored.  NECK:  Supple. No lymphadenopathy. Jugular venous pressure not elevated. Carotids equal.   HEART:   The cardiac impulse has a normal quality. Regular. Normal S1 and S2. There are no murmurs, rubs or gallops noted  CHEST:  Chest with bilateral crackles inferolaterally. Normal respiratory effort.  ABDOMEN:  Soft and nontender.   EXTREMITIES:  There is no cyanosis, clubbing or edema.   SKIN:  No rash or significant lesions are noted.    LABS:                        8.0    14.72 )-----------( 656      ( 2022 05:03 )             26.9     04-18    143  |  108  |  41<H>  ----------------------------<  142<H>  4.5   |  32<H>  |  1.03    Ca    8.4<L>      2022 05:03  Phos  3.8     04-18  Mg     2.2     04-18    TPro  7.3  /  Alb  3.4  /  TBili  0.7  /  DBili  x   /  AST  20  /  ALT  20  /  AlkPhos  115  04-17    LIVER FUNCTIONS - ( 2022 10:01 )  Alb: 3.4 g/dL / Pro: 7.3 gm/dL / ALK PHOS: 115 U/L / ALT: 20 U/L / AST: 20 U/L / GGT: x           PT/INR - ( 2022 05:03 )   PT: 14.6 sec;   INR: 1.26 ratio         PTT - ( 2022 05:03 )  PTT:30.1 sec      Urinalysis Basic - ( 2022 11:46 )    Color: Yellow / Appearance: Clear / S.020 / pH: x  Gluc: x / Ketone: Negative  / Bili: Negative / Urobili: Negative   Blood: x / Protein: 100 / Nitrite: Negative   Leuk Esterase: Small / RBC: 3-5 /HPF / WBC 11-25   Sq Epi: x / Non Sq Epi: Occasional / Bacteria: Few          RADIOLOGY & ADDITIONAL STUDIES: < from: US Duplex Venous Lower Ext Complete, Bilateral (22 @ 12:48) >  ACC: [id number] EXAM:  US DPLX LWR EXT VEINS COMPL BI                          PROCEDURE DATE:  2022          INTERPRETATION:  CLINICAL INFORMATION: Bilateral leg pain.    COMPARISON: None available.    TECHNIQUE: Duplex sonography of the BILATERAL LOWER extremity veins with   color and spectral Doppler, with and without compression.    FINDINGS:    RIGHT:  Normal compressibility of the RIGHT common femoral, femoral and popliteal   veins.  Doppler examination shows normal spontaneous and phasic flow.  No RIGHT calf vein thrombosis is detected.    LEFT:  Normal compressibility of the LEFT common femoral, femoral and popliteal   veins.  Doppler examination shows normal spontaneous and phasic flow.  No LEFT calf vein thrombosis is detected.  There is evidence of an approximately 3.2 x 1.1 x 0.7 cm sized left   popliteal cyst.    IMPRESSION:  No evidence of deep venous thrombosis in either lower extremity.          ALONZO MARTINEZ MD; Attending Radiologist

## 2022-04-19 NOTE — PHYSICAL THERAPY INITIAL EVALUATION ADULT - LIVES WITH, PROFILE
Pt lives with her other dgt (not the one present today), w/ 1 REBE[address] (dgt states she has a ramp to enter) and no steps in the house.

## 2022-04-19 NOTE — PHYSICAL THERAPY INITIAL EVALUATION ADULT - BALANCE DISTURBANCE, IDENTIFIED IMPAIRMENT CONTRIBUTE, REHAB EVAL
Detail Level: Zone Price (Use Numbers Only, No Special Characters Or $): 0.00 pain/decreased strength

## 2022-04-19 NOTE — PROGRESS NOTE ADULT - ASSESSMENT
Assessment/Plan    - Maintain aerosols with Symbicort and Tiotropium.   - Continue prednisone 5mg QD  - Hydrocortisone 50mg on call to OR as stress dose of steroids  - Mucinex as mucolytic  - Monitor H/H carefully. No change in HgB after 1 unit of PRBC's  - Supplemental O2 at 3L/M  - DVT prophylaxis  - There are no pulmonary contraindicatios to the pre-planned procedure. Attempt to avoid general anesthesia if possible.

## 2022-04-19 NOTE — CONSULT NOTE ADULT - SUBJECTIVE AND OBJECTIVE BOX
HPI:  88 yo F with a PMH CVA (left thalamic infarct, 2019) s/p implantable loop recorder, JAK2+ myelofibrosis, bronchiectasis (on 4L NC), osteoporosis, HTN, anxiety, and hypothyroidism who presents after a fall.  Her aide had been wheeling her back from the bathroom this morning around 5AM, when she slipped off and fell on her leg. Her aide was able to get her back into the bed, but she was complaining of pain in her left hip. Her daughters came by later and noticed her left upper leg was getting more and more swollen, so they took her to the ED.  She does not normally fall, but she has difficulty walking (due to osteoporosis and dyspnea), using a walker with 1-person assist.    The patient notes some dyspnea, but denies any pain at this time. She denies CP, abdominal pain, diarrhea, N/V, fevers, or chills.  She is being treated for a rash on her left ankle/foot with an antifungal cream, which is improving it.    In the ED, she was given Morphine 2 mg IV x1 and  ml x1. (2022 13:18)      Patient is a 89y old  Female who presents with a chief complaint of L hip fracture (2022 12:14)      Consulted by Dr. Cameron Marcelino for VTE prophylaxis, risk stratification, and anticoagulation management.    PAST MEDICAL & SURGICAL HISTORY:  Rib fractures    Essential hypertension    Hypothyroidism, unspecified type    Asthma, unspecified asthma severity, unspecified whether complicated, unspecified whether persistent    Cerebrovascular accident (CVA)  19 Left thalamic infarct s/p ILR    Bronchiectasis    Myelofibrosis    Anxiety    History of partial thyroidectomy    Status post placement of implantable loop recorder        FAMILY HISTORY:  FH: diabetes mellitus (Sibling)    FH: HTN (hypertension)        Interval Note:  2022 Pt seen at bedside on [facility] with 2 daughters present.. Discussed with daughters about the use of heparin sq for four weeks post procedure for DVT  prophylaxis.  Questions answered will reinforce as needed.  Pt alert and oriented to person only. They stated her mother will go to rehab on discharge.     CAPRINI SCORE  AGE RELATED RISK FACTORS                                                       MOBILITY RELATED FACTORS  [ ] Age 41-60 years                                            (1 Point)                  [ ] Bed rest                                                        (1 Point)  [ ] Age: 61-74 years                                           (2 Points)                [ ] Plaster cast                                                   (2 Points)  [ X] Age= 75 years                                              (3 Points)                 [ ] Bed bound for more than 72 hours                   (2 Points)    DISEASE RELATED RISK FACTORS                                               GENDER SPECIFIC FACTORS  [ ] Edema in the lower extremities                       (1 Point)           [ ] Pregnancy                                                            (1 Point)  [ ] Varicose veins                                               (1 Point)                  [ ] Post-partum < 6 weeks                                      (1 Point)             [X ] BMI > 25 Kg/m2                                            (1 Point)                  [ ] Hormonal therapy or oral contraception       (1 Point)                 [ ] Sepsis (in the previous month)                        (1 Point)             [ ] History of pregnancy complications                (1Point)  [ ] Pneumonia or serious lung disease                                             [ ] Unexplained or recurrent  (=/>3), premature                                 (In the previous month)                               (1 Point)                birth with toxemia or growth-restricted infant (1 Point)  [ ] Abnormal pulmonary function test            (1 Point)                                   SURGERY RELATED RISK FACTORS  [ ] Acute myocardial infarction                       (1 Point)                  [ ]  Section                                         (1 Point)  [ ] Congestive heart failure (in the previous month) (1 Point)   [ ] Minor surgery   lasting <45 minutes       (1 Point)   [ ] Inflammatory bowel disease                             (1 Point)          [ ] Arthroscopic surgery                                  (2 Points)  [ ] Central venous access                                    (2 Points)            [ ] General surgery lasting >45 minutes      (2 Points)       [ ] Stroke (in the previous month)                  (5 Points)            [ ] Elective major lower extremity arthroplasty (5 Points)                                   [  ] Malignancy (present or past include skin melanoma                                          but exclude  basal skin cell)    (2 points)                                      TRAUMA RELATED RISK FACTORS                HEMATOLOGY RELATED FACTORS                                  [X ] Fracture of the hip, pelvis, or leg                       (5 Points)  [ ] Prior episodes of VTE                                     (3 Points)          [ ] Acute spinal cord injury (in the previous month)  (5 Points)  [ ] Positive family history for VTE                         (3 Points)       [ ] Paralysis (less than 1 month)                          (5 Points)  [ ] Prothrombin 40554 A                                      (3 Points)         [ ] Multiple Trauma (within 1month)                 (5Points)                                                                                                                                                                [ ] Factor V Leiden                                          (3 Points)                                OTHER RISK FACTORS                          [ ] Lupus anticoagulants                                     (3 Points)                       [ ] BMI > 40                          (1 Point)                                                         [ ] Anticardiolipin antibodies                                (3 Points)                   [ ] Smoking                              (1Point)                                                [ ] High homocysteine in the blood                      (3 Points)                [  ] Diabetes requiring insulin (1point)                         [ ] Other congenital or acquired thrombophilia       (3 Points)          [  ] Chemotherapy                   (1 Point)  [ ] Heparin induced thrombocytopenia                  (3 Points)             [  ] Blood Transfusion                (1 point)                                                                                                             Total Score [9 ]                                                                                                                                                                                                                                                                                                                                                                                                                                               IMPROVE Bleeding Risk Score: 4.5    Falls Risk:   High (X  )  Mod (  )  Low (  )  crcl: 27.2        cr: 1.54         BMI  30.3           EBL: 150  ml  Time procedure    : starts: 1929             :ends: 20:25      Tourniquet time:        Denies any personal or familial history of clotting or bleeding disorders.    Allergies    No Known Allergies    Intolerances        REVIEW OF SYSTEMS    (  )Fever	     (  )Constipation	(  )SOB				(  )Headache	(  )Dysuria  (  )Chills	     (  )Melena	(  )Dyspnea present on exertion	                    (  )Dizziness                    (  )Polyuria  (  )Nausea	     (  )Hematochezia	(  )Cough			                    (  )Syncope   	(  )Hematuria  (  )Vomiting    (  )Chest Pain	(  )Wheezing			(  )Weakness  (  )Diarrhea     (  )Palpitations	(  )Anorexia			(  )Myalgia      Unable to obtain review of systems due to: PT IS CONFUSED     Vital Signs Last 24 Hrs  T(C): 36.8 (2022 09:45), Max: 37.8 (2022 20:20)  T(F): 98.3 (2022 09:45), Max: 100.1 (2022 20:20)  HR: 99 (2022 09:45) (77 - 101)  BP: 100/47 (2022 09:45) (95/51 - 156/70)  BP(mean): --  RR: 20 (2022 09:45) (16 - 20)  SpO2: 94% (2022 09:45) (94% - 100%)    PHYSICAL EXAM:    Constitutional: Appears Well    Neurological: A& O x 1 PERSON HER DAUGHTER STATES HE IS NORMALLY A&O TIMES 3     Skin: Warm    Respiratory and Thorax: normal effort; Breath sounds: normal; No rales/wheezing/rhonchi  	  Cardiovascular: S1, S2, regular, NMBR	    Gastrointestinal: BS + x 4Q, nontender	    Genitourinary:  Bladder nondistended, nontender    Musculoskeletal:   General Right:   no muscle/joint tenderness,   normal tone, no joint swelling,   ROM: limited/full	    General Left:   no muscle/joint tenderness,   normal tone, no joint swelling,   ROM: limited/full    Hip:  left: Dressing CDI;    Lower extrems:   Right: no calf tenderness              negative marco's sign               + pedal pulses    Left:   no calf tenderness              negative marco's sign               + pedal pulses                          7.9    x     )-----------( x        ( 2022 11:55 ) one unit PRBC             25.5           136  |  104  |  56<H>  ----------------------------<  95  5.4<H>   |  25  |  1.56<H>    Ca    8.1<L>      2022 08:32  Phos  3.8     -  Mg     2.2             PT/INR - ( 2022 05:03 )   PT: 14.6 sec;   INR: 1.26 ratio         PTT - ( 2022 05:03 )  PTT:30.1 sec				    MEDICATIONS  (STANDING):  acetaminophen     Tablet .. 650 milliGRAM(s) Oral every 6 hours  amLODIPine   Tablet 5 milliGRAM(s) Oral daily  ascorbic acid 500 milliGRAM(s) Oral two times a day  aspirin enteric coated 81 milliGRAM(s) Oral daily  atorvastatin 40 milliGRAM(s) Oral at bedtime  azithromycin   Tablet 250 milliGRAM(s) Oral <User Schedule>  budesonide  80 MICROgram(s)/formoterol 4.5 MICROgram(s) Inhaler 2 Puff(s) Inhalation two times a day  cefTRIAXone   IVPB 1000 milliGRAM(s) IV Intermittent every 24 hours  folic acid 1 milliGRAM(s) Oral daily  HYDROmorphone  Injectable 0.5 milliGRAM(s) IV Push once  levothyroxine 100 MICROGram(s) Oral daily  LORazepam     Tablet 0.5 milliGRAM(s) Oral at bedtime  montelukast 10 milliGRAM(s) Oral at bedtime  multivitamin 1 Tablet(s) Oral daily  pantoprazole    Tablet 40 milliGRAM(s) Oral before breakfast  polyethylene glycol 3350 17 Gram(s) Oral at bedtime  predniSONE   Tablet 5 milliGRAM(s) Oral daily  senna 2 Tablet(s) Oral at bedtime  enoxaparin 40mg sq daily    < from: Xray Hip w/ Pelvis 2 or 3 Views, Left (22 @ 09:49) >  MPRESSION: LEFT hip Intertrochanteric fracture.    < end of copied text >      DVT Prophylaxis:  LMWH                   (  )  Heparin SQ           ( x )  Coumadin             (  )  Xarelto                  (  )  Eliquis                   (  )  Venodynes           (x  )  Ambulation          (x  )  UFH                       (  )  Contraindicated  (  )  EC Aspirin             (  )

## 2022-04-19 NOTE — PHYSICAL THERAPY INITIAL EVALUATION ADULT - MANUAL MUSCLE TESTING RESULTS, REHAB EVAL
pain, R UE shld not moving (old RCT), L UE minimal movement noted, R LE heel slide 3-/5, KE 3/5, DF B 3+/5, L hip NT/grossly assessed due to

## 2022-04-19 NOTE — PROGRESS NOTE ADULT - SUBJECTIVE AND OBJECTIVE BOX
Patient seen and examined at bedside. Pain well controlled with medication. Postop creatinine elevated, started on fluids. Decreased urine output overnight without retention, monitor AM creatinine. Patient denies any numbness, tingling, weakness, or any other orthopaedic complaint.     Vital Signs Last 24 Hrs  T(C): 36.8 (19 Apr 2022 05:03), Max: 37.8 (18 Apr 2022 20:20)  T(F): 98.2 (19 Apr 2022 05:03), Max: 100.1 (18 Apr 2022 20:20)  HR: 97 (19 Apr 2022 05:03) (61 - 105)  BP: 142/76 (19 Apr 2022 05:03) (95/51 - 156/70)  BP(mean): --  RR: 17 (19 Apr 2022 05:03) (16 - 20)  SpO2: 99% (19 Apr 2022 05:03) (96% - 100%)    Gen: Resting in bed, no acute distress  LLE  Dressings in place, clean/dry/intact.   Jody-incisional tenderness to palpation; otherwise, NTTP throughout the rest of the extremity.   SILT L2-S1.  GSC/TA/EHL/FHL intact. Q/H unable to assess 2' pain.   DP pulse palpable.   No calf tenderness bilaterally.   Compartments soft and compressible.     Laboratory Results:   CBC, 04-18-22 @ 20:52    WBC: 16.06  Hgb: 10.6  Hct: 35.9  Plt: 580      Chemistry, 04-18-22 @ 20:52    Na: 142     AST: --  K: 5.2       ALT: --  Cl: 110      TProt: --  CO2: 27     Alb: --  BUN: 47     TBili: --  Cr: 1.40      AP: --  Glu: 116       Mg: --  P: --    eGFR: --  eGFR, AA: --

## 2022-04-19 NOTE — PHYSICAL THERAPY INITIAL EVALUATION ADULT - GENERAL OBSERVATIONS, REHAB EVAL
Pt received on 2N, supine in bed, dgt at bedside, NC+ 5 L (home O2 4 L), +Ho, +SCD's before and after session. L hip dressing c/d/i.

## 2022-04-19 NOTE — PHYSICAL THERAPY INITIAL EVALUATION ADULT - PERTINENT HX OF CURRENT PROBLEM, REHAB EVAL
89y Female  presents after a fall, found to have a left femoral neck fracture, POD 1 L IMN,   PMH CVA (left thalamic infarct, 2019) s/p implantable loop recorder, JAK2+ myelofibrosis, bronchiectasis (on 4L NC), osteoporosis, HTN, anxiety, and hypothyroidism

## 2022-04-19 NOTE — PROGRESS NOTE ADULT - ASSESSMENT
Assessment and Plan:  89y Female POD1 L IMN    Follow up AM labs  WBAT/PT/OT  Pain management PRN  Continue prophylactic antibiotics  DVT PPx: lovenox, appreciate anticoagulation team recommendations.   Incentive spirometry  Dispo: pending recommendations per PT  Will discuss with Dr. Zavala and advise of any changes to the plan.

## 2022-04-19 NOTE — PROGRESS NOTE ADULT - SUBJECTIVE AND OBJECTIVE BOX
CC- s/p mechanical fall    HPI:  88 yo F with a PMH CVA (left thalamic infarct, 2019) s/p implantable loop recorder, JAK2+ myelofibrosis, bronchiectasis (on 4L NC), osteoporosis, HTN, anxiety, and hypothyroidism who presents after a fall.  Her aide had been wheeling her back from the bathroom this morning around 5AM, when she slipped off and fell on her leg. Her aide was able to get her back into the bed, but she was complaining of pain in her left hip. Her daughters came by later and noticed her left upper leg was getting more and more swollen, so they took her to the ED.  She does not normally fall, but she has difficulty walking (due to osteoporosis and dyspnea), using a walker with 1-person assist.  The patient notes some dyspnea, but denies any pain at this time. She denies CP, abdominal pain, diarrhea, N/V, fevers, or chills.  She is being treated for a rash on her left ankle/foot with an antifungal cream, which is improving it.  In the ED, she was given Morphine 2 mg IV x1 and  ml x1. (17 Apr 2022 13:18)    4/18/22- NPO for OR later on today. Getting PRBC transfusion  4/19/22- S/p LT hip IMN yesterday. Was noted with labored breathing and chest congestion earlier, s/p lasix. Comfortable otherwise, not in pain    Review of system- All 10 systems reviewed and is as per HPI otherwise negative.     Vital Signs Last 24 Hrs  T(C): 36.8 (19 Apr 2022 09:45), Max: 37.8 (18 Apr 2022 20:20)  T(F): 98.3 (19 Apr 2022 09:45), Max: 100.1 (18 Apr 2022 20:20)  HR: 99 (19 Apr 2022 09:45) (77 - 101)  BP: 100/47 (19 Apr 2022 09:45) (95/51 - 156/70)  BP(mean): --  RR: 20 (19 Apr 2022 09:45) (16 - 20)  SpO2: 94% (19 Apr 2022 09:45) (94% - 100%)    LABS:                        7.9    x     )-----------( x        ( 19 Apr 2022 11:55 )             25.5     19 Apr 2022 08:32    136    |  104    |  56     ----------------------------<  95     5.4     |  25     |  1.56     Ca    8.1        19 Apr 2022 08:32  Phos  3.8       18 Apr 2022 05:03  Mg     2.2       18 Apr 2022 05:03    PT/INR - ( 18 Apr 2022 05:03 )   PT: 14.6 sec;   INR: 1.26 ratio       PTT - ( 18 Apr 2022 05:03 )  PTT:30.1 sec    RADIOLOGY & ADDITIONAL TESTS:  CT BRAIN                        PROCEDURE DATE:  04/17/2022      INTERPRETATION:  Clinical Indication: Fall with acute left hip fracture    5mm axial sections of the brain were obtained from base to vertex,   without the intravenous administration of contrast material. Coronal and   sagittal computer generated reconstructed views are available.    Comparison is made with the prior CT of 10/8/2020 and demonstrates no   significant interval change.      The ventricles and sulci are prominent consistent with moderate atrophy.   Small vessel white matter ischemic changes are noted. There is no   hemorrhage, mass, or shift of midline structures. Bone window examination   is unremarkable. There has been previous bilateral lens replacement   surgery.    IMPRESSION: Moderate atrophy and small vessel white matter ischemic   changes. No hemorrhage. No change since 10/8/2020.    PHYSICAL EXAM:  GENERAL: NAD, well-groomed, well-developed  HEAD:  Atraumatic, Normocephalic  EYES: EOMI, PERRLA, conjunctiva and sclera clear  HEENT: Moist mucous membranes  NECK: Supple, No JVD  NERVOUS SYSTEM:  Alert & Oriented X3, Motor Strength 5/5 B/L upper and lower extremities; DTRs 2+ intact and symmetric  CHEST/LUNG: occasional rhonchi  HEART: Regular rate and rhythm; No murmurs, rubs, or gallops  ABDOMEN: Soft, Nontender, Nondistended; Bowel sounds present  GENITOURINARY- Ho+  EXTREMITIES:  2+ Peripheral Pulses, No clubbing, cyanosis, or edema  MUSCULOSKELTAL- LT hip dressing dry  SKIN-no rash, no lesion  CNS- alert, oriented X3, non focal     MEDICATIONS  (STANDING):  acetaminophen     Tablet .. 650 milliGRAM(s) Oral every 6 hours  amLODIPine   Tablet 5 milliGRAM(s) Oral daily  ascorbic acid 500 milliGRAM(s) Oral two times a day  aspirin enteric coated 81 milliGRAM(s) Oral daily  atorvastatin 40 milliGRAM(s) Oral at bedtime  azithromycin   Tablet 250 milliGRAM(s) Oral <User Schedule>  budesonide  80 MICROgram(s)/formoterol 4.5 MICROgram(s) Inhaler 2 Puff(s) Inhalation two times a day  cefTRIAXone   IVPB 1000 milliGRAM(s) IV Intermittent every 24 hours  folic acid 1 milliGRAM(s) Oral daily  HYDROmorphone  Injectable 0.5 milliGRAM(s) IV Push once  levothyroxine 100 MICROGram(s) Oral daily  LORazepam     Tablet 0.5 milliGRAM(s) Oral at bedtime  montelukast 10 milliGRAM(s) Oral at bedtime  multivitamin 1 Tablet(s) Oral daily  pantoprazole    Tablet 40 milliGRAM(s) Oral before breakfast  polyethylene glycol 3350 17 Gram(s) Oral at bedtime  predniSONE   Tablet 5 milliGRAM(s) Oral daily  senna 2 Tablet(s) Oral at bedtime    MEDICATIONS  (PRN):  ondansetron Injectable 4 milliGRAM(s) IV Push every 6 hours PRN Nausea and/or Vomiting  oxyCODONE    IR 5 milliGRAM(s) Oral every 3 hours PRN Moderate Pain (4 - 6)  oxyCODONE    IR 10 milliGRAM(s) Oral every 3 hours PRN Severe Pain (7 - 10)    Assessment/Plan  88 yo F with a PMH CVA (left thalamic infarct, 2019) s/p implantable loop recorder, JAK2+ myelofibrosis, bronchiectasis (on 4L NC), osteoporosis, HTN, anxiety, and hypothyroidism who presents after a fall, found to have a left femoral neck fracture.    #S/p mechanical fall with LT hip fracture- s/p LT hip IMN  #Anemia acute blood loss from fracture/postop on chronic of myelofibrosis  Ortho following  S/p PRBC transfusion 1U 4/17, 2U 4/18  For 1 more unit transfusion today  Monitor HH  PT as tolerated  Pain meds prn  Bowel regimen  Incentive spiroemtry    #Urinary retention s/p Ho 4/18  Will give voiding trial when hemodynamically stable and able to do more with PT    #TEODORA  Renal eval DR Sandoval called  Will monitor BMP    #SOB   Likely fluid overload from IVF+ blood transfusions  CXR ordered  One dose of IV lasix then reassess  Clinically no wheezing, but crackly    #Primary Myelofibrosis  - Patient has a history of myelofibrosis, JAK2+, diagnosed in 2016 after bone marrow biopsy  - Had been on Hydroxyurea for several years (with temporary hold in [id number] due to cytopenias) until Feb 2022, when she was started on Jakafi 10 mg BID  - Jakafi with success but has been held for 4 weeks due to cytopenias (Hb as low as 7.5)  - Since holding Jakafi, all 3 cell lines have increased (Hb only mildly)  - MF places patient at both an increased risk of bleeding and thrombosis perioperatively as noted above  - Would recommend continuing aspirin given thrombotic risk and transfuse 1U PRBCs to Hb > 9  - F/u Hematology for further recs    #Adult Bronchiectasis/ mixed COPD  - Pulm f/u DR Yolis Irving not on formulary, pt can bring her own  - C/w Budesonide 2 puffs BID  - C/w Prednisone 5 mg QD and PPI while on steroids  - C/w Montelukast 10 mg QD  - Azithromycin 250 mg MWF for anti-inflammatory effect and PPX  -s/p stress dose of Hydrocortisone- back on Prednisone daily    #UTI  Rocephin x3 days    #Hypothyroidism  - S/p partial thyroidectomy many years ago  - C/w Levothyroxine 100 ucg QD    #HTN  - C/w Amlodipine 5 mg QD and Lisinopril 10 mg QD    #Fungal dermatitis  - Improved on Ketoconazole cream (will give Clotrimazole inpatient, as it is the only one on formulary here)    #DVT proph- heparin SQ    #MOLST with DNR/DNI    #Dispo- transfuse today, attempt to mobilize with PT. Eventually needs RITU when clinically stable

## 2022-04-19 NOTE — PROGRESS NOTE ADULT - SUBJECTIVE AND OBJECTIVE BOX
Subjective:    Awake, alert. Much better today. No sputum production. s/p repair of left hip Fx.    MEDICATIONS  (STANDING):  acetaminophen     Tablet .. 650 milliGRAM(s) Oral every 6 hours  amLODIPine   Tablet 5 milliGRAM(s) Oral daily  ascorbic acid 500 milliGRAM(s) Oral two times a day  aspirin enteric coated 81 milliGRAM(s) Oral daily  atorvastatin 40 milliGRAM(s) Oral at bedtime  azithromycin   Tablet 250 milliGRAM(s) Oral <User Schedule>  budesonide  80 MICROgram(s)/formoterol 4.5 MICROgram(s) Inhaler 2 Puff(s) Inhalation two times a day  ceFAZolin   IVPB 2000 milliGRAM(s) IV Intermittent every 8 hours  cefTRIAXone   IVPB 1000 milliGRAM(s) IV Intermittent every 24 hours  dextrose 5% + lactated ringers. 1000 milliLiter(s) (50 mL/Hr) IV Continuous <Continuous>  enoxaparin Injectable 40 milliGRAM(s) SubCutaneous every 24 hours  folic acid 1 milliGRAM(s) Oral daily  HYDROmorphone  Injectable 0.5 milliGRAM(s) IV Push once  lactated ringers. 1000 milliLiter(s) (150 mL/Hr) IV Continuous <Continuous>  levothyroxine 100 MICROGram(s) Oral daily  lisinopril 10 milliGRAM(s) Oral daily  LORazepam     Tablet 0.5 milliGRAM(s) Oral at bedtime  montelukast 10 milliGRAM(s) Oral at bedtime  multivitamin 1 Tablet(s) Oral daily  pantoprazole    Tablet 40 milliGRAM(s) Oral before breakfast  polyethylene glycol 3350 17 Gram(s) Oral at bedtime  predniSONE   Tablet 5 milliGRAM(s) Oral daily  senna 2 Tablet(s) Oral at bedtime    MEDICATIONS  (PRN):  ondansetron Injectable 4 milliGRAM(s) IV Push every 6 hours PRN Nausea and/or Vomiting  oxyCODONE    IR 5 milliGRAM(s) Oral every 3 hours PRN Moderate Pain (4 - 6)  oxyCODONE    IR 10 milliGRAM(s) Oral every 3 hours PRN Severe Pain (7 - 10)      Allergies    No Known Allergies    Intolerances        Vital Signs Last 24 Hrs  T(C): 36.8 (2022 05:03), Max: 37.8 (2022 20:20)  T(F): 98.2 (2022 05:03), Max: 100.1 (2022 20:20)  HR: 97 (2022 05:03) (61 - 104)  BP: 142/76 (2022 05:03) (95/51 - 156/70)  BP(mean): --  RR: 17 (2022 05:03) (16 - 20)  SpO2: 99% (2022 05:03) (96% - 100%)    PHYSICAL EXAMINATION:    NECK:  Supple. No lymphadenopathy. Jugular venous pressure not elevated.   HEART:   The cardiac impulse has a normal quality. Reg., Nl S1 and S2.    CHEST:  Chest with bilateral rales-chronic. Normal respiratory effort.  ABDOMEN:  Soft and nontender.   EXTREMITIES:  There is no edema.       LABS:                        10.6   16.06 )-----------( 580      ( 2022 20:52 )             35.9     04-18    142  |  110<H>  |  47<H>  ----------------------------<  116<H>  5.2   |  27  |  1.40<H>    Ca    8.6      2022 20:52  Phos  3.8     04-18  Mg     2.2     04-18    TPro  7.3  /  Alb  3.4  /  TBili  0.7  /  DBili  x   /  AST  20  /  ALT  20  /  AlkPhos  115  04-17    PT/INR - ( 2022 05:03 )   PT: 14.6 sec;   INR: 1.26 ratio         PTT - ( 2022 05:03 )  PTT:30.1 sec  Urinalysis Basic - ( 2022 11:46 )    Color: Yellow / Appearance: Clear / S.020 / pH: x  Gluc: x / Ketone: Negative  / Bili: Negative / Urobili: Negative   Blood: x / Protein: 100 / Nitrite: Negative   Leuk Esterase: Small / RBC: 3-5 /HPF / WBC 11-25   Sq Epi: x / Non Sq Epi: Occasional / Bacteria: Few        RADIOLOGY & ADDITIONAL TESTS:     Assessment and Recommendation:   · Assessment	  Assessment/Plan    - Maintain aerosols with Symbicort and Tiotropium.   - Continue prednisone 5mg QD  - Mucinex as mucolytic  - Monitor H/H carefullyimproved after 2 units of PRBC's  - Supplemental O2 at 3L/M  - DVT prophylaxis  - Physical Tx  - Incentive Spirometry    Problem/Recommendation - 1:  ·  Hip fracture, left.   Problem/Recommendation - 2:  ·  COPD mixed type.   Problem/Recommendation - 3:  ·  Bronchiectasis.   Problem/Recommendation - 4:  ·  Myelodysplastic syndrome.   Problem/Recommendation - 5:  ·  Anemia.

## 2022-04-19 NOTE — PHYSICAL THERAPY INITIAL EVALUATION ADULT - MODALITIES TREATMENT COMMENTS
Pt returned back to supine w/ max A and max A x 2 to HOB, +SCD's B, +[place], NC+5L, Pain increased to 8/10 to 9/10 from no pain reported at start of PT, RN aware,

## 2022-04-19 NOTE — PROVIDER CONTACT NOTE (OTHER) - ASSESSMENT
total arrieta output since 2200 is 310. Pt has had LR @ 150ml/hr infusing. Bladder scanned patient and scan is 0ml. No bladder distension or discomfort noted. total arrieta output since 2200 is 310ml. Pt has had LR @ 150ml/hr infusing. Bladder scanned patient and scan is 0ml. No bladder distension or discomfort noted.

## 2022-04-19 NOTE — CONSULT NOTE ADULT - SUBJECTIVE AND OBJECTIVE BOX
NEPHROLOGY CONSULT  HPI:  88 yo F with a PMH CVA (left thalamic infarct, 2019) s/p implantable loop recorder, JAK2+ myelofibrosis, bronchiectasis (on 4L NC), osteoporosis, HTN, anxiety, and hypothyroidism who presents after a fall.  Her aide had been wheeling her back from the bathroom this morning around 5AM, when she slipped off and fell on her leg. Her aide was able to get her back into the bed, but she was complaining of pain in her left hip. Her daughters came by later and noticed her left upper leg was getting more and more swollen, so they took her to the ED.  She does not normally fall, but she has difficulty walking (due to osteoporosis and dyspnea), using a walker with 1-person assist.    The patient notes some dyspnea, but denies any pain at this time. She denies CP, abdominal pain, diarrhea, N/V, fevers, or chills.  She is being treated for a rash on her left ankle/foot with an antifungal cream, which is improving it.    In the ED, she was given Morphine 2 mg IV x1 and  ml x1. (17 Apr 2022 13:18)    Above info from Chart:  Pts daughters at the bedside, interviewed  Pt s/p repair of hip fx  As above receive  ml,  Post op creat up 0.82 to 1.03 to 1.40 last night and 1.56 today  potassium 4.3 to 4.5,  5.2, and 5.4 this am  Ho in, got 1 U prbc and lasix 40 mg IV x 1 pre transfusion at 930 am, CXR this am w PVC compared to  admission CXR w pt appearing dyspneic  UO only ~ 200 ml today, clear, bladder scan negative  Pt w/o any complaints, but as per daughters not talking much usually more talkative  on lisinopril 10 mg took am of admission at home and yesterday preop  Held today  OR uneventful BPs stable w mild drop to 110-120 range    PAST MEDICAL & SURGICAL HISTORY:  Rib fractures    Essential hypertension    Hypothyroidism, unspecified type    Asthma, unspecified asthma severity, unspecified whether complicated, unspecified whether persistent    Cerebrovascular accident (CVA)  6/7/19 Left thalamic infarct s/p ILR    Bronchiectasis    Myelofibrosis    Anxiety    History of partial thyroidectomy    Status post placement of implantable loop recorder        FAMILY HISTORY:  FH: diabetes mellitus (Sibling)    FH: HTN (hypertension)        MEDICATIONS  (STANDING):  acetaminophen     Tablet .. 650 milliGRAM(s) Oral every 6 hours  amLODIPine   Tablet 5 milliGRAM(s) Oral daily  ascorbic acid 500 milliGRAM(s) Oral two times a day  aspirin enteric coated 81 milliGRAM(s) Oral daily  atorvastatin 40 milliGRAM(s) Oral at bedtime  azithromycin   Tablet 250 milliGRAM(s) Oral <User Schedule>  budesonide  80 MICROgram(s)/formoterol 4.5 MICROgram(s) Inhaler 2 Puff(s) Inhalation two times a day  cefTRIAXone   IVPB 1000 milliGRAM(s) IV Intermittent every 24 hours  folic acid 1 milliGRAM(s) Oral daily  HYDROmorphone  Injectable 0.5 milliGRAM(s) IV Push once  levothyroxine 100 MICROGram(s) Oral daily  LORazepam     Tablet 0.5 milliGRAM(s) Oral at bedtime  montelukast 10 milliGRAM(s) Oral at bedtime  multivitamin 1 Tablet(s) Oral daily  pantoprazole    Tablet 40 milliGRAM(s) Oral before breakfast  polyethylene glycol 3350 17 Gram(s) Oral at bedtime  predniSONE   Tablet 5 milliGRAM(s) Oral daily  senna 2 Tablet(s) Oral at bedtime    MEDICATIONS  (PRN):  ondansetron Injectable 4 milliGRAM(s) IV Push every 6 hours PRN Nausea and/or Vomiting  oxyCODONE    IR 5 milliGRAM(s) Oral every 3 hours PRN Moderate Pain (4 - 6)  oxyCODONE    IR 10 milliGRAM(s) Oral every 3 hours PRN Severe Pain (7 - 10)      Allergies    No Known Allergies    Intolerances        I&O's Detail    18 Apr 2022 07:01  -  19 Apr 2022 07:00  --------------------------------------------------------  IN:    Lactated Ringers: 130 mL    Other (mL): 350 mL    PRBCs (Packed Red Blood Cells): 625 mL  Total IN: 1105 mL    OUT:    Indwelling Catheter - Urethral (mL): 310 mL  Total OUT: 310 mL    Total NET: 795 mL      19 Apr 2022 07:01  -  19 Apr 2022 17:19  --------------------------------------------------------  IN:    Lactated Ringers: 900 mL    PRBCs (Packed Red Blood Cells): 346 mL  Total IN: 1246 mL    OUT:  Total OUT: 0 mL    Total NET: 1246 mL              REVIEW OF SYSTEMS:    UTO      Vital Signs Last 24 Hrs  T(C): 36.6 (19 Apr 2022 17:00), Max: 37.8 (18 Apr 2022 20:20)  T(F): 97.9 (19 Apr 2022 17:00), Max: 100.1 (18 Apr 2022 20:20)  HR: 98 (19 Apr 2022 17:00) (77 - 102)  BP: 121/48 (19 Apr 2022 17:00) (95/51 - 142/76)  BP(mean): --  RR: 18 (19 Apr 2022 17:00) (16 - 20)  SpO2: 94% (19 Apr 2022 17:00) (92% - 100%)    Daily     Daily         PHYSICAL EXAM:    General:  Alert, No acute distress. mild apprehension    Neuro:  not talkative    HEENT:  No JVD, no masses, Eyes anicteric, ,    Cardiovascular:  Regular rate and rhythm, with normal S1 and S2.    Lungs:  clear. occ crackles    Abdomen:  Normoactive bowel sounds. Soft, flat, non-tender, and non-distended.  positive bowel sounds    Skin:  Warm, dry    Extremities:  warm,  no cyanosis    LABS:                        7.9    x     )-----------( x        ( 19 Apr 2022 11:55 )             25.5     04-19    136  |  104  |  56<H>  ----------------------------<  95  5.4<H>   |  25  |  1.56<H>    Ca    8.1<L>      19 Apr 2022 08:32  Phos  3.8     04-18  Mg     2.2     04-18      PT/INR - ( 18 Apr 2022 05:03 )   PT: 14.6 sec;   INR: 1.26 ratio         PTT - ( 18 Apr 2022 05:03 )  PTT:30.1 sec

## 2022-04-20 NOTE — PROGRESS NOTE ADULT - SUBJECTIVE AND OBJECTIVE BOX
CC- s/p mechanical fall    HPI:  88 yo F with a PMH CVA (left thalamic infarct, 2019) s/p implantable loop recorder, JAK2+ myelofibrosis, bronchiectasis (on 4L NC), osteoporosis, HTN, anxiety, and hypothyroidism who presents after a fall.  Her aide had been wheeling her back from the bathroom this morning around 5AM, when she slipped off and fell on her leg. Her aide was able to get her back into the bed, but she was complaining of pain in her left hip. Her daughters came by later and noticed her left upper leg was getting more and more swollen, so they took her to the ED.  She does not normally fall, but she has difficulty walking (due to osteoporosis and dyspnea), using a walker with 1-person assist.  The patient notes some dyspnea, but denies any pain at this time. She denies CP, abdominal pain, diarrhea, N/V, fevers, or chills.  She is being treated for a rash on her left ankle/foot with an antifungal cream, which is improving it.  In the ED, she was given Morphine 2 mg IV x1 and  ml x1. (17 Apr 2022 13:18)    4/18/22- NPO for OR later on today. Getting PRBC transfusion  4/19/22- S/p LT hip IMN yesterday. Was noted with labored breathing and chest congestion earlier, s/p lasix. Comfortable otherwise, not in pain  4/20/22: seen at bedside, appears comfortable, no complaints    Review of system- All 10 systems reviewed and is as per HPI otherwise negative.     Vital Signs Last 24 Hrs  T(C): 36.4 (04-20-22 @ 08:43), Max: 37 (04-20-22 @ 00:15)  T(F): 97.5 (04-20-22 @ 08:43), Max: 98.6 (04-20-22 @ 00:15)  HR: 94 (04-20-22 @ 10:18) (94 - 100)  BP: 115/50 (04-20-22 @ 10:18) (102/90 - 158/51)  BP(mean): --  RR: 20 (04-20-22 @ 10:18) (17 - 20)  SpO2: 94% (04-20-22 @ 10:18) (88% - 100%)      LABS:                                 8.4    12.75 )-----------( 615      ( 20 Apr 2022 07:08 )             27.2       04-20    137  |  105  |  75<H>  ----------------------------<  104<H>  5.0   |  24  |  2.17<H>    Ca    7.8<L>      20 Apr 2022 07:08  Phos  5.6     04-19    TPro  x   /  Alb  2.2<L>  /  TBili  x   /  DBili  x   /  AST  x   /  ALT  x   /  AlkPhos  x   04-19              RADIOLOGY & ADDITIONAL TESTS:  CT BRAIN                        PROCEDURE DATE:  04/17/2022      INTERPRETATION:  Clinical Indication: Fall with acute left hip fracture    5mm axial sections of the brain were obtained from base to vertex,   without the intravenous administration of contrast material. Coronal and   sagittal computer generated reconstructed views are available.    Comparison is made with the prior CT of 10/8/2020 and demonstrates no   significant interval change.      The ventricles and sulci are prominent consistent with moderate atrophy.   Small vessel white matter ischemic changes are noted. There is no   hemorrhage, mass, or shift of midline structures. Bone window examination   is unremarkable. There has been previous bilateral lens replacement   surgery.    IMPRESSION: Moderate atrophy and small vessel white matter ischemic   changes. No hemorrhage. No change since 10/8/2020.    PHYSICAL EXAM:  GENERAL: NAD, well-groomed, well-developed  HEAD:  Atraumatic, Normocephalic  EYES: EOMI, PERRLA, conjunctiva and sclera clear  HEENT: Moist mucous membranes  NECK: Supple, No JVD  NERVOUS SYSTEM:  Alert & Oriented X3, Motor Strength 5/5 B/L upper and lower extremities; DTRs 2+ intact and symmetric  CHEST/LUNG: occasional rhonchi,  HEART: Regular rate and rhythm; No murmurs, rubs, or gallops  ABDOMEN: Soft, Nontender, Nondistended; Bowel sounds present  GENITOURINARY- Ho+  EXTREMITIES:  2+ Peripheral Pulses, No clubbing, cyanosis, or edema  MUSCULOSKELETAL- LT hip dressing dry  SKIN-no rash, no lesion  CNS- alert, oriented X3, non focal     MEDICATIONS  (STANDING):  acetaminophen     Tablet .. 650 milliGRAM(s) Oral every 6 hours  amLODIPine   Tablet 5 milliGRAM(s) Oral daily  ascorbic acid 500 milliGRAM(s) Oral two times a day  aspirin enteric coated 81 milliGRAM(s) Oral daily  atorvastatin 40 milliGRAM(s) Oral at bedtime  azithromycin   Tablet 250 milliGRAM(s) Oral <User Schedule>  budesonide 160 MICROgram(s)/formoterol 4.5 MICROgram(s) Inhaler 2 Puff(s) Inhalation two times a day  cefTRIAXone   IVPB 1000 milliGRAM(s) IV Intermittent every 24 hours  folic acid 1 milliGRAM(s) Oral daily  heparin   Injectable 5000 Unit(s) SubCutaneous every 12 hours  HYDROmorphone  Injectable 0.5 milliGRAM(s) IV Push once  levothyroxine 100 MICROGram(s) Oral daily  LORazepam     Tablet 0.5 milliGRAM(s) Oral at bedtime  montelukast 10 milliGRAM(s) Oral at bedtime  multivitamin 1 Tablet(s) Oral daily  pantoprazole    Tablet 40 milliGRAM(s) Oral before breakfast  polyethylene glycol 3350 17 Gram(s) Oral at bedtime  predniSONE   Tablet 5 milliGRAM(s) Oral daily  senna 2 Tablet(s) Oral at bedtime  tiotropium 18 MICROgram(s) Capsule 1 Capsule(s) Inhalation daily    MEDICATIONS  (PRN):  ondansetron Injectable 4 milliGRAM(s) IV Push every 6 hours PRN Nausea and/or Vomiting  oxyCODONE    IR 5 milliGRAM(s) Oral every 3 hours PRN Moderate Pain (4 - 6)  oxyCODONE    IR 10 milliGRAM(s) Oral every 3 hours PRN Severe Pain (7 - 10)    Assessment/Plan  88 yo F with a PMH CVA (left thalamic infarct, 2019) s/p implantable loop recorder, JAK2+ myelofibrosis, bronchiectasis (on 4L NC), osteoporosis, HTN, anxiety, and hypothyroidism who presents after a fall, found to have a left femoral neck fracture.    #S/p mechanical fall with LT hip fracture- s/p LT hip IMN  #Anemia acute blood loss from fracture/postop on chronic of myelofibrosis  Ortho following  S/p PRBC transfusion 1U 4/17, 2U 4/18  For 1 more unit transfusion today  Monitor HH Now stable  PT as tolerated  Pain meds prn  Bowel regimen  Incentive spiroemtry      #Urinary retention s/p Ho 4/18  Will give voiding trial when hemodynamically stable and able to do more with PT    #TEODORA  Renal eval DR Sandoval called  Will monitor BMP    #SOB   Likely fluid overload from IVF+ blood transfusions  CXR ordered  One dose of IV lasix : repeat CXR with some interstitial edema ( post lasix)  no wheezing    #Primary Myelofibrosis  - Patient has a history of myelofibrosis, JAK2+, diagnosed in 2016 after bone marrow biopsy  - Had been on Hydroxyurea for several years (with temporary hold in 5703-4055 due to cytopenias) until Feb 2022, when she was started on Jakafi 10 mg BID  - Jakafi with success but has been held for 4 weeks due to cytopenias (Hb as low as 7.5)  - Since holding Jakafi, all 3 cell lines have increased (Hb only mildly)  - MF places patient at both an increased risk of bleeding and thrombosis perioperatively as noted above  - Would recommend continuing aspirin given thrombotic risk and transfuse 1U PRBCs to Hb > 9  - F/u Hematology for further recs    #Adult Bronchiectasis/ mixed COPD  - Pulm f/u DR Lagos appreciated  - Brovana not on formulary, pt can bring her own  - C/w Budesonide 2 puffs BID  - C/w Prednisone 5 mg QD and PPI while on steroids  - C/w Montelukast 10 mg QD  - Azithromycin 250 mg MWF for anti-inflammatory effect and PPX  -s/p stress dose of Hydrocortisone- back on Prednisone daily    #UTI  Rocephin x3 days    #Hypothyroidism  - S/p partial thyroidectomy many years ago  - C/w Levothyroxine 100 ucg QD    #HTN  - C/w Amlodipine 5 mg QD and Lisinopril 10 mg QD    #Fungal dermatitis  - Improved on Ketoconazole cream (will give Clotrimazole inpatient, as it is the only one on formulary here)    #DVT proph- heparin SQ    #MOLST with DNR/DNI    #Dispo-  attempt to mobilize with PT. Eventually needs [facility]U when clinically stable

## 2022-04-20 NOTE — PROGRESS NOTE ADULT - SUBJECTIVE AND OBJECTIVE BOX
HPI:  88 yo F with a PMH CVA (left thalamic infarct, 2019) s/p implantable loop recorder, JAK2+ myelofibrosis, bronchiectasis (on 4L NC), osteoporosis, HTN, anxiety, and hypothyroidism who presents after a fall.  Her aide had been wheeling her back from the bathroom this morning around 5AM, when she slipped off and fell on her leg. Her aide was able to get her back into the bed, but she was complaining of pain in her left hip. Her daughters came by later and noticed her left upper leg was getting more and more swollen, so they took her to the ED.  She does not normally fall, but she has difficulty walking (due to osteoporosis and dyspnea), using a walker with 1-person assist.    The patient notes some dyspnea, but denies any pain at this time. She denies CP, abdominal pain, diarrhea, N/V, fevers, or chills.  She is being treated for a rash on her left ankle/foot with an antifungal cream, which is improving it.    In the ED, she was given Morphine 2 mg IV x1 and  ml x1. (2022 13:18)      Patient is a 89y old  Female who presents with a chief complaint of L hip fracture (2022 12:14)      Consulted by Dr. Cameron Marcelino for VTE prophylaxis, risk stratification, and anticoagulation management.    PAST MEDICAL & SURGICAL HISTORY:  Rib fractures    Essential hypertension    Hypothyroidism, unspecified type    Asthma, unspecified asthma severity, unspecified whether complicated, unspecified whether persistent    Cerebrovascular accident (CVA)  19 Left thalamic infarct s/p ILR    Bronchiectasis    Myelofibrosis    Anxiety    History of partial thyroidectomy    Status post placement of implantable loop recorder        FAMILY HISTORY:  FH: diabetes mellitus (Sibling)    FH: HTN (hypertension)        Interval Note:  2022 Pt seen at bedside on [facility] with 2 daughters present.. Discussed with daughters about the use of heparin sq for four weeks post procedure for DVT  prophylaxis.  Questions answered will reinforce as needed.  Pt alert and oriented to person only. They stated her mother will go to rehab on discharge.   2022 Pt seen at bedside on [facility] , alert to person only. No changes.  Will go to rehab on discharge.     CAPRINI SCORE  AGE RELATED RISK FACTORS                                                       MOBILITY RELATED FACTORS  [ ] Age 41-60 years                                            (1 Point)                  [ ] Bed rest                                                        (1 Point)  [ ] Age: 61-74 years                                           (2 Points)                [ ] Plaster cast                                                   (2 Points)  [ X] Age= 75 years                                              (3 Points)                 [ ] Bed bound for more than 72 hours                   (2 Points)    DISEASE RELATED RISK FACTORS                                               GENDER SPECIFIC FACTORS  [ ] Edema in the lower extremities                       (1 Point)           [ ] Pregnancy                                                            (1 Point)  [ ] Varicose veins                                               (1 Point)                  [ ] Post-partum < 6 weeks                                      (1 Point)             [X ] BMI > 25 Kg/m2                                            (1 Point)                  [ ] Hormonal therapy or oral contraception       (1 Point)                 [ ] Sepsis (in the previous month)                        (1 Point)             [ ] History of pregnancy complications                (1Point)  [ ] Pneumonia or serious lung disease                                             [ ] Unexplained or recurrent  (=/>3), premature                                 (In the previous month)                               (1 Point)                birth with toxemia or growth-restricted infant (1 Point)  [ ] Abnormal pulmonary function test            (1 Point)                                   SURGERY RELATED RISK FACTORS  [ ] Acute myocardial infarction                       (1 Point)                  [ ]  Section                                         (1 Point)  [ ] Congestive heart failure (in the previous month) (1 Point)   [ ] Minor surgery   lasting <45 minutes       (1 Point)   [ ] Inflammatory bowel disease                             (1 Point)          [ ] Arthroscopic surgery                                  (2 Points)  [ ] Central venous access                                    (2 Points)            [ ] General surgery lasting >45 minutes      (2 Points)       [ ] Stroke (in the previous month)                  (5 Points)            [ ] Elective major lower extremity arthroplasty (5 Points)                                   [  ] Malignancy (present or past include skin melanoma                                          but exclude  basal skin cell)    (2 points)                                      TRAUMA RELATED RISK FACTORS                HEMATOLOGY RELATED FACTORS                                  [X ] Fracture of the hip, pelvis, or leg                       (5 Points)  [ ] Prior episodes of VTE                                     (3 Points)          [ ] Acute spinal cord injury (in the previous month)  (5 Points)  [ ] Positive family history for VTE                         (3 Points)       [ ] Paralysis (less than 1 month)                          (5 Points)  [ ] Prothrombin 34965 A                                      (3 Points)         [ ] Multiple Trauma (within 1month)                 (5Points)                                                                                                                                                                [ ] Factor V Leiden                                          (3 Points)                                OTHER RISK FACTORS                          [ ] Lupus anticoagulants                                     (3 Points)                       [ ] BMI > 40                          (1 Point)                                                         [ ] Anticardiolipin antibodies                                (3 Points)                   [ ] Smoking                              (1Point)                                                [ ] High homocysteine in the blood                      (3 Points)                [  ] Diabetes requiring insulin (1point)                         [ ] Other congenital or acquired thrombophilia       (3 Points)          [  ] Chemotherapy                   (1 Point)  [ ] Heparin induced thrombocytopenia                  (3 Points)             [  ] Blood Transfusion                (1 point)                                                                                                             Total Score [9 ]                                                                                                                                                                                                                                  IMPROVE Bleeding Risk Score: 4.5    Falls Risk:   High (X  )  Mod (  )  Low (  )  crcl: 27.2        cr: 1.54         BMI  30.3           EBL: 150  ml  Time procedure    : starts: 1929             :ends: 20:25        Denies any personal or familial history of clotting or bleeding disorders.    Allergies    No Known Allergies    Intolerances        REVIEW OF SYSTEMS    (  )Fever	     (  )Constipation	(  )SOB				(  )Headache	(  )Dysuria  (  )Chills	     (  )Melena	(  )Dyspnea present on exertion	                    (  )Dizziness                    (  )Polyuria  (  )Nausea	     (  )Hematochezia	(  )Cough			                    (  )Syncope   	(  )Hematuria  (  )Vomiting    (  )Chest Pain	(  )Wheezing			(  )Weakness  (  )Diarrhea     (  )Palpitations	(  )Anorexia			(  )Myalgia      Unable to obtain review of systems due to: PT IS CONFUSED     Vital Signs Last 24 Hrs  T(C): 36.4 (22 @ 08:43), Max: 37 (22 @ 00:15)  T(F): 97.5 (22 @ 08:43), Max: 98.6 (-- @ 00:15)  HR: 94 (22 @ 10:18) (94 - 102)  BP: 115/50 (22 @ 10:18) (97/60 - 158/51)  BP(mean): --  RR: 20 (22 @ 10:18) (17 - 20)  SpO2: 94% (22 @ 10:18) (88% - 100%)    PHYSICAL EXAM:    Constitutional: Appears Well    Neurological: A& O x 1 PERSON HER DAUGHTER STATES HE IS NORMALLY A&O TIMES 3     Skin: Warm    Respiratory and Thorax: normal effort; Breath sounds: normal; No rales/wheezing/rhonchi  	  Cardiovascular: S1, S2, regular, NMBR	    Gastrointestinal: BS + x 4Q, nontender	    Genitourinary:  Bladder nondistended, nontender    Musculoskeletal:   General Right:   no muscle/joint tenderness,   normal tone, no joint swelling,   ROM: limited/full	    General Left:   no muscle/joint tenderness,   normal tone, no joint swelling,   ROM: limited/full    Hip:  left: Dressing CDI;    Lower extrems:   Right: no calf tenderness              negative marco's sign               + pedal pulses    Left:   no calf tenderness              negative marco's sign               + pedal pulses                                   8.4    12.75 )-----------( 615      ( 2022 07:08 )             27.2       04-20    137  |  105  |  75<H>  ----------------------------<  104<H>  5.0   |  24  |  2.17<H>    Ca    7.8<L>      2022 07:08  Phos  5.6     04-19    TPro  x   /  Alb  2.2<L>  /  TBili  x   /  DBili  x   /  AST  x   /  ALT  x   /  AlkPhos  x                      7.9    x     )-----------( x        ( 2022 11:55 ) one unit PRBC             25.5       04-    136  |  104  |  56<H>  ----------------------------<  95  5.4<H>   |  25  |  1.56<H>    Ca    8.1<L>      2022 08:32  Phos  3.8     04-18  Mg     2.2     04-18        PT/INR - ( 2022 05:03 )   PT: 14.6 sec;   INR: 1.26 ratio         PTT - ( 2022 05:03 )  PTT:30.1 sec				  MEDICATIONS  (STANDING):  acetaminophen     Tablet .. 650 milliGRAM(s) Oral every 6 hours  amLODIPine   Tablet 5 milliGRAM(s) Oral daily  ascorbic acid 500 milliGRAM(s) Oral two times a day  aspirin enteric coated 81 milliGRAM(s) Oral daily  atorvastatin 40 milliGRAM(s) Oral at bedtime  azithromycin   Tablet 250 milliGRAM(s) Oral <User Schedule>  budesonide 160 MICROgram(s)/formoterol 4.5 MICROgram(s) Inhaler 2 Puff(s) Inhalation two times a day  cefTRIAXone   IVPB 1000 milliGRAM(s) IV Intermittent every 24 hours  folic acid 1 milliGRAM(s) Oral daily  heparin   Injectable 5000 Unit(s) SubCutaneous every 12 hours  HYDROmorphone  Injectable 0.5 milliGRAM(s) IV Push once  levothyroxine 100 MICROGram(s) Oral daily  LORazepam     Tablet 0.5 milliGRAM(s) Oral at bedtime  montelukast 10 milliGRAM(s) Oral at bedtime  multivitamin 1 Tablet(s) Oral daily  pantoprazole    Tablet 40 milliGRAM(s) Oral before breakfast  polyethylene glycol 3350 17 Gram(s) Oral at bedtime  predniSONE   Tablet 5 milliGRAM(s) Oral daily  senna 2 Tablet(s) Oral at bedtime  tiotropium 18 MICROgram(s) Capsule 1 Capsule(s) Inhalation daily      < from: Xray Hip w/ Pelvis 2 or 3 Views, Left (22 @ 09:49) >  MPRESSION: LEFT hip Intertrochanteric fracture.    < end of copied text >      DVT Prophylaxis:  LMWH                   (  )  Heparin SQ           ( x )  Coumadin             (  )  Xarelto                  (  )  Eliquis                   (  )  Venodynes           (x  )  Ambulation          (x  )  UFH                       (  )  Contraindicated  (  )  EC Aspirin             (  )

## 2022-04-20 NOTE — PROGRESS NOTE ADULT - ASSESSMENT
89 yo F with a PMH CVA (left thalamic infarct, 2019) s/p implantable loop recorder, JAK2+ myelofibrosis, bronchiectasis (on 4L NC), osteoporosis, HTN, anxiety, and hypothyroidism who presents after a fall.  Her aide had been wheeling her back from the bathroom this morning around 5AM, when she slipped off and fell on her leg. Her aide was able to get her back into the bed, but she was complaining of pain in her left hip. Her daughters came by later and noticed her left upper leg was getting more and more swollen, so they took her to the ED.  She does not normally fall, but she has difficulty walking (due to osteoporosis and dyspnea), using a walker with 1-person assist.    The patient notes some dyspnea, but denies any pain at this time. She denies CP, abdominal pain, diarrhea, N/V, fevers, or chills.  She is being treated for a rash on her left ankle/foot with an antifungal cream, which is improving it.  In the ED, she was given Morphine 2 mg IV x1 and  ml x1. (17 Apr 2022 13:18)  Pts daughters at the bedside, interviewed  Pt s/p repair of hip fx  As above receive  ml,  Post op creat up 0.82 to 1.03 to 1.40 last night and 1.56 today  potassium 4.3 to 4.5,  5.2, and 5.4 this am  Ho in, got 1 U prbc and lasix 40 mg IV x 1 pre transfusion at 930 am, CXR this am w PVC compared to  admission CXR w pt appearing dyspneic  UO only ~ 200 ml today, clear, bladder scan negative  Pt w/o any complaints, but as per daughters not talking much usually more talkative  on lisinopril 10 mg took am of admission at home and yesterday preop  Held today  OR uneventful BPs stable w mild drop to 110-120 range    A/P  TEODORA post hip surgery  Not on renal toxic meds  Pt was on ACEI since yesterday am  Now dcd  Mild bp drop 90s to 100 range noted today  Will repeat labs stat, evaluate K and Creat was on LR ( DC D) and acei  repeat CXR for worsening of PVC , may need more lasix, otherwise will hold on further diuretics, to prevent azotemia,   O2 sat 94% on 5L  if TEODORA due to Lisinopril the Duration of action is up to 24-30 hrs  repeat o2 sat on 2L    4/20  more awake alert  VVS   daughters at bedside  creat plateaued  K stable at 5  Monitor i/o  encourage po fluids

## 2022-04-20 NOTE — PROGRESS NOTE ADULT - SUBJECTIVE AND OBJECTIVE BOX
Orthopedics      Patient seen and examined at bedside. Mental status at baseline. 5L NC. Feeling well. Pain controlled. No n/v. SOB yesterday likely 2' increased fluid/PRBC intake, repeat CXR obtained.     Vital Signs Last 24 Hrs  T(C): 37 (04-20-22 @ 00:15), Max: 37 (04-19-22 @ 08:59)  T(F): 98.6 (04-20-22 @ 00:15), Max: 98.6 (04-19-22 @ 08:59)  HR: 100 (04-20-22 @ 00:15) (95 - 102)  BP: 120/84 (04-20-22 @ 00:15) (97/60 - 158/51)  BP(mean): --  RR: 17 (04-20-22 @ 00:15) (17 - 20)  SpO2: 95% (04-20-22 @ 00:15) (88% - 100%)                        7.9    x     )-----------( x        ( 19 Apr 2022 11:55 )             25.5     19 Apr 2022 20:47    136    |  104    |  65     ----------------------------<  133    5.0     |  26     |  2.11     Ca    7.7        19 Apr 2022 20:47  Phos  5.6       19 Apr 2022 20:47    TPro  x      /  Alb  2.2    /  TBili  x      /  DBili  x      /  AST  x      /  ALT  x      /  AlkPhos  x      19 Apr 2022 20:47        Exam:  Gen: NAD, resting comfortably  LLE:  Dressing c/d/i  NTTP calves b/l  +EHL/FHL/TA/GS  SILT L2-S1  Compartments soft and compressible  2+ Pulses palpable    89y Female POD2 s/p L IMN    Follow up AM labs, s/p 1U PRBC yesterday   WBAT/PT/OT  Pain management PRN  DVT PPx: appreciate anticoagulation team recommendations.   [place], nephrology following  Incentive spirometry  Medical mgmt per primary team  Appreciate heme/onc recs  Dispo: [facility]U  Will discuss with Dr. Zavala and advise of any changes to the plan.

## 2022-04-20 NOTE — PROGRESS NOTE ADULT - ASSESSMENT
This is a 89 year old female s/p mechanical fall causing a fracture to her left hip.  Pt s/p left hip IMN  on 4-. Pt has high thrombosin risk and requires anticoagulation prophylaxis.       Plan:  : cont heparin 5,000 units sq q12h tomorrow for total of four weeks last dose on 5-  :daily cbc/bmp  :LE Venodynes  : increase mobility as tolerated  : will f/u  : dispo : rehab

## 2022-04-20 NOTE — PROGRESS NOTE ADULT - SUBJECTIVE AND OBJECTIVE BOX
Subjective:    Awake, alert. Events of yesterday afternoon noted. Sl more breathless today than yesterday AM. No sputum.    MEDICATIONS  (STANDING):  acetaminophen     Tablet .. 650 milliGRAM(s) Oral every 6 hours  amLODIPine   Tablet 5 milliGRAM(s) Oral daily  ascorbic acid 500 milliGRAM(s) Oral two times a day  aspirin enteric coated 81 milliGRAM(s) Oral daily  atorvastatin 40 milliGRAM(s) Oral at bedtime  azithromycin   Tablet 250 milliGRAM(s) Oral <User Schedule>  budesonide  80 MICROgram(s)/formoterol 4.5 MICROgram(s) Inhaler 2 Puff(s) Inhalation two times a day  cefTRIAXone   IVPB 1000 milliGRAM(s) IV Intermittent every 24 hours  folic acid 1 milliGRAM(s) Oral daily  heparin   Injectable 5000 Unit(s) SubCutaneous every 12 hours  HYDROmorphone  Injectable 0.5 milliGRAM(s) IV Push once  levothyroxine 100 MICROGram(s) Oral daily  LORazepam     Tablet 0.5 milliGRAM(s) Oral at bedtime  montelukast 10 milliGRAM(s) Oral at bedtime  multivitamin 1 Tablet(s) Oral daily  pantoprazole    Tablet 40 milliGRAM(s) Oral before breakfast  polyethylene glycol 3350 17 Gram(s) Oral at bedtime  predniSONE   Tablet 5 milliGRAM(s) Oral daily  senna 2 Tablet(s) Oral at bedtime    MEDICATIONS  (PRN):  ondansetron Injectable 4 milliGRAM(s) IV Push every 6 hours PRN Nausea and/or Vomiting  oxyCODONE    IR 5 milliGRAM(s) Oral every 3 hours PRN Moderate Pain (4 - 6)  oxyCODONE    IR 10 milliGRAM(s) Oral every 3 hours PRN Severe Pain (7 - 10)      Allergies    No Known Allergies    Intolerances        Vital Signs Last 24 Hrs  T(C): 36.4 (20 Apr 2022 08:43), Max: 37 (20 Apr 2022 00:15)  T(F): 97.5 (20 Apr 2022 08:43), Max: 98.6 (20 Apr 2022 00:15)  HR: 100 (20 Apr 2022 08:43) (95 - 102)  BP: 102/90 (20 Apr 2022 08:43) (97/60 - 158/51)  BP(mean): --  RR: 18 (20 Apr 2022 08:43) (17 - 20)  SpO2: 100% (20 Apr 2022 08:43) (88% - 100%)    PHYSICAL EXAMINATION:    NECK:  Supple. No lymphadenopathy. Jugular venous pressure not elevated.   HEART:   The cardiac impulse has a normal quality. Reg., Nl S1 and S2.   CHEST:  Chest with scattered crackles, sl bilat exp. wheezes. Sl. increased respiratory effort.  ABDOMEN:  Soft and nontender.   EXTREMITIES:  There is no edema.       LABS:                        8.4    12.75 )-----------( 615      ( 20 Apr 2022 07:08 )             27.2     04-20    137  |  105  |  75<H>  ----------------------------<  104<H>  5.0   |  24  |  2.17<H>    Ca    7.8<L>      20 Apr 2022 07:08  Phos  5.6     04-19    TPro  x   /  Alb  2.2<L>  /  TBili  x   /  DBili  x   /  AST  x   /  ALT  x   /  AlkPhos  x   04-19          RADIOLOGY & ADDITIONAL TESTS:< from: Xray Chest 1 View- PORTABLE-Urgent (Xray Chest 1 View- PORTABLE-Urgent .) (04.19.22 @ 11:08) >  ACC: [id number] EXAM:  XR CHEST PORTABLE URGENT 1V                          PROCEDURE DATE:  04/19/2022          INTERPRETATION:  Clinical history: 89-year-old female, rule out fluid   overload.    Portable view of the chest is compared to 4/17/2022.    FINDINGS: Normal cardiac silhouette and normal pulmonary vasculature with   no consolidation, effusion or pneumothorax.    No acute osseous findings, moderate midthoracic dextroscoliosis   unchanged. Cardiac loop recorder again evident.    IMPRESSION:  No acute radiographic findings and no change in particular normal   pulmonary vasculature        CORI HOLGUIN DO; Attending Radiologist      Assessment and Plan:   · Assessment	  Assessment/Plan    - Maintain aerosols with Symbicort and Tiotropium.   - Continue prednisone 5mg QD  - Mucinex as mucolytic  - Monitor H/H carefully. ?Need for more blood-baseline HgB is between 10-11  - Supplemental O2 at 3-5L/M  - DVT prophylaxis  - Renal Consult noted. Now off Lisinopril. Of concern is increased BUN/Cr.  - CXR noted  - Hold on IV steroids at this time, as airway reactivity may be due to mild fluid overload.     Problem/Plan - 1:  ·  Problem: Hip fracture, left.   Problem/Plan - 2:  ·  Problem: COPD mixed type.   Problem/Plan - 3:  ·  Problem: Bronchiectasis.   Problem/Plan - 4:  ·  Problem: Myelodysplastic syndrome.   Problem/Plan - 5:  ·  Problem: Anemia.

## 2022-04-20 NOTE — CONSULT NOTE ADULT - SUBJECTIVE AND OBJECTIVE BOX
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  Her aide had been wheeling her back from the bathroom this morning around 5AM, when she slipped off and fell on her leg. Her aide was able to get her back into the bed, but she was complaining of pain in her left hip. Her daughters came by later and noticed her left upper leg was getting more and more swollen, so they took her to the ED.  She does not normally fall, but she has difficulty walking (due to osteoporosis and dyspnea), using a walker with 1-person assist.    The patient notes some dyspnea, but denies any pain at this time. She denies CP, abdominal pain, diarrhea, N/V, fevers, or chills.  She is being treated for a rash on her left ankle/foot with an antifungal cream, which is improving it.    In the ED, she was given Morphine 2 mg IV x1 and  ml x1. (17 Apr 2022 13:18)      PAST MEDICAL & SURGICAL HISTORY:  Rib fractures    Essential hypertension    Hypothyroidism, unspecified type    Asthma, unspecified asthma severity, unspecified whether complicated, unspecified whether persistent    Cerebrovascular accident (CVA)  6/7/19 Left thalamic infarct s/p ILR    Bronchiectasis    Myelofibrosis    Anxiety    History of partial thyroidectomy    Status post placement of implantable loop recorder        Allergies    No Known Allergies    Intolerances        MEDICATIONS  (STANDING):  acetaminophen     Tablet .. 650 milliGRAM(s) Oral every 6 hours  amLODIPine   Tablet 5 milliGRAM(s) Oral daily  ascorbic acid 500 milliGRAM(s) Oral two times a day  aspirin enteric coated 81 milliGRAM(s) Oral daily  atorvastatin 40 milliGRAM(s) Oral at bedtime  azithromycin   Tablet 250 milliGRAM(s) Oral <User Schedule>  budesonide 160 MICROgram(s)/formoterol 4.5 MICROgram(s) Inhaler 2 Puff(s) Inhalation two times a day  folic acid 1 milliGRAM(s) Oral daily  heparin   Injectable 5000 Unit(s) SubCutaneous every 12 hours  HYDROmorphone  Injectable 0.5 milliGRAM(s) IV Push once  ketoconazole 2% Cream 1 Application(s) Topical two times a day  levothyroxine 100 MICROGram(s) Oral daily  LORazepam     Tablet 0.5 milliGRAM(s) Oral at bedtime  montelukast 10 milliGRAM(s) Oral at bedtime  multivitamin 1 Tablet(s) Oral daily  pantoprazole    Tablet 40 milliGRAM(s) Oral before breakfast  polyethylene glycol 3350 17 Gram(s) Oral at bedtime  predniSONE   Tablet 5 milliGRAM(s) Oral daily  senna 2 Tablet(s) Oral at bedtime  tiotropium 18 MICROgram(s) Capsule 1 Capsule(s) Inhalation daily    MEDICATIONS  (PRN):  ondansetron Injectable 4 milliGRAM(s) IV Push every 6 hours PRN Nausea and/or Vomiting  oxyCODONE    IR 5 milliGRAM(s) Oral every 3 hours PRN Moderate Pain (4 - 6)  oxyCODONE    IR 10 milliGRAM(s) Oral every 3 hours PRN Severe Pain (7 - 10)      FAMILY HISTORY:  FH: diabetes mellitus (Sibling)    FH: HTN (hypertension)        SOCIAL HISTORY: No EtOH, no tobacco    REVIEW OF SYSTEMS:    CONSTITUTIONAL: No weakness, fevers or chills  EYES/ENT: No visual changes;  No vertigo or throat pain   NECK: No pain or stiffness  RESPIRATORY: No cough, wheezing, hemoptysis; No shortness of breath  CARDIOVASCULAR: No chest pain or palpitations  GASTROINTESTINAL: No abdominal or epigastric pain. No nausea, vomiting, or hematemesis; No diarrhea or constipation. No melena or hematochezia.  GENITOURINARY: No dysuria, frequency or hematuria  NEUROLOGICAL: No numbness or weakness  SKIN: No itching, burning, rashes, or lesions   All other review of systems is negative unless indicated above.        T(F): 98.1 (04-21-22 @ 00:30), Max: 98.1 (04-21-22 @ 00:30)  HR: 94 (04-21-22 @ 00:30)  BP: 125/52 (04-21-22 @ 00:30)  RR: 18 (04-21-22 @ 00:30)  SpO2: 98% (04-21-22 @ 00:30)  Wt(kg): --    GENERAL: NAD, well-developed  HEAD:  Atraumatic, Normocephalic  EYES: EOMI, PERRLA, conjunctiva and sclera clear  NECK: Supple, No JVD  CHEST/LUNG: Clear to auscultation bilaterally; No wheeze  HEART: Regular rate and rhythm; No murmurs, rubs, or gallops  ABDOMEN: Soft, Nontender, Nondistended; Bowel sounds present  EXTREMITIES: L HIP site no hematoma   NEUROLOGY: non-focal  SKIN: No rashes or lesions                          8.4    12.75 )-----------( 615      ( 20 Apr 2022 07:08 )             27.2       04-20    137  |  105  |  75<H>  ----------------------------<  104<H>  5.0   |  24  |  2.17<H>    Ca    7.8<L>      20 Apr 2022 07:08  Phos  5.6     04-19    TPro  x   /  Alb  2.2<L>  /  TBili  x   /  DBili  x   /  AST  x   /  ALT  x   /  AlkPhos  x   04-19              Clean Catch Clean Catch (Midstream)  04-17 @ 11:46   <10,000 CFU/mL Normal Urogenital Guerita  --  --

## 2022-04-20 NOTE — CONSULT NOTE ADULT - ASSESSMENT
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 etiology of TEODORA - would have nephro eval   - discussed with daughter possible transfusion today prior to d/c       Pop Garcia MD   Hematology/ Oncology   [facility]   [facility]   C: [phone]  [address] [place], [place]  P:[phone]  F:[phone]  and [phone]  [address] [place], [place]   P:[phone]  F:[phone]
This is a 89 year old female s/p mechanical fall causing a fracture to her left hip.  Pt s/p left hip IMN  on 4-. Pt has high thrombosin risk and requires anticoagulation prophylaxis.       Plan:  :mod[place] Whitex  noted crcl : 27.2  : start heparin 5,000 units sq q12h tomorrow for total of four weeks last dose on 5-  :daily cbc/bmp  :LE Venodynes  : increase mobility as tolerated  :Thanks for consult will f/u  
89 yo F with a PMH CVA (left thalamic infarct, 2019) s/p implantable loop recorder, JAK2+ myelofibrosis, bronchiectasis (on 4L NC), osteoporosis, HTN, anxiety, and hypothyroidism who presents after a fall.  Her aide had been wheeling her back from the bathroom this morning around 5AM, when she slipped off and fell on her leg. Her aide was able to get her back into the bed, but she was complaining of pain in her left hip. Her daughters came by later and noticed her left upper leg was getting more and more swollen, so they took her to the ED.  She does not normally fall, but she has difficulty walking (due to osteoporosis and dyspnea), using a walker with 1-person assist.    The patient notes some dyspnea, but denies any pain at this time. She denies CP, abdominal pain, diarrhea, N/V, fevers, or chills.  She is being treated for a rash on her left ankle/foot with an antifungal cream, which is improving it.  In the ED, she was given Morphine 2 mg IV x1 and  ml x1. (17 Apr 2022 13:18)  Pts daughters at the bedside, interviewed  Pt s/p repair of hip fx  As above receive  ml,  Post op creat up 0.82 to 1.03 to 1.40 last night and 1.56 today  potassium 4.3 to 4.5,  5.2, and 5.4 this am  Ho in, got 1 U prbc and lasix 40 mg IV x 1 pre transfusion at 930 am, CXR this am w PVC compared to  admission CXR w pt appearing dyspneic  UO only ~ 200 ml today, clear, bladder scan negative  Pt w/o any complaints, but as per daughters not talking much usually more talkative  on lisinopril 10 mg took am of admission at home and yesterday preop  Held today  OR uneventful BPs stable w mild drop to 110-120 range    A/P  TEODORA post hip surgery  Not on renal toxic meds  Pt was on ACEI since yesterday am  Now dcd  Mild bp drop 90s to 100 range noted today  Will repeat labs stat, evaluate K and Creat was on LR ( DC D) and acei  repeat CXR for worsening of PVC , may need more lasix, otherwise will hold on further diuretics, to prevent azotemia,   O2 sat 94% on 5L  if TEODORA due to Lisinopril the Duration of action is up to 24-30 hrs  repeat o2 sat on 2L        
Assessment/Plan    - Maintain aerosols with Symbicort and Tiotropium.   - Continue prednisone 5mg QD  - Hydrocortisone 50mg on call to OR as stress dose of steroids  - Mucinex as mucolytic  - Monitor H/H carefully. No change in HgB after 1 unit of PRBC's  - Supplemental O2 at 3L/M  - DVT prophylaxis  - There are no pulmonary contraindicatios to the pre-planned procedure. Attempt to avoid general anesthesia if possible.

## 2022-04-21 PROBLEM — J47.9 BRONCHIECTASIS, UNCOMPLICATED: Chronic | Status: ACTIVE | Noted: 2022-01-01

## 2022-04-21 PROBLEM — I63.9 CEREBRAL INFARCTION, UNSPECIFIED: Chronic | Status: ACTIVE | Noted: 2020-07-24

## 2022-04-21 PROBLEM — D75.81 MYELOFIBROSIS: Chronic | Status: ACTIVE | Noted: 2022-01-01

## 2022-04-21 PROBLEM — F41.9 ANXIETY DISORDER, UNSPECIFIED: Chronic | Status: ACTIVE | Noted: 2022-01-01

## 2022-04-21 NOTE — PROGRESS NOTE ADULT - SUBJECTIVE AND OBJECTIVE BOX
CC- s/p mechanical fall    HPI:  89F, PMH CVA (left thalamic infarct, 2019) s/p implantable loop recorder, JAK2+ myelofibrosis, bronchiectasis with chronic respiratory failure on 4L NC and 5mg prednisone daily,  osteoporosis, HTN, anxiety, and hypothyroidism who presents after a fall.  Her aide had been wheeling her back from the bathroom this morning around 5AM, when she slipped off and fell on her leg. Her aide was able to get her back into the bed, but she was complaining of pain in her left hip. Her daughters came by later and noticed her left upper leg was getting more and more swollen, so they took her to the ED.  She does not normally fall, but she has difficulty walking (due to osteoporosis and dyspnea), using a walker with 1-person assist.  She is being treated for a rash on her left ankle/foot with an antifungal cream, which is improving it.  She was admitted with left hip fracture. Underwent left hip im nail 4/18.   Hospital course notable for Anemia requiring multiple units prbcs. Also with Acute diastolic CHF exacerbation and ashli. Also required arrieta.     pt seen and examined this am. Felt ok. Denies worsening sob/chest pain. Denies pain at fracture site. Hgb today 7.8    Review of system- All 10 systems reviewed and is as per HPI otherwise negative.     Vital Signs Last 24 Hrs  T(C): 36.8 (21 Apr 2022 12:50), Max: 36.8 (21 Apr 2022 12:50)  T(F): 98.2 (21 Apr 2022 12:50), Max: 98.2 (21 Apr 2022 12:50)  HR: 116 (21 Apr 2022 12:50) (94 - 116)  BP: 175/89 (21 Apr 2022 12:50) (125/52 - 175/89)  RR: 20 (21 Apr 2022 12:50) (18 - 20)  SpO2: 93% (21 Apr 2022 12:50) (93% - 98%)      PHYSICAL EXAM:    GENERAL: Comfortable, no acute distress   HEAD:  Normocephalic, atraumatic  EYES: EOMI, PERRLA  HEENT: Moist mucous membranes  NECK: Supple, No JVD  NERVOUS SYSTEM:  non focal.   CHEST/LUNG: Crackles posteriorly, decreased at bases bilaterally.   HEART: Regular rate and rhythm  ABDOMEN: Soft, Nontender, Nondistended, Bowel sounds present  GENITOURINARY: Voiding, no palpable bladder  EXTREMITIES:   No clubbing, cyanosis. LLE edema+  MUSCULOSKELETAL- Left hip dressing c/d/i. +swelling from hematoma Left hip  SKIN-no rash    LABS:                        7.8    12.43 )-----------( 670      ( 21 Apr 2022 08:39 )             26.4     04-21    139  |  107  |  68<H>  ----------------------------<  129<H>  4.5   |  25  |  1.52<H>    Ca    8.0<L>      21 Apr 2022 08:39  Phos  5.6     04-19    TPro  x   /  Alb  2.2<L>  /  TBili  x   /  DBili  x   /  AST  x   /  ALT  x   /  AlkPhos  x   04-19        RADIOLOGY & ADDITIONAL TESTS:  CT BRAIN                        PROCEDURE DATE:  04/17/2022      INTERPRETATION:  Clinical Indication: Fall with acute left hip fracture    5mm axial sections of the brain were obtained from base to vertex,   without the intravenous administration of contrast material. Coronal and   sagittal computer generated reconstructed views are available.    Comparison is made with the prior CT of 10/8/2020 and demonstrates no   significant interval change.      The ventricles and sulci are prominent consistent with moderate atrophy.   Small vessel white matter ischemic changes are noted. There is no   hemorrhage, mass, or shift of midline structures. Bone window examination   is unremarkable. There has been previous bilateral lens replacement   surgery.    IMPRESSION: Moderate atrophy and small vessel white matter ischemic   changes. No hemorrhage. No change since 10/8/2020.    MEDICATIONS  (STANDING):  acetaminophen     Tablet .. 650 milliGRAM(s) Oral every 6 hours  amLODIPine   Tablet 5 milliGRAM(s) Oral daily  ascorbic acid 500 milliGRAM(s) Oral two times a day  aspirin enteric coated 81 milliGRAM(s) Oral daily  atorvastatin 40 milliGRAM(s) Oral at bedtime  azithromycin   Tablet 250 milliGRAM(s) Oral <User Schedule>  budesonide 160 MICROgram(s)/formoterol 4.5 MICROgram(s) Inhaler 2 Puff(s) Inhalation two times a day  folic acid 1 milliGRAM(s) Oral daily  heparin   Injectable 5000 Unit(s) SubCutaneous every 12 hours  HYDROmorphone  Injectable 0.5 milliGRAM(s) IV Push once  ketoconazole 2% Cream 1 Application(s) Topical two times a day  levothyroxine 100 MICROGram(s) Oral daily  LORazepam     Tablet 0.5 milliGRAM(s) Oral at bedtime  montelukast 10 milliGRAM(s) Oral at bedtime  multivitamin 1 Tablet(s) Oral daily  pantoprazole    Tablet 40 milliGRAM(s) Oral before breakfast  polyethylene glycol 3350 17 Gram(s) Oral at bedtime  predniSONE   Tablet 5 milliGRAM(s) Oral daily  senna 2 Tablet(s) Oral at bedtime  tiotropium 18 MICROgram(s) Capsule 1 Capsule(s) Inhalation daily    MEDICATIONS  (PRN):  ALBUTerol    90 MICROgram(s) HFA Inhaler 2 Puff(s) Inhalation every 6 hours PRN Shortness of Breath and/or Wheezing  ondansetron Injectable 4 milliGRAM(s) IV Push every 6 hours PRN Nausea and/or Vomiting  oxyCODONE    IR 5 milliGRAM(s) Oral every 3 hours PRN Moderate Pain (4 - 6)  oxyCODONE    IR 10 milliGRAM(s) Oral every 3 hours PRN Severe Pain (7 - 10)    Assessment/Plan  88 yo F with a PMH CVA (left thalamic infarct, 2019) s/p implantable loop recorder, JAK2+ myelofibrosis, bronchiectasis (on 4L NC), osteoporosis, HTN, anxiety, and hypothyroidism who presents after a fall, found to have a left femoral neck fracture.    #S/p mechanical fall with LT hip fracture-  -s/p LT hip IMN POD#3  -pain control with tylenol, prn oxycodone  -physical thrapy  -incentive spirometry  -bowel regimen.     #Acute blood loss anemia due to hematoma at surgical site with underlying Basil 2+ myeloproliferative d/o with myelofibrosis Vs. P vera:  -Heme eval appreciated.   -s/p jakafi in past, but now on hold d/t drop in h/h.   -s/p 4 units prbcs on this admission.   -getting additional unit today.     #Urinary retention s/p Arrieta 4/18  -TOV @ [facility]    #ASHLI  -likely related to hemodynamic changes during surgery in setting of ace-i use +/-obstructive uropathy.  -arrieta in place.  -ace-i on hold  -creatinine trending down now.   -avoid nephrotoxics.     #Acute diastolic CHF:-  -lasix 20mg iv X1  -monitor daily weights,   -I+Os    #Adult Bronchiectasis/ mixed COPD/Chronic respiratory failure on 4L NC:  - Pulm f/u DR Lagos appreciated  - continue symbicort, spiriva  - singulair.   - Azithromycin 250 mg MWF for anti-inflammatory effect and PPX  - s/p stress dose of Hydrocortisone- back on Prednisone 5mg daily  -continue nasal cannula @ 4L    #UTI ruled out, cultures negative, off abx.     #Hypothyroidism  - S/p partial thyroidectomy many years ago  - C/w Levothyroxine 100 ucg QD    #HTN  -continue norvasc  -ace-i on hold.     #Fungal dermatitis  -clotrimazole.    #DVT proph  - heparin SQ    #MOLST with DNR/DNI    #DIspo:  [facility] tomorrow if h/h stable and renal function continues to improve.     d/w daughter at bedside/hematology/pulmonary

## 2022-04-21 NOTE — PROGRESS NOTE ADULT - SUBJECTIVE AND OBJECTIVE BOX
Orthopedics      Patient seen and examined at bedside. Mental status at baseline. 4L NC. No acute events overnight. Feeling well. Pain controlled. No n/v.    Vital Signs Last 24 Hrs  T(C): 36.7 (21 Apr 2022 00:30), Max: 36.7 (21 Apr 2022 00:30)  T(F): 98.1 (21 Apr 2022 00:30), Max: 98.1 (21 Apr 2022 00:30)  HR: 94 (21 Apr 2022 00:30) (94 - 100)  BP: 125/52 (21 Apr 2022 00:30) (102/90 - 129/52)  BP(mean): --  RR: 18 (21 Apr 2022 00:30) (18 - 20)  SpO2: 98% (21 Apr 2022 00:30) (94% - 100%)        Exam:  Gen: NAD, resting comfortably  LLE:  Dressing c/d/i  NTTP calves b/l  +EHL/FHL/TA/GS  SILT L2-S1  Compartments soft and compressible  2+ Pulses palpable    89y Female POD3 s/p L IMN    WBAT/PT/OT  Pain management PRN  DVT PPx: heparin  [place], nephrology following  Incentive spirometry  Medical mgmt per primary team  Appreciate heme/onc recs  Dispo: [facility]  Will discuss with Dr. Zavala and advise of any changes to the plan.

## 2022-04-21 NOTE — PROGRESS NOTE ADULT - ASSESSMENT
89 yo F with a PMH CVA (left thalamic infarct, 2019) s/p implantable loop recorder, JAK2+ myelofibrosis, bronchiectasis (on 4L NC), osteoporosis, HTN, anxiety, and hypothyroidism who presents after a fall.  Her aide had been wheeling her back from the bathroom this morning around 5AM, when she slipped off and fell on her leg. Her aide was able to get her back into the bed, but she was complaining of pain in her left hip. Her daughters came by later and noticed her left upper leg was getting more and more swollen, so they took her to the ED.  She does not normally fall, but she has difficulty walking (due to osteoporosis and dyspnea), using a walker with 1-person assist.    The patient notes some dyspnea, but denies any pain at this time. She denies CP, abdominal pain, diarrhea, N/V, fevers, or chills.  She is being treated for a rash on her left ankle/foot with an antifungal cream, which is improving it.  In the ED, she was given Morphine 2 mg IV x1 and  ml x1. (17 Apr 2022 13:18)  Pts daughters at the bedside, interviewed  Pt s/p repair of hip fx  As above receive  ml,  Post op creat up 0.82 to 1.03 to 1.40 last night and 1.56 today  potassium 4.3 to 4.5,  5.2, and 5.4 this am  Ho in, got 1 U prbc and lasix 40 mg IV x 1 pre transfusion at 930 am, CXR this am w PVC compared to  admission CXR w pt appearing dyspneic  UO only ~ 200 ml today, clear, bladder scan negative  Pt w/o any complaints, but as per daughters not talking much usually more talkative  on lisinopril 10 mg took am of admission at home and yesterday preop  Held today  OR uneventful BPs stable w mild drop to 110-120 range    A/P  TEODORA post hip surgery  Not on renal toxic meds  Pt was on ACEI since yesterday am  Now dcd  Mild bp drop 90s to 100 range noted today  Will repeat labs stat, evaluate K and Creat was on LR ( DC D) and acei  repeat CXR for worsening of PVC , may need more lasix, otherwise will hold on further diuretics, to prevent azotemia,   O2 sat 94% on 5L  if TEODORA due to Lisinopril the Duration of action is up to 24-30 hrs  repeat o2 sat on 2L    4/20  more awake alert  VVS   daughters at bedside  creat plateaued  K stable at 5  Monitor i/o  encourage po fluids    4/21  Creat and K improving  VVS  will monitor  From renal standpoint may be dc or to rehab  Enalapril may be resumed as outpt if cardiac need  Or if for BP only may be replaced by another less renal   active antihypertensive  Bp stable at this time

## 2022-04-21 NOTE — PROGRESS NOTE ADULT - SUBJECTIVE AND OBJECTIVE BOX
NEPHROLOGY CONSULT  HPI:  88 yo F with a PMH CVA (left thalamic infarct, 2019) s/p implantable loop recorder, JAK2+ myelofibrosis, bronchiectasis (on 4L NC), osteoporosis, HTN, anxiety, and hypothyroidism who presents after a fall.  Her aide had been wheeling her back from the bathroom this morning around 5AM, when she slipped off and fell on her leg. Her aide was able to get her back into the bed, but she was complaining of pain in her left hip. Her daughters came by later and noticed her left upper leg was getting more and more swollen, so they took her to the ED.  She does not normally fall, but she has difficulty walking (due to osteoporosis and dyspnea), using a walker with 1-person assist.    The patient notes some dyspnea, but denies any pain at this time. She denies CP, abdominal pain, diarrhea, N/V, fevers, or chills.  She is being treated for a rash on her left ankle/foot with an antifungal cream, which is improving it.    In the ED, she was given Morphine 2 mg IV x1 and  ml x1. (17 Apr 2022 13:18)    4/20  more awake alert  VVS   daughters at bedside  creat plateaued  K stable at 5    4/21  feels much better appears in better spirits  labs improving    Above info from Chart:  Pts daughters at the bedside, interviewed  Pt s/p repair of hip fx  As above receive  ml,  Post op creat up 0.82 to 1.03 to 1.40 last night and 1.56 today  potassium 4.3 to 4.5,  5.2, and 5.4 this am  Ho in, got 1 U prbc and lasix 40 mg IV x 1 pre transfusion at 930 am, CXR this am w PVC compared to  admission CXR w pt appearing dyspneic  UO only ~ 200 ml today, clear, bladder scan negative  Pt w/o any complaints, but as per daughters not talking much usually more talkative  on lisinopril 10 mg took am of admission at home and yesterday preop  Held today  OR uneventful BPs stable w mild drop to 110-120 range    PAST MEDICAL & SURGICAL HISTORY:  Rib fractures    Essential hypertension    Hypothyroidism, unspecified type    Asthma, unspecified asthma severity, unspecified whether complicated, unspecified whether persistent    Cerebrovascular accident (CVA)  6/7/19 Left thalamic infarct s/p ILR    Bronchiectasis    Myelofibrosis    Anxiety    History of partial thyroidectomy    Status post placement of implantable loop recorder        FAMILY HISTORY:  FH: diabetes mellitus (Sibling)    FH: HTN (hypertension)        MEDICATIONS  (STANDING):  acetaminophen     Tablet .. 650 milliGRAM(s) Oral every 6 hours  amLODIPine   Tablet 5 milliGRAM(s) Oral daily  ascorbic acid 500 milliGRAM(s) Oral two times a day  aspirin enteric coated 81 milliGRAM(s) Oral daily  atorvastatin 40 milliGRAM(s) Oral at bedtime  azithromycin   Tablet 250 milliGRAM(s) Oral <User Schedule>  budesonide  80 MICROgram(s)/formoterol 4.5 MICROgram(s) Inhaler 2 Puff(s) Inhalation two times a day  cefTRIAXone   IVPB 1000 milliGRAM(s) IV Intermittent every 24 hours  folic acid 1 milliGRAM(s) Oral daily  HYDROmorphone  Injectable 0.5 milliGRAM(s) IV Push once  levothyroxine 100 MICROGram(s) Oral daily  LORazepam     Tablet 0.5 milliGRAM(s) Oral at bedtime  montelukast 10 milliGRAM(s) Oral at bedtime  multivitamin 1 Tablet(s) Oral daily  pantoprazole    Tablet 40 milliGRAM(s) Oral before breakfast  polyethylene glycol 3350 17 Gram(s) Oral at bedtime  predniSONE   Tablet 5 milliGRAM(s) Oral daily  senna 2 Tablet(s) Oral at bedtime    MEDICATIONS  (PRN):  ondansetron Injectable 4 milliGRAM(s) IV Push every 6 hours PRN Nausea and/or Vomiting  oxyCODONE    IR 5 milliGRAM(s) Oral every 3 hours PRN Moderate Pain (4 - 6)  oxyCODONE    IR 10 milliGRAM(s) Oral every 3 hours PRN Severe Pain (7 - 10)      Allergies    No Known Allergies    Intolerances    REVIEW OF SYSTEMS:    MANOJ    I&O's Detail    20 Apr 2022 07:01  -  21 Apr 2022 07:00  --------------------------------------------------------  IN:  Total IN: 0 mL    OUT:    Indwelling Catheter - Urethral (mL): 825 mL  Total OUT: 825 mL    Total NET: -825 mL    Vital Signs Last 24 Hrs  T(C): 36.7 (21 Apr 2022 00:30), Max: 36.7 (21 Apr 2022 00:30)  T(F): 98.1 (21 Apr 2022 00:30), Max: 98.1 (21 Apr 2022 00:30)  HR: 105 (21 Apr 2022 08:08) (94 - 106)  BP: 125/52 (21 Apr 2022 00:30) (125/52 - 129/52)  BP(mean): --  ABP: --  ABP(mean): --  RR: 18 (21 Apr 2022 00:30) (18 - 18)  SpO2: 96% (21 Apr 2022 08:08) (96% - 98%)      PHYSICAL EXAM:    General:  Alert, appears more comfortable    Neuro:  not talkative    HEENT:  No JVD, no masses, Eyes anicteric, ,    Cardiovascular:  Regular rate and rhythm, with normal S1 and S2.    Lungs:  clear. occ crackles    Abdomen:  Normoactive bowel sounds. Soft, flat, non-tender, and non-distended.  positive bowel sounds    Skin:  Warm, dry    Extremities:  warm,  no cyanosis    LABS:                          7.8    12.43 )-----------( 670      ( 21 Apr 2022 08:39 )             26.4                             8.4    12.75 )-----------( 615      ( 20 Apr 2022 07:08 )             27.2                           7.9    x     )-----------( x        ( 19 Apr 2022 11:55 )             25.5       139    |  107    |  68     ----------------------------<  129       21 Apr 2022 08:39  4.5     |  25     |  1.52     137    |  105    |  75     ----------------------------<  104       20 Apr 2022 07:08  5.0     |  24     |  2.17     136    |  104    |  65     ----------------------------<  133       19 Apr 2022 20:47  5.0     |  26     |  2.11     Ca    8.0        21 Apr 2022 08:39  Ca    7.8        20 Apr 2022 07:08  Ca    7.7        19 Apr 2022 20:47    Phos  5.6       19 Apr 2022 20:47  Phos  3.8       18 Apr 2022 05:03    Mg     2.2       18 Apr 2022 05:03

## 2022-04-21 NOTE — PROGRESS NOTE ADULT - SUBJECTIVE AND OBJECTIVE BOX
Subjective:    Awake, alert. No cough or sputum. Ambulated yesterday.    MEDICATIONS  (STANDING):  acetaminophen     Tablet .. 650 milliGRAM(s) Oral every 6 hours  amLODIPine   Tablet 5 milliGRAM(s) Oral daily  ascorbic acid 500 milliGRAM(s) Oral two times a day  aspirin enteric coated 81 milliGRAM(s) Oral daily  atorvastatin 40 milliGRAM(s) Oral at bedtime  azithromycin   Tablet 250 milliGRAM(s) Oral <User Schedule>  budesonide 160 MICROgram(s)/formoterol 4.5 MICROgram(s) Inhaler 2 Puff(s) Inhalation two times a day  folic acid 1 milliGRAM(s) Oral daily  heparin   Injectable 5000 Unit(s) SubCutaneous every 12 hours  HYDROmorphone  Injectable 0.5 milliGRAM(s) IV Push once  ketoconazole 2% Cream 1 Application(s) Topical two times a day  levothyroxine 100 MICROGram(s) Oral daily  LORazepam     Tablet 0.5 milliGRAM(s) Oral at bedtime  montelukast 10 milliGRAM(s) Oral at bedtime  multivitamin 1 Tablet(s) Oral daily  pantoprazole    Tablet 40 milliGRAM(s) Oral before breakfast  polyethylene glycol 3350 17 Gram(s) Oral at bedtime  predniSONE   Tablet 5 milliGRAM(s) Oral daily  senna 2 Tablet(s) Oral at bedtime  tiotropium 18 MICROgram(s) Capsule 1 Capsule(s) Inhalation daily    MEDICATIONS  (PRN):  ondansetron Injectable 4 milliGRAM(s) IV Push every 6 hours PRN Nausea and/or Vomiting  oxyCODONE    IR 5 milliGRAM(s) Oral every 3 hours PRN Moderate Pain (4 - 6)  oxyCODONE    IR 10 milliGRAM(s) Oral every 3 hours PRN Severe Pain (7 - 10)      Allergies    No Known Allergies    Intolerances        Vital Signs Last 24 Hrs  T(C): 36.7 (21 Apr 2022 00:30), Max: 36.7 (21 Apr 2022 00:30)  T(F): 98.1 (21 Apr 2022 00:30), Max: 98.1 (21 Apr 2022 00:30)  HR: 105 (21 Apr 2022 08:08) (94 - 106)  BP: 125/52 (21 Apr 2022 00:30) (115/50 - 129/52)  BP(mean): --  RR: 18 (21 Apr 2022 00:30) (18 - 20)  SpO2: 96% (21 Apr 2022 08:08) (94% - 98%)    PHYSICAL EXAMINATION:    NECK:  Supple. No lymphadenopathy. Jugular venous pressure not elevated. C  HEART:   The cardiac impulse has a normal quality. Reg., Nl S1 and S2.    CHEST:  Chest with bilateral crackles. Scattered exp wheezes.  Sl. increased respiratory effort.  ABDOMEN:  Soft and nontender.   EXTREMITIES:  There is no edema.       LABS:                        7.8    12.43 )-----------( 670      ( 21 Apr 2022 08:39 )             26.4     04-21    139  |  107  |  68<H>  ----------------------------<  129<H>  4.5   |  25  |  1.52<H>    Ca    8.0<L>      21 Apr 2022 08:39  Phos  5.6     04-19    TPro  x   /  Alb  2.2<L>  /  TBili  x   /  DBili  x   /  AST  x   /  ALT  x   /  AlkPhos  x   04-19          RADIOLOGY & ADDITIONAL TESTS:    Assessment and Plan:   · Assessment	  Assessment/Plan    - Maintain aerosols with Symbicort and Tiotropium.   - Continue prednisone 5mg QD  - Mucinex as mucolytic  - Monitor H/H carefully. To get one more unit of blood-baseline HgB is between 10-11  - Supplemental O2 at 3-5L/M  - DVT prophylaxis  - Renal Consult noted. BUN/Cr-improved.  - Kenalog x 1 dose today.     Problem/Plan - 1:  ·  Problem: Hip fracture, left.   Problem/Plan - 2:  ·  Problem: COPD mixed type.   Problem/Plan - 3:  ·  Problem: Bronchiectasis.   Problem/Plan - 4:  ·  Problem: Myelodysplastic syndrome.   Problem/Plan - 5:  ·  Problem: Anemia.

## 2022-04-21 NOTE — PROGRESS NOTE ADULT - SUBJECTIVE AND OBJECTIVE BOX
HPI:  89 yo F with a PMH CVA (left thalamic infarct, 2019) s/p implantable loop recorder, JAK2+ myelofibrosis, bronchiectasis (on 4L NC), osteoporosis, HTN, anxiety, and hypothyroidism who presents after a fall.  Her aide had been wheeling her back from the bathroom this morning around 5AM, when she slipped off and fell on her leg. Her aide was able to get her back into the bed, but she was complaining of pain in her left hip. Her daughters came by later and noticed her left upper leg was getting more and more swollen, so they took her to the ED.  She does not normally fall, but she has difficulty walking (due to osteoporosis and dyspnea), using a walker with 1-person assist.    The patient notes some dyspnea, but denies any pain at this time. She denies CP, abdominal pain, diarrhea, N/V, fevers, or chills.  She is being treated for a rash on her left ankle/foot with an antifungal cream, which is improving it.    In the ED, she was given Morphine 2 mg IV x1 and  ml x1. (2022 13:18)      Patient is a 89y old  Female who presents with a chief complaint of L hip fracture (2022 12:14)      Consulted by Dr. Cameron Marcelino for VTE prophylaxis, risk stratification, and anticoagulation management.    PAST MEDICAL & SURGICAL HISTORY:  Rib fractures    Essential hypertension    Hypothyroidism, unspecified type    Asthma, unspecified asthma severity, unspecified whether complicated, unspecified whether persistent    Cerebrovascular accident (CVA)  19 Left thalamic infarct s/p ILR    Bronchiectasis    Myelofibrosis    Anxiety    History of partial thyroidectomy    Status post placement of implantable loop recorder        FAMILY HISTORY:  FH: diabetes mellitus (Sibling)    FH: HTN (hypertension)        Interval Note:  2022 Pt seen at bedside on [facility] with 2 daughters present.. Discussed with daughters about the use of heparin sq for four weeks post procedure for DVT  prophylaxis.  Questions answered will reinforce as needed.  Pt alert and oriented to person only. They stated her mother will go to rehab on discharge.   2022 Pt seen at bedside on [facility] , alert to person only. No changes.  Will go to rehab on discharge.   2022 Pt seen at bedside on [facility] , No overnight events, tolerating AC, possible rehab today        CAPRINI SCORE  AGE RELATED RISK FACTORS                                                       MOBILITY RELATED FACTORS  [ ] Age 41-60 years                                            (1 Point)                  [ ] Bed rest                                                        (1 Point)  [ ] Age: 61-74 years                                           (2 Points)                [ ] Plaster cast                                                   (2 Points)  [ X] Age= 75 years                                              (3 Points)                 [ ] Bed bound for more than 72 hours                   (2 Points)    DISEASE RELATED RISK FACTORS                                               GENDER SPECIFIC FACTORS  [ ] Edema in the lower extremities                       (1 Point)           [ ] Pregnancy                                                            (1 Point)  [ ] Varicose veins                                               (1 Point)                  [ ] Post-partum < 6 weeks                                      (1 Point)             [X ] BMI > 25 Kg/m2                                            (1 Point)                  [ ] Hormonal therapy or oral contraception       (1 Point)                 [ ] Sepsis (in the previous month)                        (1 Point)             [ ] History of pregnancy complications                (1Point)  [ ] Pneumonia or serious lung disease                                             [ ] Unexplained or recurrent  (=/>3), premature                                 (In the previous month)                               (1 Point)                birth with toxemia or growth-restricted infant (1 Point)  [ ] Abnormal pulmonary function test            (1 Point)                                   SURGERY RELATED RISK FACTORS  [ ] Acute myocardial infarction                       (1 Point)                  [ ]  Section                                         (1 Point)  [ ] Congestive heart failure (in the previous month) (1 Point)   [ ] Minor surgery   lasting <45 minutes       (1 Point)   [ ] Inflammatory bowel disease                             (1 Point)          [ ] Arthroscopic surgery                                  (2 Points)  [ ] Central venous access                                    (2 Points)            [ ] General surgery lasting >45 minutes      (2 Points)       [ ] Stroke (in the previous month)                  (5 Points)            [ ] Elective major lower extremity arthroplasty (5 Points)                                   [  ] Malignancy (present or past include skin melanoma                                          but exclude  basal skin cell)    (2 points)                                      TRAUMA RELATED RISK FACTORS                HEMATOLOGY RELATED FACTORS                                  [X ] Fracture of the hip, pelvis, or leg                       (5 Points)  [ ] Prior episodes of VTE                                     (3 Points)          [ ] Acute spinal cord injury (in the previous month)  (5 Points)  [ ] Positive family history for VTE                         (3 Points)       [ ] Paralysis (less than 1 month)                          (5 Points)  [ ] Prothrombin 14013 A                                      (3 Points)         [ ] Multiple Trauma (within 1month)                 (5Points)                                                                                                                                                                [ ] Factor V Leiden                                          (3 Points)                                OTHER RISK FACTORS                          [ ] Lupus anticoagulants                                     (3 Points)                       [ ] BMI > 40                          (1 Point)                                                         [ ] Anticardiolipin antibodies                                (3 Points)                   [ ] Smoking                              (1Point)                                                [ ] High homocysteine in the blood                      (3 Points)                [  ] Diabetes requiring insulin (1point)                         [ ] Other congenital or acquired thrombophilia       (3 Points)          [  ] Chemotherapy                   (1 Point)  [ ] Heparin induced thrombocytopenia                  (3 Points)             [  ] Blood Transfusion                (1 point)                                                                                                             Total Score [9 ]                                                                                                                                                                                                                                  IMPROVE Bleeding Risk Score: 4.5    Falls Risk:   High (X  )  Mod (  )  Low (  )  crcl: 27.2        cr: 1.54         BMI  30.3           EBL: 150  ml  Time procedure    : starts: 1929             :ends: 20:25        Denies any personal or familial history of clotting or bleeding disorders.    Allergies    No Known Allergies    Intolerances        REVIEW OF SYSTEMS    (  )Fever	     (  )Constipation	(  )SOB				(  )Headache	(  )Dysuria  (  )Chills	     (  )Melena	(  )Dyspnea present on exertion	                    (  )Dizziness                    (  )Polyuria  (  )Nausea	     (  )Hematochezia	(  )Cough			                    (  )Syncope   	(  )Hematuria  (  )Vomiting    (  )Chest Pain	(  )Wheezing			(  )Weakness  (  )Diarrhea     (  )Palpitations	(  )Anorexia			(  )Myalgia      Unable to obtain review of systems due to: PT IS CONFUSED     Vital Signs Last 24 Hrs  T(C): 36.7 (2022 00:30), Max: 36.7 (2022 00:30)  T(F): 98.1 (2022 00:30), Max: 98.1 (2022 00:30)  HR: 105 (2022 08:08) (94 - 106)  BP: 125/52 (2022 00:30) (125/52 - 129/52)  BP(mean): --  RR: 18 (2022 00:30) (18 - 18)  SpO2: 96% (2022 08:08) (96% - 98%)  PHYSICAL EXAM:    Constitutional: Appears Well    Neurological: A& O x 1 PERSON HER DAUGHTER STATES HE IS NORMALLY A&O TIMES 3     Skin: Warm    Respiratory and Thorax: normal effort; Breath sounds: normal; No rales/wheezing/rhonchi  	  Cardiovascular: S1, S2, regular, NMBR	    Gastrointestinal: BS + x 4Q, nontender	    Genitourinary:  Bladder nondistended, nontender    Musculoskeletal:   General Right:   no muscle/joint tenderness,   normal tone, no joint swelling,   ROM: limited	    General Left:   + muscle/joint tenderness,   normal tone, no joint swelling,   ROM: limited    Hip:  left: Dressing CDI;    Lower extrems:   Right: no calf tenderness              negative marco's sign               + pedal pulses    Left:   no calf tenderness              negative marco's sign               + pedal pulses                        7.8    12.43 )-----------( 670      ( 2022 08:39 )             26.4       04-21    139  |  107  |  68<H>  ----------------------------<  129<H>  4.5   |  25  |  1.52<H>    Ca    8.0<L>      2022 08:39  Phos  5.6     04-19    TPro  x   /  Alb  2.2<L>  /  TBili  x   /  DBili  x   /  AST  x   /  ALT  x   /  AlkPhos  x   04                                       8.4    12.75 )-----------( 615      ( 2022 07:08 )             27.2       04-20    137  |  105  |  75<H>  ----------------------------<  104<H>  5.0   |  24  |  2.17<H>    Ca    7.8<L>      2022 07:08  Phos  5.6     04-19    TPro  x   /  Alb  2.2<L>  /  TBili  x   /  DBili  x   /  AST  x   /  ALT  x   /  AlkPhos  x                      7.9    x     )-----------( x        ( 2022 11:55 ) one unit PRBC             25.5       04-19    136  |  104  |  56<H>  ----------------------------<  95  5.4<H>   |  25  |  1.56<H>    Ca    8.1<L>      2022 08:32  Phos  3.8     04-18  Mg     2.2     04-18        PT/INR - ( 2022 05:03 )   PT: 14.6 sec;   INR: 1.26 ratio         PTT - ( 2022 05:03 )  PTT:30.1 sec				  MEDICATIONS  (STANDING):  acetaminophen     Tablet .. 650 milliGRAM(s) Oral every 6 hours  amLODIPine   Tablet 5 milliGRAM(s) Oral daily  ascorbic acid 500 milliGRAM(s) Oral two times a day  aspirin enteric coated 81 milliGRAM(s) Oral daily  atorvastatin 40 milliGRAM(s) Oral at bedtime  azithromycin   Tablet 250 milliGRAM(s) Oral <User Schedule>  budesonide 160 MICROgram(s)/formoterol 4.5 MICROgram(s) Inhaler 2 Puff(s) Inhalation two times a day  cefTRIAXone   IVPB 1000 milliGRAM(s) IV Intermittent every 24 hours  folic acid 1 milliGRAM(s) Oral daily  heparin   Injectable 5000 Unit(s) SubCutaneous every 12 hours  HYDROmorphone  Injectable 0.5 milliGRAM(s) IV Push once  levothyroxine 100 MICROGram(s) Oral daily  LORazepam     Tablet 0.5 milliGRAM(s) Oral at bedtime  montelukast 10 milliGRAM(s) Oral at bedtime  multivitamin 1 Tablet(s) Oral daily  pantoprazole    Tablet 40 milliGRAM(s) Oral before breakfast  polyethylene glycol 3350 17 Gram(s) Oral at bedtime  predniSONE   Tablet 5 milliGRAM(s) Oral daily  senna 2 Tablet(s) Oral at bedtime  tiotropium 18 MICROgram(s) Capsule 1 Capsule(s) Inhalation daily      < from: Xray Hip w/ Pelvis 2 or 3 Views, Left (22 @ 09:49) >  MPRESSION: LEFT hip Intertrochanteric fracture.    < end of copied text >      DVT Prophylaxis:  LMWH                   (  )  Heparin SQ           ( x )  Coumadin             (  )  Xarelto                  (  )  Eliquis                   (  )  Venodynes           (x  )  Ambulation          (x  )  UFH                       (  )  Contraindicated  (  )  EC Aspirin             (  )

## 2022-04-21 NOTE — PROGRESS NOTE ADULT - ASSESSMENT
This is a 89 year old female s/p mechanical fall causing a fracture to her left hip.  Pt s/p left hip IMN  on 4-. Pt has high thrombosin risk and requires anticoagulation prophylaxis.       Plan:  :H&H noted7.8/26.4, pending PRBC, no overt signs of bleeding  : cont heparin 5,000 units sq q12h tomorrow for total of four weeks last dose on 5-  :daily cbc/bmp  :LE Venodynes  : increase mobility as tolerated  : will f/u  : dispo : rehab

## 2022-04-22 NOTE — PROVIDER CONTACT NOTE (OTHER) - ACTION/TREATMENT ORDERED:
MD aware. No further orders received at this time, MD will discuss with oncoming day hospitalist team. To continue IVF LR @ 150ml/hr and monitor outputs and check am labs.
MD aware, stated he will discuss with oncoming day hospitalist team. No further orders received at this time. Will discuss with oncoming RN as well. Will continue to monitor.

## 2022-04-22 NOTE — CHART NOTE - NSCHARTNOTEFT_GEN_A_CORE
Discussed with patient's daughter re: patient's current condition,   events of this am, and transfusion reaction.   We talked about her prognosis and her treatment so far.   She mentioned her mother never wanted a blood transfusion after her initial transfusion when she was diagnosed with her hematological condition.   Subsequently she had a gi bleed, and required prbcs which she didn't want, but her daughter convinced her to have.   She also required prbcs during this admission which she does not want anymore.   Therefore even if h/h drops, will not give further transfusions.   will treat copd exacerbation with iv steroids/bronchodilators for now.   aspiration precautions.   speech and swallow eval.   if pt declines, daughter would like comfort care and even hospice if appropriate.   if pt improves, she would most likely agree to [facility]   d/w NIKKI

## 2022-04-22 NOTE — PROGRESS NOTE ADULT - SUBJECTIVE AND OBJECTIVE BOX
INTERVAL HPI/OVERNIGHT EVENTS:  Patient S&E at bedside.  Patient now with Delayed hemolytic transfusion reaction     VITAL SIGNS:  T(F): 97.3 (22 @ 06:06)  HR: 114 (22 @ 06:06)  BP: 109/48 (22 @ 06:06)  RR: 18 (22 @ 06:06)  SpO2: 98% (22 @ 06:06)  Wt(kg): --    PHYSICAL EXAM:  TAchypneic   resting   no acute distress   now abdominal pain       MEDICATIONS  (STANDING):  amLODIPine   Tablet 5 milliGRAM(s) Oral daily  ascorbic acid 500 milliGRAM(s) Oral two times a day  aspirin enteric coated 81 milliGRAM(s) Oral daily  atorvastatin 40 milliGRAM(s) Oral at bedtime  azithromycin   Tablet 250 milliGRAM(s) Oral <User Schedule>  budesonide 160 MICROgram(s)/formoterol 4.5 MICROgram(s) Inhaler 2 Puff(s) Inhalation two times a day  folic acid 1 milliGRAM(s) Oral daily  heparin   Injectable 5000 Unit(s) SubCutaneous every 12 hours  HYDROmorphone  Injectable 0.5 milliGRAM(s) IV Push once  ketoconazole 2% Cream 1 Application(s) Topical two times a day  levothyroxine 100 MICROGram(s) Oral daily  LORazepam     Tablet 0.5 milliGRAM(s) Oral at bedtime  methylPREDNISolone sodium succinate Injectable 40 milliGRAM(s) IV Push two times a day  montelukast 10 milliGRAM(s) Oral at bedtime  multivitamin 1 Tablet(s) Oral daily  pantoprazole    Tablet 40 milliGRAM(s) Oral before breakfast  polyethylene glycol 3350 17 Gram(s) Oral at bedtime  senna 2 Tablet(s) Oral at bedtime  tiotropium 18 MICROgram(s) Capsule 1 Capsule(s) Inhalation daily    MEDICATIONS  (PRN):  ALBUTerol    90 MICROgram(s) HFA Inhaler 2 Puff(s) Inhalation every 6 hours PRN Shortness of Breath and/or Wheezing  morphine   Solution 5 milliGRAM(s) Oral every 4 hours PRN respiratory distress  ondansetron Injectable 4 milliGRAM(s) IV Push every 6 hours PRN Nausea and/or Vomiting  oxyCODONE    IR 5 milliGRAM(s) Oral every 3 hours PRN Moderate Pain (4 - 6)  oxyCODONE    IR 10 milliGRAM(s) Oral every 3 hours PRN Severe Pain (7 - 10)      Allergies    No Known Allergies    Intolerances        Urinalysis Basic - ( 2022 15:00 )    Color: Brown / Appearance: very cloudy / S.020 / pH: x  Gluc: x / Ketone: Trace  / Bili: Negative / Urobili: 1   Blood: x / Protein: 500 mg/dL / Nitrite: Positive   Leuk Esterase: Moderate / RBC: 11-25 /HPF / WBC 6-10   Sq Epi: x / Non Sq Epi: Few / Bacteria: Occasional        RADIOLOGY & ADDITIONAL TESTS:  Studies reviewed.    ASSESSMENT & PLAN:

## 2022-04-22 NOTE — PROGRESS NOTE ADULT - SUBJECTIVE AND OBJECTIVE BOX
CC- s/p mechanical fall    HPI:  89F, PMH CVA (left thalamic infarct, 2019) s/p implantable loop recorder, JAK2+ Myeloproliferative d/w with  myelofibrosis, bronchiectasis with chronic respiratory failure on 4L NC and 5mg prednisone daily,  osteoporosis, HTN, anxiety, and hypothyroidism who presents after a fall.  Her aide had been wheeling her back from the bathroom this morning around 5AM, when she slipped off and fell on her leg. Her aide was able to get her back into the bed, but she was complaining of pain in her left hip. Her daughters came by later and noticed her left upper leg was getting more and more swollen, so they took her to the ED.  She does not normally fall, but she has difficulty walking (due to osteoporosis and dyspnea), using a walker with 1-person assist.  She is being treated for a rash on her left ankle/foot with an antifungal cream, which is improving it.  She was admitted with left hip fracture. Underwent left hip im nail 4/18.   Hospital course notable for Anemia requiring multiple units prbcs. Also with Acute diastolic CHF exacerbation and ashli. Also required arrieta.     Called by RN as while patient was having breakfast she started becoming more dyspneic and increased her o2 requirement. No overt choking episode.   Pt seen and examined. Denies cough/sputum/chest pain. Feels sob. Denies choking.     Review of system- All 10 systems reviewed and is as per HPI otherwise negative.     Vital Signs Last 24 Hrs  T(C): 36.7 (22 Apr 2022 11:23), Max: 36.9 (21 Apr 2022 16:30)  T(F): 98.1 (22 Apr 2022 11:23), Max: 98.4 (21 Apr 2022 16:30)  HR: 121 (22 Apr 2022 13:59) (100 - 124)  BP: 113/70 (22 Apr 2022 13:59) (113/70 - 157/72)  RR: 18 (22 Apr 2022 13:59) (17 - 18)  SpO2: 96% (22 Apr 2022 13:59) (96% - 99%)      PHYSICAL EXAM:    GENERAL: tachypneic/resp distress  HEAD:  Normocephalic, atraumatic  EYES: EOMI, PERRLA  HEENT: Moist mucous membranes  NECK: Supple, No JVD  NERVOUS SYSTEM:  non focal.   CHEST/LUNG: Crackles posteriorly, wheeze bilaterally.   HEART: Regular rate and rhythm  ABDOMEN: Soft, Nontender, Nondistended, Bowel sounds present  GENITOURINARY: +arrieta with dark reddish/brown urine  EXTREMITIES:   No clubbing, cyanosis. LLE edema+  MUSCULOSKELETAL- Left hip dressing c/d/i. +swelling from hematoma Left hip  SKIN-no rash    LABS:                        8.7    20.24 )-----------( 719      ( 22 Apr 2022 07:54 )             28.5     04-22    137  |  105  |  61<H>  ----------------------------<  167<H>  4.5   |  26  |  1.24    Ca    8.4<L>      22 Apr 2022 07:54    TPro  x   /  Alb  x   /  TBili  2.5<H>  /  DBili  0.6<H>  /  AST  x   /  ALT  x   /  AlkPhos  x   04-22        RADIOLOGY & ADDITIONAL TESTS:  CT BRAIN                        PROCEDURE DATE:  04/17/2022      INTERPRETATION:  Clinical Indication: Fall with acute left hip fracture    5mm axial sections of the brain were obtained from base to vertex,   without the intravenous administration of contrast material. Coronal and   sagittal computer generated reconstructed views are available.    Comparison is made with the prior CT of 10/8/2020 and demonstrates no   significant interval change.      The ventricles and sulci are prominent consistent with moderate atrophy.   Small vessel white matter ischemic changes are noted. There is no   hemorrhage, mass, or shift of midline structures. Bone window examination   is unremarkable. There has been previous bilateral lens replacement   surgery.    IMPRESSION: Moderate atrophy and small vessel white matter ischemic   changes. No hemorrhage. No change since 10/8/2020.    MEDICATIONS  (STANDING):  acetaminophen     Tablet .. 650 milliGRAM(s) Oral every 6 hours  amLODIPine   Tablet 5 milliGRAM(s) Oral daily  ascorbic acid 500 milliGRAM(s) Oral two times a day  aspirin enteric coated 81 milliGRAM(s) Oral daily  atorvastatin 40 milliGRAM(s) Oral at bedtime  azithromycin   Tablet 250 milliGRAM(s) Oral <User Schedule>  budesonide 160 MICROgram(s)/formoterol 4.5 MICROgram(s) Inhaler 2 Puff(s) Inhalation two times a day  folic acid 1 milliGRAM(s) Oral daily  heparin   Injectable 5000 Unit(s) SubCutaneous every 12 hours  HYDROmorphone  Injectable 0.5 milliGRAM(s) IV Push once  ketoconazole 2% Cream 1 Application(s) Topical two times a day  levothyroxine 100 MICROGram(s) Oral daily  LORazepam     Tablet 0.5 milliGRAM(s) Oral at bedtime  montelukast 10 milliGRAM(s) Oral at bedtime  multivitamin 1 Tablet(s) Oral daily  pantoprazole    Tablet 40 milliGRAM(s) Oral before breakfast  polyethylene glycol 3350 17 Gram(s) Oral at bedtime  predniSONE   Tablet 5 milliGRAM(s) Oral daily  senna 2 Tablet(s) Oral at bedtime  tiotropium 18 MICROgram(s) Capsule 1 Capsule(s) Inhalation daily    MEDICATIONS  (PRN):  ALBUTerol    90 MICROgram(s) HFA Inhaler 2 Puff(s) Inhalation every 6 hours PRN Shortness of Breath and/or Wheezing  ondansetron Injectable 4 milliGRAM(s) IV Push every 6 hours PRN Nausea and/or Vomiting  oxyCODONE    IR 5 milliGRAM(s) Oral every 3 hours PRN Moderate Pain (4 - 6)  oxyCODONE    IR 10 milliGRAM(s) Oral every 3 hours PRN Severe Pain (7 - 10)    Assessment/Plan  89 yo F with a PMH CVA (left thalamic infarct, 2019) s/p implantable loop recorder, JAK2+ myelofibrosis, bronchiectasis (on 4L NC), osteoporosis, HTN, anxiety, and hypothyroidism who presents after a fall, found to have a left femoral neck fracture.    #Acute COPD exacerbation/possible aspiration/bronchiectasis/Chronic resp failure on 4L NC:  -iv solumedrol 40mg bid.   -symbicort, spiriva  -singular  -azithromycin 250 mwf  -resume prednisone 5mg prednisone after treatment for acute exacerbation.   -o2 via nasal cannula   -aspiration precautions.   -CXR reviewed, no s/o worsening fluid overload or acute infiltrate.  -start abx if fever.    #Transfusion reaction:  -pt with dark/tea colored urine  -w/u + for hemolysis.   -d/w blood bank, pt likely having reaction to blood transfused 4/17 d/t undetectable ab at that time which showed up positive on type and screen yesterday.   -d/w Dr. Garcia, supportive care  -monitor counts.   -d/w family at bedside.    #S/p mechanical fall with LT hip fracture-  -s/p LT hip IMN POD#5  -pain control with tylenol, prn oxycodone  -physical therapy  -incentive spirometry  -bowel regimen.     #Acute blood loss anemia due to hematoma at surgical site + Hemolysis d/t transfusion reaction, with underlying Basil 2+ myeloproliferative d/o with myelofibrosis Vs. P vera   -heme eval appreciated.   -s/p jakafi in past, but now on hold d/t drop in h/h.   -s/p 5 units prbcs on this admission.   -anemia     #Urinary retention s/p Arrieta 4/18  -TOV @ [facility]    #ASHLI  -likely related to hemodynamic changes during surgery in setting of ace-i use +/-obstructive uropathy.  -arrieta in place.  -ace-i on hold  -creatinine trending down now.   -avoid nephrotoxics.     #Acute diastolic CHF:-  -s/p iv lasix   -overall improved.     #UTI ruled out, cultures negative, off abx.     #Hypothyroidism  - S/p partial thyroidectomy many years ago  - C/w Levothyroxine 100 ucg QD    #HTN  -continue norvasc  -ace-i on hold.     #Fungal dermatitis  -clotrimazole.    #DVT proph  - heparin SQ    #MOLST with DNR/DNI    #DIspo:  -not stable for dc.   -d/w family at bedside/RN/Pulmonary.  CC- s/p mechanical fall    HPI:  89F, PMH CVA (left thalamic infarct, 2019) s/p implantable loop recorder, JAK2+ Myeloproliferative d/w with  myelofibrosis, bronchiectasis with chronic respiratory failure on 4L NC and 5mg prednisone daily,  osteoporosis, HTN, anxiety, and hypothyroidism who presents after a fall.  Her aide had been wheeling her back from the bathroom this morning around 5AM, when she slipped off and fell on her leg. Her aide was able to get her back into the bed, but she was complaining of pain in her left hip. Her daughters came by later and noticed her left upper leg was getting more and more swollen, so they took her to the ED.  She does not normally fall, but she has difficulty walking (due to osteoporosis and dyspnea), using a walker with 1-person assist.  She is being treated for a rash on her left ankle/foot with an antifungal cream, which is improving it.  She was admitted with left hip fracture. Underwent left hip im nail 4/18.   Hospital course notable for Anemia requiring multiple units prbcs. Also with Acute diastolic CHF exacerbation and teodora. Also required arrieta.     Called by RN as while patient was having breakfast she started becoming more dyspneic and increased her o2 requirement. No overt choking episode.   Pt seen and examined. Denies cough/sputum/chest pain. Feels sob. Denies choking.     Review of system- All 10 systems reviewed and is as per HPI otherwise negative.     Vital Signs Last 24 Hrs  T(C): 36.7 (22 Apr 2022 11:23), Max: 36.9 (21 Apr 2022 16:30)  T(F): 98.1 (22 Apr 2022 11:23), Max: 98.4 (21 Apr 2022 16:30)  HR: 121 (22 Apr 2022 13:59) (100 - 124)  BP: 113/70 (22 Apr 2022 13:59) (113/70 - 157/72)  RR: 18 (22 Apr 2022 13:59) (17 - 18)  SpO2: 96% (22 Apr 2022 13:59) (96% - 99%)      PHYSICAL EXAM:    GENERAL: tachypneic/resp distress  HEAD:  Normocephalic, atraumatic  EYES: EOMI, PERRLA  HEENT: Moist mucous membranes  NECK: Supple, No JVD  NERVOUS SYSTEM:  non focal.   CHEST/LUNG: Crackles posteriorly, wheeze bilaterally.   HEART: Regular rate and rhythm  ABDOMEN: Soft, Nontender, Nondistended, Bowel sounds present  GENITOURINARY: +arrieta with dark reddish/brown urine  EXTREMITIES:   No clubbing, cyanosis. LLE edema+  MUSCULOSKELETAL- Left hip dressing c/d/i. +swelling from hematoma Left hip  SKIN-no rash    LABS:                        8.7    20.24 )-----------( 719      ( 22 Apr 2022 07:54 )             28.5     04-22    137  |  105  |  61<H>  ----------------------------<  167<H>  4.5   |  26  |  1.24    Ca    8.4<L>      22 Apr 2022 07:54    TPro  x   /  Alb  x   /  TBili  2.5<H>  /  DBili  0.6<H>  /  AST  x   /  ALT  x   /  AlkPhos  x   04-22        RADIOLOGY & ADDITIONAL TESTS:  CT BRAIN                        PROCEDURE DATE:  04/17/2022      INTERPRETATION:  Clinical Indication: Fall with acute left hip fracture    5mm axial sections of the brain were obtained from base to vertex,   without the intravenous administration of contrast material. Coronal and   sagittal computer generated reconstructed views are available.    Comparison is made with the prior CT of 10/8/2020 and demonstrates no   significant interval change.      The ventricles and sulci are prominent consistent with moderate atrophy.   Small vessel white matter ischemic changes are noted. There is no   hemorrhage, mass, or shift of midline structures. Bone window examination   is unremarkable. There has been previous bilateral lens replacement   surgery.    IMPRESSION: Moderate atrophy and small vessel white matter ischemic   changes. No hemorrhage. No change since 10/8/2020.    MEDICATIONS  (STANDING):  acetaminophen     Tablet .. 650 milliGRAM(s) Oral every 6 hours  amLODIPine   Tablet 5 milliGRAM(s) Oral daily  ascorbic acid 500 milliGRAM(s) Oral two times a day  aspirin enteric coated 81 milliGRAM(s) Oral daily  atorvastatin 40 milliGRAM(s) Oral at bedtime  azithromycin   Tablet 250 milliGRAM(s) Oral <User Schedule>  budesonide 160 MICROgram(s)/formoterol 4.5 MICROgram(s) Inhaler 2 Puff(s) Inhalation two times a day  folic acid 1 milliGRAM(s) Oral daily  heparin   Injectable 5000 Unit(s) SubCutaneous every 12 hours  HYDROmorphone  Injectable 0.5 milliGRAM(s) IV Push once  ketoconazole 2% Cream 1 Application(s) Topical two times a day  levothyroxine 100 MICROGram(s) Oral daily  LORazepam     Tablet 0.5 milliGRAM(s) Oral at bedtime  montelukast 10 milliGRAM(s) Oral at bedtime  multivitamin 1 Tablet(s) Oral daily  pantoprazole    Tablet 40 milliGRAM(s) Oral before breakfast  polyethylene glycol 3350 17 Gram(s) Oral at bedtime  predniSONE   Tablet 5 milliGRAM(s) Oral daily  senna 2 Tablet(s) Oral at bedtime  tiotropium 18 MICROgram(s) Capsule 1 Capsule(s) Inhalation daily    MEDICATIONS  (PRN):  ALBUTerol    90 MICROgram(s) HFA Inhaler 2 Puff(s) Inhalation every 6 hours PRN Shortness of Breath and/or Wheezing  ondansetron Injectable 4 milliGRAM(s) IV Push every 6 hours PRN Nausea and/or Vomiting  oxyCODONE    IR 5 milliGRAM(s) Oral every 3 hours PRN Moderate Pain (4 - 6)  oxyCODONE    IR 10 milliGRAM(s) Oral every 3 hours PRN Severe Pain (7 - 10)    Assessment/Plan  89 yo F with a PMH CVA (left thalamic infarct, 2019) s/p implantable loop recorder, JAK2+ myelofibrosis, bronchiectasis (on 4L NC), osteoporosis, HTN, anxiety, and hypothyroidism who presents after a fall, found to have a left femoral neck fracture.    #Acute COPD exacerbation/possible aspiration/bronchiectasis/Chronic resp failure on 4L NC:  -iv solumedrol 40mg bid.   -symbicort, spiriva  -singular  -azithromycin 250 mwf  -resume prednisone 5mg prednisone after treatment for acute exacerbation.   -o2 via nasal cannula   -aspiration precautions.   -CXR reviewed, no s/o worsening fluid overload or acute infiltrate.  -start abx if fever.    #Sinus tachycardia:  -likely related to resp status.     #Transfusion reaction:  -pt with dark/tea colored urine  -w/u + for hemolysis.   -d/w blood bank, pt likely having reaction to blood transfused 4/17 d/t undetectable ab at that time which showed up positive on type and screen yesterday.   -d/w Dr. Garcia, supportive care  -monitor counts.   -d/w family at bedside.    #S/p mechanical fall with LT hip fracture-  -s/p LT hip IMN POD#5  -pain control with tylenol, prn oxycodone  -physical therapy  -incentive spirometry  -bowel regimen.     #Acute blood loss anemia due to hematoma at surgical site + Hemolysis d/t transfusion reaction, with underlying Basil 2+ myeloproliferative d/o with myelofibrosis Vs. P vera   -heme eval appreciated.   -s/p jakafi in past, but now on hold d/t drop in h/h.   -s/p 5 units prbcs on this admission.   -anemia     #Urinary retention s/p Arrieta 4/18  -TOV @ [facility]    #TEODORA  -likely related to hemodynamic changes during surgery in setting of ace-i use +/-obstructive uropathy.  -arrieta in place.  -ace-i on hold  -creatinine trending down now.   -avoid nephrotoxics.     #Acute diastolic CHF:-  -s/p iv lasix   -overall improved.     #UTI ruled out, cultures negative, off abx.     #Hypothyroidism  - S/p partial thyroidectomy many years ago  - C/w Levothyroxine 100 ucg QD    #HTN  -continue norvasc  -ace-i on hold.     #Fungal dermatitis  -clotrimazole.    #DVT proph  - heparin SQ    #MOLST with DNR/DNI    #DIspo:  -not stable for dc.   -d/w family at bedside/RN/Pulmonary.

## 2022-04-22 NOTE — PROGRESS NOTE ADULT - SUBJECTIVE AND OBJECTIVE BOX
Subjective:    Awake, alert. Sl more breathless this AM. ?coughing during breakfast-possible aspiration     MEDICATIONS  (STANDING):  amLODIPine   Tablet 5 milliGRAM(s) Oral daily  ascorbic acid 500 milliGRAM(s) Oral two times a day  aspirin enteric coated 81 milliGRAM(s) Oral daily  atorvastatin 40 milliGRAM(s) Oral at bedtime  azithromycin   Tablet 250 milliGRAM(s) Oral <User Schedule>  budesonide 160 MICROgram(s)/formoterol 4.5 MICROgram(s) Inhaler 2 Puff(s) Inhalation two times a day  folic acid 1 milliGRAM(s) Oral daily  heparin   Injectable 5000 Unit(s) SubCutaneous every 12 hours  HYDROmorphone  Injectable 0.5 milliGRAM(s) IV Push once  ketoconazole 2% Cream 1 Application(s) Topical two times a day  levothyroxine 100 MICROGram(s) Oral daily  LORazepam     Tablet 0.5 milliGRAM(s) Oral at bedtime  methylPREDNISolone sodium succinate Injectable 40 milliGRAM(s) IV Push once  montelukast 10 milliGRAM(s) Oral at bedtime  multivitamin 1 Tablet(s) Oral daily  pantoprazole    Tablet 40 milliGRAM(s) Oral before breakfast  polyethylene glycol 3350 17 Gram(s) Oral at bedtime  predniSONE   Tablet 5 milliGRAM(s) Oral daily  senna 2 Tablet(s) Oral at bedtime  tiotropium 18 MICROgram(s) Capsule 1 Capsule(s) Inhalation daily    MEDICATIONS  (PRN):  ALBUTerol    90 MICROgram(s) HFA Inhaler 2 Puff(s) Inhalation every 6 hours PRN Shortness of Breath and/or Wheezing  ondansetron Injectable 4 milliGRAM(s) IV Push every 6 hours PRN Nausea and/or Vomiting  oxyCODONE    IR 5 milliGRAM(s) Oral every 3 hours PRN Moderate Pain (4 - 6)  oxyCODONE    IR 10 milliGRAM(s) Oral every 3 hours PRN Severe Pain (7 - 10)      Allergies    No Known Allergies    Intolerances        Vital Signs Last 24 Hrs  T(C): 36.4 (22 Apr 2022 09:09), Max: 36.9 (21 Apr 2022 16:30)  T(F): 97.6 (22 Apr 2022 09:09), Max: 98.4 (21 Apr 2022 16:30)  HR: 124 (22 Apr 2022 09:09) (100 - 124)  BP: 157/72 (22 Apr 2022 09:09) (131/47 - 175/89)  BP(mean): --  RR: 18 (22 Apr 2022 09:09) (17 - 20)  SpO2: 99% (22 Apr 2022 09:09) (93% - 99%)    PHYSICAL EXAMINATION:    NECK:  Supple. No lymphadenopathy. Jugular venous pressure not elevated.    HEART:   The cardiac impulse has a normal quality. Reg., Nl S1 and S2.    CHEST:  Chest with diffuse wheezes-increased. Bilateral rales. Increased respiratory effort.  ABDOMEN:  Soft and nontender.   EXTREMITIES:  There is no edema.       LABS:                        8.7    20.24 )-----------( 719      ( 22 Apr 2022 07:54 )             28.5     04-22    137  |  105  |  61<H>  ----------------------------<  167<H>  4.5   |  26  |  1.24    Ca    8.4<L>      22 Apr 2022 07:54            RADIOLOGY & ADDITIONAL TESTS:    Assessment and Plan:   · Assessment	  Assessment/Plan    - Maintain aerosols with Symbicort and Tiotropium.   - Solumedrol 40mg W37H-3vr dose STAT  - Mucinex as mucolytic  - Monitor H/H carefully-sl increased today. Leukocytosis noted   - Supplemental O2 at 3-5L/M  - DVT prophylaxis  - Renal Consult noted. BUN/Cr-stable.  - CXR today.  - Concern about possible transfusion reaction. Will perform eval for hemolysis     Problem/Plan - 1:  ·  Problem: Hip fracture, left.   Problem/Plan - 2:  ·  Problem: COPD mixed type.   Problem/Plan - 3:  ·  Problem: Bronchiectasis.   Problem/Plan - 4:  ·  Problem: Myelodysplastic syndrome.   Problem/Plan - 5:  ·  Problem: Anemia.

## 2022-04-22 NOTE — PROVIDER CONTACT NOTE (OTHER) - ASSESSMENT
Arrieta catheter draining dark zakia urine this morning. Urine appears darker in color since previous assessment. No blood clots noted in arrieta catheter tubing/arrieta bag. Total arrieta output of shift is 800ml.

## 2022-04-22 NOTE — PROGRESS NOTE ADULT - ASSESSMENT
89 yo F with a PMH CVA (left thalamic infarct, 2019) s/p implantable loop recorder, JAK2+ myelofibrosis, bronchiectasis (on 4L NC), osteoporosis, HTN, anxiety, and hypothyroidism who presents after a fall.  Her aide had been wheeling her back from the bathroom this morning around 5AM, when she slipped off and fell on her leg. Her aide was able to get her back into the bed, but she was complaining of pain in her left hip. Her daughters came by later and noticed her left upper leg was getting more and more swollen, so they took her to the ED.  She does not normally fall, but she has difficulty walking (due to osteoporosis and dyspnea), using a walker with 1-person assist.    The patient notes some dyspnea, but denies any pain at this time. She denies CP, abdominal pain, diarrhea, N/V, fevers, or chills.  She is being treated for a rash on her left ankle/foot with an antifungal cream, which is improving it.  In the ED, she was given Morphine 2 mg IV x1 and  ml x1. (17 Apr 2022 13:18)  Pts daughters at the bedside, interviewed  Pt s/p repair of hip fx  As above receive  ml,  Post op creat up 0.82 to 1.03 to 1.40 last night and 1.56 today  potassium 4.3 to 4.5,  5.2, and 5.4 this am  Ho in, got 1 U prbc and lasix 40 mg IV x 1 pre transfusion at 930 am, CXR this am w PVC compared to  admission CXR w pt appearing dyspneic  UO only ~ 200 ml today, clear, bladder scan negative  Pt w/o any complaints, but as per daughters not talking much usually more talkative  on lisinopril 10 mg took am of admission at home and yesterday preop  Held today  OR uneventful BPs stable w mild drop to 110-120 range    A/P  TEODORA post hip surgery  Not on renal toxic meds  Pt was on ACEI since yesterday am  Now dcd  Mild bp drop 90s to 100 range noted today  Will repeat labs stat, evaluate K and Creat was on LR ( DC D) and acei  repeat CXR for worsening of PVC , may need more lasix, otherwise will hold on further diuretics, to prevent azotemia,   O2 sat 94% on 5L  if TEODORA due to Lisinopril the Duration of action is up to 24-30 hrs  repeat o2 sat on 2L    4/20  more awake alert  VVS   daughters at bedside  creat plateaued  K stable at 5  Monitor i/o  encourage po fluids    4/21  Creat and K improving  VVS  will monitor  From renal standpoint may be dc or to rehab  Enalapril may be resumed as outpt  active antihypertensive  Bp stable at this time    4/22  Possible aspiration  renal function cont to improve off acei  will be followed and evaluated for possible aspiration  If develops acute illness renal function may be affected  Dr Gordon covering this weekend  Cont to hold acei

## 2022-04-22 NOTE — PROGRESS NOTE ADULT - SUBJECTIVE AND OBJECTIVE BOX
HPI:  88 yo F with a PMH CVA (left thalamic infarct, 2019) s/p implantable loop recorder, JAK2+ myelofibrosis, bronchiectasis (on 4L NC), osteoporosis, HTN, anxiety, and hypothyroidism who presents after a fall.  Her aide had been wheeling her back from the bathroom this morning around 5AM, when she slipped off and fell on her leg. Her aide was able to get her back into the bed, but she was complaining of pain in her left hip. Her daughters came by later and noticed her left upper leg was getting more and more swollen, so they took her to the ED.  She does not normally fall, but she has difficulty walking (due to osteoporosis and dyspnea), using a walker with 1-person assist.    The patient notes some dyspnea, but denies any pain at this time. She denies CP, abdominal pain, diarrhea, N/V, fevers, or chills.  She is being treated for a rash on her left ankle/foot with an antifungal cream, which is improving it.    In the ED, she was given Morphine 2 mg IV x1 and  ml x1. (2022 13:18)      Patient is a 89y old  Female who presents with a chief complaint of L hip fracture (2022 12:14)      Consulted by Dr. Cameron Marcelino for VTE prophylaxis, risk stratification, and anticoagulation management.    PAST MEDICAL & SURGICAL HISTORY:  Rib fractures    Essential hypertension    Hypothyroidism, unspecified type    Asthma, unspecified asthma severity, unspecified whether complicated, unspecified whether persistent    Cerebrovascular accident (CVA)  19 Left thalamic infarct s/p ILR    Bronchiectasis    Myelofibrosis    Anxiety    History of partial thyroidectomy    Status post placement of implantable loop recorder        FAMILY HISTORY:  FH: diabetes mellitus (Sibling)    FH: HTN (hypertension)        Interval Note:  2022 Pt seen at bedside on [facility] with 2 daughters present.. Discussed with daughters about the use of heparin sq for four weeks post procedure for DVT  prophylaxis.  Questions answered will reinforce as needed.  Pt alert and oriented to person only. They stated her mother will go to rehab on discharge.   2022 Pt seen at bedside on [facility] , alert to person only. No changes.  Will go to rehab on discharge.   2022 Pt seen at bedside on [facility] , No overnight events, tolerating AC, possible rehab today  2022 Pt seen at bedside this a.m. more alert knows her name and that she is in [facility]. Not medically stable for discharge. TEODORA, ? Aspiration during breakfast this a.m.      CAPRINI SCORE  AGE RELATED RISK FACTORS                                                       MOBILITY RELATED FACTORS  [ ] Age 41-60 years                                            (1 Point)                  [ ] Bed rest                                                        (1 Point)  [ ] Age: 61-74 years                                           (2 Points)                [ ] Plaster cast                                                   (2 Points)  [ X] Age= 75 years                                              (3 Points)                 [ ] Bed bound for more than 72 hours                   (2 Points)    DISEASE RELATED RISK FACTORS                                               GENDER SPECIFIC FACTORS  [ ] Edema in the lower extremities                       (1 Point)           [ ] Pregnancy                                                            (1 Point)  [ ] Varicose veins                                               (1 Point)                  [ ] Post-partum < 6 weeks                                      (1 Point)             [X ] BMI > 25 Kg/m2                                            (1 Point)                  [ ] Hormonal therapy or oral contraception       (1 Point)                 [ ] Sepsis (in the previous month)                        (1 Point)             [ ] History of pregnancy complications                (1Point)  [ ] Pneumonia or serious lung disease                                             [ ] Unexplained or recurrent  (=/>3), premature                                 (In the previous month)                               (1 Point)                birth with toxemia or growth-restricted infant (1 Point)  [ ] Abnormal pulmonary function test            (1 Point)                                   SURGERY RELATED RISK FACTORS  [ ] Acute myocardial infarction                       (1 Point)                  [ ]  Section                                         (1 Point)  [ ] Congestive heart failure (in the previous month) (1 Point)   [ ] Minor surgery   lasting <45 minutes       (1 Point)   [ ] Inflammatory bowel disease                             (1 Point)          [ ] Arthroscopic surgery                                  (2 Points)  [ ] Central venous access                                    (2 Points)            [ ] General surgery lasting >45 minutes      (2 Points)       [ ] Stroke (in the previous month)                  (5 Points)            [ ] Elective major lower extremity arthroplasty (5 Points)                                   [  ] Malignancy (present or past include skin melanoma                                          but exclude  basal skin cell)    (2 points)                                      TRAUMA RELATED RISK FACTORS                HEMATOLOGY RELATED FACTORS                                  [X ] Fracture of the hip, pelvis, or leg                       (5 Points)  [ ] Prior episodes of VTE                                     (3 Points)          [ ] Acute spinal cord injury (in the previous month)  (5 Points)  [ ] Positive family history for VTE                         (3 Points)       [ ] Paralysis (less than 1 month)                          (5 Points)  [ ] Prothrombin 21487 A                                      (3 Points)         [ ] Multiple Trauma (within 1month)                 (5Points)                                                                                                                                                                [ ] Factor V Leiden                                          (3 Points)                                OTHER RISK FACTORS                          [ ] Lupus anticoagulants                                     (3 Points)                       [ ] BMI > 40                          (1 Point)                                                         [ ] Anticardiolipin antibodies                                (3 Points)                   [ ] Smoking                              (1Point)                                                [ ] High homocysteine in the blood                      (3 Points)                [  ] Diabetes requiring insulin (1point)                         [ ] Other congenital or acquired thrombophilia       (3 Points)          [  ] Chemotherapy                   (1 Point)  [ ] Heparin induced thrombocytopenia                  (3 Points)             [  ] Blood Transfusion                (1 point)                                                                                                             Total Score [9 ]                                                                                                                                                                                                                                  IMPROVE Bleeding Risk Score: 4.5    Falls Risk:   High (X  )  Mod (  )  Low (  )  crcl: 27.2        cr: 1.54         BMI  30.3           EBL: 150  ml  Time procedure    : starts: 1929             :ends: 20:25        Denies any personal or familial history of clotting or bleeding disorders.    Allergies    No Known Allergies    Intolerances        REVIEW OF SYSTEMS    (  )Fever	     (  )Constipation	(  )SOB				(  )Headache	(  )Dysuria  (  )Chills	     (  )Melena	(  )Dyspnea present on exertion	                    (  )Dizziness                    (  )Polyuria  (  )Nausea	     (  )Hematochezia	(  )Cough			                    (  )Syncope   	(  )Hematuria  (  )Vomiting    (  )Chest Pain	(  )Wheezing			(  )Weakness  (  )Diarrhea     (  )Palpitations	(  )Anorexia			(  )Myalgia      Unable to obtain review of systems due to: PT IS CONFUSED     Vital Signs Last 24 Hrs  T(C): 36.7 (22 @ 11:23), Max: 36.9 (22 @ 16:30)  T(F): 98.1 (22 @ 11:23), Max: 98.4 (22 @ 16:30)  HR: 121 (22 @ 13:59) (100 - 124)  BP: 113/70 (22 @ 13:59) (113/70 - 157/72)  BP(mean): --  RR: 18 (22 @ 13:59) (17 - 18)  SpO2: 96% (22 @ 13:59) (96% - 99%)  PHYSICAL EXAM:    Constitutional: Appears Well    Neurological: A& O x 2 PERSON  and place      Skin: Warm    Respiratory and Thorax: normal effort; Breath sounds: normal; No rales/wheezing/rhonchi  	  Cardiovascular: S1, S2, regular, NMBR	    Gastrointestinal: BS + x 4Q, nontender	    Genitourinary:  Bladder nondistended, nontender    Musculoskeletal:   General Right:   no muscle/joint tenderness,   normal tone, no joint swelling,   ROM: limited	    General Left:   + muscle/joint tenderness,   normal tone, no joint swelling,   ROM: limited    Hip:  left: Dressing CDI;    Lower extrems:   Right: no calf tenderness              negative marco's sign               + pedal pulses    Left:   no calf tenderness              negative marco's sign               + pedal pulses                                 8.7    20. )-----------( 719      ( 2022 07:54 )             28.5       04-22    137  |  105  |  61<H>  ----------------------------<  167<H>  4.5   |  26  |  1.24    Ca    8.4<L>      2022 07:54    TPro  x   /  Alb  x   /  TBili  2.5<H>  /  DBili  0.6<H>  /  AST  x   /  ALT  x   /  AlkPhos  x   04                   7.8    12.43 )-----------( 670      ( 2022 08:39 ) one unit PRBC             26.4       04-    139  |  107  |  68<H>  ----------------------------<  129<H>  4.5   |  25  |  1.52<H>    Ca    8.0<L>      2022 08:39  Phos  5.6     04-19    TPro  x   /  Alb  2.2<L>  /  TBili  x   /  DBili  x   /  AST  x   /  ALT  x   /  AlkPhos  x   04-19                                       8.4    12.75 )-----------( 615      ( 2022 07:08 )             27.2       04-20    137  |  105  |  75<H>  ----------------------------<  104<H>  5.0   |  24  |  2.17<H>    Ca    7.8<L>      2022 07:08  Phos  5.6     04-19    TPro  x   /  Alb  2.2<L>  /  TBili  x   /  DBili  x   /  AST  x   /  ALT  x   /  AlkPhos  x                      7.9    x     )-----------( x        ( 2022 11:55 ) one unit PRBC             25.5           136  |  104  |  56<H>  ----------------------------<  95  5.4<H>   |  25  |  1.56<H>    Ca    8.1<L>      2022 08:32  Phos  3.8     04-18  Mg     2.2             PT/INR - ( 2022 05:03 )   PT: 14.6 sec;   INR: 1.26 ratio         MEDICATIONS  (STANDING):  amLODIPine   Tablet 5 milliGRAM(s) Oral daily  ascorbic acid 500 milliGRAM(s) Oral two times a day  aspirin enteric coated 81 milliGRAM(s) Oral daily  atorvastatin 40 milliGRAM(s) Oral at bedtime  azithromycin   Tablet 250 milliGRAM(s) Oral <User Schedule>  budesonide 160 MICROgram(s)/formoterol 4.5 MICROgram(s) Inhaler 2 Puff(s) Inhalation two times a day  folic acid 1 milliGRAM(s) Oral daily  heparin   Injectable 5000 Unit(s) SubCutaneous every 12 hours  HYDROmorphone  Injectable 0.5 milliGRAM(s) IV Push once  ketoconazole 2% Cream 1 Application(s) Topical two times a day  levothyroxine 100 MICROGram(s) Oral daily  LORazepam     Tablet 0.5 milliGRAM(s) Oral at bedtime  montelukast 10 milliGRAM(s) Oral at bedtime  multivitamin 1 Tablet(s) Oral daily  pantoprazole    Tablet 40 milliGRAM(s) Oral before breakfast  polyethylene glycol 3350 17 Gram(s) Oral at bedtime  senna 2 Tablet(s) Oral at bedtime  tiotropium 18 MICROgram(s) Capsule 1 Capsule(s) Inhalation daily        < from: Xray Hip w/ Pelvis 2 or 3 Views, Left (22 @ 09:49) >  MPRESSION: LEFT hip Intertrochanteric fracture.    < end of copied text >      DVT Prophylaxis:  LMWH                   (  )  Heparin SQ           ( x )  Coumadin             (  )  Xarelto                  (  )  Eliquis                   (  )  Venodynes           (x  )  Ambulation          (x  )  UFH                       (  )  Contraindicated  (  )  EC Aspirin             (  )           HPI:  88 yo F with a PMH CVA (left thalamic infarct, 2019) s/p implantable loop recorder, JAK2+ myelofibrosis, bronchiectasis (on 4L NC), osteoporosis, HTN, anxiety, and hypothyroidism who presents after a fall.  Her aide had been wheeling her back from the bathroom this morning around 5AM, when she slipped off and fell on her leg. Her aide was able to get her back into the bed, but she was complaining of pain in her left hip. Her daughters came by later and noticed her left upper leg was getting more and more swollen, so they took her to the ED.  She does not normally fall, but she has difficulty walking (due to osteoporosis and dyspnea), using a walker with 1-person assist.    The patient notes some dyspnea, but denies any pain at this time. She denies CP, abdominal pain, diarrhea, N/V, fevers, or chills.  She is being treated for a rash on her left ankle/foot with an antifungal cream, which is improving it.    In the ED, she was given Morphine 2 mg IV x1 and  ml x1. (2022 13:18)      Patient is a 89y old  Female who presents with a chief complaint of L hip fracture (2022 12:14)      Consulted by Dr. Cameron Marcelino for VTE prophylaxis, risk stratification, and anticoagulation management.    PAST MEDICAL & SURGICAL HISTORY:  Rib fractures    Essential hypertension    Hypothyroidism, unspecified type    Asthma, unspecified asthma severity, unspecified whether complicated, unspecified whether persistent    Cerebrovascular accident (CVA)  19 Left thalamic infarct s/p ILR    Bronchiectasis    Myelofibrosis    Anxiety    History of partial thyroidectomy    Status post placement of implantable loop recorder        FAMILY HISTORY:  FH: diabetes mellitus (Sibling)    FH: HTN (hypertension)        Interval Note:  2022 Pt seen at bedside on [facility] with 2 daughters present.. Discussed with daughters about the use of heparin sq for four weeks post procedure for DVT  prophylaxis.  Questions answered will reinforce as needed.  Pt alert and oriented to person only. They stated her mother will go to rehab on discharge.   2022 Pt seen at bedside on [facility] , alert to person only. No changes.  Will go to rehab on discharge.   2022 Pt seen at bedside on [facility] , No overnight events, tolerating AC, possible rehab today  2022 Pt seen at bedside this a.m. more alert knows her name and that she is in [facility]. Not medically stable for discharge. TEODORA, ? Aspiration during breakfast this a.m.      CAPRINI SCORE  AGE RELATED RISK FACTORS                                                       MOBILITY RELATED FACTORS  [ ] Age 41-60 years                                            (1 Point)                  [ ] Bed rest                                                        (1 Point)  [ ] Age: 61-74 years                                           (2 Points)                [ ] Plaster cast                                                   (2 Points)  [ X] Age= 75 years                                              (3 Points)                 [ ] Bed bound for more than 72 hours                   (2 Points)    DISEASE RELATED RISK FACTORS                                               GENDER SPECIFIC FACTORS  [ ] Edema in the lower extremities                       (1 Point)           [ ] Pregnancy                                                            (1 Point)  [ ] Varicose veins                                               (1 Point)                  [ ] Post-partum < 6 weeks                                      (1 Point)             [X ] BMI > 25 Kg/m2                                            (1 Point)                  [ ] Hormonal therapy or oral contraception       (1 Point)                 [ ] Sepsis (in the previous month)                        (1 Point)             [ ] History of pregnancy complications                (1Point)  [ ] Pneumonia or serious lung disease                                             [ ] Unexplained or recurrent  (=/>3), premature                                 (In the previous month)                               (1 Point)                birth with toxemia or growth-restricted infant (1 Point)  [ ] Abnormal pulmonary function test            (1 Point)                                   SURGERY RELATED RISK FACTORS  [ ] Acute myocardial infarction                       (1 Point)                  [ ]  Section                                         (1 Point)  [ ] Congestive heart failure (in the previous month) (1 Point)   [ ] Minor surgery   lasting <45 minutes       (1 Point)   [ ] Inflammatory bowel disease                             (1 Point)          [ ] Arthroscopic surgery                                  (2 Points)  [ ] Central venous access                                    (2 Points)            [ ] General surgery lasting >45 minutes      (2 Points)       [ ] Stroke (in the previous month)                  (5 Points)            [ ] Elective major lower extremity arthroplasty (5 Points)                                   [  ] Malignancy (present or past include skin melanoma                                          but exclude  basal skin cell)    (2 points)                                      TRAUMA RELATED RISK FACTORS                HEMATOLOGY RELATED FACTORS                                  [X ] Fracture of the hip, pelvis, or leg                       (5 Points)  [ ] Prior episodes of VTE                                     (3 Points)          [ ] Acute spinal cord injury (in the previous month)  (5 Points)  [ ] Positive family history for VTE                         (3 Points)       [ ] Paralysis (less than 1 month)                          (5 Points)  [ ] Prothrombin 18518 A                                      (3 Points)         [ ] Multiple Trauma (within 1month)                 (5Points)                                                                                                                                                                [ ] Factor V Leiden                                          (3 Points)                                OTHER RISK FACTORS                          [ ] Lupus anticoagulants                                     (3 Points)                       [ ] BMI > 40                          (1 Point)                                                         [ ] Anticardiolipin antibodies                                (3 Points)                   [ ] Smoking                              (1Point)                                                [ ] High homocysteine in the blood                      (3 Points)                [  ] Diabetes requiring insulin (1point)                         [ ] Other congenital or acquired thrombophilia       (3 Points)          [  ] Chemotherapy                   (1 Point)  [ ] Heparin induced thrombocytopenia                  (3 Points)             [  ] Blood Transfusion                (1 point)                                                                                                             Total Score [9 ]                                                                                                                                                                                                                                  IMPROVE Bleeding Risk Score: 4.5    Falls Risk:   High (X  )  Mod (  )  Low (  )  crcl: 27.2        cr: 1.54         BMI  30.3           EBL: 150  ml  Time procedure    : starts: 1929             :ends: 20:25        Denies any personal or familial history of clotting or bleeding disorders.    Allergies    No Known Allergies    Intolerances        REVIEW OF SYSTEMS    (  )Fever	     (  )Constipation	(  )SOB				(  )Headache	(  )Dysuria  (  )Chills	     (  )Melena	(  )Dyspnea present on exertion	                    (  )Dizziness                    (  )Polyuria  (  )Nausea	     (  )Hematochezia	(  )Cough			                    (  )Syncope   	(  )Hematuria  (  )Vomiting    (  )Chest Pain	(  )Wheezing			(  )Weakness  (  )Diarrhea     (  )Palpitations	(  )Anorexia			(  )Myalgia      Unable to obtain review of systems due to: PT IS CONFUSED     Vital Signs Last 24 Hrs  T(C): 36.7 (22 @ 11:23), Max: 36.9 (22 @ 16:30)  T(F): 98.1 (22 @ 11:23), Max: 98.4 (22 @ 16:30)  HR: 121 (22 @ 13:59) (100 - 124)  BP: 113/70 (22 @ 13:59) (113/70 - 157/72)  BP(mean): --  RR: 18 (22 @ 13:59) (17 - 18)  SpO2: 96% (22 @ 13:59) (96% - 99%)  PHYSICAL EXAM:    Constitutional: Appears Well    Neurological: A& O x 2 PERSON  and place      Skin: Warm    Respiratory and Thorax: normal effort; Breath sounds: normal; No rales/wheezing/rhonchi  	  Cardiovascular: S1, S2, regular, NMBR	    Gastrointestinal: BS + x 4Q, nontender	    Genitourinary:  Bladder nondistended, nontender    Musculoskeletal:   General Right:   no muscle/joint tenderness,   normal tone, no joint swelling,   ROM: limited	    General Left:   + muscle/joint tenderness,   normal tone, no joint swelling,   ROM: limited    Hip:  left: Dressing CDI;    Lower extrems:   Right: no calf tenderness              negative marco's sign               + pedal pulses    Left:   no calf tenderness              negative marco's sign               + pedal pulses                                 8.7    20. )-----------( 719      ( 2022 07:54 )             28.5       04-22    137  |  105  |  61<H>  ----------------------------<  167<H>  4.5   |  26  |  1.24    Ca    8.4<L>      2022 07:54    TPro  x   /  Alb  x   /  TBili  2.5<H>  /  DBili  0.6<H>  /  AST  x   /  ALT  x   /  AlkPhos  x   04                   7.8    12.43 )-----------( 670      ( 2022 08:39 ) one unit PRBC             26.4       04-    139  |  107  |  68<H>  ----------------------------<  129<H>  4.5   |  25  |  1.52<H>    Ca    8.0<L>      2022 08:39  Phos  5.6     04-19    TPro  x   /  Alb  2.2<L>  /  TBili  x   /  DBili  x   /  AST  x   /  ALT  x   /  AlkPhos  x   04-19                                       8.4    12.75 )-----------( 615      ( 2022 07:08 )             27.2       04-20    137  |  105  |  75<H>  ----------------------------<  104<H>  5.0   |  24  |  2.17<H>    Ca    7.8<L>      2022 07:08  Phos  5.6     04-19    TPro  x   /  Alb  2.2<L>  /  TBili  x   /  DBili  x   /  AST  x   /  ALT  x   /  AlkPhos  x                      7.9    x     )-----------( x        ( 2022 11:55 ) one unit PRBC             25.5           136  |  104  |  56<H>  ----------------------------<  95  5.4<H>   |  25  |  1.56<H>    Ca    8.1<L>      2022 08:32  Phos  3.8     04-18  Mg     2.2             PT/INR - ( 2022 05:03 )   PT: 14.6 sec;   INR: 1.26 ratio         MEDICATIONS  (STANDING):  amLODIPine   Tablet 5 milliGRAM(s) Oral daily  ascorbic acid 500 milliGRAM(s) Oral two times a day  aspirin enteric coated 81 milliGRAM(s) Oral daily  atorvastatin 40 milliGRAM(s) Oral at bedtime  azithromycin   Tablet 250 milliGRAM(s) Oral <User Schedule>  budesonide 160 MICROgram(s)/formoterol 4.5 MICROgram(s) Inhaler 2 Puff(s) Inhalation two times a day  folic acid 1 milliGRAM(s) Oral daily  heparin   Injectable 5000 Unit(s) SubCutaneous every 12 hours  HYDROmorphone  Injectable 0.5 milliGRAM(s) IV Push once  ketoconazole 2% Cream 1 Application(s) Topical two times a day  levothyroxine 100 MICROGram(s) Oral daily  LORazepam     Tablet 0.5 milliGRAM(s) Oral at bedtime  montelukast 10 milliGRAM(s) Oral at bedtime  multivitamin 1 Tablet(s) Oral daily  pantoprazole    Tablet 40 milliGRAM(s) Oral before breakfast  polyethylene glycol 3350 17 Gram(s) Oral at bedtime  senna 2 Tablet(s) Oral at bedtime  tiotropium 18 MICROgram(s) Capsule 1 Capsule(s) Inhalation daily        < from: Xray Hip w/ Pelvis 2 or 3 Views, Left (22 @ 09:49) >  MPRESSION: LEFT hip Intertrochanteric fracture.    < end of copied text >      DVT Prophylaxis:  LMWH                   (  )  Heparin SQ           ( x )  Coumadin             (  )  Xarelto                  (  )  Eliquis                   (  )  Venodynes           (x  )  Ambulation          (x  )  UFH                       (  )  Contraindicated  (  )  EC Aspirin             (  )

## 2022-04-22 NOTE — PROGRESS NOTE ADULT - ASSESSMENT
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   - patient has been started on steroids for known underlying COPD   - as per discussion with Dr. Badillo family favoring a non aggressive approach.       Pop Garcia MD   Hematology/ Oncology   [facility]   [facility]   C: [phone]  [address] [place], [place]  P:[phone]  F:[phone]  and [phone]  [address] [place], [place]   P:[phone]  F:[phone]

## 2022-04-22 NOTE — PROGRESS NOTE ADULT - SUBJECTIVE AND OBJECTIVE BOX
NEPHROLOGY CONSULT  HPI:  88 yo F with a PMH CVA (left thalamic infarct, 2019) s/p implantable loop recorder, JAK2+ myelofibrosis, bronchiectasis (on 4L NC), osteoporosis, HTN, anxiety, and hypothyroidism who presents after a fall.  Her aide had been wheeling her back from the bathroom this morning around 5AM, when she slipped off and fell on her leg. Her aide was able to get her back into the bed, but she was complaining of pain in her left hip. Her daughters came by later and noticed her left upper leg was getting more and more swollen, so they took her to the ED.  She does not normally fall, but she has difficulty walking (due to osteoporosis and dyspnea), using a walker with 1-person assist.    The patient notes some dyspnea, but denies any pain at this time. She denies CP, abdominal pain, diarrhea, N/V, fevers, or chills.  She is being treated for a rash on her left ankle/foot with an antifungal cream, which is improving it.    In the ED, she was given Morphine 2 mg IV x1 and  ml x1. (17 Apr 2022 13:18)    4/20  more awake alert  VVS   daughters at bedside  creat plateaued  K stable at 5    4/21  feels much better appears in better spirits  labs improving    4/22  In distress, states has to have BM but nursing informed me   she started coughing while eating, being evaluated for aspiration    Above info from Chart:  Pts daughters at the bedside, interviewed  Pt s/p repair of hip fx  As above receive  ml,  Post op creat up 0.82 to 1.03 to 1.40 last night and 1.56 today  potassium 4.3 to 4.5,  5.2, and 5.4 this am  Ho in, got 1 U prbc and lasix 40 mg IV x 1 pre transfusion at 930 am, CXR this am w PVC compared to  admission CXR w pt appearing dyspneic  UO only ~ 200 ml today, clear, bladder scan negative  Pt w/o any complaints, but as per daughters not talking much usually more talkative  on lisinopril 10 mg took am of admission at home and yesterday preop  Held today  OR uneventful BPs stable w mild drop to 110-120 range    PAST MEDICAL & SURGICAL HISTORY:  Rib fractures    Essential hypertension    Hypothyroidism, unspecified type    Asthma, unspecified asthma severity, unspecified whether complicated, unspecified whether persistent    Cerebrovascular accident (CVA)  6/7/19 Left thalamic infarct s/p ILR    Bronchiectasis    Myelofibrosis    Anxiety    History of partial thyroidectomy    Status post placement of implantable loop recorder        FAMILY HISTORY:  FH: diabetes mellitus (Sibling)    FH: HTN (hypertension)        MEDICATIONS  (STANDING):  acetaminophen     Tablet .. 650 milliGRAM(s) Oral every 6 hours  amLODIPine   Tablet 5 milliGRAM(s) Oral daily  ascorbic acid 500 milliGRAM(s) Oral two times a day  aspirin enteric coated 81 milliGRAM(s) Oral daily  atorvastatin 40 milliGRAM(s) Oral at bedtime  azithromycin   Tablet 250 milliGRAM(s) Oral <User Schedule>  budesonide  80 MICROgram(s)/formoterol 4.5 MICROgram(s) Inhaler 2 Puff(s) Inhalation two times a day  cefTRIAXone   IVPB 1000 milliGRAM(s) IV Intermittent every 24 hours  folic acid 1 milliGRAM(s) Oral daily  HYDROmorphone  Injectable 0.5 milliGRAM(s) IV Push once  levothyroxine 100 MICROGram(s) Oral daily  LORazepam     Tablet 0.5 milliGRAM(s) Oral at bedtime  montelukast 10 milliGRAM(s) Oral at bedtime  multivitamin 1 Tablet(s) Oral daily  pantoprazole    Tablet 40 milliGRAM(s) Oral before breakfast  polyethylene glycol 3350 17 Gram(s) Oral at bedtime  predniSONE   Tablet 5 milliGRAM(s) Oral daily  senna 2 Tablet(s) Oral at bedtime    MEDICATIONS  (PRN):  ondansetron Injectable 4 milliGRAM(s) IV Push every 6 hours PRN Nausea and/or Vomiting  oxyCODONE    IR 5 milliGRAM(s) Oral every 3 hours PRN Moderate Pain (4 - 6)  oxyCODONE    IR 10 milliGRAM(s) Oral every 3 hours PRN Severe Pain (7 - 10)      Allergies    No Known Allergies    Intolerances    REVIEW OF SYSTEMS:    MANOJ    I&O's Detail    20 Apr 2022 07:01  -  21 Apr 2022 07:00  --------------------------------------------------------  IN:  Total IN: 0 mL    OUT:    Indwelling Catheter - Urethral (mL): 825 mL  Total OUT: 825 mL    Total NET: -825 mL    Vital Signs Last 24 Hrs  T(C): 36.7 (22 Apr 2022 11:23), Max: 36.9 (21 Apr 2022 16:30)  T(F): 98.1 (22 Apr 2022 11:23), Max: 98.4 (21 Apr 2022 16:30)  HR: 122 (22 Apr 2022 11:23) (100 - 124)  BP: 131/34 (22 Apr 2022 11:23) (131/34 - 175/89)  BP(mean): --  RR: 18 (22 Apr 2022 11:23) (17 - 20)  SpO2: 99% (22 Apr 2022 11:23) (93% - 99%)      PHYSICAL EXAM:    General:  Alert, appears more comfortable    Neuro:  not talkative    HEENT:  No JVD, no masses, Eyes anicteric, ,    Cardiovascular:  Regular rate and rhythm, with normal S1 and S2.    Lungs:  coarse breath sounds, loud gastric gurgling heard with respirations    Abdomen:  Normoactive bowel sounds. Soft, flat, non-tender, and non-distended.  positive bowel sounds    Skin:  Warm, dry    Extremities:  warm,  no cyanosis    LABS:                          8.7    20.24 )-----------( 719      ( 22 Apr 2022 07:54 )             28.5                             7.8    12.43 )-----------( 670      ( 21 Apr 2022 08:39 )             26.4                             8.4    12.75 )-----------( 615      ( 20 Apr 2022 07:08 )             27.2                           7.9    x     )-----------( x        ( 19 Apr 2022 11:55 )             25.5       04-22    137  |  105  |  61<H>  ----------------------------<  167<H>  4.5   |  26  |  1.24    Ca    8.4<L>      22 Apr 2022 07:54    TPro  x   /  Alb  x   /  TBili  2.5<H>  /  DBili  0.6<H>  /  AST  x   /  ALT  x   /  AlkPhos  x   04-22      139    |  107    |  68     ----------------------------<  129       21 Apr 2022 08:39  4.5     |  25     |  1.52     137    |  105    |  75     ----------------------------<  104       20 Apr 2022 07:08  5.0     |  24     |  2.17     136    |  104    |  65     ----------------------------<  133       19 Apr 2022 20:47  5.0     |  26     |  2.11     Ca    8.0        21 Apr 2022 08:39  Ca    7.8        20 Apr 2022 07:08  Ca    7.7        19 Apr 2022 20:47    Phos  5.6       19 Apr 2022 20:47  Phos  3.8       18 Apr 2022 05:03    Mg     2.2       18 Apr 2022 05:03

## 2022-04-22 NOTE — PROGRESS NOTE ADULT - ASSESSMENT
This is a 89 year old female s/p mechanical fall causing a fracture to her left hip.  Pt s/p left hip IMN  on 4-. Pt has high thrombosin risk and requires anticoagulation prophylaxis.       Plan:  :H&H noted 7.8/26.4 YESTERDAY today 8.7/28.5 post on unit PRBC yesterday  : cont heparin 5,000 units sq q12h tomorrow for total of four weeks last dose on 5-  :daily cbc/bmp  :LE Venodynes  : increase mobility as tolerated  : will f/u  : dispo : rehab This is a 89 year old female s/p mechanical fall causing a fracture to her left hip.  Pt s/p left hip IMN  on 4-. Pt has high thrombosin risk and requires anticoagulation prophylaxis.       Plan:  :H&H noted 7.8/26.4 YESTERDAY today 8.7/28.5 post on unit PRBC yesterday  : cont heparin 5,000 units sq q12h  for total of four weeks last dose on 5-  :daily cbc/bmp  :LE Venodynes  : increase mobility as tolerated  : will f/u  : dispo : rehab

## 2022-04-22 NOTE — PROGRESS NOTE ADULT - SUBJECTIVE AND OBJECTIVE BOX
Orthopedics      Patient seen and examined at bedside. Feeling well. Pain controlled. No n/v. No acute events overnight.    Vital Signs Last 24 Hrs  T(C): 36.3 (04-22-22 @ 00:30), Max: 36.9 (04-21-22 @ 16:30)  T(F): 97.4 (04-22-22 @ 00:30), Max: 98.4 (04-21-22 @ 16:30)  HR: 102 (04-22-22 @ 00:30) (100 - 116)  BP: 142/73 (04-22-22 @ 00:30) (131/47 - 175/89)  BP(mean): --  RR: 17 (04-22-22 @ 00:30) (17 - 20)  SpO2: 98% (04-22-22 @ 00:30) (93% - 99%)                        7.8    12.43 )-----------( 670      ( 21 Apr 2022 08:39 )             26.4     21 Apr 2022 08:39    139    |  107    |  68     ----------------------------<  129    4.5     |  25     |  1.52     Ca    8.0        21 Apr 2022 08:39          Exam:  Gen: NAD, resting comfortably  LLE:  Dressing c/d/i  NTTP calves b/l  +EHL/FHL/TA/GS  SILT L2-S1  Compartments soft and compressible  2+ Pulses palpable      A/P: 89yF POD 3  s/p L Rev TKA  -FU AM labs  -Pain control  -PWB LLE, KI while ambulating  -DVT ppx  -Incentive Spirometry  -Elevation to extremity  -Medical management appreciated   Orthopedics      Patient seen and examined at bedside. Unable to provide history.    Vital Signs Last 24 Hrs  T(C): 36.3 (04-22-22 @ 00:30), Max: 36.9 (04-21-22 @ 16:30)  T(F): 97.4 (04-22-22 @ 00:30), Max: 98.4 (04-21-22 @ 16:30)  HR: 102 (04-22-22 @ 00:30) (100 - 116)  BP: 142/73 (04-22-22 @ 00:30) (131/47 - 175/89)  BP(mean): --  RR: 17 (04-22-22 @ 00:30) (17 - 20)  SpO2: 98% (04-22-22 @ 00:30) (93% - 99%)                        7.8    12.43 )-----------( 670      ( 21 Apr 2022 08:39 )             26.4     21 Apr 2022 08:39    139    |  107    |  68     ----------------------------<  129    4.5     |  25     |  1.52     Ca    8.0        21 Apr 2022 08:39          Physical Exam:  LLE:  Dressing c/d/i  NTTP calves b/l  Unable to cooperate with neuro exam  Compartments soft and compressible  2+ Pulses palpable      A/P: 89yF POD 1 s/p L IMN  -FU AM labs  -Pain control  -WBAT  -DVT ppx  -Incentive Spirometry  -Elevation to extremity  -Medical management appreciated

## 2022-04-23 NOTE — SWALLOW BEDSIDE ASSESSMENT ADULT - ORAL PHASE
When PO entered pt's oral cavity, food stagnated in pt's mouth without attempts to form or transfer a bolus. Oral awareness of food stasis in mouth was poor and pt became increasingly dyspneic when PO stagnated in oral cavity. Oral stasis was than manually removed from mouth prior to entering Pharynx due to a lack of functional oral processing.

## 2022-04-23 NOTE — PROGRESS NOTE ADULT - SUBJECTIVE AND OBJECTIVE BOX
Subjective:    Awake, alert. Sl more breathless this AM. ?coughing during breakfast-possible aspiration    4/23: in bed, tachypneic, not uncomfortable, on 4 L/min NC O2.      MEDICATIONS  (STANDING):  amLODIPine   Tablet 5 milliGRAM(s) Oral daily  ascorbic acid 500 milliGRAM(s) Oral two times a day  aspirin enteric coated 81 milliGRAM(s) Oral daily  atorvastatin 40 milliGRAM(s) Oral at bedtime  azithromycin   Tablet 250 milliGRAM(s) Oral <User Schedule>  budesonide 160 MICROgram(s)/formoterol 4.5 MICROgram(s) Inhaler 2 Puff(s) Inhalation two times a day  folic acid 1 milliGRAM(s) Oral daily  heparin   Injectable 5000 Unit(s) SubCutaneous every 12 hours  HYDROmorphone  Injectable 0.5 milliGRAM(s) IV Push once  ketoconazole 2% Cream 1 Application(s) Topical two times a day  levothyroxine 100 MICROGram(s) Oral daily  LORazepam     Tablet 0.5 milliGRAM(s) Oral at bedtime  methylPREDNISolone sodium succinate Injectable 40 milliGRAM(s) IV Push once  montelukast 10 milliGRAM(s) Oral at bedtime  multivitamin 1 Tablet(s) Oral daily  pantoprazole    Tablet 40 milliGRAM(s) Oral before breakfast  polyethylene glycol 3350 17 Gram(s) Oral at bedtime  predniSONE   Tablet 5 milliGRAM(s) Oral daily  senna 2 Tablet(s) Oral at bedtime  tiotropium 18 MICROgram(s) Capsule 1 Capsule(s) Inhalation daily    MEDICATIONS  (PRN):  ALBUTerol    90 MICROgram(s) HFA Inhaler 2 Puff(s) Inhalation every 6 hours PRN Shortness of Breath and/or Wheezing  ondansetron Injectable 4 milliGRAM(s) IV Push every 6 hours PRN Nausea and/or Vomiting  oxyCODONE    IR 5 milliGRAM(s) Oral every 3 hours PRN Moderate Pain (4 - 6)  oxyCODONE    IR 10 milliGRAM(s) Oral every 3 hours PRN Severe Pain (7 - 10)      Allergies    No Known Allergies    Intolerances        Vital Signs Last 24 Hrs  T(C): 36.4 (22 Apr 2022 09:09), Max: 36.9 (21 Apr 2022 16:30)  T(F): 97.6 (22 Apr 2022 09:09), Max: 98.4 (21 Apr 2022 16:30)  HR: 124 (22 Apr 2022 09:09) (100 - 124)  BP: 157/72 (22 Apr 2022 09:09) (131/47 - 175/89)  BP(mean): --  RR: 18 (22 Apr 2022 09:09) (17 - 20)  SpO2: 99% (22 Apr 2022 09:09) (93% - 99%)    PHYSICAL EXAMINATION:    NECK:  Supple. No lymphadenopathy. Jugular venous pressure not elevated.    HEART:   The cardiac impulse has a normal quality. Reg., Nl S1 and S2.    CHEST:  Chest without wheezing this AM.  Bilateral rales. Increased respiratory effort.  ABDOMEN:  Soft and nontender.   EXTREMITIES:  There is no edema.       LABS:                        8.7    20.24 )-----------( 719      ( 22 Apr 2022 07:54 )             28.5     04-22    137  |  105  |  61<H>  ----------------------------<  167<H>  4.5   |  26  |  1.24    Ca    8.4<L>      22 Apr 2022 07:54            RADIOLOGY & ADDITIONAL TESTS:    Assessment and Plan:   · Assessment	  Assessment/Plan    - Maintain aerosols with Symbicort and Tiotropium.   - Solumedrol 40mg Q12H  - Mucinex as mucolytic  - Monitor H/H carefully. Leukocytosis noted   - Supplemental O2 at 3-5L/M  - DVT prophylaxis  - Renal Consult noted. BUN/Cr noted higher today  - For repeat CXR today.  - Delayed transfusion reaction.  Pt seen by hematology.       Problem/Plan - 1:  ·  Problem: Hip fracture, left.   Problem/Plan - 2:  ·  Problem: COPD mixed type.   Problem/Plan - 3:  ·  Problem: Bronchiectasis.   Problem/Plan - 4:  ·  Problem: Myelodysplastic syndrome.   Problem/Plan - 5:  ·  Problem: Anemia.

## 2022-04-23 NOTE — SWALLOW BEDSIDE ASSESSMENT ADULT - SWALLOW EVAL: CRITERIA FOR SKILLED INTERVENTION MET
PT IS NOT A VIABLE THERAPY CANDIDATE AT THIS TIME GIVEN HER REDUCED ALERTNESS/MENTATION, DECREASED ABILITY TO FOLLOW COMMANDS/INTERACT WITH OTHERS AND CONSIDERING THAT TACHYPNEA/DYSPNEA WHICH WERE NOTED ON ENCOUNTER WORSENED UPON OROPHARYNGEAL STIMULATION. ADDITIONALLY, I SPOKE WITH NURSING WHO ADVISED THAT PT IS TO BE PLACED ON BIPAP AFTER MY EVALUATION ATTEMPT WHICH FURTHER CONTRAINDICATES THERAPY. GIVEN ABOVE, THIS SERVICE WILL NOT ACTIVELY FOLLOW AT THIS TIME. RECONSULT ONLY IF PT'S ALERTNESS/MENTATION MARKEDLY IMPROVE, PT BE ABLE TO TOLERATE BEING OFF BIPAP FOR PROLONGED PERIODS OF TIME AND CONDITION WARRANT.

## 2022-04-23 NOTE — SWALLOW BEDSIDE ASSESSMENT ADULT - SWALLOW EVAL: RECOMMENDED DIET
PATIENT IS NOT ALERT/COGNIZANT ENOUGH FOR PO TRIALS AND WOULD BE SAFEST FROM A PULMONARY STANCE IF NPO RESTRICTION MAINTAINED. ADDITIONALLY, PT IS TO BE PLACED ON BIPAP AFTER MY EXAMINATION ATTEMPT AND PO IS NOT A CONSIDERATION WHILE PT ON BIPAP. MAINTAINING NON ORALLY AT DISCRETION OF ATTENDINGS.

## 2022-04-23 NOTE — SWALLOW BEDSIDE ASSESSMENT ADULT - ADDITIONAL RECOMMENDATIONS
PT WAS DYSPNEIC, TACHYPNEIC, LETHARGIC AND NOT COMMUNICATIVE AT TIME OF SWALLOW EXAM. PT NOT CANDIDATE FOR PO TRIALS IN CURRENT MEDICALLY DECONDITIONED STATE AND IS NOT STIMULABLE TO THERAPY ATTEMPTS WHEN PROBED. SUGGEST NPO RESTRICTION AT THIS TIME. MAINTAINING NON ORALLY IS AT DISCRETION OF ATTENDINGS. CONSIDER POTENTIAL BENEFIT OF A PALLIATIVE CARE CONSULT. THIS SERVICE WILL NOT ACTIVELY FOLLOW. SUGGEST ORDERING A RECONSULT IF PT'S ALERTNESS/MENTATION/INTERACTIVENESS MARKEDLY IMPROVE, PT BE OFF BIPAP, AND CONDITION WARRANT.

## 2022-04-23 NOTE — SWALLOW BEDSIDE ASSESSMENT ADULT - NS SPL SWALLOW CLINIC TRIAL FT
Solids and liquids thinner than moderately thick liquid were not offered given severity of pt's feeding compromise/Oral Dysphagia. Stimulability to swallow therapy was poor.

## 2022-04-23 NOTE — SWALLOW BEDSIDE ASSESSMENT ADULT - ORAL PREPARATORY PHASE
How Severe Are Your Spot(S)?: mild Pt's alertness for/orientation to feeding were reduced. PO acceptance is either passive or resistive. What Type Of Note Output Would You Prefer (Optional)?: Standard Output What Is The Reason For Today's Visit?: Full Body Skin Examination What Is The Reason For Today's Visit? (Being Monitored For X): the development of new lesions

## 2022-04-23 NOTE — SWALLOW BEDSIDE ASSESSMENT ADULT - ASR SWALLOW LABIAL MOBILITY
Unable to assess due to altered alertness/mentation with reduced active participation/resistive mouth closing on stimulation.

## 2022-04-23 NOTE — SWALLOW BEDSIDE ASSESSMENT ADULT - SWALLOW EVAL: DIAGNOSIS
1) The pt exhibits reduced alertness for/orientation to feeding with concomitant prominent Oral Dysphagia which hindered functional swallowing abilities and precluded the feasibility of assessing pharyngeal integrity at this time. Effects of acute medical deconditioning(i.e left hip fracture s/p surgery, large thigh hematoma, anemia s/p transfusions, dyspnea/tachypnea, bronchiectasis/COPD exacerbated, elevated BNP, encephalopathy) are contributory   2) On encounter, the pt was pale/dusky in appearance. Tachypnea/dyspnea were noted. Pt was lethargic. Eye gaze was distant & joint attention was fleeting. Unable to participate in communication tasks, follow commands or verbalize. Prominent metabolic encephalopathy related to multiple acute medical issues is evident. Pt's needs must be anticipated.

## 2022-04-23 NOTE — SWALLOW BEDSIDE ASSESSMENT ADULT - SLP GENERAL OBSERVATIONS
On encounter, some loss of bulk was evident in strap muscle regions. The pt was pale/dusky in appearance. Tachypnea/dyspnea were noted. Pt was lethargic. Eye gaze was distant & joint attention was fleeting. Unable to participate in communication tasks, follow commands or verbalize. Prominent metabolic encephalopathy related to multiple acute medical issues is evident. Pt's needs must be anticipated.

## 2022-04-23 NOTE — PROGRESS NOTE ADULT - ASSESSMENT
This is a 89 year old female s/p mechanical fall causing a fracture to her left hip.  Pt s/p left hip IMN  on 4-. Pt has high thrombosin risk and requires anticoagulation prophylaxis.       Plan:  :H&H noted 7.2/23.4 as per pt's request she will not receive any other PRBC (PT'S DAUGHTER informed us)  : cont heparin 5,000 units sq q12h for total of four weeks last dose on 5-  :daily cbc/bmp  :LE Venodynes  : increase mobility as tolerated  : will f/u  : dispo : rehab

## 2022-04-23 NOTE — SWALLOW BEDSIDE ASSESSMENT ADULT - COMMENTS
The patient was admitted to  s/p fall. Hospital course is notable for left hip fracture status post surgery, prominent thigh hematoma, anemia s/p transfusions, dyspnea/tachypnea, bronchiectasis/COPD exacerbated, elevated BNP, and lethargy/altered mentation with encephalopathy. The pt has an underlying history of Polycythemia Vera/MDS/Myelofibrosis,, asthma, COPD, HTN, hypothyroidism, past rib fractures after a fall, prior left thalamic CVA in 2019 with RLE weakness, and past thyroidectomy.

## 2022-04-23 NOTE — PROGRESS NOTE ADULT - ASSESSMENT
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   - patient has been started on steroids for known underlying COPD   - as per discussion with Dr. Badillo family favoring a non aggressive approach.   cont to monitor H/H   if transfusion again required would need repeat type and crossmatch and add Solucortef 50mg IV premed     will follow     Jose Rdz MD  [facility]  [phone]

## 2022-04-23 NOTE — PROGRESS NOTE ADULT - SUBJECTIVE AND OBJECTIVE BOX
CC- s/p mechanical fall    HPI:  89F, PMH CVA (left thalamic infarct, 2019) s/p implantable loop recorder, JAK2+ Myeloproliferative d/w with  myelofibrosis, bronchiectasis with chronic respiratory failure on 4L NC and 5mg prednisone daily,  osteoporosis, HTN, anxiety, and hypothyroidism who presents after a fall.  Her aide had been wheeling her back from the bathroom this morning around 5AM, when she slipped off and fell on her leg. Her aide was able to get her back into the bed, but she was complaining of pain in her left hip. Her daughters came by later and noticed her left upper leg was getting more and more swollen, so they took her to the ED.  She does not normally fall, but she has difficulty walking (due to osteoporosis and dyspnea), using a walker with 1-person assist.  She is being treated for a rash on her left ankle/foot with an antifungal cream, which is improving it.  She was admitted with left hip fracture. Underwent left hip im nail .   Hospital course notable for Anemia requiring multiple units prbcs. Also with Acute diastolic CHF exacerbation and teodora. Also required arrieta.   Subsequently had worsening of her respiratory status d/t aspiration.   Also noted to have a delayed transfusion reaction.     Pt seen and examined this am. Resting comfortably. RR improved with morphine.     Review of system- All 10 systems reviewed and is as per HPI otherwise negative.     Vital Signs Last 24 Hrs  T(C): 37.7 (2022 08:45), Max: 37.7 (2022 08:45)  T(F): 99.8 (2022 08:45), Max: 99.8 (2022 08:45)  HR: 123 (2022 08:45) (114 - 123)  BP: 105/40 (2022 08:45) (102/55 - 128/55)  RR: 26 (2022 11:55) (18 - 36)  SpO2: 97% (2022 08:45) (96% - 98%)      PHYSICAL EXAM:    GENERAL: somnolent  HEAD:  Normocephalic, atraumatic  EYES: EOMI, PERRLA  HEENT: Moist mucous membranes  NECK: Supple, No JVD  NERVOUS SYSTEM:  somnolent  CHEST/LUNG: Crackles posteriorly  HEART: Regular rate and rhythm  ABDOMEN: Soft, Nontender, Nondistended, Bowel sounds present  GENITOURINARY: +arrieta with dark reddish/brown urine  EXTREMITIES:   No clubbing, cyanosis. LLE edema+  MUSCULOSKELETAL- Left hip dressing c/d/i. +swelling from hematoma Left hip  SKIN-no rash  LABS:                        7.2    17.00 )-----------( 683      ( 2022 08:05 )             23.4     04    141  |  109<H>  |  107<H>  ----------------------------<  156<H>  5.6<H>   |  24  |  1.94<H>    Ca    8.1<L>      2022 08:05    TPro  x   /  Alb  x   /  TBili  2.5<H>  /  DBili  0.6<H>  /  AST  x   /  ALT  x   /  AlkPhos  x         Urinalysis Basic - ( 2022 15:00 )    Color: Brown / Appearance: very cloudy / S.020 / pH: x  Gluc: x / Ketone: Trace  / Bili: Negative / Urobili: 1   Blood: x / Protein: 500 mg/dL / Nitrite: Positive   Leuk Esterase: Moderate / RBC: 11-25 /HPF / WBC 6-10   Sq Epi: x / Non Sq Epi: Few / Bacteria: Occasional          RADIOLOGY & ADDITIONAL TESTS:  CT BRAIN                        PROCEDURE DATE:  2022      INTERPRETATION:  Clinical Indication: Fall with acute left hip fracture    5mm axial sections of the brain were obtained from base to vertex,   without the intravenous administration of contrast material. Coronal and   sagittal computer generated reconstructed views are available.    Comparison is made with the prior CT of 10/8/2020 and demonstrates no   significant interval change.      The ventricles and sulci are prominent consistent with moderate atrophy.   Small vessel white matter ischemic changes are noted. There is no   hemorrhage, mass, or shift of midline structures. Bone window examination   is unremarkable. There has been previous bilateral lens replacement   surgery.    IMPRESSION: Moderate atrophy and small vessel white matter ischemic   changes. No hemorrhage. No change since 10/8/2020.    MEDICATIONS  (STANDING):  acetaminophen     Tablet .. 650 milliGRAM(s) Oral every 6 hours  amLODIPine   Tablet 5 milliGRAM(s) Oral daily  ascorbic acid 500 milliGRAM(s) Oral two times a day  aspirin enteric coated 81 milliGRAM(s) Oral daily  atorvastatin 40 milliGRAM(s) Oral at bedtime  azithromycin   Tablet 250 milliGRAM(s) Oral <User Schedule>  budesonide 160 MICROgram(s)/formoterol 4.5 MICROgram(s) Inhaler 2 Puff(s) Inhalation two times a day  folic acid 1 milliGRAM(s) Oral daily  heparin   Injectable 5000 Unit(s) SubCutaneous every 12 hours  HYDROmorphone  Injectable 0.5 milliGRAM(s) IV Push once  ketoconazole 2% Cream 1 Application(s) Topical two times a day  levothyroxine 100 MICROGram(s) Oral daily  LORazepam     Tablet 0.5 milliGRAM(s) Oral at bedtime  montelukast 10 milliGRAM(s) Oral at bedtime  multivitamin 1 Tablet(s) Oral daily  pantoprazole    Tablet 40 milliGRAM(s) Oral before breakfast  polyethylene glycol 3350 17 Gram(s) Oral at bedtime  predniSONE   Tablet 5 milliGRAM(s) Oral daily  senna 2 Tablet(s) Oral at bedtime  tiotropium 18 MICROgram(s) Capsule 1 Capsule(s) Inhalation daily    MEDICATIONS  (PRN):  ALBUTerol    90 MICROgram(s) HFA Inhaler 2 Puff(s) Inhalation every 6 hours PRN Shortness of Breath and/or Wheezing  ondansetron Injectable 4 milliGRAM(s) IV Push every 6 hours PRN Nausea and/or Vomiting  oxyCODONE    IR 5 milliGRAM(s) Oral every 3 hours PRN Moderate Pain (4 - 6)  oxyCODONE    IR 10 milliGRAM(s) Oral every 3 hours PRN Severe Pain (7 - 10)    Assessment/Plan  88 yo F with a PMH CVA (left thalamic infarct, 2019) s/p implantable loop recorder, JAK2+ myelofibrosis, bronchiectasis (on 4L NC), osteoporosis, HTN, anxiety, and hypothyroidism who presents after a fall, found to have a left femoral neck fracture.    #Acute COPD exacerbation/aspiration/bronchiectasis/Chronic resp failure on 4L NC:  -on iv solumedrol 40mg bid.   -symbicort, spiriva  -singular  -azithromycin 250 mwf  -o2 via nasal cannula   -aspiration precautions.   -CXR reviewed, no s/o worsening fluid overload or acute infiltrate.    #Sinus tachycardia:  -likely related to resp status.     #Transfusion reaction:  -pt with dark/tea colored urine  -w/u + for hemolysis.   -d/w blood bank, pt likely having reaction to blood transfused  d/t undetectable ab at that time which showed up positive on type and screen yesterday.   -d/w Dr. Garcia, supportive care    #S/p mechanical fall with LT hip fracture-  -s/p LT hip IMN POD#6  -pain control    #Acute blood loss anemia due to hematoma at surgical site + Hemolysis d/t transfusion reaction, with underlying Basil 2+ myeloproliferative d/o with myelofibrosis Vs. P vera   -heme eval appreciated.   -s/p jakafi in past, but now on hold d/t drop in h/h.   -s/p 5 units prbcs on this admission.   -NO FURTHER BLOOD TRANSFUSIONS PER PATIENT'S WISHES AND D/W FAMILY    #Urinary retention s/p Arrieta   -arrieta to remain in place.    #TEODORA  -likely related to hemodynamic changes during surgery in setting of ace-i use +/-obstructive uropathy.  -arrieta in place.  -ace-i on hold  -avoid nephrotoxics.   -initially improving, now with low urine output/worsening creatinine.    #Acute diastolic CHF:-  -s/p iv lasix   -overall improved.     #UTI ruled out, cultures negative, off abx.     #Hypothyroidism  - S/p partial thyroidectomy many years ago  - C/w Levothyroxine 100 ucg QD    #HTN  -continue norvasc  -ace-i on hold.     #Fungal dermatitis  -clotrimazole.    #DVT proph  - heparin SQ    #GOC discussion:  -discussed overall condition with both daughters including her respiratory status/anemia related to hemolysis with underlying MF and some acute blood loss @ left hip, and her minimal activity with physical therapy. They are aware her condition is guarded.   Her daughters agree that she would not want any aggressive measures and at this point would  CC- s/p mechanical fall    HPI:  89F, PMH CVA (left thalamic infarct, 2019) s/p implantable loop recorder, JAK2+ Myeloproliferative d/w with  myelofibrosis, bronchiectasis with chronic respiratory failure on 4L NC and 5mg prednisone daily,  osteoporosis, HTN, anxiety, and hypothyroidism who presents after a fall.  Her aide had been wheeling her back from the bathroom this morning around 5AM, when she slipped off and fell on her leg. Her aide was able to get her back into the bed, but she was complaining of pain in her left hip. Her daughters came by later and noticed her left upper leg was getting more and more swollen, so they took her to the ED.  She does not normally fall, but she has difficulty walking (due to osteoporosis and dyspnea), using a walker with 1-person assist.  She is being treated for a rash on her left ankle/foot with an antifungal cream, which is improving it.  She was admitted with left hip fracture. Underwent left hip im nail .   Hospital course notable for Anemia requiring multiple units prbcs. Also with Acute diastolic CHF exacerbation and teodora. Also required arrieta.   Subsequently had worsening of her respiratory status d/t aspiration.   Also noted to have a delayed transfusion reaction.     Pt seen and examined this am. Resting comfortably. RR improved with morphine.     Review of system- All 10 systems reviewed and is as per HPI otherwise negative.     Vital Signs Last 24 Hrs  T(C): 37.7 (2022 08:45), Max: 37.7 (2022 08:45)  T(F): 99.8 (2022 08:45), Max: 99.8 (2022 08:45)  HR: 123 (2022 08:45) (114 - 123)  BP: 105/40 (2022 08:45) (102/55 - 128/55)  RR: 26 (2022 11:55) (18 - 36)  SpO2: 97% (2022 08:45) (96% - 98%)      PHYSICAL EXAM:    GENERAL: somnolent  HEAD:  Normocephalic, atraumatic  EYES: EOMI, PERRLA  HEENT: Moist mucous membranes  NECK: Supple, No JVD  NERVOUS SYSTEM:  somnolent  CHEST/LUNG: Crackles posteriorly  HEART: Regular rate and rhythm  ABDOMEN: Soft, Nontender, Nondistended, Bowel sounds present  GENITOURINARY: +arrieta with dark reddish/brown urine  EXTREMITIES:   No clubbing, cyanosis. LLE edema+  MUSCULOSKELETAL- Left hip dressing c/d/i. +swelling from hematoma Left hip  SKIN-no rash  LABS:                        7.2    17.00 )-----------( 683      ( 2022 08:05 )             23.4     04    141  |  109<H>  |  107<H>  ----------------------------<  156<H>  5.6<H>   |  24  |  1.94<H>    Ca    8.1<L>      2022 08:05    TPro  x   /  Alb  x   /  TBili  2.5<H>  /  DBili  0.6<H>  /  AST  x   /  ALT  x   /  AlkPhos  x         Urinalysis Basic - ( 2022 15:00 )    Color: Brown / Appearance: very cloudy / S.020 / pH: x  Gluc: x / Ketone: Trace  / Bili: Negative / Urobili: 1   Blood: x / Protein: 500 mg/dL / Nitrite: Positive   Leuk Esterase: Moderate / RBC: 11-25 /HPF / WBC 6-10   Sq Epi: x / Non Sq Epi: Few / Bacteria: Occasional          RADIOLOGY & ADDITIONAL TESTS:  CT BRAIN                        PROCEDURE DATE:  2022      INTERPRETATION:  Clinical Indication: Fall with acute left hip fracture    5mm axial sections of the brain were obtained from base to vertex,   without the intravenous administration of contrast material. Coronal and   sagittal computer generated reconstructed views are available.    Comparison is made with the prior CT of 10/8/2020 and demonstrates no   significant interval change.      The ventricles and sulci are prominent consistent with moderate atrophy.   Small vessel white matter ischemic changes are noted. There is no   hemorrhage, mass, or shift of midline structures. Bone window examination   is unremarkable. There has been previous bilateral lens replacement   surgery.    IMPRESSION: Moderate atrophy and small vessel white matter ischemic   changes. No hemorrhage. No change since 10/8/2020.    MEDICATIONS  (STANDING):  acetaminophen     Tablet .. 650 milliGRAM(s) Oral every 6 hours  amLODIPine   Tablet 5 milliGRAM(s) Oral daily  ascorbic acid 500 milliGRAM(s) Oral two times a day  aspirin enteric coated 81 milliGRAM(s) Oral daily  atorvastatin 40 milliGRAM(s) Oral at bedtime  azithromycin   Tablet 250 milliGRAM(s) Oral <User Schedule>  budesonide 160 MICROgram(s)/formoterol 4.5 MICROgram(s) Inhaler 2 Puff(s) Inhalation two times a day  folic acid 1 milliGRAM(s) Oral daily  heparin   Injectable 5000 Unit(s) SubCutaneous every 12 hours  HYDROmorphone  Injectable 0.5 milliGRAM(s) IV Push once  ketoconazole 2% Cream 1 Application(s) Topical two times a day  levothyroxine 100 MICROGram(s) Oral daily  LORazepam     Tablet 0.5 milliGRAM(s) Oral at bedtime  montelukast 10 milliGRAM(s) Oral at bedtime  multivitamin 1 Tablet(s) Oral daily  pantoprazole    Tablet 40 milliGRAM(s) Oral before breakfast  polyethylene glycol 3350 17 Gram(s) Oral at bedtime  predniSONE   Tablet 5 milliGRAM(s) Oral daily  senna 2 Tablet(s) Oral at bedtime  tiotropium 18 MICROgram(s) Capsule 1 Capsule(s) Inhalation daily    MEDICATIONS  (PRN):  ALBUTerol    90 MICROgram(s) HFA Inhaler 2 Puff(s) Inhalation every 6 hours PRN Shortness of Breath and/or Wheezing  ondansetron Injectable 4 milliGRAM(s) IV Push every 6 hours PRN Nausea and/or Vomiting  oxyCODONE    IR 5 milliGRAM(s) Oral every 3 hours PRN Moderate Pain (4 - 6)  oxyCODONE    IR 10 milliGRAM(s) Oral every 3 hours PRN Severe Pain (7 - 10)    Assessment/Plan  88 yo F with a PMH CVA (left thalamic infarct, 2019) s/p implantable loop recorder, JAK2+ myelofibrosis, bronchiectasis (on 4L NC), osteoporosis, HTN, anxiety, and hypothyroidism who presents after a fall, found to have a left femoral neck fracture.    #Acute COPD exacerbation/aspiration/bronchiectasis/Chronic resp failure on 4L NC:  -on iv solumedrol 40mg bid.   -symbicort, spiriva  -singular  -azithromycin 250 mwf  -o2 via nasal cannula   -aspiration precautions.   -CXR reviewed, no s/o worsening fluid overload or acute infiltrate.    #Sinus tachycardia:  -likely related to resp status.     #Transfusion reaction:  -pt with dark/tea colored urine  -w/u + for hemolysis.   -d/w blood bank, pt likely having reaction to blood transfused  d/t undetectable ab at that time which showed up positive on type and screen yesterday.   -d/w Dr. Garcia, supportive care    #S/p mechanical fall with LT hip fracture-  -s/p LT hip IMN POD#6  -pain control    #Acute blood loss anemia due to hematoma at surgical site + Hemolysis d/t transfusion reaction, with underlying Basil 2+ myeloproliferative d/o with myelofibrosis Vs. P vera   -heme eval appreciated.   -s/p jakafi in past, but now on hold d/t drop in h/h.   -s/p 5 units prbcs on this admission.   -NO FURTHER BLOOD TRANSFUSIONS PER PATIENT'S WISHES AND D/W FAMILY    #Urinary retention s/p Arrieta   -arrieta to remain in place.    #TEODORA  -likely related to hemodynamic changes during surgery in setting of ace-i use +/-obstructive uropathy.  -arrieta in place.  -ace-i on hold  -avoid nephrotoxics.   -initially improving, now with low urine output/worsening creatinine.    #Acute diastolic CHF:-  -s/p iv lasix   -overall improved.     #UTI ruled out, cultures negative, off abx.     #Hypothyroidism  - S/p partial thyroidectomy many years ago  - C/w Levothyroxine 100 ucg QD    #HTN  -continue norvasc  -ace-i on hold.     #Fungal dermatitis  -clotrimazole.    #DVT proph  - heparin SQ    #GOC discussion:  -discussed overall condition with both daughters including her respiratory status/anemia related to hemolysis with underlying MF and some acute blood loss @ left hip, renal failure,  and her minimal activity with physical therapy as well as rest of conditions address above. They are aware her condition is guarded. We discussed what was most important to their mother. Her daughters agree that she would not have wanted any aggressive measures at this point and that ultimately she would have wanted to go home with hospice. I explained, she would most likely not be able to come home in this condition as she would require 24 hour nursing care and it would not be feasible with just one daughter at home.   They would now like to focus on comfort.   Agreed to adjusting morphine dose for respiratory distress.   can use robinol prn secretions.  no further lab draws  left message for sw regarding hospice.    d/w Dr. Ozuna, RN

## 2022-04-23 NOTE — PROGRESS NOTE ADULT - SUBJECTIVE AND OBJECTIVE BOX
HPI:  88 yo F with a PMH CVA (left thalamic infarct, 2019) s/p implantable loop recorder, JAK2+ myelofibrosis, bronchiectasis (on 4L NC), osteoporosis, HTN, anxiety, and hypothyroidism who presents after a fall.  Her aide had been wheeling her back from the bathroom this morning around 5AM, when she slipped off and fell on her leg. Her aide was able to get her back into the bed, but she was complaining of pain in her left hip. Her daughters came by later and noticed her left upper leg was getting more and more swollen, so they took her to the ED.  She does not normally fall, but she has difficulty walking (due to osteoporosis and dyspnea), using a walker with 1-person assist.    The patient notes some dyspnea, but denies any pain at this time. She denies CP, abdominal pain, diarrhea, N/V, fevers, or chills.  She is being treated for a rash on her left ankle/foot with an antifungal cream, which is improving it.    In the ED, she was given Morphine 2 mg IV x1 and  ml x1. (2022 13:18)      Patient is a 89y old  Female who presents with a chief complaint of L hip fracture (2022 12:14)      Consulted by Dr. Cameron Marcelino for VTE prophylaxis, risk stratification, and anticoagulation management.    PAST MEDICAL & SURGICAL HISTORY:  Rib fractures    Essential hypertension    Hypothyroidism, unspecified type    Asthma, unspecified asthma severity, unspecified whether complicated, unspecified whether persistent    Cerebrovascular accident (CVA)  19 Left thalamic infarct s/p ILR    Bronchiectasis    Myelofibrosis    Anxiety    History of partial thyroidectomy    Status post placement of implantable loop recorder        FAMILY HISTORY:  FH: diabetes mellitus (Sibling)    FH: HTN (hypertension)        Interval Note:  2022 Pt seen at bedside on [facility] with 2 daughters present.. Discussed with daughters about the use of heparin sq for four weeks post procedure for DVT  prophylaxis.  Questions answered will reinforce as needed.  Pt alert and oriented to person only. They stated her mother will go to rehab on discharge.   2022 Pt seen at bedside on [facility] , alert to person only. No changes.  Will go to rehab on discharge.   2022 Pt seen at bedside on [facility] , No overnight events, tolerating AC, possible rehab today  2022 Pt seen at bedside this a.m. more alert knows her name and that she is in [facility]. Not medically stable for discharge. TEODORA, ? Aspiration during breakfast this a.m.  2022 Pt seen at bedside on [facility], appears weak and speaks very softly know her name and that she is in a hospital.  Noted low h/h 7.2/23.4 also noted pt's wishes as per her daughter she will not receive anymore transfusion. Will cont on supportive care, cont heparin sq .    CAPRINI SCORE  AGE RELATED RISK FACTORS                                                       MOBILITY RELATED FACTORS  [ ] Age 41-60 years                                            (1 Point)                  [ ] Bed rest                                                        (1 Point)  [ ] Age: 61-74 years                                           (2 Points)                [ ] Plaster cast                                                   (2 Points)  [ X] Age= 75 years                                              (3 Points)                 [ ] Bed bound for more than 72 hours                   (2 Points)    DISEASE RELATED RISK FACTORS                                               GENDER SPECIFIC FACTORS  [ ] Edema in the lower extremities                       (1 Point)           [ ] Pregnancy                                                            (1 Point)  [ ] Varicose veins                                               (1 Point)                  [ ] Post-partum < 6 weeks                                      (1 Point)             [X ] BMI > 25 Kg/m2                                            (1 Point)                  [ ] Hormonal therapy or oral contraception       (1 Point)                 [ ] Sepsis (in the previous month)                        (1 Point)             [ ] History of pregnancy complications                (1Point)  [ ] Pneumonia or serious lung disease                                             [ ] Unexplained or recurrent  (=/>3), premature                                 (In the previous month)                               (1 Point)                birth with toxemia or growth-restricted infant (1 Point)  [ ] Abnormal pulmonary function test            (1 Point)                                   SURGERY RELATED RISK FACTORS  [ ] Acute myocardial infarction                       (1 Point)                  [ ]  Section                                         (1 Point)  [ ] Congestive heart failure (in the previous month) (1 Point)   [ ] Minor surgery   lasting <45 minutes       (1 Point)   [ ] Inflammatory bowel disease                             (1 Point)          [ ] Arthroscopic surgery                                  (2 Points)  [ ] Central venous access                                    (2 Points)            [ ] General surgery lasting >45 minutes      (2 Points)       [ ] Stroke (in the previous month)                  (5 Points)            [ ] Elective major lower extremity arthroplasty (5 Points)                                   [  ] Malignancy (present or past include skin melanoma                                          but exclude  basal skin cell)    (2 points)                                      TRAUMA RELATED RISK FACTORS                HEMATOLOGY RELATED FACTORS                                  [X ] Fracture of the hip, pelvis, or leg                       (5 Points)  [ ] Prior episodes of VTE                                     (3 Points)          [ ] Acute spinal cord injury (in the previous month)  (5 Points)  [ ] Positive family history for VTE                         (3 Points)       [ ] Paralysis (less than 1 month)                          (5 Points)  [ ] Prothrombin 19629 A                                      (3 Points)         [ ] Multiple Trauma (within 1month)                 (5Points)                                                                                                                                                                [ ] Factor V Leiden                                          (3 Points)                                OTHER RISK FACTORS                          [ ] Lupus anticoagulants                                     (3 Points)                       [ ] BMI > 40                          (1 Point)                                                         [ ] Anticardiolipin antibodies                                (3 Points)                   [ ] Smoking                              (1Point)                                                [ ] High homocysteine in the blood                      (3 Points)                [  ] Diabetes requiring insulin (1point)                         [ ] Other congenital or acquired thrombophilia       (3 Points)          [  ] Chemotherapy                   (1 Point)  [ ] Heparin induced thrombocytopenia                  (3 Points)             [  ] Blood Transfusion                (1 point)                                                                                                             Total Score [9 ]                                                                                                                                                                                                                                  IMPROVE Bleeding Risk Score: 4.5    Falls Risk:   High (X  )  Mod (  )  Low (  )  crcl: 27.2        cr: 1.54         BMI  30.3           EBL: 150  ml  Time procedure    : starts: 1929             :ends: 20:25        Denies any personal or familial history of clotting or bleeding disorders.    Allergies    No Known Allergies    Intolerances        REVIEW OF SYSTEMS    (  )Fever	     (  )Constipation	(  )SOB				(  )Headache	(  )Dysuria  (  )Chills	     (  )Melena	(  )Dyspnea present on exertion	                    (  )Dizziness                    (  )Polyuria  (  )Nausea	     (  )Hematochezia	(  )Cough			                    (  )Syncope   	(  )Hematuria  (  )Vomiting    (  )Chest Pain	(  )Wheezing			(  )Weakness  (  )Diarrhea     (  )Palpitations	(  )Anorexia			(  )Myalgia      Unable to obtain review of systems due to: PT IS CONFUSED     Vital Signs Last 24 Hrs  T(C): 37.7 (22 @ 08:45), Max: 37.7 (22 @ 08:45)  T(F): 99.8 (22 @ 08:45), Max: 99.8 (22 @ 08:45)  HR: 123 (22 @ 08:45) (114 - 123)  BP: 105/40 (22 @ 08:45) (102/55 - 128/55)  BP(mean): --  RR: 26 (22 @ 11:55) (18 - 36)  SpO2: 97% (22 @ 08:45) (96% - 98%)    PHYSICAL EXAM:    Constitutional: Appears ill    Neurological: A& O x 2 PERSON  and place      Skin: Warm    Respiratory and Thorax: normal effort; Breath sounds: normal; No rales/wheezing/rhonchi  	  Cardiovascular: S1, S2, regular, NMBR	    Gastrointestinal: BS + x 4Q, nontender	    Genitourinary:  Bladder nondistended, nontender    Musculoskeletal:   General Right:   no muscle/joint tenderness,   normal tone, no joint swelling,   ROM: limited	    General Left:   + muscle/joint tenderness,   normal tone, no joint swelling,   ROM: limited    Hip:  left: Dressing CDI;    Lower extrems:   Right: no calf tenderness              negative marco's sign               + pedal pulses    Left:   no calf tenderness              negative marco's sign               + pedal pulses                                    7.2    17.00 )-----------( 683      ( 2022 08:05 )             23.4       04    141  |  109<H>  |  107<H>  ----------------------------<  156<H>  5.6<H>   |  24  |  1.94<H>    Ca    8.1<L>      2022 08:05    TPro  x   /  Alb  x   /  TBili  2.5<H>  /  DBili  0.6<H>  /  AST  x   /  ALT  x   /  AlkPhos  x                            8.7    20.24 )-----------( 719      ( 2022 07:54 )             28.5           137  |  105  |  61<H>  ----------------------------<  167<H>  4.5   |  26  |  1.24    Ca    8.4<L>      2022 07:54    TPro  x   /  Alb  x   /  TBili  2.5<H>  /  DBili  0.6<H>  /  AST  x   /  ALT  x   /  AlkPhos  x                      7.8    12.43 )-----------( 670      ( 2022 08:39 ) one unit PRBC             26.4       04    139  |  107  |  68<H>  ----------------------------<  129<H>  4.5   |  25  |  1.52<H>    Ca    8.0<L>      2022 08:39  Phos  5.6     04-19    TPro  x   /  Alb  2.2<L>  /  TBili  x   /  DBili  x   /  AST  x   /  ALT  x   /  AlkPhos  x                                          8.4    12.75 )-----------( 615      ( 2022 07:08 )             27.2       04-20    137  |  105  |  75<H>  ----------------------------<  104<H>  5.0   |  24  |  2.17<H>    Ca    7.8<L>      2022 07:08  Phos  5.6     04-19    TPro  x   /  Alb  2.2<L>  /  TBili  x   /  DBili  x   /  AST  x   /  ALT  x   /  AlkPhos  x                      7.9    x     )-----------( x        ( 2022 11:55 ) one unit PRBC             25.5       -    136  |  104  |  56<H>  ----------------------------<  95  5.4<H>   |  25  |  1.56<H>    Ca    8.1<L>      2022 08:32  Phos  3.8     04-18  Mg     2.2     04-18        PT/INR - ( 2022 05:03 )   PT: 14.6 sec;   INR: 1.26 ratio         MEDICATIONS  (STANDING):  amLODIPine   Tablet 5 milliGRAM(s) Oral daily  ascorbic acid 500 milliGRAM(s) Oral two times a day  aspirin enteric coated 81 milliGRAM(s) Oral daily  atorvastatin 40 milliGRAM(s) Oral at bedtime  azithromycin   Tablet 250 milliGRAM(s) Oral <User Schedule>  budesonide 160 MICROgram(s)/formoterol 4.5 MICROgram(s) Inhaler 2 Puff(s) Inhalation two times a day  folic acid 1 milliGRAM(s) Oral daily  heparin   Injectable 5000 Unit(s) SubCutaneous every 12 hours  HYDROmorphone  Injectable 0.5 milliGRAM(s) IV Push once  ketoconazole 2% Cream 1 Application(s) Topical two times a day  levothyroxine 100 MICROGram(s) Oral daily  LORazepam     Tablet 0.5 milliGRAM(s) Oral at bedtime  methylPREDNISolone sodium succinate Injectable 40 milliGRAM(s) IV Push two times a day  montelukast 10 milliGRAM(s) Oral at bedtime  multivitamin 1 Tablet(s) Oral daily  pantoprazole    Tablet 40 milliGRAM(s) Oral before breakfast  polyethylene glycol 3350 17 Gram(s) Oral at bedtime  senna 2 Tablet(s) Oral at bedtime  tiotropium 18 MICROgram(s) Capsule 1 Capsule(s) Inhalation daily        < from: Xray Hip w/ Pelvis 2 or 3 Views, Left (22 @ 09:49) >  MPRESSION: LEFT hip Intertrochanteric fracture.    < end of copied text >      DVT Prophylaxis:  LMWH                   (  )  Heparin SQ           ( x )  Coumadin             (  )  Xarelto                  (  )  Eliquis                   (  )  Venodynes           (x  )  Ambulation          (x  )  UFH                       (  )  Contraindicated  (  )  EC Aspirin             (  )

## 2022-04-23 NOTE — PROGRESS NOTE ADULT - SUBJECTIVE AND OBJECTIVE BOX
Orthopedics      Patient seen and examined at bedside. Mental status at baseline, minimal arousability. Appears comfortable No n/v. No acute events overnight.    Vital Signs Last 24 Hrs  T(C): 36.3 (04-23-22 @ 06:06), Max: 37.3 (04-23-22 @ 02:04)  T(F): 97.3 (04-23-22 @ 06:06), Max: 99.2 (04-23-22 @ 02:04)  HR: 114 (04-23-22 @ 06:06) (114 - 124)  BP: 109/48 (04-23-22 @ 06:06) (102/55 - 157/72)  BP(mean): --  RR: 18 (04-23-22 @ 06:06) (18 - 18)  SpO2: 98% (04-23-22 @ 06:06) (96% - 99%)                        8.7    20.24 )-----------( 719      ( 22 Apr 2022 07:54 )             28.5     22 Apr 2022 07:54    137    |  105    |  61     ----------------------------<  167    4.5     |  26     |  1.24     Ca    8.4        22 Apr 2022 07:54    TPro  x      /  Alb  x      /  TBili  2.5    /  DBili  0.6    /  AST  x      /  ALT  x      /  AlkPhos  x      22 Apr 2022 10:18        Exam:  Gen: NAD, resting comfortably  LLE:  Dressing c/d/i  Post-operative thigh hematoma, compressible   NTTP calves b/l  Grossly moving ankle and foot, but minimal cooperation with motor/sensory exam   Compartments soft and compressible  + Pulses palpable    A/P: 89yF POD 5 s/p L IMN  -Follow labs, required 5U PRBC this admission, hold any further transfusions per medicine and family   -Multimodal Analgesia   -WBAT/PT/OOBTC  -DVT ppx per AC team  -Incentive Spirometry  -Cold compress, thigh hematoma within normal post-op expectations   -Medical management appreciated  -Dispo Planning: RITU Marrero attending

## 2022-04-23 NOTE — PROGRESS NOTE ADULT - SUBJECTIVE AND OBJECTIVE BOX
INTERVAL HPI/OVERNIGHT EVENTS:  Patient S&E at bedside.  Patient with Delayed hemolytic transfusion reaction   Hb today 7.2    Vital Signs Last 24 Hrs  T(C): 37.7 (2022 08:45), Max: 37.7 (2022 08:45)  T(F): 99.8 (2022 08:45), Max: 99.8 (2022 08:45)  HR: 123 (2022 08:45) (114 - 123)  BP: 105/40 (2022 08:45) (102/55 - 128/55)  BP(mean): --  RR: 26 (2022 11:55) (18 - 36)  SpO2: 97% (2022 08:45) (96% - 98%)  PHYSICAL EXAM:  TAchypneic   resting   no acute distress   now abdominal pain       MEDICATIONS  (STANDING):  amLODIPine   Tablet 5 milliGRAM(s) Oral daily  ascorbic acid 500 milliGRAM(s) Oral two times a day  aspirin enteric coated 81 milliGRAM(s) Oral daily  atorvastatin 40 milliGRAM(s) Oral at bedtime  azithromycin   Tablet 250 milliGRAM(s) Oral <User Schedule>  budesonide 160 MICROgram(s)/formoterol 4.5 MICROgram(s) Inhaler 2 Puff(s) Inhalation two times a day  folic acid 1 milliGRAM(s) Oral daily  heparin   Injectable 5000 Unit(s) SubCutaneous every 12 hours  HYDROmorphone  Injectable 0.5 milliGRAM(s) IV Push once  ketoconazole 2% Cream 1 Application(s) Topical two times a day  levothyroxine 100 MICROGram(s) Oral daily  LORazepam     Tablet 0.5 milliGRAM(s) Oral at bedtime  methylPREDNISolone sodium succinate Injectable 40 milliGRAM(s) IV Push two times a day  montelukast 10 milliGRAM(s) Oral at bedtime  multivitamin 1 Tablet(s) Oral daily  pantoprazole    Tablet 40 milliGRAM(s) Oral before breakfast  polyethylene glycol 3350 17 Gram(s) Oral at bedtime  senna 2 Tablet(s) Oral at bedtime  tiotropium 18 MICROgram(s) Capsule 1 Capsule(s) Inhalation daily    MEDICATIONS  (PRN):  ALBUTerol    90 MICROgram(s) HFA Inhaler 2 Puff(s) Inhalation every 6 hours PRN Shortness of Breath and/or Wheezing  morphine   Solution 5 milliGRAM(s) Oral every 4 hours PRN respiratory distress  ondansetron Injectable 4 milliGRAM(s) IV Push every 6 hours PRN Nausea and/or Vomiting  oxyCODONE    IR 5 milliGRAM(s) Oral every 3 hours PRN Moderate Pain (4 - 6)  oxyCODONE    IR 10 milliGRAM(s) Oral every 3 hours PRN Severe Pain (7 - 10)      Allergies    No Known Allergies    Intolerances        Urinalysis Basic - ( 2022 15:00 )    Color: Brown / Appearance: very cloudy / S.020 / pH: x  Gluc: x / Ketone: Trace  / Bili: Negative / Urobili: 1   Blood: x / Protein: 500 mg/dL / Nitrite: Positive   Leuk Esterase: Moderate / RBC: 11-25 /HPF / WBC 6-10   Sq Epi: x / Non Sq Epi: Few / Bacteria: Occasional        RADIOLOGY & ADDITIONAL TESTS:  Studies reviewed.    ASSESSMENT & PLAN:

## 2022-04-24 NOTE — CHART NOTE - NSCHARTNOTEFT_GEN_A_CORE
Notified by the nurse that the patient was having PEA, which became asystole at [facility] bed 214-1 on 4/24 at 1950. Patient is DNR/DNI on comfort care.     Patient seen and examined at [facility] on 4/24 at 1950. Patient was unresponsive to painful stimulation.     Heart and lung sounds are absent. No spontaneous cardiac or respiratory activity. Patient is not responding/nonreactive to verbal or painful stimuli. No corneal pupillary reflex is present. Pupils fixed and dilated.  was pronounced dead at [facility] on 4/24 at 1950. Cause of death: Natural causes.    Patient's nurse and family were present in the patient's room.    Unknown whether or not family wants patient to be cremated or an autopsy be performed. Condolences were provided to patient's family.

## 2022-04-24 NOTE — PROGRESS NOTE ADULT - SUBJECTIVE AND OBJECTIVE BOX
CC- s/p mechanical fall    HPI:  89F, PMH CVA (left thalamic infarct, 2019) s/p implantable loop recorder, JAK2+ Myeloproliferative d/w with  myelofibrosis, bronchiectasis with chronic respiratory failure on 4L NC and 5mg prednisone daily,  osteoporosis, HTN, anxiety, and hypothyroidism who presents after a fall.  Her aide had been wheeling her back from the bathroom this morning around 5AM, when she slipped off and fell on her leg. Her aide was able to get her back into the bed, but she was complaining of pain in her left hip. Her daughters came by later and noticed her left upper leg was getting more and more swollen, so they took her to the ED.  She does not normally fall, but she has difficulty walking (due to osteoporosis and dyspnea), using a walker with 1-person assist.  She is being treated for a rash on her left ankle/foot with an antifungal cream, which is improving it.  She was admitted with left hip fracture. Underwent left hip im nail .   Hospital course notable for Anemia requiring multiple units prbcs. Also with Acute diastolic CHF exacerbation and teodora. Also required arrieta.   Subsequently had worsening of her respiratory status d/t aspiration.   Also noted to have a delayed transfusion reaction.   Now with worsening renal failure.   D/w family at length, pt is now comfort care.     pt seen and examined this am. Comfortable in bed. Opens eyes to verbal stimuli.       Review of system- unable to obtain d/t lethargy    Vital Signs Last 24 Hrs  T(C): 36.4 (2022 10:00), Max: 38.7 (2022 22:32)  T(F): 97.6 (2022 10:00), Max: 101.7 (2022 22:32)  HR: 106 (2022 10:00) (90 - 110)  BP: 110/65 (2022 10:00) (110/65 - 127/49)  RR: 16 (2022 10:00) (16 - 32)  SpO2: 96% (2022 10:00) (96% - 99%)    PHYSICAL EXAM:    GENERAL: somnolent  HEAD:  Normocephalic, atraumatic  EYES: EOMI, PERRLA  HEENT: Moist mucous membranes  NECK: Supple, No JVD  NERVOUS SYSTEM:  somnolent  CHEST/LUNG: Crackles posteriorly  HEART: Regular rate and rhythm  ABDOMEN: Soft, Nontender, Nondistended, Bowel sounds present  GENITOURINARY: +arrieta with dark reddish/brown urine  EXTREMITIES:   No clubbing, cyanosis. LLE edema+  MUSCULOSKELETAL- Left hip dressing c/d/i. +swelling from hematoma Left hip  SKIN-no rash    LABS:                        7.0    28.37 )-----------( 859      ( 2022 09:27 )             23.9     04-24    141  |  109<H>  |  148  ----------------------------<  122<H>  7.5<HH>   |  21<L>  |  3.63<H>    Ca    7.8<L>      2022 09:27        Urinalysis Basic - ( 2022 15:00 )    Color: Brown / Appearance: very cloudy / S.020 / pH: x  Gluc: x / Ketone: Trace  / Bili: Negative / Urobili: 1   Blood: x / Protein: 500 mg/dL / Nitrite: Positive   Leuk Esterase: Moderate / RBC: 11-25 /HPF / WBC 6-10   Sq Epi: x / Non Sq Epi: Few / Bacteria: Occasional            RADIOLOGY & ADDITIONAL TESTS:  CT BRAIN                        PROCEDURE DATE:  2022      INTERPRETATION:  Clinical Indication: Fall with acute left hip fracture    5mm axial sections of the brain were obtained from base to vertex,   without the intravenous administration of contrast material. Coronal and   sagittal computer generated reconstructed views are available.    Comparison is made with the prior CT of 10/8/2020 and demonstrates no   significant interval change.      The ventricles and sulci are prominent consistent with moderate atrophy.   Small vessel white matter ischemic changes are noted. There is no   hemorrhage, mass, or shift of midline structures. Bone window examination   is unremarkable. There has been previous bilateral lens replacement   surgery.    IMPRESSION: Moderate atrophy and small vessel white matter ischemic   changes. No hemorrhage. No change since 10/8/2020.      MEDICATIONS  (STANDING):  amLODIPine   Tablet 5 milliGRAM(s) Oral daily  ascorbic acid 500 milliGRAM(s) Oral two times a day  aspirin enteric coated 81 milliGRAM(s) Oral daily  atorvastatin 40 milliGRAM(s) Oral at bedtime  azithromycin   Tablet 250 milliGRAM(s) Oral <User Schedule>  budesonide 160 MICROgram(s)/formoterol 4.5 MICROgram(s) Inhaler 2 Puff(s) Inhalation two times a day  folic acid 1 milliGRAM(s) Oral daily  heparin   Injectable 5000 Unit(s) SubCutaneous every 12 hours  HYDROmorphone  Injectable 0.5 milliGRAM(s) IV Push once  ketoconazole 2% Cream 1 Application(s) Topical two times a day  levothyroxine 100 MICROGram(s) Oral daily  LORazepam     Tablet 0.5 milliGRAM(s) Oral at bedtime  methylPREDNISolone sodium succinate Injectable 40 milliGRAM(s) IV Push two times a day  montelukast 10 milliGRAM(s) Oral at bedtime  pantoprazole    Tablet 40 milliGRAM(s) Oral before breakfast  tiotropium 18 MICROgram(s) Capsule 1 Capsule(s) Inhalation daily    MEDICATIONS  (PRN):  ALBUTerol    90 MICROgram(s) HFA Inhaler 2 Puff(s) Inhalation every 6 hours PRN Shortness of Breath and/or Wheezing  morphine  - Injectable 1 milliGRAM(s) IV Push every 4 hours PRN RR>30 and or respiratory distress  ondansetron Injectable 4 milliGRAM(s) IV Push every 6 hours PRN Nausea and/or Vomiting    Assessment/Plan  89 yo F with a PMH CVA (left thalamic infarct, ) s/p implantable loop recorder, JAK2+ myelofibrosis, bronchiectasis (on 4L NC), osteoporosis, HTN, anxiety, and hypothyroidism who presents after a fall, found to have a left femoral neck fracture.    #Acute COPD exacerbation/aspiration/bronchiectasis/Chronic resp failure on 4L NC:  -on iv solumedrol 40mg bid.   -dc symbicort/spiriva/singulair/azithromycin as pt is now comfort care  -supplement o2.     #Sinus tachycardia:  -likely related to resp status.     #Transfusion reaction:  -supportive care.     #S/p mechanical fall with LT hip fracture-  -s/p LT hip IMN POD#7  -pain control    #Acute blood loss anemia due to hematoma at surgical site + Hemolysis d/t transfusion reaction, with underlying Basil 2+ myeloproliferative d/o with myelofibrosis Vs. P vera   -NO FURTHER BLOOD TRANSFUSIONS PER PATIENT'S WISHES AND D/W FAMILY    #Urinary retention s/p Arrieta 4/18  -arrieta to remain in place.    #TEODORA/hyperkalemia  -likely related to hemodynamic changes during surgery in setting of ace-i use +/-obstructive uropathy.  -arrieta in place.    #Acute diastolic CHF:-  -s/p iv lasix     #UTI ruled out, cultures negative, off abx.     #Hypothyroidism  -dc synthroid unable to take po meds    #HTN  -dc norvasc.    #Fungal dermatitis  -clotrimazole.    #DVT proph  -dc hep sq    #GOC discussion:  -discussed overall condition with both daughters including her respiratory status/anemia related to hemolysis with underlying MF and some acute blood loss @ left hip, renal failure,  and her minimal activity with physical therapy as well as rest of conditions address above. They are aware her condition is guarded. We discussed what was most important to their mother. Her daughters agree that she would not have wanted any aggressive measures at this point and that ultimately she would have wanted to go home with hospice. I explained, she would most likely not be able to come home in this condition as she would require 24 hour nursing care and it would not be feasible with just one daughter at home.   They would now like to focus on comfort.   morphine for resp distress/pain.   robinol for secretions.   dc oral meds as unable to take.   NO FURTHER BLOOD DRAWS  NO FURTHER TRANSFUSIONS.   Inpatient hospice tomorrow if pt stable enough to be transferred.

## 2022-04-24 NOTE — DISCHARGE NOTE FOR THE EXPIRED PATIENT - HOSPITAL COURSE
Patient fell at home. Came to [facility] where patient was found to have left hip fracture. Patient had left hip IM nail on 4/18/2022. After 5 blood transfusions, patient was found to have a delayed blood transfusion reaction. Patient switched to comfort care measures.

## 2022-04-24 NOTE — PROGRESS NOTE ADULT - SUBJECTIVE AND OBJECTIVE BOX
Patient seen and examined at bedside. Patient transitioning to comfort care per IM team. Mental status at baseline, minimal arousability. Appears comfortable No n/v. No acute events overnight.    Vital Signs Last 24 Hrs  T(C): 38.7 (23 Apr 2022 22:32), Max: 38.7 (23 Apr 2022 22:32)  T(F): 101.7 (23 Apr 2022 22:32), Max: 101.7 (23 Apr 2022 22:32)  HR: 110 (23 Apr 2022 22:32) (108 - 123)  BP: 127/49 (23 Apr 2022 22:32) (105/40 - 127/49)  BP(mean): --  RR: 28 (23 Apr 2022 22:32) (18 - 36)  SpO2: 99% (23 Apr 2022 22:32) (97% - 99%)    Exam:  Gen: lethargic, minimal arousability resting comfortably  LLE:  Dressing c/d/i  Post-operative thigh hematoma, compressible   NTTP calves b/l  Grossly moving ankle and foot, but minimal cooperation with motor/sensory exam   Compartments soft and compressible  + Pulses palpable    A/P: 89yF POD 6 s/p L IMN  - required 5U PRBC this admission, hold any further transfusions per medicine and family   - Per medicine, pt moving toward comfort care  - NNL per family  -Multimodal Analgesia   -WBAT/PT/OOBTC  -DVT ppx per AC team  -Incentive Spirometry  -Cold compress, thigh hematoma within normal post-op expectations   -Medical management appreciated  -Dispo Planning: RITU Marrero attending

## 2022-04-24 NOTE — PROGRESS NOTE ADULT - REASON FOR ADMISSION
L hip fracture

## 2022-04-24 NOTE — PROVIDER CONTACT NOTE (CHANGE IN STATUS NOTIFICATION) - SITUATION
Patient not breathing after watching chest for 1 minute. No apical pulse heard for one minute. Family at bedside, alerted hospitalist of status. Pronounced at bedside.
Patient with SOB, crackles noted

## 2022-04-24 NOTE — CHART NOTE - NSCHARTNOTEFT_GEN_A_CORE
patient is only comfort care, family at the bedside, emotional support provided, will sign off the case

## 2022-04-24 NOTE — PROGRESS NOTE ADULT - PROVIDER SPECIALTY LIST ADULT
Anticoag Management
Hospitalist
Hospitalist
Orthopedics
Anticoag Management
Hospitalist
Nephrology
Orthopedics
Pulmonology
Anticoag Management
Anticoag Management
Heme/Onc
Hospitalist
Hospitalist
Orthopedics
Heme/Onc
Nephrology
Nephrology
Pulmonology

## 2022-04-25 ENCOUNTER — RX RENEWAL (OUTPATIENT)
Age: 87
End: 2022-04-25
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
## 2022-05-02 DIAGNOSIS — M96.840 POSTPROCEDURAL HEMATOMA OF A MUSCULOSKELETAL STRUCTURE FOLLOWING A MUSCULOSKELETAL SYSTEM PROCEDURE: ICD-10-CM

## 2022-05-02 DIAGNOSIS — N13.9 OBSTRUCTIVE AND REFLUX UROPATHY, UNSPECIFIED: ICD-10-CM

## 2022-05-02 DIAGNOSIS — B35.3 TINEA PEDIS: ICD-10-CM

## 2022-05-02 DIAGNOSIS — I70.0 ATHEROSCLEROSIS OF AORTA: ICD-10-CM
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
## 2022-05-02 DIAGNOSIS — I11.0 HYPERTENSIVE HEART DISEASE WITH HEART FAILURE: ICD-10-CM

## 2022-05-02 DIAGNOSIS — Z51.5 ENCOUNTER FOR PALLIATIVE CARE: ICD-10-CM

## 2022-05-02 DIAGNOSIS — Z66 DO NOT RESUSCITATE: ICD-10-CM

## 2022-05-02 DIAGNOSIS — T80.A9XA: ICD-10-CM
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
## 2022-05-02 DIAGNOSIS — M81.0 AGE-RELATED OSTEOPOROSIS WITHOUT CURRENT PATHOLOGICAL FRACTURE: ICD-10-CM

## 2022-05-02 DIAGNOSIS — T46.4X5A ADVERSE EFFECT OF ANGIOTENSIN-CONVERTING-ENZYME INHIBITORS, INITIAL ENCOUNTER: ICD-10-CM
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
## 2022-05-02 DIAGNOSIS — J44.1 CHRONIC OBSTRUCTIVE PULMONARY DISEASE WITH (ACUTE) EXACERBATION: ICD-10-CM

## 2022-05-02 DIAGNOSIS — Z86.73 PERSONAL HISTORY OF TRANSIENT ISCHEMIC ATTACK (TIA), AND CEREBRAL INFARCTION WITHOUT RESIDUAL DEFICITS: ICD-10-CM

## 2022-05-02 DIAGNOSIS — E89.0 POSTPROCEDURAL HYPOTHYROIDISM: ICD-10-CM
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
## 2022-05-02 DIAGNOSIS — R33.9 RETENTION OF URINE, UNSPECIFIED: ICD-10-CM

## 2022-05-02 DIAGNOSIS — J96.10 CHRONIC RESPIRATORY FAILURE, UNSPECIFIED WHETHER WITH HYPOXIA OR HYPERCAPNIA: ICD-10-CM

## 2022-05-02 DIAGNOSIS — N17.9 ACUTE KIDNEY FAILURE, UNSPECIFIED: ICD-10-CM

## 2022-05-02 DIAGNOSIS — Z99.81 DEPENDENCE ON SUPPLEMENTAL OXYGEN: ICD-10-CM

## 2022-05-02 DIAGNOSIS — Z79.82 LONG TERM (CURRENT) USE OF ASPIRIN: ICD-10-CM

## 2022-05-02 DIAGNOSIS — S72.142A DISPLACED INTERTROCHANTERIC FRACTURE OF LEFT FEMUR, INITIAL ENCOUNTER FOR CLOSED FRACTURE: ICD-10-CM

## 2022-05-02 DIAGNOSIS — B36.9 SUPERFICIAL MYCOSIS, UNSPECIFIED: ICD-10-CM

## 2022-05-02 DIAGNOSIS — Z79.51 LONG TERM (CURRENT) USE OF INHALED STEROIDS: ICD-10-CM

## 2022-05-02 DIAGNOSIS — F41.9 ANXIETY DISORDER, UNSPECIFIED: ICD-10-CM

## 2022-05-02 DIAGNOSIS — Z79.52 LONG TERM (CURRENT) USE OF SYSTEMIC STEROIDS: ICD-10-CM

## 2022-05-02 DIAGNOSIS — D62 ACUTE POSTHEMORRHAGIC ANEMIA: ICD-10-CM

## 2022-05-02 DIAGNOSIS — D45 POLYCYTHEMIA VERA: ICD-10-CM

## 2022-05-02 DIAGNOSIS — D46.9 MYELODYSPLASTIC SYNDROME, UNSPECIFIED: ICD-10-CM

## 2022-05-02 DIAGNOSIS — E87.5 HYPERKALEMIA: ICD-10-CM

## 2022-05-02 DIAGNOSIS — I50.31 ACUTE DIASTOLIC (CONGESTIVE) HEART FAILURE: ICD-10-CM

## 2022-05-02 DIAGNOSIS — D47.1 CHRONIC MYELOPROLIFERATIVE DISEASE: ICD-10-CM

## 2022-05-02 DIAGNOSIS — J45.909 UNSPECIFIED ASTHMA, UNCOMPLICATED: ICD-10-CM

## 2022-05-23 NOTE — PATIENT PROFILE ADULT. - FUNCTIONAL LEVEL PRIOR: DRESSING
(1) assistive equipment Niacinamide Pregnancy And Lactation Text: These medications are considered safe during pregnancy.

## 2022-05-25 ENCOUNTER — APPOINTMENT (OUTPATIENT)
Dept: ELECTROPHYSIOLOGY | Facility: CLINIC | Age: 87
End: 2022-05-25

## 2022-06-16 ENCOUNTER — APPOINTMENT (OUTPATIENT)
Dept: INTERNAL MEDICINE | Facility: CLINIC | Age: 87
End: 2022-06-16

## 2022-07-07 ENCOUNTER — APPOINTMENT (OUTPATIENT)
Dept: INTERNAL MEDICINE | Facility: CLINIC | Age: 87
End: 2022-07-07

## 2022-07-13 ENCOUNTER — APPOINTMENT (OUTPATIENT)
Dept: FAMILY MEDICINE | Facility: CLINIC | Age: 87
End: 2022-07-13

## 2022-07-25 NOTE — DISCHARGE NOTE ADULT - IF YOU ARE A SMOKER, IT IS IMPORTANT FOR YOUR HEALTH TO STOP SMOKING. PLEASE BE AWARE THAT SECOND HAND SMOKE IS ALSO HARMFUL.
Render Note In Bullet Format When Appropriate: No
Medical Necessity Clause: This procedure was medically necessary because the lesions that were treated were: painful, enlarging
Medical Necessity Information: It is in your best interest to select a reason for this procedure from the list below. All of these items fulfill various CMS LCD requirements except the new and changing color options.
Pared With?: 15 blade
Show Applicator Variable?: Yes
Number Of Freeze-Thaw Cycles: 2 freeze-thaw cycles
Spray Paint Text: The liquid nitrogen was applied to the skin utilizing a spray paint frosting technique.
Post-Care Instructions: I reviewed with the patient in detail post-care instructions. Patient is to wear sunprotection, and avoid picking at any of the treated lesions. Pt may apply Vaseline to crusted or scabbing areas.
Duration Of Freeze Thaw-Cycle (Seconds): 5-10
Consent: The patient's consent was obtained including but not limited to risks of crusting, scabbing, blistering, scarring, darker or lighter pigmentary change, recurrence, incomplete removal and infection.
Detail Level: Detailed
Statement Selected

## 2022-10-20 ENCOUNTER — APPOINTMENT (OUTPATIENT)
Dept: INTERNAL MEDICINE | Facility: CLINIC | Age: 87
End: 2022-10-20

## 2022-10-30 NOTE — ED ADULT NURSE NOTE - CAS DISCH ACCOMP BY
*To elaborate: The patient is able to be seen in the [facility] for MD visits, but could not be seen here for treatment or resource services, due to her insurance coverage. If patient will ultimately need those other services, they are sometimes referred to another facility instead of being seen here. Should patient be scheduled? Mario-Please advise.    Self/Transport

## 2022-12-19 NOTE — PHYSICAL THERAPY INITIAL EVALUATION ADULT - ASSISTIVE DEVICE:SUPINE/SIT, REHAB EVAL
Spoke with patient  (verbal consent on file) regarding below results, pt denies questions at this time.     bed rails

## 2023-03-05 NOTE — ED ADULT NURSE NOTE - CHIEF COMPLAINT
The patient is a 87y Female complaining of shortness of breath.
Benefits, risks, and possible complications of procedure explained to patient/caregiver who verbalized understanding and gave verbal consent.
Benefits, risks, and possible complications of procedure explained to patient/caregiver who verbalized understanding and gave verbal consent.

## 2023-03-26 NOTE — ED ADULT NURSE NOTE - HOW MANY DRINKS CONTAINING ALCOHOL DO YOU HAVE ON A TYPICAL DAY WHEN YOU ARE DRINKING?
"Interval History: see "Hospital Course"    Review of Systems   Unable to perform ROS: Acuity of condition   Objective:     Vital Signs (Most Recent):  Temp: 98 °F (36.7 °C) (03/26/23 1322)  Pulse: 76 (03/26/23 1205)  Resp: 16 (03/26/23 1121)  BP: 133/65 (03/26/23 1205)  SpO2: 98 % (03/26/23 1205) Vital Signs (24h Range):  Temp:  [97.6 °F (36.4 °C)-98.4 °F (36.9 °C)] 98 °F (36.7 °C)  Pulse:  [76-98] 76  Resp:  [16-18] 16  SpO2:  [95 %-98 %] 98 %  BP: (104-171)/(55-96) 133/65     Weight: 83.5 kg (184 lb)  Body mass index is 35.94 kg/m².    Intake/Output Summary (Last 24 hours) at 3/26/2023 1342  Last data filed at 3/26/2023 1327  Gross per 24 hour   Intake 1680.11 ml   Output --   Net 1680.11 ml      Physical Exam  Vitals and nursing note reviewed.   Constitutional:       Appearance: She is obese.   HENT:      Head: Normocephalic and atraumatic.      Right Ear: External ear normal.      Left Ear: External ear normal.      Nose: Nose normal.      Mouth/Throat:      Pharynx: Oropharynx is clear.   Eyes:      Extraocular Movements: Extraocular movements intact.      Conjunctiva/sclera: Conjunctivae normal.   Cardiovascular:      Rate and Rhythm: Normal rate and regular rhythm.      Pulses: Normal pulses.      Heart sounds: Normal heart sounds.   Pulmonary:      Effort: Pulmonary effort is normal.      Breath sounds: Normal breath sounds.   Abdominal:      General: Bowel sounds are normal.      Palpations: Abdomen is soft.   Musculoskeletal:         General: Normal range of motion.      Cervical back: Normal range of motion and neck supple.   Skin:     General: Skin is warm and dry.       Significant Labs: All pertinent labs within the past 24 hours have been reviewed.    Significant Imaging: I have reviewed all pertinent imaging results/findings within the past 24 hours.  "
good balance
1 or 2

## 2023-09-13 NOTE — ED PROVIDER NOTE - TEMPLATE
General Olumiant Pregnancy And Lactation Text: Based on animal studies, Olumiant may cause embryo-fetal harm when administered to pregnant women.  The medication should not be used in pregnancy.  Breastfeeding is not recommended during treatment.

## 2023-12-19 NOTE — CONSULT NOTE ADULT - PROBLEM/RECOMMENDATION-1
What Is The Reason For Today's Visit?: Full Body Skin Examination
What Is The Reason For Today's Visit? (Being Monitored For X): concerning skin lesions on an annual basis
How Severe Are Your Spot(S)?: moderate
DISPLAY PLAN FREE TEXT

## 2023-12-29 NOTE — ED ADULT NURSE NOTE - ED CARDIAC RHYTHM
Fax confirmation received. Paperwork placed in brown RN folder.
Per Eloina APRN: Raysa Maldonado we fax a copy of my note to Dr. Aure Martin office w/ Cardiology? I will also write a letter/cover sheet to attach to the fax before sending\". RN phoned Dr. Aure Martin office to obtain office fax #. Per Eloina's request, RN faxed copy of (Eloina's) office notes and letter to Dr. Aure Martin office. Fax #[phone]. Awaiting fax confirmation.
regular

## 2024-03-19 NOTE — DISCHARGE NOTE PROVIDER - NSDCQMANTITHROM_GEN_A_CORE
Abnormal as indicated, otherwise normal... Yes Abnormal as indicated, otherwise normal... Abnormal as indicated, otherwise normal...

## 2024-03-19 NOTE — DISCHARGE NOTE PROVIDER - YES NO FOR MLM POSITIVE OR NEGATIVE COVID RESULT
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

## 2024-12-16 NOTE — DISCHARGE NOTE PROVIDER - NSDCFUSCHEDAPPT_GEN_ALL_CORE_FT
Lab Results   Component Value Date    HGBA1C 7.3 (H) 09/28/2024       Recent Labs     12/15/24  1053 12/15/24  1614 12/15/24  2057 12/16/24  0658   POCGLU 244* 150* 194* 140       Blood Sugar Average: Last 72 hrs:  (P) 164.4     ANAHI AGUIRRE ; 07/29/2020 ; [facility] [address] ; 08/17/2020 ; [facility] [address]
